# Patient Record
Sex: MALE | Race: WHITE | NOT HISPANIC OR LATINO | Employment: OTHER | URBAN - METROPOLITAN AREA
[De-identification: names, ages, dates, MRNs, and addresses within clinical notes are randomized per-mention and may not be internally consistent; named-entity substitution may affect disease eponyms.]

---

## 2017-08-16 RX ORDER — TIZANIDINE 4 MG/1
TABLET ORAL
Qty: 45 TABLET | Refills: 2 | Status: SHIPPED | OUTPATIENT
Start: 2017-08-16 | End: 2018-10-23

## 2017-11-08 RX ORDER — ALBUTEROL SULFATE 90 UG/1
AEROSOL, METERED RESPIRATORY (INHALATION)
Qty: 9 EACH | Refills: 11 | Status: SHIPPED | OUTPATIENT
Start: 2017-11-08 | End: 2018-09-03 | Stop reason: SDUPTHER

## 2017-11-15 ENCOUNTER — CLINICAL SUPPORT (OUTPATIENT)
Dept: PRIMARY CARE CLINIC | Facility: CLINIC | Age: 67
End: 2017-11-15
Payer: MEDICARE

## 2017-11-15 ENCOUNTER — OFFICE VISIT (OUTPATIENT)
Dept: PRIMARY CARE CLINIC | Facility: CLINIC | Age: 67
End: 2017-11-15
Payer: MEDICARE

## 2017-11-15 VITALS
OXYGEN SATURATION: 93 % | DIASTOLIC BLOOD PRESSURE: 64 MMHG | RESPIRATION RATE: 18 BRPM | BODY MASS INDEX: 26.22 KG/M2 | HEART RATE: 99 BPM | HEIGHT: 70 IN | WEIGHT: 183.13 LBS | SYSTOLIC BLOOD PRESSURE: 131 MMHG | TEMPERATURE: 98 F

## 2017-11-15 DIAGNOSIS — Z12.5 PROSTATE CANCER SCREENING: ICD-10-CM

## 2017-11-15 DIAGNOSIS — J40 BRONCHITIS: ICD-10-CM

## 2017-11-15 DIAGNOSIS — R53.83 FATIGUE, UNSPECIFIED TYPE: ICD-10-CM

## 2017-11-15 DIAGNOSIS — R06.02 SOB (SHORTNESS OF BREATH): ICD-10-CM

## 2017-11-15 DIAGNOSIS — M25.552 HIP PAIN, BILATERAL: ICD-10-CM

## 2017-11-15 DIAGNOSIS — J32.9 SINUSITIS, UNSPECIFIED CHRONICITY, UNSPECIFIED LOCATION: Primary | ICD-10-CM

## 2017-11-15 DIAGNOSIS — J44.9 CHRONIC OBSTRUCTIVE PULMONARY DISEASE, UNSPECIFIED COPD TYPE: ICD-10-CM

## 2017-11-15 DIAGNOSIS — Z23 NEED FOR IMMUNIZATION AGAINST INFLUENZA: ICD-10-CM

## 2017-11-15 DIAGNOSIS — M25.551 HIP PAIN, BILATERAL: ICD-10-CM

## 2017-11-15 DIAGNOSIS — N40.0 BENIGN PROSTATIC HYPERPLASIA, UNSPECIFIED WHETHER LOWER URINARY TRACT SYMPTOMS PRESENT: ICD-10-CM

## 2017-11-15 DIAGNOSIS — Z72.0 TOBACCO USE: ICD-10-CM

## 2017-11-15 DIAGNOSIS — J45.40 MODERATE PERSISTENT ASTHMA WITHOUT COMPLICATION: ICD-10-CM

## 2017-11-15 LAB
ALBUMIN SERPL BCP-MCNC: 3.6 G/DL
ALP SERPL-CCNC: 122 U/L
ALT SERPL W/O P-5'-P-CCNC: 15 U/L
ANION GAP SERPL CALC-SCNC: 12 MMOL/L
AST SERPL-CCNC: 27 U/L
BASOPHILS # BLD AUTO: 0.13 K/UL
BASOPHILS NFR BLD: 1.4 %
BILIRUB SERPL-MCNC: 0.4 MG/DL
BUN SERPL-MCNC: 15 MG/DL
CALCIUM SERPL-MCNC: 10.4 MG/DL
CHLORIDE SERPL-SCNC: 105 MMOL/L
CO2 SERPL-SCNC: 26 MMOL/L
COMPLEXED PSA SERPL-MCNC: 1.2 NG/ML
CREAT SERPL-MCNC: 1.2 MG/DL
DIFFERENTIAL METHOD: ABNORMAL
EOSINOPHIL # BLD AUTO: 0.2 K/UL
EOSINOPHIL NFR BLD: 1.7 %
ERYTHROCYTE [DISTWIDTH] IN BLOOD BY AUTOMATED COUNT: 13.6 %
EST. GFR  (AFRICAN AMERICAN): >60 ML/MIN/1.73 M^2
EST. GFR  (NON AFRICAN AMERICAN): >60 ML/MIN/1.73 M^2
GLUCOSE SERPL-MCNC: 93 MG/DL
HCT VFR BLD AUTO: 46.8 %
HGB BLD-MCNC: 15.4 G/DL
IMM GRANULOCYTES # BLD AUTO: 0.02 K/UL
IMM GRANULOCYTES NFR BLD AUTO: 0.2 %
LYMPHOCYTES # BLD AUTO: 2.7 K/UL
LYMPHOCYTES NFR BLD: 29 %
MCH RBC QN AUTO: 32.3 PG
MCHC RBC AUTO-ENTMCNC: 32.9 G/DL
MCV RBC AUTO: 98 FL
MONOCYTES # BLD AUTO: 1 K/UL
MONOCYTES NFR BLD: 10.8 %
NEUTROPHILS # BLD AUTO: 5.3 K/UL
NEUTROPHILS NFR BLD: 56.9 %
NRBC BLD-RTO: 0 /100 WBC
PLATELET # BLD AUTO: 379 K/UL
PMV BLD AUTO: 9.3 FL
POTASSIUM SERPL-SCNC: 4.4 MMOL/L
PROT SERPL-MCNC: 8.9 G/DL
RBC # BLD AUTO: 4.77 M/UL
SODIUM SERPL-SCNC: 143 MMOL/L
TSH SERPL DL<=0.005 MIU/L-ACNC: 1.26 UIU/ML
WBC # BLD AUTO: 9.36 K/UL

## 2017-11-15 PROCEDURE — 99215 OFFICE O/P EST HI 40 MIN: CPT | Mod: PBBFAC,25,27,PN | Performed by: INTERNAL MEDICINE

## 2017-11-15 PROCEDURE — 99213 OFFICE O/P EST LOW 20 MIN: CPT | Mod: S$PBB,,, | Performed by: INTERNAL MEDICINE

## 2017-11-15 PROCEDURE — 85025 COMPLETE CBC W/AUTO DIFF WBC: CPT

## 2017-11-15 PROCEDURE — 99212 OFFICE O/P EST SF 10 MIN: CPT | Mod: PBBFAC,PN

## 2017-11-15 PROCEDURE — G0008 ADMIN INFLUENZA VIRUS VAC: HCPCS | Mod: PBBFAC,PN

## 2017-11-15 PROCEDURE — 84443 ASSAY THYROID STIM HORMONE: CPT

## 2017-11-15 PROCEDURE — 99999 PR PBB SHADOW E&M-EST. PATIENT-LVL II: CPT | Mod: PBBFAC,,,

## 2017-11-15 PROCEDURE — 96372 THER/PROPH/DIAG INJ SC/IM: CPT | Mod: PBBFAC,PN

## 2017-11-15 PROCEDURE — 84153 ASSAY OF PSA TOTAL: CPT

## 2017-11-15 PROCEDURE — 99999 PR PBB SHADOW E&M-EST. PATIENT-LVL V: CPT | Mod: PBBFAC,,, | Performed by: INTERNAL MEDICINE

## 2017-11-15 PROCEDURE — 80053 COMPREHEN METABOLIC PANEL: CPT

## 2017-11-15 RX ORDER — CYANOCOBALAMIN 1000 UG/ML
1000 INJECTION, SOLUTION INTRAMUSCULAR; SUBCUTANEOUS
Status: COMPLETED | OUTPATIENT
Start: 2017-11-15 | End: 2017-11-15

## 2017-11-15 RX ORDER — BETAMETHASONE SODIUM PHOSPHATE AND BETAMETHASONE ACETATE 3; 3 MG/ML; MG/ML
12 INJECTION, SUSPENSION INTRA-ARTICULAR; INTRALESIONAL; INTRAMUSCULAR; SOFT TISSUE
Status: COMPLETED | OUTPATIENT
Start: 2017-11-15 | End: 2017-11-15

## 2017-11-15 RX ADMIN — CYANOCOBALAMIN 1000 MCG: 1000 INJECTION, SOLUTION INTRAMUSCULAR; SUBCUTANEOUS at 10:11

## 2017-11-15 RX ADMIN — BETAMETHASONE ACETATE AND BETAMETHASONE SODIUM PHOSPHATE 12 MG: 3; 3 INJECTION, SUSPENSION INTRA-ARTICULAR; INTRALESIONAL; INTRAMUSCULAR; SOFT TISSUE at 10:11

## 2017-11-16 ENCOUNTER — TELEPHONE (OUTPATIENT)
Dept: PRIMARY CARE CLINIC | Facility: CLINIC | Age: 67
End: 2017-11-16

## 2017-11-16 RX ORDER — AZITHROMYCIN 250 MG/1
TABLET, FILM COATED ORAL
Qty: 6 TABLET | Refills: 0 | Status: SHIPPED | OUTPATIENT
Start: 2017-11-16 | End: 2017-11-21

## 2017-11-16 RX ORDER — PREDNISONE 20 MG/1
20 TABLET ORAL 2 TIMES DAILY
Qty: 14 TABLET | Refills: 0 | Status: SHIPPED | OUTPATIENT
Start: 2017-11-16 | End: 2017-11-23

## 2017-11-16 RX ORDER — TIOTROPIUM BROMIDE 18 UG/1
18 CAPSULE ORAL; RESPIRATORY (INHALATION) DAILY
Qty: 30 CAPSULE | Refills: 5 | Status: SHIPPED | OUTPATIENT
Start: 2017-11-16 | End: 2018-10-23

## 2017-11-16 NOTE — TELEPHONE ENCOUNTER
----- Message from Gonzalez Gonzales sent at 11/16/2017  9:42 AM CST -----  Contact: Wife,Yamilex Kaminski want to know if you going to send new rx to pharmacy if so please send to [facility], any questions please call back at [phone] (home)     [address] [facility] [address]  [place] [address]  Phone: [phone] Fax: [phone]

## 2017-11-16 NOTE — PROGRESS NOTES
Subjective:       Patient ID: Uriah Mills is a 67 y.o. male.    Chief Complaint: Flu Vaccine and bilateral hip pain    HPI  Pt need Flu vaccine and c/o bilateral hip pain increase when ambulate  No trauma no sob cp also c/o coughing congestion had colonoscopy <10yrs and normal and problems with prostae see urologist  Review of Systems    Objective:      Physical Exam   Constitutional: He is oriented to person, place, and time. He appears well-developed and well-nourished. No distress.   HENT:   Head: Normocephalic and atraumatic.   Right Ear: External ear normal.   Left Ear: External ear normal.   Nose: Nose normal.   Mouth/Throat: Oropharynx is clear and moist. No oropharyngeal exudate.   Eyes: Conjunctivae and EOM are normal. Pupils are equal, round, and reactive to light. Right eye exhibits no discharge. Left eye exhibits no discharge.   Neck: Normal range of motion. Neck supple. No thyromegaly present.   Cardiovascular: Normal rate, regular rhythm, normal heart sounds and intact distal pulses.  Exam reveals no gallop and no friction rub.    No murmur heard.  Pulmonary/Chest: Effort normal and breath sounds normal. No respiratory distress. He has no wheezes. He has no rales. He exhibits no tenderness.   Abdominal: Soft. Bowel sounds are normal. He exhibits no distension. There is no tenderness. There is no rebound and no guarding.   Musculoskeletal: Normal range of motion. He exhibits no edema, tenderness (bilateral hip pain and loer back pain increase with ambulation) or deformity.   Lymphadenopathy:     He has no cervical adenopathy.   Neurological: He is alert and oriented to person, place, and time.   Skin: Skin is warm and dry. Capillary refill takes less than 2 seconds. No rash noted. No erythema.   Psychiatric: He has a normal mood and affect. Judgment and thought content normal.   Nursing note and vitals reviewed.      Assessment:       1. Sinusitis, unspecified chronicity, unspecified location    2.  Bronchitis    3. Tobacco use    4. Hip pain, bilateral    5. Chronic obstructive pulmonary disease, unspecified COPD type    6. Benign prostatic hyperplasia, unspecified whether lower urinary tract symptoms present    7. Prostate cancer screening    8. Need for immunization against influenza    9. Fatigue, unspecified type    10. SOB (shortness of breath)    11. Moderate persistent asthma without complication        Plan:       Sinusitis, unspecified chronicity, unspecified location  -     azithromycin (Z-KADE) 250 MG tablet; Take 2 tablets by mouth on day 1; Take 1 tablet by mouth on days 2-5  Dispense: 6 tablet; Refill: 0    Bronchitis  -     CBC auto differential; Future; Expected date: 11/15/2017  -     Comprehensive metabolic panel; Future; Expected date: 11/15/2017  -     X-Ray Chest PA And Lateral; Future; Expected date: 11/15/2017  -     betamethasone acetate-betamethasone sodium phosphate injection 12 mg; Inject 2 mLs (12 mg total) into the muscle one time.    Tobacco use    Hip pain, bilateral  -     X-Ray Hip 3 or 4 views Bilateral; Future; Expected date: 11/15/2017  -     X-Ray Lumbar Spine Ap And Lateral; Future; Expected date: 11/15/2017    Chronic obstructive pulmonary disease, unspecified COPD type  -     tiotropium (SPIRIVA) 18 mcg inhalation capsule; Inhale 1 capsule (18 mcg total) into the lungs once daily. Controller  Dispense: 30 capsule; Refill: 5  -     predniSONE (DELTASONE) 20 MG tablet; Take 1 tablet (20 mg total) by mouth 2 (two) times daily.  Dispense: 14 tablet; Refill: 0    Benign prostatic hyperplasia, unspecified whether lower urinary tract symptoms present  -     POCT URINE DIPSTICK WITHOUT MICROSCOPE  -     Ambulatory referral to Urology    Prostate cancer screening  -     PSA, Screening; Future; Expected date: 02/15/2018    Need for immunization against influenza  -     Influenza - High Dose (65+) (PF) (IM)    Fatigue, unspecified type  -     TSH; Future; Expected date:  11/15/2017  -     cyanocobalamin injection 1,000 mcg; Inject 1 mL (1,000 mcg total) into the muscle one time.    SOB (shortness of breath)  -     POCT EKG 12-LEAD (NOT FOR [facility] USE); Future; Expected date: 11/16/2017    Moderate persistent asthma without complication

## 2017-11-17 ENCOUNTER — PATIENT MESSAGE (OUTPATIENT)
Dept: PRIMARY CARE CLINIC | Facility: CLINIC | Age: 67
End: 2017-11-17

## 2017-11-17 DIAGNOSIS — J44.9 CHRONIC OBSTRUCTIVE PULMONARY DISEASE, UNSPECIFIED COPD TYPE: ICD-10-CM

## 2017-11-17 DIAGNOSIS — N40.0 BENIGN PROSTATIC HYPERPLASIA, UNSPECIFIED WHETHER LOWER URINARY TRACT SYMPTOMS PRESENT: Primary | ICD-10-CM

## 2017-11-17 RX ORDER — SILODOSIN 8 MG/1
8 CAPSULE ORAL DAILY
Qty: 30 CAPSULE | Refills: 11 | Status: SHIPPED | OUTPATIENT
Start: 2017-11-17 | End: 2018-10-23

## 2017-11-20 ENCOUNTER — TELEPHONE (OUTPATIENT)
Dept: PRIMARY CARE CLINIC | Facility: CLINIC | Age: 67
End: 2017-11-20

## 2017-11-20 NOTE — TELEPHONE ENCOUNTER
----- Message from Geo Montalvo MD sent at 11/19/2017  8:13 PM CST -----  Please call the patient regarding his labs are normal so far

## 2017-11-24 ENCOUNTER — TELEPHONE (OUTPATIENT)
Dept: PRIMARY CARE CLINIC | Facility: CLINIC | Age: 67
End: 2017-11-24

## 2017-11-24 NOTE — TELEPHONE ENCOUNTER
----- Message from Veronica Tavarez sent at 11/24/2017  9:50 AM CST -----  Yamilex Mills / [phone] returning call for Nahomy

## 2017-11-24 NOTE — TELEPHONE ENCOUNTER
Patient notified with results, also documented in result notes. Wife states that there were 2 medications that were not covered by the insurance and too pricey. Just wanted it documented in the record

## 2018-09-04 RX ORDER — ALBUTEROL SULFATE 90 UG/1
AEROSOL, METERED RESPIRATORY (INHALATION)
Qty: 9 EACH | Refills: 11 | Status: SHIPPED | OUTPATIENT
Start: 2018-09-04 | End: 2019-03-13

## 2018-10-23 ENCOUNTER — OFFICE VISIT (OUTPATIENT)
Dept: PRIMARY CARE CLINIC | Facility: CLINIC | Age: 68
End: 2018-10-23
Payer: MEDICARE

## 2018-10-23 VITALS
DIASTOLIC BLOOD PRESSURE: 66 MMHG | SYSTOLIC BLOOD PRESSURE: 134 MMHG | HEIGHT: 70 IN | TEMPERATURE: 98 F | HEART RATE: 82 BPM | RESPIRATION RATE: 18 BRPM | OXYGEN SATURATION: 97 % | WEIGHT: 188.5 LBS | BODY MASS INDEX: 26.99 KG/M2

## 2018-10-23 DIAGNOSIS — Z23 NEED FOR IMMUNIZATION AGAINST TETANUS ALONE: ICD-10-CM

## 2018-10-23 DIAGNOSIS — L03.032 INFECTION OF NAIL BED OF TOE OF LEFT FOOT: Primary | ICD-10-CM

## 2018-10-23 DIAGNOSIS — Z23 NEED FOR PROPHYLACTIC VACCINATION AND INOCULATION AGAINST INFLUENZA: ICD-10-CM

## 2018-10-23 DIAGNOSIS — J44.9 CHRONIC OBSTRUCTIVE PULMONARY DISEASE, UNSPECIFIED COPD TYPE: ICD-10-CM

## 2018-10-23 DIAGNOSIS — N40.0 BENIGN PROSTATIC HYPERPLASIA, UNSPECIFIED WHETHER LOWER URINARY TRACT SYMPTOMS PRESENT: ICD-10-CM

## 2018-10-23 PROCEDURE — 99999 PR PBB SHADOW E&M-EST. PATIENT-LVL III: CPT | Mod: PBBFAC,,, | Performed by: INTERNAL MEDICINE

## 2018-10-23 PROCEDURE — 99213 OFFICE O/P EST LOW 20 MIN: CPT | Mod: PBBFAC,PN,25 | Performed by: INTERNAL MEDICINE

## 2018-10-23 PROCEDURE — 90662 IIV NO PRSV INCREASED AG IM: CPT | Mod: PBBFAC,PN

## 2018-10-23 PROCEDURE — 99213 OFFICE O/P EST LOW 20 MIN: CPT | Mod: S$PBB,,, | Performed by: INTERNAL MEDICINE

## 2018-10-23 PROCEDURE — 90714 TD VACC NO PRESV 7 YRS+ IM: CPT | Mod: PBBFAC,PN

## 2018-10-23 PROCEDURE — 1101F PT FALLS ASSESS-DOCD LE1/YR: CPT | Mod: ,,, | Performed by: INTERNAL MEDICINE

## 2018-10-23 RX ORDER — SILODOSIN 8 MG/1
8 CAPSULE ORAL DAILY
Qty: 30 CAPSULE | Refills: 11 | Status: SHIPPED | OUTPATIENT
Start: 2018-10-23 | End: 2019-03-13

## 2018-10-23 RX ORDER — SULFAMETHOXAZOLE AND TRIMETHOPRIM 800; 160 MG/1; MG/1
1 TABLET ORAL 2 TIMES DAILY
Qty: 20 TABLET | Refills: 0 | Status: SHIPPED | OUTPATIENT
Start: 2018-10-23 | End: 2018-11-02

## 2018-10-23 RX ORDER — CLINDAMYCIN HYDROCHLORIDE 300 MG/1
300 CAPSULE ORAL 4 TIMES DAILY
Qty: 40 CAPSULE | Refills: 0 | Status: SHIPPED | OUTPATIENT
Start: 2018-10-23 | End: 2019-03-13

## 2018-10-23 RX ORDER — MUPIROCIN 20 MG/G
OINTMENT TOPICAL 2 TIMES DAILY
Qty: 22 G | Refills: 0 | Status: SHIPPED | OUTPATIENT
Start: 2018-10-23 | End: 2019-03-13

## 2018-10-23 NOTE — PROGRESS NOTES
Patient ID by name and . NKDA. Influenza High Dose vaccine given IM in right deltoid and Tetanus vaccine given IM in left deltoid using aseptic technique. Aspirated with no blood noted. Patient tolerated well. Given per physicians order. No adverse reactions noted.

## 2018-10-24 NOTE — PROGRESS NOTES
Subjective:       Patient ID: Uriah Mills is a 67 y.o. male.    Chief Complaint: Insect Bite    HPI patient complained of possible insect by in the left big toe now with swelling redness at the base of the nail bed with no clear yellow day and is unwitnessed and the toenail at the tip no fever no trauma no short of breath or chest pain has not any had tetanus vaccine since Silke more and 12 year  Review of Systems    Objective:      Physical Exam   Constitutional: He is oriented to person, place, and time. He appears well-developed and well-nourished. No distress.   HENT:   Head: Normocephalic and atraumatic.   Right Ear: External ear normal.   Left Ear: External ear normal.   Nose: Nose normal.   Mouth/Throat: Oropharynx is clear and moist. No oropharyngeal exudate.   Eyes: Conjunctivae and EOM are normal. Pupils are equal, round, and reactive to light. Right eye exhibits no discharge. Left eye exhibits no discharge.   Neck: Normal range of motion. Neck supple. No thyromegaly present.   Cardiovascular: Normal rate, regular rhythm, normal heart sounds and intact distal pulses. Exam reveals no gallop and no friction rub.   No murmur heard.  Pulmonary/Chest: Effort normal and breath sounds normal. No respiratory distress. He has no wheezes. He has no rales. He exhibits no tenderness.   Abdominal: Soft. Bowel sounds are normal. He exhibits no distension. There is no tenderness. There is no rebound and no guarding.   Musculoskeletal: Normal range of motion. He exhibits tenderness (Connor an erythematous at the base of the left big toenail with yellow discoloration of the neck to and some clear yellow urine drainage on the medial tip of the toenail). He exhibits no edema or deformity.   Lymphadenopathy:     He has no cervical adenopathy.   Neurological: He is alert and oriented to person, place, and time.   Skin: Skin is warm and dry. Capillary refill takes less than 2 seconds. No rash noted. No erythema.    Psychiatric: He has a normal mood and affect. Judgment and thought content normal.   Nursing note and vitals reviewed.      Assessment:       1. Infection of nail bed of toe of left foot    2. Chronic obstructive pulmonary disease, unspecified COPD type    3. Benign prostatic hyperplasia, unspecified whether lower urinary tract symptoms present    4. Need for immunization against tetanus alone    5. Need for prophylactic vaccination and inoculation against influenza        Plan:       Infection of nail bed of toe of left foot  Comments:  Local wound care with the antiseptic sap peroxide and apply Bactroban twice a day  Orders:  -     clindamycin (CLEOCIN) 300 MG capsule; Take 1 capsule (300 mg total) by mouth 4 (four) times daily.  Dispense: 40 capsule; Refill: 0  -     sulfamethoxazole-trimethoprim 800-160mg (BACTRIM DS) 800-160 mg Tab; Take 1 tablet by mouth 2 (two) times daily. for 10 days  Dispense: 20 tablet; Refill: 0  -     mupirocin (BACTROBAN) 2 % ointment; Apply topically 2 (two) times daily.  Dispense: 22 g; Refill: 0    Chronic obstructive pulmonary disease, unspecified COPD type  -     umeclidinium-vilanterol (ANORO ELLIPTA) 62.5-25 mcg/actuation DsDv; Inhale 1 puff into the lungs once daily. Controller  Dispense: 60 each; Refill: 5    Benign prostatic hyperplasia, unspecified whether lower urinary tract symptoms present  -     silodosin (RAPAFLO) 8 mg Cap capsule; Take 1 capsule (8 mg total) by mouth once daily.  Dispense: 30 capsule; Refill: 11    Need for immunization against tetanus alone  -     (In Office Administered) Td Vaccine - Preservative Free    Need for prophylactic vaccination and inoculation against influenza  -     Flu Vaccine - High Dose (PF) (65+)

## 2019-03-13 ENCOUNTER — OFFICE VISIT (OUTPATIENT)
Dept: PRIMARY CARE CLINIC | Facility: CLINIC | Age: 69
End: 2019-03-13
Payer: MEDICARE

## 2019-03-13 VITALS
TEMPERATURE: 98 F | SYSTOLIC BLOOD PRESSURE: 139 MMHG | HEIGHT: 70 IN | DIASTOLIC BLOOD PRESSURE: 68 MMHG | BODY MASS INDEX: 27.44 KG/M2 | HEART RATE: 90 BPM | OXYGEN SATURATION: 97 % | RESPIRATION RATE: 18 BRPM | WEIGHT: 191.69 LBS

## 2019-03-13 DIAGNOSIS — Z11.59 NEED FOR HEPATITIS C SCREENING TEST: ICD-10-CM

## 2019-03-13 DIAGNOSIS — G56.01 CARPAL TUNNEL SYNDROME OF RIGHT WRIST: Primary | ICD-10-CM

## 2019-03-13 DIAGNOSIS — Z13.6 ENCOUNTER FOR SCREENING FOR CARDIOVASCULAR DISORDERS: ICD-10-CM

## 2019-03-13 DIAGNOSIS — L98.9 SKIN LESIONS: ICD-10-CM

## 2019-03-13 DIAGNOSIS — Z00.00 ROUTINE MEDICAL EXAM: ICD-10-CM

## 2019-03-13 DIAGNOSIS — Z12.11 COLON CANCER SCREENING: ICD-10-CM

## 2019-03-13 DIAGNOSIS — J44.9 CHRONIC OBSTRUCTIVE PULMONARY DISEASE, UNSPECIFIED COPD TYPE: ICD-10-CM

## 2019-03-13 DIAGNOSIS — R06.02 SOB (SHORTNESS OF BREATH) ON EXERTION: ICD-10-CM

## 2019-03-13 DIAGNOSIS — L57.0 ACTINIC KERATOSES: ICD-10-CM

## 2019-03-13 DIAGNOSIS — Z12.5 PROSTATE CANCER SCREENING: ICD-10-CM

## 2019-03-13 DIAGNOSIS — N40.0 BENIGN PROSTATIC HYPERPLASIA, UNSPECIFIED WHETHER LOWER URINARY TRACT SYMPTOMS PRESENT: ICD-10-CM

## 2019-03-13 LAB
BILIRUB SERPL-MCNC: NORMAL MG/DL
BLOOD URINE, POC: NORMAL
COLOR, POC UA: YELLOW
GLUCOSE UR QL STRIP: NORMAL
KETONES UR QL STRIP: NORMAL
LEUKOCYTE ESTERASE URINE, POC: NORMAL
NITRITE, POC UA: NORMAL
PH, POC UA: 5
PROTEIN, POC: NORMAL
SPECIFIC GRAVITY, POC UA: 1.01
UROBILINOGEN, POC UA: NORMAL

## 2019-03-13 PROCEDURE — 93005 ELECTROCARDIOGRAM TRACING: CPT | Mod: S$GLB,,, | Performed by: INTERNAL MEDICINE

## 2019-03-13 PROCEDURE — 99499 RISK ADDL DX/OHS AUDIT: ICD-10-PCS | Mod: S$GLB,,, | Performed by: INTERNAL MEDICINE

## 2019-03-13 PROCEDURE — 93005 EKG 12-LEAD: ICD-10-PCS | Mod: S$GLB,,, | Performed by: INTERNAL MEDICINE

## 2019-03-13 PROCEDURE — 93010 EKG 12-LEAD: ICD-10-PCS | Mod: S$GLB,,, | Performed by: INTERNAL MEDICINE

## 2019-03-13 PROCEDURE — 99999 PR PBB SHADOW E&M-EST. PATIENT-LVL V: CPT | Mod: PBBFAC,,, | Performed by: INTERNAL MEDICINE

## 2019-03-13 PROCEDURE — 1101F PT FALLS ASSESS-DOCD LE1/YR: CPT | Mod: CPTII,S$GLB,, | Performed by: INTERNAL MEDICINE

## 2019-03-13 PROCEDURE — 81002 URINALYSIS NONAUTO W/O SCOPE: CPT | Mod: S$GLB,,, | Performed by: INTERNAL MEDICINE

## 2019-03-13 PROCEDURE — 93010 ELECTROCARDIOGRAM REPORT: CPT | Mod: S$GLB,,, | Performed by: INTERNAL MEDICINE

## 2019-03-13 PROCEDURE — 99214 PR OFFICE/OUTPT VISIT, EST, LEVL IV, 30-39 MIN: ICD-10-PCS | Mod: 25,S$GLB,, | Performed by: INTERNAL MEDICINE

## 2019-03-13 PROCEDURE — 81002 POCT URINE DIPSTICK WITHOUT MICROSCOPE: ICD-10-PCS | Mod: S$GLB,,, | Performed by: INTERNAL MEDICINE

## 2019-03-13 PROCEDURE — 99999 PR PBB SHADOW E&M-EST. PATIENT-LVL V: ICD-10-PCS | Mod: PBBFAC,,, | Performed by: INTERNAL MEDICINE

## 2019-03-13 PROCEDURE — 99499 UNLISTED E&M SERVICE: CPT | Mod: S$GLB,,, | Performed by: INTERNAL MEDICINE

## 2019-03-13 PROCEDURE — 1101F PR PT FALLS ASSESS DOC 0-1 FALLS W/OUT INJ PAST YR: ICD-10-PCS | Mod: CPTII,S$GLB,, | Performed by: INTERNAL MEDICINE

## 2019-03-13 PROCEDURE — 99214 OFFICE O/P EST MOD 30 MIN: CPT | Mod: 25,S$GLB,, | Performed by: INTERNAL MEDICINE

## 2019-03-13 RX ORDER — TERAZOSIN 5 MG/1
5 CAPSULE ORAL NIGHTLY
Qty: 30 CAPSULE | Refills: 11 | Status: SHIPPED | OUTPATIENT
Start: 2019-03-13 | End: 2020-03-15

## 2019-03-13 RX ORDER — TAMSULOSIN HYDROCHLORIDE 0.4 MG/1
0.4 CAPSULE ORAL DAILY
Qty: 30 CAPSULE | Refills: 11 | Status: SHIPPED | OUTPATIENT
Start: 2019-03-13 | End: 2020-03-15

## 2019-03-13 NOTE — PROGRESS NOTES
Subjective:       Patient ID: Uriah Mills is a 68 y.o. male.    Chief Complaint: Discuss Medications; Arthritis; and Prostate Problem    HPI patient is here for follow-up routine and problem with medication since new insurance had to change his the medicine for benign prostate hypertrophy to get a medication in the formulary and also inhaler for his COPD asthma also need to be changed patient complained of shortness of breath with exertion but still smoking no chest pain no headache dizziness or syncope he had colonoscopy more than 10 years ago and was normal has symptom a carpal tunnel syndrome right hand had treatment but not better patient work as a  for many years before retired patient still smoking but trying to quit patient see Dr. Dsouza and as his cardiologist also complain by his right hip has been hurting with ambulation but not severe  Review of Systems    Objective:      Physical Exam   Constitutional: He is oriented to person, place, and time. He appears well-developed and well-nourished. No distress.   HENT:   Head: Normocephalic and atraumatic.   Right Ear: External ear normal.   Left Ear: External ear normal.   Nose: Nose normal.   Mouth/Throat: Oropharynx is clear and moist. No oropharyngeal exudate.   Eyes: Conjunctivae and EOM are normal. Pupils are equal, round, and reactive to light. Right eye exhibits no discharge. Left eye exhibits no discharge.   Neck: Normal range of motion. Neck supple. No thyromegaly present.   Cardiovascular: Normal rate, regular rhythm, normal heart sounds and intact distal pulses. Exam reveals no gallop and no friction rub.   No murmur heard.  Pulmonary/Chest: Effort normal and breath sounds normal. No respiratory distress. He has no wheezes. He has no rales. He exhibits no tenderness.   Abdominal: Soft. Bowel sounds are normal. He exhibits no distension. There is no tenderness. There is no rebound and no guarding.   Musculoskeletal: Normal range of  motion. He exhibits no edema, tenderness or deformity.   Lymphadenopathy:     He has no cervical adenopathy.   Neurological: He is alert and oriented to person, place, and time.   Skin: Skin is warm and dry. Capillary refill takes less than 2 seconds. No rash noted. No erythema.   Psychiatric: He has a normal mood and affect. Judgment and thought content normal.   Nursing note and vitals reviewed.      Assessment:       1. Carpal tunnel syndrome of right wrist    2. Benign prostatic hyperplasia, unspecified whether lower urinary tract symptoms present    3. Chronic obstructive pulmonary disease, unspecified COPD type    4. SOB (shortness of breath) on exertion    5. Prostate cancer screening    6. Colon cancer screening    7. Need for hepatitis C screening test    8. Routine medical exam    9. Encounter for screening for cardiovascular disorders    10. Skin lesions    11. Actinic keratoses        Plan:       Carpal tunnel syndrome of right wrist  Comments:  Orthopedic consult for carpal tunnel release when patient ready    Benign prostatic hyperplasia, unspecified whether lower urinary tract symptoms present  -     tamsulosin (FLOMAX) 0.4 mg Cap; Take 1 capsule (0.4 mg total) by mouth once daily.  Dispense: 30 capsule; Refill: 11  -     terazosin (HYTRIN) 5 MG capsule; Take 1 capsule (5 mg total) by mouth every evening.  Dispense: 30 capsule; Refill: 11  -     Ambulatory consult to Urology    Chronic obstructive pulmonary disease, unspecified COPD type  -     ipratropium-albuterol (COMBIVENT)  mcg/actuation inhaler; Inhale 2 puffs into the lungs every 6 (six) hours as needed for Wheezing. Rescue  Dispense: 4 g; Refill: 11  -     Complete PFT w/ bronchodilator; Future    SOB (shortness of breath) on exertion  Comments:  Pulmonary from COPD from many years of smoking patient already have cardiologist Dr. Dsouza had cardiac workup with him    Prostate cancer screening  -     PSA, Screening; Future; Expected  date: 03/13/2019    Colon cancer screening  -     Ambulatory referral to Colorectal Surgery    Need for hepatitis C screening test  -     Hepatitis C antibody; Future; Expected date: 03/13/2019    Routine medical exam  -     CBC auto differential; Future; Expected date: 03/13/2019  -     Comprehensive metabolic panel; Future; Expected date: 03/13/2019  -     X-Ray Chest PA And Lateral; Future; Expected date: 03/13/2019  -     POCT URINE DIPSTICK WITHOUT MICROSCOPE  -     SCHEDULED EKG 12-LEAD (to [facility]); Future    Encounter for screening for cardiovascular disorders  -     Lipid panel; Future; Expected date: 03/13/2019    Skin lesions  -     Ambulatory referral to Dermatology    Actinic keratoses  -     Ambulatory referral to Dermatology

## 2019-03-14 NOTE — PATIENT INSTRUCTIONS
Patient is blood test chest x-ray EKG UA  Urology consult for BPH still symptomatic with medication  Colonoscopy with Dr. Sequeira  And dermatology consult for skin lesion

## 2019-03-20 ENCOUNTER — TELEPHONE (OUTPATIENT)
Dept: PRIMARY CARE CLINIC | Facility: CLINIC | Age: 69
End: 2019-03-20

## 2019-03-20 DIAGNOSIS — Z12.11 COLON CANCER SCREENING: Primary | ICD-10-CM

## 2019-03-20 NOTE — TELEPHONE ENCOUNTER
----- Message from Gisela Mills sent at 3/20/2019 10:43 AM CDT -----  Hello,    We have received an FOBT FIT KIT from your patient    Uriah Mills, MRN [id number].There are no orders in EPIC for the testing. Can you please submit an order for the test and please call me @ [phone] to let me know it is in, so that we can process this test. Please call the lab with any questions.    Thank you,   Gisela Mills MT([facility])  [facility]  [phone]

## 2019-03-21 ENCOUNTER — LAB VISIT (OUTPATIENT)
Dept: LAB | Facility: HOSPITAL | Age: 69
End: 2019-03-21
Attending: INTERNAL MEDICINE
Payer: MEDICARE

## 2019-03-21 ENCOUNTER — OFFICE VISIT (OUTPATIENT)
Dept: PRIMARY CARE CLINIC | Facility: CLINIC | Age: 69
End: 2019-03-21
Payer: MEDICARE

## 2019-03-21 VITALS
TEMPERATURE: 98 F | DIASTOLIC BLOOD PRESSURE: 55 MMHG | SYSTOLIC BLOOD PRESSURE: 125 MMHG | OXYGEN SATURATION: 95 % | HEART RATE: 95 BPM | HEIGHT: 70 IN | BODY MASS INDEX: 27.47 KG/M2 | WEIGHT: 191.88 LBS | RESPIRATION RATE: 18 BRPM

## 2019-03-21 DIAGNOSIS — F51.01 PRIMARY INSOMNIA: ICD-10-CM

## 2019-03-21 DIAGNOSIS — M51.36 HERNIATION OF INTERVERTEBRAL DISC OF LUMBAR SPINE DUE TO DEGENERATION: ICD-10-CM

## 2019-03-21 DIAGNOSIS — Z12.11 COLON CANCER SCREENING: ICD-10-CM

## 2019-03-21 DIAGNOSIS — L57.0 ACTINIC KERATOSES: ICD-10-CM

## 2019-03-21 DIAGNOSIS — G56.01 CARPAL TUNNEL SYNDROME OF RIGHT WRIST: ICD-10-CM

## 2019-03-21 DIAGNOSIS — F41.9 ANXIETY: Primary | ICD-10-CM

## 2019-03-21 DIAGNOSIS — M51.26 HERNIATION OF INTERVERTEBRAL DISC OF LUMBAR SPINE DUE TO DEGENERATION: ICD-10-CM

## 2019-03-21 PROCEDURE — 99999 PR PBB SHADOW E&M-EST. PATIENT-LVL IV: ICD-10-PCS | Mod: PBBFAC,,, | Performed by: INTERNAL MEDICINE

## 2019-03-21 PROCEDURE — 1101F PT FALLS ASSESS-DOCD LE1/YR: CPT | Mod: CPTII,S$GLB,, | Performed by: INTERNAL MEDICINE

## 2019-03-21 PROCEDURE — 1101F PR PT FALLS ASSESS DOC 0-1 FALLS W/OUT INJ PAST YR: ICD-10-PCS | Mod: CPTII,S$GLB,, | Performed by: INTERNAL MEDICINE

## 2019-03-21 PROCEDURE — 99214 PR OFFICE/OUTPT VISIT, EST, LEVL IV, 30-39 MIN: ICD-10-PCS | Mod: S$GLB,,, | Performed by: INTERNAL MEDICINE

## 2019-03-21 PROCEDURE — 99999 PR PBB SHADOW E&M-EST. PATIENT-LVL IV: CPT | Mod: PBBFAC,,, | Performed by: INTERNAL MEDICINE

## 2019-03-21 PROCEDURE — 82274 ASSAY TEST FOR BLOOD FECAL: CPT

## 2019-03-21 PROCEDURE — 99214 OFFICE O/P EST MOD 30 MIN: CPT | Mod: S$GLB,,, | Performed by: INTERNAL MEDICINE

## 2019-03-21 RX ORDER — LORAZEPAM 1 MG/1
1 TABLET ORAL NIGHTLY PRN
Qty: 30 TABLET | Refills: 0 | Status: SHIPPED | OUTPATIENT
Start: 2019-03-21 | End: 2021-03-04

## 2019-03-21 RX ORDER — TIZANIDINE 4 MG/1
4 TABLET ORAL 2 TIMES DAILY PRN
Qty: 30 TABLET | Refills: 1 | Status: SHIPPED | OUTPATIENT
Start: 2019-03-21 | End: 2019-03-31

## 2019-03-21 NOTE — PROGRESS NOTES
Subjective:       Patient ID: Uriah Mills is a 68 y.o. male.    Chief Complaint: Results    HPI  patient is here for follow-up and blood test results review labs with palpation CBC CMP UA lipid profile PSA hepatitis C antibody own within normal limits patient has complained of back pain radiated down to both legs this has been ongoing problem from many years sometimes gets worse if he over do things at home still try to stay active patient also complained of symptom right carpal tunnel syndrome wearing wrist brace which help a little but still hurt currently not ready for surgery yet he also complained of anxiety and insomnia past at night cannot sleep uses take medication in the past but not currently request medication to take when needed occasionally denies short of breath chest pain headache dyspnea with exertion  Review of Systems    Objective:      Physical Exam   Constitutional: He is oriented to person, place, and time. He appears well-developed and well-nourished. No distress.   HENT:   Head: Normocephalic and atraumatic.   Right Ear: External ear normal.   Left Ear: External ear normal.   Nose: Nose normal.   Mouth/Throat: Oropharynx is clear and moist. No oropharyngeal exudate.   Eyes: Conjunctivae and EOM are normal. Pupils are equal, round, and reactive to light. Right eye exhibits no discharge. Left eye exhibits no discharge.   Neck: Normal range of motion. Neck supple. No thyromegaly present.   Cardiovascular: Normal rate, regular rhythm, normal heart sounds and intact distal pulses. Exam reveals no gallop and no friction rub.   No murmur heard.  Pulmonary/Chest: Effort normal and breath sounds normal. No respiratory distress. He has no wheezes. He has no rales. He exhibits no tenderness.   Abdominal: Soft. Bowel sounds are normal. He exhibits no distension. There is no tenderness. There is no rebound and no guarding.   Musculoskeletal: Normal range of motion. He exhibits tenderness (Tenderness  across lower back with palpation). He exhibits no edema or deformity.   Lymphadenopathy:     He has no cervical adenopathy.   Neurological: He is alert and oriented to person, place, and time.   Numbness and burning in the hand and fingers from carpal tunnel syndrome   Skin: Skin is warm and dry. Capillary refill takes less than 2 seconds. No rash noted. No erythema.   Diffuse scaly grayish  patchy skin rash in both upper extremity   Psychiatric: He has a normal mood and affect. Judgment and thought content normal.   Nursing note and vitals reviewed.      Assessment:       1. Anxiety    2. Primary insomnia    3. 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 Carpal tunnel syndrome of right wrist    5. Actinic keratoses        Plan:       Anxiety  -     LORazepam (ATIVAN) 1 MG tablet; Take 1 tablet (1 mg total) by mouth nightly as needed for Anxiety (insomnia).  Dispense: 30 tablet; Refill: 0    Primary insomnia  -     LORazepam (ATIVAN) 1 MG tablet; Take 1 tablet (1 mg total) by mouth nightly as needed for Anxiety (insomnia).  Dispense: 30 tablet; Refill: 0    Herniation of intervertebral disc of lumbar spine due to degeneration  Comments:  Patient had multiple epidural injection in the past without relief is to be on pain medication in the past consider neurosurgery consult if not better  Orders:  -     tiZANidine (ZANAFLEX) 4 MG tablet; Take 1 tablet (4 mg total) by mouth 2 (two) times daily as needed (muscle spasm).  Dispense: 30 tablet; Refill: 1    Carpal tunnel syndrome of right wrist  Comments:  Referral to orthopedic when patient ready for surgical intervention    Actinic keratoses  -     Ambulatory Referral to Dermatology

## 2019-03-22 LAB — HEMOCCULT STL QL IA: NEGATIVE

## 2019-03-23 PROBLEM — G56.01 CARPAL TUNNEL SYNDROME OF RIGHT WRIST: Status: ACTIVE | Noted: 2019-03-23

## 2019-04-12 ENCOUNTER — TELEPHONE (OUTPATIENT)
Dept: ORTHOPEDICS | Facility: CLINIC | Age: 69
End: 2019-04-12

## 2019-04-12 DIAGNOSIS — M79.641 PAIN OF RIGHT HAND: Primary | ICD-10-CM

## 2019-04-15 ENCOUNTER — OFFICE VISIT (OUTPATIENT)
Dept: ORTHOPEDICS | Facility: CLINIC | Age: 69
End: 2019-04-15
Payer: MEDICARE

## 2019-04-15 VITALS
DIASTOLIC BLOOD PRESSURE: 66 MMHG | HEART RATE: 85 BPM | SYSTOLIC BLOOD PRESSURE: 143 MMHG | WEIGHT: 193.56 LBS | BODY MASS INDEX: 27.77 KG/M2

## 2019-04-15 DIAGNOSIS — J44.9 CHRONIC OBSTRUCTIVE PULMONARY DISEASE, UNSPECIFIED COPD TYPE: ICD-10-CM

## 2019-04-15 DIAGNOSIS — G56.01 CARPAL TUNNEL SYNDROME OF RIGHT WRIST: Primary | ICD-10-CM

## 2019-04-15 PROCEDURE — 99204 OFFICE O/P NEW MOD 45 MIN: CPT | Mod: S$GLB,,, | Performed by: ORTHOPAEDIC SURGERY

## 2019-04-15 PROCEDURE — 99204 PR OFFICE/OUTPT VISIT, NEW, LEVL IV, 45-59 MIN: ICD-10-PCS | Mod: S$GLB,,, | Performed by: ORTHOPAEDIC SURGERY

## 2019-04-15 PROCEDURE — 1101F PT FALLS ASSESS-DOCD LE1/YR: CPT | Mod: CPTII,S$GLB,, | Performed by: ORTHOPAEDIC SURGERY

## 2019-04-15 PROCEDURE — 1101F PR PT FALLS ASSESS DOC 0-1 FALLS W/OUT INJ PAST YR: ICD-10-PCS | Mod: CPTII,S$GLB,, | Performed by: ORTHOPAEDIC SURGERY

## 2019-04-15 PROCEDURE — 99999 PR PBB SHADOW E&M-EST. PATIENT-LVL III: CPT | Mod: PBBFAC,,, | Performed by: ORTHOPAEDIC SURGERY

## 2019-04-15 PROCEDURE — 99999 PR PBB SHADOW E&M-EST. PATIENT-LVL III: ICD-10-PCS | Mod: PBBFAC,,, | Performed by: ORTHOPAEDIC SURGERY

## 2019-04-15 RX ORDER — CELECOXIB 200 MG/1
200 CAPSULE ORAL EVERY OTHER DAY
Qty: 7 CAPSULE | Refills: 0 | Status: SHIPPED | OUTPATIENT
Start: 2019-04-15 | End: 2019-04-29

## 2019-04-15 RX ORDER — UMECLIDINIUM BROMIDE AND VILANTEROL TRIFENATATE 62.5; 25 UG/1; UG/1
POWDER RESPIRATORY (INHALATION)
Qty: 60 EACH | Refills: 5 | Status: SHIPPED | OUTPATIENT
Start: 2019-04-15 | End: 2019-10-22 | Stop reason: CLARIF

## 2019-04-15 NOTE — PATIENT INSTRUCTIONS
Mr. Mills has symptoms and signs of of carpal tunnel syndrome. He is advised to get X-rays and EMG evaluation and return parminder follow up.

## 2019-04-15 NOTE — PROGRESS NOTES
Subjective:      Patient ID: Uriah Mills is a 68 y.o. male.    Chief Complaint: Pain of the Right Wrist      HPI:Mr. Mills has been having pain , tingling and numbness of right hand for the past 4 months. His symptoms are mostly in his thumb, index and middle fingers. Pain is more at night and it disturbs his sleep. He wears a wrist splint at night and it relieves his symptoms.    Past Medical History:   Diagnosis Date    Arthritis     Gastrointestinal disease      Past Surgical History:   Procedure Laterality Date    APPENDECTOMY      GASTRIC FUNDOPLICATION       Social History     Socioeconomic History    Marital status:      Spouse name: Not on file    Number of children: Not on file    Years of education: Not on file    Highest education level: Not on file   Occupational History    Not on file   Social Needs    Financial resource strain: Not on file    Food insecurity:     Worry: Not on file     Inability: Not on file    Transportation needs:     Medical: Not on file     Non-medical: Not on file   Tobacco Use    Smoking status: Current Every Day Smoker     Types: Cigars    Smokeless tobacco: Never Used   Substance and Sexual Activity    Alcohol use: No    Drug use: No    Sexual activity: Not Currently   Lifestyle    Physical activity:     Days per week: Not on file     Minutes per session: Not on file    Stress: Not on file   Relationships    Social connections:     Talks on phone: Not on file     Gets together: Not on file     Attends [employer] service: Not on file     Active member of club or organization: Not on file     Attends meetings of clubs or organizations: Not on file     Relationship status: Not on file   Other Topics Concern    Not on file   Social History Narrative    Not on file         Current Outpatient Medications:     ipratropium-albuterol (COMBIVENT)  mcg/actuation inhaler, Inhale 2 puffs into the lungs every 6 (six) hours as needed for Wheezing.  Rescue, Disp: 4 g, Rfl: 11    tamsulosin (FLOMAX) 0.4 mg Cap, Take 1 capsule (0.4 mg total) by mouth once daily., Disp: 30 capsule, Rfl: 11    terazosin (HYTRIN) 5 MG capsule, Take 1 capsule (5 mg total) by mouth every evening., Disp: 30 capsule, Rfl: 11    umeclidinium-vilanterol (ANORO ELLIPTA) 62.5-25 mcg/actuation DsDv, Inhale 1 puff into the lungs once daily. Controller, Disp: 60 each, Rfl: 5    LORazepam (ATIVAN) 1 MG tablet, Take 1 tablet (1 mg total) by mouth nightly as needed for Anxiety (insomnia)., Disp: 30 tablet, Rfl: 0  Review of patient's allergies indicates:  No Known Allergies    BP (!) 143/66   Pulse 85   Wt 87.8 kg (193 lb 9 oz)   BMI 27.77 kg/m²     Review of Systems   Constitution: Negative for chills, decreased appetite, diaphoresis, fever, malaise/fatigue, night sweats, weight gain and weight loss.   HENT: Negative.    Eyes: Negative for blurred vision, discharge, double vision, pain, photophobia, redness, vision loss in left eye, vision loss in right eye, visual disturbance and visual halos.   Cardiovascular: Negative.    Respiratory: Negative for cough, hemoptysis, shortness of breath, sleep disturbances due to breathing, snoring, sputum production and wheezing.    Endocrine: Negative.    Skin: Negative for color change, dry skin, flushing, itching, nail changes, poor wound healing, skin cancer, suspicious lesions and unusual hair distribution.   Musculoskeletal: Positive for joint pain, muscle cramps, muscle weakness and myalgias. Negative for arthritis, back pain, falls, gout, joint swelling, neck pain and stiffness.   Gastrointestinal: Negative.    Genitourinary: Negative.    Neurological: Positive for numbness, paresthesias and sensory change. Negative for aphonia, brief paralysis, difficulty with concentration, disturbances in coordination, excessive daytime sleepiness, dizziness, focal weakness, headaches, light-headedness, loss of balance, seizures and tremors.          Objective:    Right Hand Exam     Tenderness   The patient is experiencing tenderness in the bettencourt area.    Range of Motion   Wrist   Extension: normal   Flexion: normal   Pronation: normal   Supination: normal     Muscle Strength   Wrist extension: 4/5   Wrist flexion: 4/5   : 4/5     Tests   Phalens sign: positive  Tinel's sign (median nerve): positive  Finkelstein's test: negative    Other   Erythema: absent  Scars: absent  Sensation: decreased  Pulse: present    Comments:  Mr. Mills has symptoms and signs of carpal tunnel syndrome on right. He ha wasting of thenar muscles in right hand with decreased senation in median nerve distribution.       Left Hand Exam   Left hand exam is normal.               Assessment:     Imagin. Carpal tunnel syndrome of right wrist          Plan:          No follow-ups on file.

## 2019-04-17 ENCOUNTER — PATIENT MESSAGE (OUTPATIENT)
Dept: ORTHOPEDICS | Facility: CLINIC | Age: 69
End: 2019-04-17

## 2019-04-17 ENCOUNTER — TELEPHONE (OUTPATIENT)
Dept: NEUROLOGY | Facility: CLINIC | Age: 69
End: 2019-04-17

## 2019-04-17 NOTE — TELEPHONE ENCOUNTER
Lm on patients mobile vm informing him that his EMG Procedure has been scheduled for the next available date, which is August 6th at 9am.  Mailing confirmation letter today and left my direct contact information is case of any questions or concerns.

## 2019-04-22 DIAGNOSIS — F51.01 PRIMARY INSOMNIA: ICD-10-CM

## 2019-04-22 DIAGNOSIS — F41.9 ANXIETY: ICD-10-CM

## 2019-04-23 RX ORDER — LORAZEPAM 1 MG/1
TABLET ORAL
Qty: 30 TABLET | Refills: 0 | OUTPATIENT
Start: 2019-04-23

## 2019-05-13 RX ORDER — ALBUTEROL SULFATE 90 UG/1
AEROSOL, METERED RESPIRATORY (INHALATION)
Qty: 18 EACH | Refills: 1 | Status: SHIPPED | OUTPATIENT
Start: 2019-05-13 | End: 2019-06-23 | Stop reason: SDUPTHER

## 2019-06-23 RX ORDER — ALBUTEROL SULFATE 90 UG/1
AEROSOL, METERED RESPIRATORY (INHALATION)
Qty: 18 EACH | Refills: 1 | Status: SHIPPED | OUTPATIENT
Start: 2019-06-23 | End: 2019-07-27 | Stop reason: SDUPTHER

## 2019-07-16 ENCOUNTER — PROCEDURE VISIT (OUTPATIENT)
Dept: NEUROLOGY | Facility: CLINIC | Age: 69
End: 2019-07-16
Payer: MEDICARE

## 2019-07-16 ENCOUNTER — TELEPHONE (OUTPATIENT)
Dept: ORTHOPEDICS | Facility: CLINIC | Age: 69
End: 2019-07-16

## 2019-07-16 DIAGNOSIS — G56.01 CARPAL TUNNEL SYNDROME OF RIGHT WRIST: ICD-10-CM

## 2019-07-16 PROCEDURE — 95913 PR NERVE CONDUCTION STUDY; 13 OR MORE STUDIES: ICD-10-PCS | Mod: S$GLB,,, | Performed by: PSYCHIATRY & NEUROLOGY

## 2019-07-16 PROCEDURE — 95886 PR EMG COMPLETE, W/ NERVE CONDUCTION STUDIES, 5+ MUSCLES: ICD-10-PCS | Mod: S$GLB,,, | Performed by: PSYCHIATRY & NEUROLOGY

## 2019-07-16 PROCEDURE — 95913 NRV CNDJ TEST 13/> STUDIES: CPT | Mod: S$GLB,,, | Performed by: PSYCHIATRY & NEUROLOGY

## 2019-07-16 PROCEDURE — 95886 MUSC TEST DONE W/N TEST COMP: CPT | Mod: S$GLB,,, | Performed by: PSYCHIATRY & NEUROLOGY

## 2019-07-27 ENCOUNTER — PATIENT OUTREACH (OUTPATIENT)
Dept: ADMINISTRATIVE | Facility: OTHER | Age: 69
End: 2019-07-27

## 2019-07-27 RX ORDER — ALBUTEROL SULFATE 90 UG/1
AEROSOL, METERED RESPIRATORY (INHALATION)
Qty: 18 EACH | Refills: 1 | Status: SHIPPED | OUTPATIENT
Start: 2019-07-27 | End: 2019-09-06 | Stop reason: SDUPTHER

## 2019-07-30 ENCOUNTER — OFFICE VISIT (OUTPATIENT)
Dept: ORTHOPEDICS | Facility: CLINIC | Age: 69
End: 2019-07-30
Payer: MEDICARE

## 2019-07-30 VITALS
SYSTOLIC BLOOD PRESSURE: 131 MMHG | HEIGHT: 70 IN | HEART RATE: 73 BPM | DIASTOLIC BLOOD PRESSURE: 69 MMHG | BODY MASS INDEX: 27.77 KG/M2

## 2019-07-30 DIAGNOSIS — G56.01 CARPAL TUNNEL SYNDROME OF RIGHT WRIST: Primary | ICD-10-CM

## 2019-07-30 PROCEDURE — 99999 PR PBB SHADOW E&M-EST. PATIENT-LVL III: CPT | Mod: PBBFAC,,, | Performed by: ORTHOPAEDIC SURGERY

## 2019-07-30 PROCEDURE — 1101F PR PT FALLS ASSESS DOC 0-1 FALLS W/OUT INJ PAST YR: ICD-10-PCS | Mod: CPTII,S$GLB,, | Performed by: ORTHOPAEDIC SURGERY

## 2019-07-30 PROCEDURE — 1101F PT FALLS ASSESS-DOCD LE1/YR: CPT | Mod: CPTII,S$GLB,, | Performed by: ORTHOPAEDIC SURGERY

## 2019-07-30 PROCEDURE — 99204 OFFICE O/P NEW MOD 45 MIN: CPT | Mod: S$GLB,,, | Performed by: ORTHOPAEDIC SURGERY

## 2019-07-30 PROCEDURE — 99999 PR PBB SHADOW E&M-EST. PATIENT-LVL III: ICD-10-PCS | Mod: PBBFAC,,, | Performed by: ORTHOPAEDIC SURGERY

## 2019-07-30 PROCEDURE — 99204 PR OFFICE/OUTPT VISIT, NEW, LEVL IV, 45-59 MIN: ICD-10-PCS | Mod: S$GLB,,, | Performed by: ORTHOPAEDIC SURGERY

## 2019-07-30 NOTE — PROGRESS NOTES
H&P  Orthopaedics    SUBJECTIVE:     CC: right wrist pain, bilateral hand numbness at night    History of Present Illness:  Uriah Mills is a 68 y.o. male who presents with over 1 year history of bilateral hand pain and numbness which occurs mainly at night.  He also states over this time he has had increased right wrist pain with movement and decreased range of motion at that wrist.  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  He was previously seeing Dr. Cortes in [facility] who obtained an EMG.  Results of the EMG returned positive for bilateral ulnar nerve neuropathies, severe bilateral carpal tunnel syndrome with chronic denervation in bilateral APB muscles, and chronic denervation of the left C5 and C6 myotomes suggestive for cervical radiculopathy.  He states that his right wrist pain is is most pressing issue at this time. He does state that he has some chronic neck pain that gives him minimal issues.  He has notice some muscle wasting in his right hand over the past year.  He believes his  strength in both hands have decreased significantly over the past year.      Review of patient's allergies indicates:  No Known Allergies    Past Medical History:   Diagnosis Date    Arthritis     Gastrointestinal disease      Past Surgical History:   Procedure Laterality Date    APPENDECTOMY      GASTRIC FUNDOPLICATION       No family history on file.  Social History     Tobacco Use    Smoking status: Current Every Day Smoker     Types: Cigars    Smokeless tobacco: Never Used   Substance Use Topics    Alcohol use: No    Drug use: No        Review of Systems:  Patient denies constitutional symptoms, cardiac symptoms, respiratory symptoms, GI symptoms.  The remainder of the musculoskeletal ROS is included in the HPI.      OBJECTIVE:     Vital Signs (Most Recent)  Pulse: 73 (07/30/19 1028)  BP: 131/69 (07/30/19 1028)    Physical Exam:  Gen:  No  acute distress  CV:  Peripherally well-perfused.  Pulses 2+ bilaterally.  Lungs:  Normal respiratory effort.  Abdomen:  Soft, non-tender, non-distended  Head/Neck:  Normocephalic.  Atraumatic. No TTP, AROM and PROM intact without pain  Neuro:  CN intact without deficit, SILT throughout B/L Upper & Lower Extremities    MSK:  RUE:  - +tinels at carpal tunnel, +median nerve compression test  - Decreased active ROM with flexion of the wrist due to pain  - a trophy the interosseous muscles  - AIN/PIN/Radial/Median/Ulnar Nerves assessed in isolation without deficit  - SILT throughout  - Radial & Ulnar arteries palpated 2+  - Capillary Refill <3s    LUE:  - +median nerve compression  - AROM and PROM of the wrist full without pain  - AIN/PIN/Radial/Median/Ulnar Nerves assessed in isolation without deficit  - SILT throughout  - Radial & Ulnar arteries palpated 2+  - Capillary Refill <3s      Diagnostic Results:  Xray of the right wrist  Impression:  No acute fractures, dislocations, or bony lesion.  Mild radiocarpal joint arthritis    EMG: bilateral ulnar nerve neuropathies, severe bilateral carpal tunnel syndrome with chronic denervation in bilateral APB muscles, and chronic denervation of the left C5 and C6 myotomes suggestive for cervical radiculopathy    ASSESSMENT/PLAN:     A/P: Uriah Mills is a 68 y.o. male  with bilateral carpal tunnel syndrome, bilateral ulnar nerve neuropathies  Both surgical and non-surgical options were discussed with the patient today including NSAIDs, therapy, injections, and surgical intervention. At this time, the patient wishes to proceed with bilateral carpal tunnel injections today as well as nocturnal bracing.  He wishes to hold off on any therapy at this time. Discussed with patient that he will likely need surgery for his carpal tunnel syndrome, but he would like to wait to see how the injections improved his symptoms before deciding whether or not to undergo surgery. Will schedule  follow up in 6 weeks for re-evaluation.         Ken Suarez M.D.   Orthopedic Surgery Resident

## 2019-07-30 NOTE — H&P (VIEW-ONLY)
H&P  Orthopaedics    SUBJECTIVE:     CC: right wrist pain, bilateral hand numbness at night    History of Present Illness:  Uriah Mills is a 68 y.o. male who presents with over 1 year history of bilateral hand pain and numbness which occurs mainly at night.  He also states over this time he has had increased right wrist pain with movement and decreased range of motion at that wrist.  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  He was previously seeing Dr. Cortes in [facility] who obtained an EMG.  Results of the EMG returned positive for bilateral ulnar nerve neuropathies, severe bilateral carpal tunnel syndrome with chronic denervation in bilateral APB muscles, and chronic denervation of the left C5 and C6 myotomes suggestive for cervical radiculopathy.  He states that his right wrist pain is is most pressing issue at this time. He does state that he has some chronic neck pain that gives him minimal issues.  He has notice some muscle wasting in his right hand over the past year.  He believes his  strength in both hands have decreased significantly over the past year.      Review of patient's allergies indicates:  No Known Allergies    Past Medical History:   Diagnosis Date    Arthritis     Gastrointestinal disease      Past Surgical History:   Procedure Laterality Date    APPENDECTOMY      GASTRIC FUNDOPLICATION       No family history on file.  Social History     Tobacco Use    Smoking status: Current Every Day Smoker     Types: Cigars    Smokeless tobacco: Never Used   Substance Use Topics    Alcohol use: No    Drug use: No        Review of Systems:  Patient denies constitutional symptoms, cardiac symptoms, respiratory symptoms, GI symptoms.  The remainder of the musculoskeletal ROS is included in the HPI.      OBJECTIVE:     Vital Signs (Most Recent)  Pulse: 73 (07/30/19 1028)  BP: 131/69 (07/30/19 1028)    Physical Exam:  Gen:  No  acute distress  CV:  Peripherally well-perfused.  Pulses 2+ bilaterally.  Lungs:  Normal respiratory effort.  Abdomen:  Soft, non-tender, non-distended  Head/Neck:  Normocephalic.  Atraumatic. No TTP, AROM and PROM intact without pain  Neuro:  CN intact without deficit, SILT throughout B/L Upper & Lower Extremities    MSK:  RUE:  - +tinels at carpal tunnel, +median nerve compression test  - Decreased active ROM with flexion of the wrist due to pain  - a trophy the interosseous muscles  - AIN/PIN/Radial/Median/Ulnar Nerves assessed in isolation without deficit  - SILT throughout  - Radial & Ulnar arteries palpated 2+  - Capillary Refill <3s    LUE:  - +median nerve compression  - AROM and PROM of the wrist full without pain  - AIN/PIN/Radial/Median/Ulnar Nerves assessed in isolation without deficit  - SILT throughout  - Radial & Ulnar arteries palpated 2+  - Capillary Refill <3s      Diagnostic Results:  Xray of the right wrist  Impression:  No acute fractures, dislocations, or bony lesion.  Mild radiocarpal joint arthritis    EMG: bilateral ulnar nerve neuropathies, severe bilateral carpal tunnel syndrome with chronic denervation in bilateral APB muscles, and chronic denervation of the left C5 and C6 myotomes suggestive for cervical radiculopathy    ASSESSMENT/PLAN:     A/P: Uriah Mills is a 68 y.o. male  with bilateral carpal tunnel syndrome, bilateral ulnar nerve neuropathies  Both surgical and non-surgical options were discussed with the patient today including NSAIDs, therapy, injections, and surgical intervention. At this time, the patient wishes to proceed with bilateral carpal tunnel injections today as well as nocturnal bracing.  He wishes to hold off on any therapy at this time. Discussed with patient that he will likely need surgery for his carpal tunnel syndrome, but he would like to wait to see how the injections improved his symptoms before deciding whether or not to undergo surgery. Will schedule  follow up in 6 weeks for re-evaluation.         Ken Suarez M.D.   Orthopedic Surgery Resident

## 2019-07-31 DIAGNOSIS — G56.01 CARPAL TUNNEL SYNDROME OF RIGHT WRIST: Primary | ICD-10-CM

## 2019-08-01 ENCOUNTER — PATIENT MESSAGE (OUTPATIENT)
Dept: SURGERY | Facility: OTHER | Age: 69
End: 2019-08-01

## 2019-08-08 RX ORDER — ACETAMINOPHEN AND CODEINE PHOSPHATE 300; 30 MG/1; MG/1
1 TABLET ORAL
Qty: 10 TABLET | Refills: 0 | Status: SHIPPED | OUTPATIENT
Start: 2019-08-08 | End: 2020-06-19

## 2019-08-09 ENCOUNTER — TELEPHONE (OUTPATIENT)
Dept: ORTHOPEDICS | Facility: CLINIC | Age: 69
End: 2019-08-09

## 2019-08-09 NOTE — TELEPHONE ENCOUNTER
Called patient regarding arrival time for upcoming surgery. Arrival time is 5:15am on August 12, 2019.

## 2019-08-12 ENCOUNTER — ANESTHESIA EVENT (OUTPATIENT)
Dept: SURGERY | Facility: OTHER | Age: 69
End: 2019-08-12
Payer: MEDICARE

## 2019-08-12 ENCOUNTER — ANESTHESIA (OUTPATIENT)
Dept: SURGERY | Facility: OTHER | Age: 69
End: 2019-08-12
Payer: MEDICARE

## 2019-08-12 ENCOUNTER — HOSPITAL ENCOUNTER (OUTPATIENT)
Facility: OTHER | Age: 69
Discharge: HOME | End: 2019-08-12
Attending: ORTHOPAEDIC SURGERY | Admitting: ORTHOPAEDIC SURGERY
Payer: MEDICARE

## 2019-08-12 VITALS
HEART RATE: 62 BPM | DIASTOLIC BLOOD PRESSURE: 70 MMHG | OXYGEN SATURATION: 97 % | WEIGHT: 190 LBS | TEMPERATURE: 98 F | SYSTOLIC BLOOD PRESSURE: 110 MMHG | HEIGHT: 71 IN | RESPIRATION RATE: 18 BRPM | BODY MASS INDEX: 26.6 KG/M2

## 2019-08-12 DIAGNOSIS — G56.00 CARPAL TUNNEL SYNDROME: Primary | ICD-10-CM

## 2019-08-12 PROCEDURE — 36000707: Performed by: ORTHOPAEDIC SURGERY

## 2019-08-12 PROCEDURE — 20526 THER INJECTION CARP TUNNEL: CPT | Mod: 59,51,LT, | Performed by: ORTHOPAEDIC SURGERY

## 2019-08-12 PROCEDURE — 37000008 HC ANESTHESIA 1ST 15 MINUTES: Performed by: ORTHOPAEDIC SURGERY

## 2019-08-12 PROCEDURE — 63600175 PHARM REV CODE 636 W HCPCS: Performed by: ORTHOPAEDIC SURGERY

## 2019-08-12 PROCEDURE — 63600175 PHARM REV CODE 636 W HCPCS: Performed by: NURSE ANESTHETIST, CERTIFIED REGISTERED

## 2019-08-12 PROCEDURE — 64721 CARPAL TUNNEL SURGERY: CPT | Mod: RT,,, | Performed by: ORTHOPAEDIC SURGERY

## 2019-08-12 PROCEDURE — 20526 PR INJECT CARPAL TUNNEL: ICD-10-PCS | Mod: 59,51,LT, | Performed by: ORTHOPAEDIC SURGERY

## 2019-08-12 PROCEDURE — 63600175 PHARM REV CODE 636 W HCPCS: Performed by: STUDENT IN AN ORGANIZED HEALTH CARE EDUCATION/TRAINING PROGRAM

## 2019-08-12 PROCEDURE — 37000009 HC ANESTHESIA EA ADD 15 MINS: Performed by: ORTHOPAEDIC SURGERY

## 2019-08-12 PROCEDURE — 25000003 PHARM REV CODE 250: Performed by: ORTHOPAEDIC SURGERY

## 2019-08-12 PROCEDURE — 63600175 PHARM REV CODE 636 W HCPCS: Performed by: ANESTHESIOLOGY

## 2019-08-12 PROCEDURE — 71000015 HC POSTOP RECOV 1ST HR: Performed by: ORTHOPAEDIC SURGERY

## 2019-08-12 PROCEDURE — 36000706: Performed by: ORTHOPAEDIC SURGERY

## 2019-08-12 PROCEDURE — 64721 PR REVISE MEDIAN N/CARPAL TUNNEL SURG: ICD-10-PCS | Mod: RT,,, | Performed by: ORTHOPAEDIC SURGERY

## 2019-08-12 RX ORDER — LIDOCAINE HCL/PF 100 MG/5ML
SYRINGE (ML) INTRAVENOUS
Status: DISCONTINUED | OUTPATIENT
Start: 2019-08-12 | End: 2019-08-12

## 2019-08-12 RX ORDER — SODIUM CHLORIDE 9 MG/ML
INJECTION, SOLUTION INTRAVENOUS CONTINUOUS
Status: DISCONTINUED | OUTPATIENT
Start: 2019-08-12 | End: 2019-08-12 | Stop reason: HOSPADM

## 2019-08-12 RX ORDER — DEXAMETHASONE SODIUM PHOSPHATE 4 MG/ML
INJECTION, SOLUTION INTRA-ARTICULAR; INTRALESIONAL; INTRAMUSCULAR; INTRAVENOUS; SOFT TISSUE
Status: DISCONTINUED | OUTPATIENT
Start: 2019-08-12 | End: 2019-08-12 | Stop reason: HOSPADM

## 2019-08-12 RX ORDER — LIDOCAINE HYDROCHLORIDE 10 MG/ML
INJECTION, SOLUTION EPIDURAL; INFILTRATION; INTRACAUDAL; PERINEURAL
Status: DISCONTINUED | OUTPATIENT
Start: 2019-08-12 | End: 2019-08-12 | Stop reason: HOSPADM

## 2019-08-12 RX ORDER — PROPOFOL 10 MG/ML
VIAL (ML) INTRAVENOUS CONTINUOUS PRN
Status: DISCONTINUED | OUTPATIENT
Start: 2019-08-12 | End: 2019-08-12

## 2019-08-12 RX ORDER — MIDAZOLAM HYDROCHLORIDE 1 MG/ML
INJECTION INTRAMUSCULAR; INTRAVENOUS
Status: DISCONTINUED | OUTPATIENT
Start: 2019-08-12 | End: 2019-08-12

## 2019-08-12 RX ORDER — CEFAZOLIN SODIUM 1 G/3ML
2 INJECTION, POWDER, FOR SOLUTION INTRAMUSCULAR; INTRAVENOUS
Status: COMPLETED | OUTPATIENT
Start: 2019-08-12 | End: 2019-08-12

## 2019-08-12 RX ORDER — FENTANYL CITRATE 50 UG/ML
INJECTION, SOLUTION INTRAMUSCULAR; INTRAVENOUS
Status: DISCONTINUED | OUTPATIENT
Start: 2019-08-12 | End: 2019-08-12

## 2019-08-12 RX ORDER — BUPIVACAINE HYDROCHLORIDE 2.5 MG/ML
INJECTION, SOLUTION EPIDURAL; INFILTRATION; INTRACAUDAL
Status: DISCONTINUED | OUTPATIENT
Start: 2019-08-12 | End: 2019-08-12 | Stop reason: HOSPADM

## 2019-08-12 RX ORDER — SODIUM CHLORIDE, SODIUM LACTATE, POTASSIUM CHLORIDE, CALCIUM CHLORIDE 600; 310; 30; 20 MG/100ML; MG/100ML; MG/100ML; MG/100ML
INJECTION, SOLUTION INTRAVENOUS CONTINUOUS PRN
Status: DISCONTINUED | OUTPATIENT
Start: 2019-08-12 | End: 2019-08-12

## 2019-08-12 RX ADMIN — FENTANYL CITRATE 100 MCG: 50 INJECTION, SOLUTION INTRAMUSCULAR; INTRAVENOUS at 08:08

## 2019-08-12 RX ADMIN — PROPOFOL 50 MCG/KG/MIN: 10 INJECTION, EMULSION INTRAVENOUS at 08:08

## 2019-08-12 RX ADMIN — LIDOCAINE HYDROCHLORIDE 50 MG: 20 INJECTION, SOLUTION INTRAVENOUS at 08:08

## 2019-08-12 RX ADMIN — MIDAZOLAM HYDROCHLORIDE 2 MG: 1 INJECTION, SOLUTION INTRAMUSCULAR; INTRAVENOUS at 07:08

## 2019-08-12 RX ADMIN — CEFAZOLIN 2 G: 330 INJECTION, POWDER, FOR SOLUTION INTRAMUSCULAR; INTRAVENOUS at 08:08

## 2019-08-12 RX ADMIN — SODIUM CHLORIDE, SODIUM LACTATE, POTASSIUM CHLORIDE, AND CALCIUM CHLORIDE: .6; .31; .03; .02 INJECTION, SOLUTION INTRAVENOUS at 07:08

## 2019-08-12 NOTE — BRIEF OP NOTE
[facility]  Brief Operative Note     SUMMARY     Surgery Date: 8/12/2019     Surgeon(s) and Role:     * Roopa Hanna MD - Primary     * Colt George MD - Resident - Assisting        Pre-op Diagnosis:  Carpal tunnel syndrome of right wrist [G56.01]    Post-op Diagnosis:  Post-Op Diagnosis Codes:     * Carpal tunnel syndrome of right wrist [G56.01]    Procedure(s) (LRB):  RELEASE, CARPAL TUNNEL right (Right)    Anesthesia: Local MAC    Description of the findings of the procedure: as above    Findings/Key Components: as above    Estimated Blood Loss: * No values recorded between 8/12/2019  8:16 AM and 8/12/2019  8:34 AM *         Specimens:   Specimen (12h ago, onward)    None          Discharge Note    SUMMARY     Admit Date: 8/12/2019    Discharge Date and Time:  08/12/2019 8:34 AM    Hospital Course (synopsis of major diagnoses, care, treatment, and services provided during the course of the hospital stay): Pt admitted for outpatient procedure, tolerated well.  Recovered in PACU and was discharged home on day of surgery.       Final Diagnosis: Post-Op Diagnosis Codes:     * Carpal tunnel syndrome of right wrist [G56.01]    Disposition: Home or Self Care    Follow Up/Patient Instructions:     Medications:  Reconciled Home Medications:      Medication List      CONTINUE taking these medications    acetaminophen-codeine 300-30mg 300-30 mg Tab  Commonly known as:  TYLENOL #3  Take 1 tablet by mouth every 4 to 6 hours as needed (moderate to severe pain).     albuterol 90 mcg/actuation inhaler  Commonly known as:  PROVENTIL/VENTOLIN HFA  INHALE 2 PUFFS BY MOUTH EVERY 4 HOURS AS NEEDED     ANORO ELLIPTA 62.5-25 mcg/actuation Dsdv  Generic drug:  umeclidinium-vilanterol  INHALE 1 PUFF BY MOUTH ONCE DAILY     LORazepam 1 MG tablet  Commonly known as:  ATIVAN  Take 1 tablet (1 mg total) by mouth nightly as needed for Anxiety (insomnia).     tamsulosin 0.4 mg Cap  Commonly known as:   FLOMAX  Take 1 capsule (0.4 mg total) by mouth once daily.     terazosin 5 MG capsule  Commonly known as:  HYTRIN  Take 1 capsule (5 mg total) by mouth every evening.          Discharge Procedure Orders   Call MD for:  temperature >100.4     Call MD for:  persistent nausea and vomiting or diarrhea     Call MD for:  severe uncontrolled pain     Call MD for:  redness, tenderness, or signs of infection (pain, swelling, redness, odor or green/yellow discharge around incision site)     Call MD for:  difficulty breathing or increased cough     Call MD for:  severe persistent headache     Call MD for:  worsening rash     Call MD for:  persistent dizziness, light-headedness, or visual disturbances     Call MD for:  increased confusion or weakness     Leave dressing on - Keep it clean, dry, and intact until clinic visit     Follow-up Information     Follow up In 2 weeks.

## 2019-08-12 NOTE — PLAN OF CARE
Uriah HECTOR Mills has met all discharge criteria from Phase II. Vital Signs are stable, ambulating  without difficulty. Discharge instructions given, patient verbalized understanding. Discharged from facility via wheelchair in stable condition.

## 2019-08-12 NOTE — DISCHARGE INSTRUCTIONS
Anesthesia: Monitored Anesthesia Care (MAC)    Anesthesia Safety  · Have an adult family member or friend drive you home after the procedure.  · For the first 24 hours after your surgery:  ¨ Do not drive or use heavy equipment.  ¨ Do not make important decisions or sign documents.  ¨ Avoid alcohol.  ¨ Have someone stay with you, if possible. They can watch for problems and help keep you safe.      Discharge Instructions for Carpal Tunnel Release  You had a carpal tunnel release procedure to help relieve the symptoms of carpal tunnel syndrome. In carpal tunnel syndrome, a nerve in the wrist is compressed and irritated. This causes numbness and pain in the fingers and hand. Carpal tunnel release relieves the compression of the nerve. Here are instructions that will help you care for your arm and wrist when you are at home.  Home care  · Avoid gripping objects tightly or lifting with your affected arm.  · Wear your bandage, splint, or cast as directed by your doctor.  · Always keep the dressing, splint, or cast dry and clean.  · When showering, cover your hand and  wrist with plastic and use tape or rubberbands to keep the dressing, splint, or cast dry. Shower as necessary.    · Use an ice pack or bag of frozen peas -- or something similar -- wrapped in a thin towel on your wrist to reduce swelling for the first 48 hours. Leave the ice pack on for 20 minutes; then take it off for 20 minutes. Repeat as needed.  · Keep your arm elevated above your heart for 24 to 48 hours after surgery.  · Do the exercises you learned in the hospital, or as instructed by your doctor.  · Take pain medicine as directed.  · Dont drive until your doctor says its OK. Never drive while you are taking opioid pain medicine.  · Ask your doctor when you can return to work. If your job requires heavy lifting, you may not be able to begin working again for several weeks.  Follow-up care  Make a follow-up appointment as directed by your  doctor.     When to seek medical care  Call 911 right away if you have any of the following:  · Chest pain  · Shortness of breath  Otherwise, call your doctor immediately if you have any of the following:  · A splint, cast, or dressing that has gotten wet  · Increased bleeding or drainage from the incision (cut)  · Opening of the incision  · Fever above 100.4°F (38.9°C) taken by mouth, or shaking chills  · Any new numbness in the fingers or thumb  · Blue hand or fingers  · Increased pain with or without activity  · Increased redness, tenderness, or swelling of the incision   Date Last Reviewed: 11/15/2015  © 4527-1983 [employer]. [address] [place] [address], [place] [address]. All rights reserved. This information is not intended as a substitute for professional medical care. Always follow your healthcare professional's instructions.      PLEASE FOLLOW ANY ADDITIONAL INSTRUCTIONS GIVEN TO YOU BY DR MARIAMA BEVERLY!

## 2019-08-12 NOTE — OP NOTE
[facility]  Surgery Department  Operative Note    SUMMARY     Date of Procedure: 8/12/2019     Procedure: Procedure(s) (LRB):  RELEASE, CARPAL TUNNEL right (Right)  INJECTION, STEROID (Left)     Surgeon(s) and Role:     * Roopa Hanna MD - Primary     * Colt George MD - Resident - Assisting        Pre-Operative Diagnosis: Carpal tunnel syndrome of right wrist [G56.01]    Post-Operative Diagnosis: Post-Op Diagnosis Codes:     * Carpal tunnel syndrome of right wrist [G56.01]    Anesthesia: Local MAC    Technical Procedures Used: surgery    Description of the Findings of the Procedure:   DATE OF PROCEDURE: 8/12/19  SERVICE: Orthopedics.   ATTENDING SURGEON: Roopa Hanna M.D.   ASSISTANT SURGEON: Ariel  PREOPERATIVE DIAGNOSIS: Bilateral carpal tunnel syndrome.   POSTOPERATIVE DIAGNOSIS: bilateral carpal tunnel syndrome.   PROCEDURE: right carpal tunnel release. Left Carpal tunnel injection  ANESTHESIA: Local placed by surgeon and MAC.   FLUIDS: Lactated Ringers.   BLOOD LOSS: None. No blood was given.   TOURNIQUET TIME: 12 minutes.   PACKS AND DRAINS: None.   IMPLANTS: None.   SPECIMENS: None.   COMPLICATIONS: None.   INDICATIONS FOR PROCEDURE: Mr. Mills is a 68-year-old male who had numbness   and tingling into her bilateral hand. He has failed conservative treatment, EMG   was positive for carpal tunnel syndrome. Therefore, operative intervention was   deemed necessary. Risks and benefits were explained to the patient in clinic   since performed in clinic.   PROCEDURE IN DETAIL: After correct site was marked with the patient's   participation in the holding area, the patient was brought to the Operating   Room, placed in supine position, underwent MAC anesthesia. A well-padded   nonsterile tourniquet was placed on the right arm. A time-out was called for   correct site, procedure and patient to be indicated. Under sterile conditions,   an injection of lidocaine 1% was  injected into the carpal tunnel space. The arm   was prepped and draped in normal sterile fashion. The incision was marked out   using Sloan cardinal lines. The arm was exsanguinated using Esmarch.   Tourniquet was insufflated to 250 mmHg and that is where it remained for a total   of 12 minutes. The incision was made down to the palmar fascia. The palmar   fascia was sharply incised. The transverse ligament was identified. It was   very thick in nature. It was sharply incised. Median nerve was identified. A   Mosquito hemostat was passed from the undersurface of the transverse ligament   proximally and distally to free it from the median nerve. Metzenbaum scissors   were utilized to cut the transverse ligament proximally and distally. Once that   was completely released, a Mosquito hemostat was passed again to confirm a   complete release. Once that was performed, the area was irrigated with copious   amounts of normal saline. Nylon closed the skin. Sterile dressing was applied.   Tourniquet was deflated. Brisk capillary refill ensued.  A timeout was called. Under sterile conditions an injection of lidocaine 1% no epinephrine (1cc) and dexamethasone 4mg (1cc) was injected into the left carpal tunnel space.  The patient tolerated the procedure well. A bandage was placed. Sterile soft dressing was applied.  The patient was   transported to the Recovery Room in stable condition.   POSTOPERATIVE PLAN FOR THIS PATIENT: She is to keep her dressing clean, dry and   intact. We will see her back in 2 weeks for stitch removal.    Significant Surgical Tasks Conducted by the Assistant(s), if Applicable: none    Complications: No    Estimated Blood Loss (EBL): * No values recorded between 8/12/2019  8:16 AM and 8/12/2019  8:32 AM *           Implants: * No implants in log *    Specimens:   Specimen (12h ago, onward)    None                  Condition: Good    Disposition: PACU - hemodynamically stable.    Attestation: I  performed the procedure.    Discharge Note    SUMMARY     Admit Date: 8/12/2019    Discharge Date and Time:  08/12/2019 2:33 PM    Hospital Course (synopsis of major diagnoses, care, treatment, and services provided during the course of the hospital stay):      Final Diagnosis: Post-Op Diagnosis Codes:     * Carpal tunnel syndrome of right wrist [G56.01]    Disposition: Home or Self Care    Follow Up/Patient Instructions:     Medications:  Reconciled Home Medications:      Medication List      CONTINUE taking these medications    acetaminophen-codeine 300-30mg 300-30 mg Tab  Commonly known as:  TYLENOL #3  Take 1 tablet by mouth every 4 to 6 hours as needed (moderate to severe pain).     albuterol 90 mcg/actuation inhaler  Commonly known as:  PROVENTIL/VENTOLIN HFA  INHALE 2 PUFFS BY MOUTH EVERY 4 HOURS AS NEEDED     ANORO ELLIPTA 62.5-25 mcg/actuation Dsdv  Generic drug:  umeclidinium-vilanterol  INHALE 1 PUFF BY MOUTH ONCE DAILY     LORazepam 1 MG tablet  Commonly known as:  ATIVAN  Take 1 tablet (1 mg total) by mouth nightly as needed for Anxiety (insomnia).     tamsulosin 0.4 mg Cap  Commonly known as:  FLOMAX  Take 1 capsule (0.4 mg total) by mouth once daily.     terazosin 5 MG capsule  Commonly known as:  HYTRIN  Take 1 capsule (5 mg total) by mouth every evening.          Discharge Procedure Orders   Call MD for:  temperature >100.4     Call MD for:  persistent nausea and vomiting or diarrhea     Call MD for:  severe uncontrolled pain     Call MD for:  redness, tenderness, or signs of infection (pain, swelling, redness, odor or green/yellow discharge around incision site)     Call MD for:  difficulty breathing or increased cough     Call MD for:  severe persistent headache     Call MD for:  worsening rash     Call MD for:  persistent dizziness, light-headedness, or visual disturbances     Call MD for:  increased confusion or weakness     Leave dressing on - Keep it clean, dry, and intact until clinic visit      Follow-up Information     Follow up In 2 weeks.

## 2019-08-12 NOTE — ANESTHESIA POSTPROCEDURE EVALUATION
Anesthesia Post Evaluation    Patient: Uriah Mills    Procedure(s) Performed: Procedure(s) (LRB):  RELEASE, CARPAL TUNNEL right (Right)    Final Anesthesia Type: general  Patient location during evaluation: OPS  Patient participation: Yes- Able to Participate  Level of consciousness: awake and alert and oriented  Post-procedure vital signs: reviewed and stable  Pain management: adequate  Airway patency: patent  PONV status at discharge: No PONV  Anesthetic complications: no      Cardiovascular status: stable  Respiratory status: unassisted, spontaneous ventilation and room air  Hydration status: euvolemic  Follow-up not needed.          Vitals Value Taken Time   /65 8/12/2019  6:52 AM   Temp 36.6 °C (97.9 °F) 8/12/2019  6:52 AM   Pulse 18 8/12/2019  6:52 AM   Resp 18 8/12/2019  6:52 AM   SpO2 95 % 8/12/2019  6:52 AM         No case tracking events are documented in the log.      Pain/Adeline Score: No data recorded

## 2019-08-12 NOTE — INTERVAL H&P NOTE
The patient has been examined and the H&P has been [id number]:        Anesthesia/Surgery risks, benefits and alternative options discussed and understood by patient/family.          Active Hospital Problems    Diagnosis  POA    Carpal tunnel syndrome [G56.00]  Yes      Resolved Hospital Problems   No resolved problems to display.

## 2019-08-12 NOTE — ANESTHESIA PREPROCEDURE EVALUATION
08/12/2019  Uriah Mills is a 68 y.o., male.    Anesthesia Evaluation    I have reviewed the Patient Summary Reports.    I have reviewed the Nursing Notes.   I have reviewed the Medications.     Review of Systems  Anesthesia Hx:  Denies Family Hx of Anesthesia complications.   Denies Personal Hx of Anesthesia complications.   Social:  Smoker    Hematology/Oncology:  Hematology Normal   Oncology Normal     Cardiovascular:  Cardiovascular Normal     Pulmonary:   COPD    Renal/:  Renal/ Normal     Hepatic/GI:   GERD    Musculoskeletal:   Arthritis     Endocrine:  Endocrine Normal        Physical Exam  General:  Well nourished    Airway/Jaw/Neck:  Airway Findings: Mouth Opening: Normal Tongue: Normal  Mallampati: II  TM Distance: Normal, at least 6 cm      Dental:  Dental Findings: Upper Dentures   Chest/Lungs:  Chest/Lungs Findings: Decreased Breath Sounds Bilateral         Mental Status:  Mental Status Findings:  Alert and Oriented, Cooperative         Anesthesia Plan  Type of Anesthesia, risks & benefits discussed:  Anesthesia Type:  MAC, general  Patient's Preference:   Intra-op Monitoring Plan: standard ASA monitors  Intra-op Monitoring Plan Comments:   Post Op Pain Control Plan: multimodal analgesia  Post Op Pain Control Plan Comments:   Induction:   IV  Beta Blocker:         Informed Consent: Patient understands risks and agrees with Anesthesia plan.  Questions answered. Anesthesia consent signed with patient.  ASA Score: 3     Day of Surgery Review of History & Physical:    H&P update referred to the surgeon.         Ready For Surgery From Anesthesia Perspective.

## 2019-08-23 ENCOUNTER — OFFICE VISIT (OUTPATIENT)
Dept: ORTHOPEDICS | Facility: CLINIC | Age: 69
End: 2019-08-23
Payer: MEDICARE

## 2019-08-23 VITALS
HEART RATE: 84 BPM | HEIGHT: 70 IN | SYSTOLIC BLOOD PRESSURE: 132 MMHG | BODY MASS INDEX: 27.2 KG/M2 | WEIGHT: 190 LBS | DIASTOLIC BLOOD PRESSURE: 69 MMHG

## 2019-08-23 DIAGNOSIS — Z98.890 S/P CARPAL TUNNEL RELEASE: Primary | ICD-10-CM

## 2019-08-23 PROCEDURE — 99999 PR PBB SHADOW E&M-EST. PATIENT-LVL III: CPT | Mod: PBBFAC,,, | Performed by: PHYSICIAN ASSISTANT

## 2019-08-23 PROCEDURE — 99024 POSTOP FOLLOW-UP VISIT: CPT | Mod: S$GLB,,, | Performed by: PHYSICIAN ASSISTANT

## 2019-08-23 PROCEDURE — 99999 PR PBB SHADOW E&M-EST. PATIENT-LVL III: ICD-10-PCS | Mod: PBBFAC,,, | Performed by: PHYSICIAN ASSISTANT

## 2019-08-23 PROCEDURE — 99024 PR POST-OP FOLLOW-UP VISIT: ICD-10-PCS | Mod: S$GLB,,, | Performed by: PHYSICIAN ASSISTANT

## 2019-08-23 NOTE — PROGRESS NOTES
"Mr. Mills is here today for a post-operative visit.  He is 11 days status post right carpal tunnel release and Left Carpal tunnel injection by Dr. Hanna on 8/12/19. He reports that he is doing well.  Pain is minimal.  He is not taking pain medication.  He denies fever, chills, and sweats since the time of the surgery.     Physical exam:    Vitals:    08/23/19 0912   BP: 132/69   Pulse: 84   Weight: 86.2 kg (190 lb)   Height: 5' 10" (1.778 m)   PainSc:   4     Vital signs are stable, patient is afebrile.  Patient is well dressed and well groomed, no acute distress.  Alert and oriented to person, place, and time.  Post op dressing taken down.  Incision is clean, dry and intact, incision not ready for suture removal, there is mild superficial wound dehiscence, minimal bleeding present following bandage removal. Incision cleaned and new dressing applied. No signs of infection. There is no erythema or exudate. He is NVI. Good finger motion.    Assessment:  status post right carpal tunnel release and Left Carpal tunnel injection by Dr. Hanna on 8/12/19    Plan:  Uriah was seen today for pain.    Diagnoses and all orders for this visit:    S/P carpal tunnel release    -incision not ready for suture removal today, will have him come back Tuesday for suture removal and gel brace. Otherwise, doing well. Post op instructions reviewed with pt today for after suture removal.      Nataliya Reis PA-C  [facility]   [facility] [place]      "

## 2019-08-27 ENCOUNTER — OFFICE VISIT (OUTPATIENT)
Dept: ORTHOPEDICS | Facility: CLINIC | Age: 69
End: 2019-08-27
Payer: MEDICARE

## 2019-08-27 VITALS
WEIGHT: 190 LBS | DIASTOLIC BLOOD PRESSURE: 70 MMHG | HEART RATE: 68 BPM | BODY MASS INDEX: 27.2 KG/M2 | SYSTOLIC BLOOD PRESSURE: 136 MMHG | HEIGHT: 70 IN

## 2019-08-27 DIAGNOSIS — M19.041 OSTEOARTHRITIS OF RIGHT HAND, UNSPECIFIED OSTEOARTHRITIS TYPE: Primary | ICD-10-CM

## 2019-08-27 PROCEDURE — 99024 POSTOP FOLLOW-UP VISIT: CPT | Mod: S$GLB,,, | Performed by: ORTHOPAEDIC SURGERY

## 2019-08-27 PROCEDURE — 99999 PR PBB SHADOW E&M-EST. PATIENT-LVL III: CPT | Mod: PBBFAC,,, | Performed by: ORTHOPAEDIC SURGERY

## 2019-08-27 PROCEDURE — 99999 PR PBB SHADOW E&M-EST. PATIENT-LVL III: ICD-10-PCS | Mod: PBBFAC,,, | Performed by: ORTHOPAEDIC SURGERY

## 2019-08-27 PROCEDURE — 99024 PR POST-OP FOLLOW-UP VISIT: ICD-10-PCS | Mod: S$GLB,,, | Performed by: ORTHOPAEDIC SURGERY

## 2019-08-27 NOTE — PROGRESS NOTES
Pt is a postoperative pt from 2 weeks . Pt is s/p CTR. Pt denies fever. Pt denies of numbness, tingling. Pthas not started therapy. 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   Exam: Incision healing well, no erythema, no drainage. Pt unable to make composite fist- from OA  Plan Dutures out, OT ordered

## 2019-09-08 RX ORDER — ALBUTEROL SULFATE 90 UG/1
AEROSOL, METERED RESPIRATORY (INHALATION)
Qty: 18 EACH | Refills: 1 | Status: SHIPPED | OUTPATIENT
Start: 2019-09-08 | End: 2019-10-21 | Stop reason: SDUPTHER

## 2019-10-08 ENCOUNTER — OFFICE VISIT (OUTPATIENT)
Dept: ORTHOPEDICS | Facility: CLINIC | Age: 69
End: 2019-10-08
Payer: MEDICARE

## 2019-10-08 VITALS
SYSTOLIC BLOOD PRESSURE: 134 MMHG | HEIGHT: 70 IN | BODY MASS INDEX: 27.21 KG/M2 | HEART RATE: 76 BPM | DIASTOLIC BLOOD PRESSURE: 67 MMHG | WEIGHT: 190.06 LBS

## 2019-10-08 DIAGNOSIS — G56.01 CARPAL TUNNEL SYNDROME OF RIGHT WRIST: Primary | ICD-10-CM

## 2019-10-08 PROCEDURE — 99999 PR PBB SHADOW E&M-EST. PATIENT-LVL III: CPT | Mod: PBBFAC,,, | Performed by: PHYSICIAN ASSISTANT

## 2019-10-08 PROCEDURE — 99024 POSTOP FOLLOW-UP VISIT: CPT | Mod: S$GLB,,, | Performed by: PHYSICIAN ASSISTANT

## 2019-10-08 PROCEDURE — 99024 PR POST-OP FOLLOW-UP VISIT: ICD-10-PCS | Mod: S$GLB,,, | Performed by: PHYSICIAN ASSISTANT

## 2019-10-08 PROCEDURE — 99999 PR PBB SHADOW E&M-EST. PATIENT-LVL III: ICD-10-PCS | Mod: PBBFAC,,, | Performed by: PHYSICIAN ASSISTANT

## 2019-10-08 NOTE — PROGRESS NOTES
"Mr. Mills is here today for a post-operative visit.  He is 57 days status post right carpal tunnel release and Left Carpal tunnel injection by Dr. Hanna on 8/12/19. He reports that he is doing well, no longer having finger numbness or tingling in either hand. No longer having nighttime waking with pain or numbness.  Pain is 5/10, generalized hand arthritis pain that has not improved with compound cream alone or paraffin alone.  He is not interested in OT.  He is not taking pain medication.  He denies fever, chills, and sweats since the time of the surgery.     Physical exam:    Vitals:    10/08/19 1049   BP: 134/67   Pulse: 76   Weight: 86.2 kg (190 lb 0.6 oz)   Height: 5' 10" (1.778 m)   PainSc:   5     Vital signs are stable, patient is afebrile.  Patient is well dressed and well groomed, no acute distress.  Alert and oriented to person, place, and time.  Incision is healing well - clean, dry and intact. No scar pain. No signs of infection. There is no erythema or exudate. He is NVI. Good finger motion.    Assessment: 57 days status post right carpal tunnel release and Left Carpal tunnel injection by Dr. Hanna on 8/12/19    Plan:  Uriah was seen today for post-op evaluation and post-op evaluation.    Diagnoses and all orders for this visit:    Carpal tunnel syndrome of right wrist          - Discussed scar massage  - Discussed generalized hand OA pain - use of compound cream after warm compress/paraffin treatment  - Follow up if pain does not improve  - Follow up if left CT becomes symptomatic  - Call with questions or concerns        "

## 2019-10-22 ENCOUNTER — OFFICE VISIT (OUTPATIENT)
Dept: PRIMARY CARE CLINIC | Facility: CLINIC | Age: 69
End: 2019-10-22
Payer: MEDICARE

## 2019-10-22 VITALS
OXYGEN SATURATION: 97 % | HEART RATE: 80 BPM | WEIGHT: 191.31 LBS | TEMPERATURE: 97 F | SYSTOLIC BLOOD PRESSURE: 120 MMHG | DIASTOLIC BLOOD PRESSURE: 56 MMHG | BODY MASS INDEX: 27.39 KG/M2 | HEIGHT: 70 IN | RESPIRATION RATE: 20 BRPM

## 2019-10-22 DIAGNOSIS — Z23 NEED FOR VACCINATION: ICD-10-CM

## 2019-10-22 DIAGNOSIS — J44.9 CHRONIC OBSTRUCTIVE PULMONARY DISEASE, UNSPECIFIED COPD TYPE: ICD-10-CM

## 2019-10-22 DIAGNOSIS — M19.042 PRIMARY OSTEOARTHRITIS OF BOTH HANDS: Primary | ICD-10-CM

## 2019-10-22 DIAGNOSIS — M16.0 PRIMARY OSTEOARTHRITIS OF BOTH HIPS: ICD-10-CM

## 2019-10-22 DIAGNOSIS — M19.041 PRIMARY OSTEOARTHRITIS OF BOTH HANDS: Primary | ICD-10-CM

## 2019-10-22 DIAGNOSIS — Z72.0 TOBACCO USE: ICD-10-CM

## 2019-10-22 DIAGNOSIS — Z13.6 SCREENING FOR AAA (ABDOMINAL AORTIC ANEURYSM): ICD-10-CM

## 2019-10-22 DIAGNOSIS — J40 BRONCHITIS: ICD-10-CM

## 2019-10-22 PROCEDURE — 90662 FLU VACCINE - HIGH DOSE (65+) PRESERVATIVE FREE IM: ICD-10-PCS | Mod: S$GLB,,, | Performed by: INTERNAL MEDICINE

## 2019-10-22 PROCEDURE — 90670 PNEUMOCOCCAL CONJUGATE VACCINE 13-VALENT LESS THAN 5YO & GREATER THAN: ICD-10-PCS | Mod: S$GLB,,, | Performed by: INTERNAL MEDICINE

## 2019-10-22 PROCEDURE — 99999 PR PBB SHADOW E&M-EST. PATIENT-LVL IV: ICD-10-PCS | Mod: PBBFAC,,, | Performed by: INTERNAL MEDICINE

## 2019-10-22 PROCEDURE — 99499 RISK ADDL DX/OHS AUDIT: ICD-10-PCS | Mod: S$GLB,,, | Performed by: INTERNAL MEDICINE

## 2019-10-22 PROCEDURE — G0009 ADMIN PNEUMOCOCCAL VACCINE: HCPCS | Mod: S$GLB,,, | Performed by: INTERNAL MEDICINE

## 2019-10-22 PROCEDURE — 1101F PR PT FALLS ASSESS DOC 0-1 FALLS W/OUT INJ PAST YR: ICD-10-PCS | Mod: CPTII,S$GLB,, | Performed by: INTERNAL MEDICINE

## 2019-10-22 PROCEDURE — 1101F PT FALLS ASSESS-DOCD LE1/YR: CPT | Mod: CPTII,S$GLB,, | Performed by: INTERNAL MEDICINE

## 2019-10-22 PROCEDURE — 90670 PCV13 VACCINE IM: CPT | Mod: S$GLB,,, | Performed by: INTERNAL MEDICINE

## 2019-10-22 PROCEDURE — 99213 OFFICE O/P EST LOW 20 MIN: CPT | Mod: 25,S$GLB,, | Performed by: INTERNAL MEDICINE

## 2019-10-22 PROCEDURE — G0008 ADMIN INFLUENZA VIRUS VAC: HCPCS | Mod: S$GLB,,, | Performed by: INTERNAL MEDICINE

## 2019-10-22 PROCEDURE — 99213 PR OFFICE/OUTPT VISIT, EST, LEVL III, 20-29 MIN: ICD-10-PCS | Mod: 25,S$GLB,, | Performed by: INTERNAL MEDICINE

## 2019-10-22 PROCEDURE — 99499 UNLISTED E&M SERVICE: CPT | Mod: S$GLB,,, | Performed by: INTERNAL MEDICINE

## 2019-10-22 PROCEDURE — 99999 PR PBB SHADOW E&M-EST. PATIENT-LVL IV: CPT | Mod: PBBFAC,,, | Performed by: INTERNAL MEDICINE

## 2019-10-22 PROCEDURE — 90662 IIV NO PRSV INCREASED AG IM: CPT | Mod: S$GLB,,, | Performed by: INTERNAL MEDICINE

## 2019-10-22 PROCEDURE — G0009 PNEUMOCOCCAL CONJUGATE VACCINE 13-VALENT LESS THAN 5YO & GREATER THAN: ICD-10-PCS | Mod: S$GLB,,, | Performed by: INTERNAL MEDICINE

## 2019-10-22 PROCEDURE — G0008 FLU VACCINE - HIGH DOSE (65+) PRESERVATIVE FREE IM: ICD-10-PCS | Mod: S$GLB,,, | Performed by: INTERNAL MEDICINE

## 2019-10-22 RX ORDER — METHYLPREDNISOLONE 4 MG/1
TABLET ORAL
Qty: 1 PACKAGE | Refills: 0 | Status: SHIPPED | OUTPATIENT
Start: 2019-10-22 | End: 2020-06-19

## 2019-10-22 RX ORDER — ALBUTEROL SULFATE 90 UG/1
AEROSOL, METERED RESPIRATORY (INHALATION)
Qty: 18 EACH | Refills: 1 | Status: SHIPPED | OUTPATIENT
Start: 2019-10-22 | End: 2019-11-30 | Stop reason: SDUPTHER

## 2019-10-22 RX ORDER — TIZANIDINE 4 MG/1
TABLET ORAL
COMMUNITY
Start: 2019-10-21 | End: 2020-06-19

## 2019-10-22 RX ORDER — ASPIRIN 81 MG/1
81 TABLET ORAL DAILY
COMMUNITY

## 2019-10-22 RX ORDER — TRAMADOL HYDROCHLORIDE 50 MG/1
50 TABLET ORAL EVERY 8 HOURS PRN
Qty: 20 TABLET | Refills: 0 | Status: SHIPPED | OUTPATIENT
Start: 2019-10-22 | End: 2020-06-19

## 2019-10-22 RX ORDER — AZITHROMYCIN 250 MG/1
TABLET, FILM COATED ORAL
Qty: 6 TABLET | Refills: 0 | Status: SHIPPED | OUTPATIENT
Start: 2019-10-22 | End: 2019-10-26

## 2019-10-22 NOTE — PROGRESS NOTES
Verified pt ID using name and . NKDA. Administered Prevnar 13 IM in R. Deltoid, and Flu High Dose Vaccine IM in L. Deltoid per physician order using aseptic technique. Aspirated and no blood return noted. Pt tolerated well with no adverse reactions noted.

## 2019-10-22 NOTE — PROGRESS NOTES
Subjective:       Patient ID: Uriah Mills is a 68 y.o. male.    Chief Complaint: Arthritis (hands and hips) and Immunizations (flu and pneumo)    HPI  patient is here with complaint of chronic pain in both his hands and both hips he has been seen by Orthopedics but not getting any medication for treatment recommend hot wax patient both his from [facility] and try at home but not helping patient also has coughing congestion COPD still smoking cigar was on Anoro but cannot afford it anymore since insurance not covered discussed the smoking cessation with patient he will try he deny chest pain high fever weight gain weight loss no change in bowel habit and urination he does have a dyspnea with exertion but no chest pain  Review of Systems    Objective:      Physical Exam   Constitutional: He is oriented to person, place, and time. He appears well-developed and well-nourished. No distress.   HENT:   Head: Normocephalic and atraumatic.   Right Ear: External ear normal.   Left Ear: External ear normal.   Mouth/Throat: Oropharynx is clear and moist. No oropharyngeal exudate.   Nasal congestion with wet cough   Eyes: Pupils are equal, round, and reactive to light. Conjunctivae and EOM are normal. Right eye exhibits no discharge. Left eye exhibits no discharge.   Neck: Normal range of motion. Neck supple. No thyromegaly present.   Cardiovascular: Normal rate, regular rhythm, normal heart sounds and intact distal pulses. Exam reveals no gallop and no friction rub.   No murmur heard.  Pulmonary/Chest: Effort normal. No respiratory distress. He has no wheezes. He has rales (Mild bilateral rhonchi). He exhibits no tenderness.   Abdominal: Soft. Bowel sounds are normal. He exhibits no distension. There is no tenderness. There is no rebound and no guarding.   Musculoskeletal: Normal range of motion. He exhibits tenderness (Tenderness of both hand increased when patient try to make a fist not able to close the hand and bilateral hip  pain). He exhibits no edema or deformity.   Lymphadenopathy:     He has no cervical adenopathy.   Neurological: He is alert and oriented to person, place, and time.   Skin: Skin is warm and dry. Capillary refill takes less than 2 seconds. No rash noted. No erythema.   Psychiatric: He has a normal mood and affect. Judgment and thought content normal.   Nursing note and vitals reviewed.      Assessment:       1. Primary osteoarthritis of both hands    2. Need for vaccination    3. Screening for AAA (abdominal aortic aneurysm)    4. Primary osteoarthritis of both hips    5. Chronic obstructive pulmonary disease, unspecified COPD type    6. Bronchitis    7. Tobacco use        Plan:       Primary osteoarthritis of both hands  -     methylPREDNISolone (MEDROL DOSEPACK) 4 mg tablet; use as directed  Dispense: 1 Package; Refill: 0  -     traMADol (ULTRAM) 50 mg tablet; Take 1 tablet (50 mg total) by mouth every 8 (eight) hours as needed.  Dispense: 20 tablet; Refill: 0    Need for vaccination  -     varicella-zoster [id number], PF, (SHINGRIX, PF,) 50 mcg/0.5 mL injection; Inject 0.5 mLs into the muscle once. for 1 dose  Dispense: 0.5 mL; Refill: 0  -     Pneumococcal Conjugate Vaccine (13 Valent) (IM)  -     Influenza - High Dose (65+) (PF) (IM)    Screening for AAA (abdominal aortic aneurysm)  -     US Abdominal Aorta; Future; Expected date: 10/22/2019    Primary osteoarthritis of both hips  -     methylPREDNISolone (MEDROL DOSEPACK) 4 mg tablet; use as directed  Dispense: 1 Package; Refill: 0  -     traMADol (ULTRAM) 50 mg tablet; Take 1 tablet (50 mg total) by mouth every 8 (eight) hours as needed.  Dispense: 20 tablet; Refill: 0    Chronic obstructive pulmonary disease, unspecified COPD type  Comments:  Will try Trelegy since it is appear to be cover by his insurance  Orders:  -     methylPREDNISolone (MEDROL DOSEPACK) 4 mg tablet; use as directed  Dispense: 1 Package; Refill: 0  -     fluticasone-umeclidin-vilanter  (MEERA MORAN) 100-62.5-25 mcg DsDv; Inhale 1 puff into the lungs once daily.  Dispense: 60 each; Refill: 2    Bronchitis  -     methylPREDNISolone (MEDROL DOSEPACK) 4 mg tablet; use as directed  Dispense: 1 Package; Refill: 0  -     azithromycin (Z-KADE) 250 MG tablet; 2 tabs by mouth day 1, then 1 tab by mouth daily x 4 days  Dispense: 6 tablet; Refill: 0    Tobacco use  Comments:  Discussed smoking cessation with patient he will try does not want any medications  Orders:  -     Ambulatory referral to Smoking Cessation Program

## 2019-10-31 ENCOUNTER — TELEPHONE (OUTPATIENT)
Dept: PRIMARY CARE CLINIC | Facility: CLINIC | Age: 69
End: 2019-10-31

## 2019-10-31 DIAGNOSIS — J44.9 CHRONIC OBSTRUCTIVE PULMONARY DISEASE, UNSPECIFIED COPD TYPE: Primary | ICD-10-CM

## 2019-10-31 RX ORDER — TIOTROPIUM BROMIDE 18 UG/1
18 CAPSULE ORAL; RESPIRATORY (INHALATION) DAILY
Qty: 30 CAPSULE | Refills: 1 | Status: CANCELLED | OUTPATIENT
Start: 2019-10-31 | End: 2020-10-30

## 2019-10-31 RX ORDER — TIOTROPIUM BROMIDE 18 UG/1
18 CAPSULE ORAL; RESPIRATORY (INHALATION) DAILY
Qty: 30 CAPSULE | Refills: 3 | Status: SHIPPED | OUTPATIENT
Start: 2019-10-31 | End: 2020-06-19

## 2019-10-31 RX ORDER — TIOTROPIUM BROMIDE 18 UG/1
18 CAPSULE ORAL; RESPIRATORY (INHALATION) DAILY
Qty: 30 CAPSULE | Refills: 11 | Status: SHIPPED | OUTPATIENT
Start: 2019-10-31 | End: 2020-06-19

## 2019-10-31 NOTE — TELEPHONE ENCOUNTER
----- Message from Geo Montalvo MD sent at 10/31/2019  5:30 AM CDT -----  Please call the patient regarding his abdominal ultrasound no aneurysm

## 2019-10-31 NOTE — TELEPHONE ENCOUNTER
Dr. Montalvo,  Pt wanted you to know he did not get the med Trelegy filled due to high cost. Can you prescribe something else?

## 2019-12-01 RX ORDER — ALBUTEROL SULFATE 90 UG/1
AEROSOL, METERED RESPIRATORY (INHALATION)
Qty: 18 EACH | Refills: 1 | Status: SHIPPED | OUTPATIENT
Start: 2019-12-01 | End: 2020-01-13

## 2020-01-11 ENCOUNTER — PATIENT MESSAGE (OUTPATIENT)
Dept: PRIMARY CARE CLINIC | Facility: CLINIC | Age: 70
End: 2020-01-11

## 2020-01-13 DIAGNOSIS — J40 BRONCHITIS: Primary | ICD-10-CM

## 2020-01-13 NOTE — TELEPHONE ENCOUNTER
----- Message from Stephania Serrano sent at 1/13/2020 10:23 AM CST -----  Contact: Spouse/[place] [phone]  Prescription Request:     Name of medication: Proair Inhaler    Reason for request: Refill    Pharmacy: [facility] [id number]  [place], [place] [facility]    Patient is almost out!    Thank You

## 2020-01-14 RX ORDER — ALBUTEROL SULFATE 90 UG/1
2 AEROSOL, METERED RESPIRATORY (INHALATION) EVERY 6 HOURS PRN
Qty: 1 INHALER | Refills: 1 | OUTPATIENT
Start: 2020-01-14

## 2020-01-14 RX ORDER — ALBUTEROL SULFATE 90 UG/1
2 AEROSOL, METERED RESPIRATORY (INHALATION) EVERY 6 HOURS PRN
Qty: 1 INHALER | Refills: 3 | Status: SHIPPED | OUTPATIENT
Start: 2020-01-14 | End: 2020-04-15

## 2020-03-14 DIAGNOSIS — N40.0 BENIGN PROSTATIC HYPERPLASIA, UNSPECIFIED WHETHER LOWER URINARY TRACT SYMPTOMS PRESENT: ICD-10-CM

## 2020-03-15 RX ORDER — TERAZOSIN 5 MG/1
CAPSULE ORAL
Qty: 90 CAPSULE | Refills: 0 | Status: SHIPPED | OUTPATIENT
Start: 2020-03-15 | End: 2020-06-14

## 2020-03-15 RX ORDER — TAMSULOSIN HYDROCHLORIDE 0.4 MG/1
CAPSULE ORAL
Qty: 90 CAPSULE | Refills: 0 | Status: SHIPPED | OUTPATIENT
Start: 2020-03-15 | End: 2020-06-14

## 2020-04-14 DIAGNOSIS — J40 BRONCHITIS: ICD-10-CM

## 2020-04-15 RX ORDER — ALBUTEROL SULFATE 90 UG/1
AEROSOL, METERED RESPIRATORY (INHALATION)
Qty: 9 G | Refills: 0 | Status: SHIPPED | OUTPATIENT
Start: 2020-04-15 | End: 2020-04-18

## 2020-04-17 DIAGNOSIS — J40 BRONCHITIS: ICD-10-CM

## 2020-04-18 RX ORDER — ALBUTEROL SULFATE 90 UG/1
AEROSOL, METERED RESPIRATORY (INHALATION)
Qty: 9 G | Refills: 0 | Status: SHIPPED | OUTPATIENT
Start: 2020-04-18 | End: 2020-05-26

## 2020-05-14 DIAGNOSIS — Z12.11 COLON CANCER SCREENING: ICD-10-CM

## 2020-05-25 DIAGNOSIS — J40 BRONCHITIS: ICD-10-CM

## 2020-05-26 RX ORDER — ALBUTEROL SULFATE 90 UG/1
AEROSOL, METERED RESPIRATORY (INHALATION)
Qty: 9 G | Refills: 0 | Status: SHIPPED | OUTPATIENT
Start: 2020-05-26 | End: 2020-05-29 | Stop reason: SDUPTHER

## 2020-05-29 DIAGNOSIS — J40 BRONCHITIS: ICD-10-CM

## 2020-05-29 RX ORDER — ALBUTEROL SULFATE 90 UG/1
2 AEROSOL, METERED RESPIRATORY (INHALATION) EVERY 6 HOURS PRN
Qty: 9 G | Refills: 0 | Status: SHIPPED | OUTPATIENT
Start: 2020-05-29 | End: 2020-08-04

## 2020-06-18 ENCOUNTER — TELEPHONE (OUTPATIENT)
Dept: PRIMARY CARE CLINIC | Facility: CLINIC | Age: 70
End: 2020-06-18

## 2020-06-18 NOTE — TELEPHONE ENCOUNTER
Spoke with pt. Wife notified her that the rule is a one person visitor policy and that she may attend her husbands apt. Tomorrow with him

## 2020-06-18 NOTE — TELEPHONE ENCOUNTER
----- Message from Lupe Salamanca sent at 6/18/2020  1:33 PM CDT -----  Contact: Wife Yamilex [phone]  Patient has an apt tomorrow and wife is calling to ask if she could come to the apt with him, states patient is hard of hearing and cannot understand. Please advise and call her back

## 2020-06-19 ENCOUNTER — OFFICE VISIT (OUTPATIENT)
Dept: PRIMARY CARE CLINIC | Facility: CLINIC | Age: 70
End: 2020-06-19
Payer: MEDICARE

## 2020-06-19 VITALS
HEART RATE: 81 BPM | SYSTOLIC BLOOD PRESSURE: 136 MMHG | BODY MASS INDEX: 28.17 KG/M2 | HEIGHT: 70 IN | WEIGHT: 196.75 LBS | DIASTOLIC BLOOD PRESSURE: 60 MMHG | OXYGEN SATURATION: 96 % | RESPIRATION RATE: 18 BRPM | TEMPERATURE: 99 F

## 2020-06-19 DIAGNOSIS — J01.90 ACUTE NON-RECURRENT SINUSITIS, UNSPECIFIED LOCATION: ICD-10-CM

## 2020-06-19 DIAGNOSIS — J40 BRONCHITIS: ICD-10-CM

## 2020-06-19 DIAGNOSIS — Z72.0 TOBACCO USE: ICD-10-CM

## 2020-06-19 DIAGNOSIS — J44.9 CHRONIC OBSTRUCTIVE PULMONARY DISEASE, UNSPECIFIED COPD TYPE: Primary | ICD-10-CM

## 2020-06-19 PROCEDURE — 1159F PR MEDICATION LIST DOCUMENTED IN MEDICAL RECORD: ICD-10-PCS | Mod: S$GLB,,, | Performed by: INTERNAL MEDICINE

## 2020-06-19 PROCEDURE — 99999 PR PBB SHADOW E&M-EST. PATIENT-LVL IV: CPT | Mod: PBBFAC,,, | Performed by: INTERNAL MEDICINE

## 2020-06-19 PROCEDURE — 3288F FALL RISK ASSESSMENT DOCD: CPT | Mod: CPTII,S$GLB,, | Performed by: INTERNAL MEDICINE

## 2020-06-19 PROCEDURE — 96372 THER/PROPH/DIAG INJ SC/IM: CPT | Mod: S$GLB,,, | Performed by: INTERNAL MEDICINE

## 2020-06-19 PROCEDURE — 99999 PR PBB SHADOW E&M-EST. PATIENT-LVL IV: ICD-10-PCS | Mod: PBBFAC,,, | Performed by: INTERNAL MEDICINE

## 2020-06-19 PROCEDURE — 99499 UNLISTED E&M SERVICE: CPT | Mod: S$GLB,,, | Performed by: INTERNAL MEDICINE

## 2020-06-19 PROCEDURE — 96372 PR INJECTION,THERAP/PROPH/DIAG2ST, IM OR SUBCUT: ICD-10-PCS | Mod: S$GLB,,, | Performed by: INTERNAL MEDICINE

## 2020-06-19 PROCEDURE — 99499 RISK ADDL DX/OHS AUDIT: ICD-10-PCS | Mod: S$GLB,,, | Performed by: INTERNAL MEDICINE

## 2020-06-19 PROCEDURE — 99213 OFFICE O/P EST LOW 20 MIN: CPT | Mod: 25,S$GLB,, | Performed by: INTERNAL MEDICINE

## 2020-06-19 PROCEDURE — 1125F AMNT PAIN NOTED PAIN PRSNT: CPT | Mod: S$GLB,,, | Performed by: INTERNAL MEDICINE

## 2020-06-19 PROCEDURE — 3288F PR FALLS RISK ASSESSMENT DOCUMENTED: ICD-10-PCS | Mod: CPTII,S$GLB,, | Performed by: INTERNAL MEDICINE

## 2020-06-19 PROCEDURE — 1159F MED LIST DOCD IN RCRD: CPT | Mod: S$GLB,,, | Performed by: INTERNAL MEDICINE

## 2020-06-19 PROCEDURE — 1100F PTFALLS ASSESS-DOCD GE2>/YR: CPT | Mod: CPTII,S$GLB,, | Performed by: INTERNAL MEDICINE

## 2020-06-19 PROCEDURE — 1100F PR PT FALLS ASSESS DOC 2+ FALLS/FALL W/INJURY/YR: ICD-10-PCS | Mod: CPTII,S$GLB,, | Performed by: INTERNAL MEDICINE

## 2020-06-19 PROCEDURE — 99213 PR OFFICE/OUTPT VISIT, EST, LEVL III, 20-29 MIN: ICD-10-PCS | Mod: 25,S$GLB,, | Performed by: INTERNAL MEDICINE

## 2020-06-19 PROCEDURE — 1125F PR PAIN SEVERITY QUANTIFIED, PAIN PRESENT: ICD-10-PCS | Mod: S$GLB,,, | Performed by: INTERNAL MEDICINE

## 2020-06-19 RX ORDER — ALBUTEROL SULFATE 1.25 MG/3ML
1.25 SOLUTION RESPIRATORY (INHALATION) EVERY 6 HOURS PRN
Qty: 1 BOX | Refills: 0 | Status: SHIPPED | OUTPATIENT
Start: 2020-06-19 | End: 2021-06-19

## 2020-06-19 RX ORDER — CODEINE PHOSPHATE AND GUAIFENESIN 10; 100 MG/5ML; MG/5ML
5 SOLUTION ORAL EVERY 6 HOURS PRN
Qty: 150 ML | Refills: 0 | Status: SHIPPED | OUTPATIENT
Start: 2020-06-19 | End: 2020-10-22

## 2020-06-19 RX ORDER — TRIAMCINOLONE ACETONIDE 40 MG/ML
40 INJECTION, SUSPENSION INTRA-ARTICULAR; INTRAMUSCULAR ONCE
Status: COMPLETED | OUTPATIENT
Start: 2020-06-19 | End: 2020-06-19

## 2020-06-19 RX ORDER — LEVOCETIRIZINE DIHYDROCHLORIDE 5 MG/1
5 TABLET, FILM COATED ORAL NIGHTLY
Qty: 30 TABLET | Refills: 3 | Status: SHIPPED | OUTPATIENT
Start: 2020-06-19 | End: 2020-10-19

## 2020-06-19 RX ORDER — AMOXICILLIN AND CLAVULANATE POTASSIUM 875; 125 MG/1; MG/1
1 TABLET, FILM COATED ORAL EVERY 12 HOURS
Qty: 20 TABLET | Refills: 0 | Status: SHIPPED | OUTPATIENT
Start: 2020-06-19 | End: 2020-10-22

## 2020-06-19 RX ORDER — PREDNISONE 20 MG/1
20 TABLET ORAL 2 TIMES DAILY
Qty: 10 TABLET | Refills: 0 | Status: SHIPPED | OUTPATIENT
Start: 2020-06-19 | End: 2020-06-24

## 2020-06-19 RX ADMIN — TRIAMCINOLONE ACETONIDE 40 MG: 40 INJECTION, SUSPENSION INTRA-ARTICULAR; INTRAMUSCULAR at 10:06

## 2020-06-19 NOTE — PROGRESS NOTES
Subjective:       Patient ID: Uriah Mills is a 69 y.o. male.    Chief Complaint: Cough    HPI  patient complained of increasing coughing in the last few weeks coughing up white mucus and postnasal drip her last week he had a bad episode a cough ring when he is front of his porch with presyncope he has Ventolin metered-dose inhaler not help he is still smoking cigar he deny symptom of acid reflux deny P chest pain  Review of Systems    Objective:      Physical Exam  Vitals signs and nursing note reviewed.   Constitutional:       General: He is not in acute distress.     Appearance: He is well-developed and normal weight.   HENT:      Head: Normocephalic and atraumatic.      Right Ear: External ear normal.      Left Ear: External ear normal.      Nose: Nose normal.      Mouth/Throat:      Pharynx: No oropharyngeal exudate.   Eyes:      General:         Right eye: No discharge.         Left eye: No discharge.      Conjunctiva/sclera: Conjunctivae normal.      Pupils: Pupils are equal, round, and reactive to light.   Neck:      Musculoskeletal: Normal range of motion and neck supple.      Thyroid: No thyromegaly.   Cardiovascular:      Rate and Rhythm: Normal rate and regular rhythm.      Heart sounds: Normal heart sounds. No murmur. No friction rub. No gallop.    Pulmonary:      Effort: Pulmonary effort is normal. No respiratory distress.      Breath sounds: Wheezing (A bilateral Inspira jeramie expiratory wheezing) present. No rales.   Chest:      Chest wall: No tenderness.   Abdominal:      General: Bowel sounds are normal. There is no distension.      Palpations: Abdomen is soft.      Tenderness: There is no abdominal tenderness.   Musculoskeletal: Normal range of motion.         General: No tenderness or deformity.   Lymphadenopathy:      Cervical: No cervical adenopathy.   Skin:     General: Skin is warm and dry.      Findings: No erythema or rash.   Neurological:      Mental Status: He is alert and oriented to  person, place, and time.   Psychiatric:         Thought Content: Thought content normal.         Judgment: Judgment normal.         Assessment:       1. Chronic obstructive pulmonary disease, unspecified COPD type    2. Bronchitis    3. Acute non-recurrent sinusitis, unspecified location    4. Tobacco use        Plan:       Chronic obstructive pulmonary disease, unspecified COPD type  Comments:  Ventolin metered-dose inhaler not effective patient have moderately tight a bilateral wheezing will try nebulizer  Orders:  -     fluticasone-umeclidin-vilanter (TRELEGY ELLIPTA) 100-62.5-25 mcg DsDv; Inhale 1 puff into the lungs once daily.  Dispense: 60 each; Refill: 5  -     X-Ray Chest PA And Lateral; Future; Expected date: 06/19/2020  -     predniSONE (DELTASONE) 20 MG tablet; Take 1 tablet (20 mg total) by mouth 2 (two) times daily. for 5 days  Dispense: 10 tablet; Refill: 0  -     NEBULIZER FOR HOME USE  -     albuterol (ACCUNEB) 1.25 mg/3 mL Nebu; Take 3 mLs (1.25 mg total) by nebulization every 6 (six) hours as needed. Rescue  Dispense: 1 Box; Refill: 0    Bronchitis  -     triamcinolone acetonide injection 40 mg  -     cefTRIAXone (ROCEPHIN) 1 g in lidocaine HCL 10 mg/ml (1%) IM only syringe  -     X-Ray Chest PA And Lateral; Future; Expected date: 06/19/2020  -     guaifenesin-codeine 100-10 mg/5 ml (TUSSI-ORGANIDIN NR)  mg/5 mL syrup; Take 5 mLs by mouth every 6 (six) hours as needed.  Dispense: 150 mL; Refill: 0  -     amoxicillin-clavulanate 875-125mg (AUGMENTIN) 875-125 mg per tablet; Take 1 tablet by mouth every 12 (twelve) hours.  Dispense: 20 tablet; Refill: 0    Acute non-recurrent sinusitis, unspecified location  -     cefTRIAXone (ROCEPHIN) 1 g in lidocaine HCL 10 mg/ml (1%) IM only syringe  -     amoxicillin-clavulanate 875-125mg (AUGMENTIN) 875-125 mg per tablet; Take 1 tablet by mouth every 12 (twelve) hours.  Dispense: 20 tablet; Refill: 0    Tobacco use  Comments:  Discussed with patient need  to quit smoking completely will refer to smoking cessation program  Orders:  -     Ambulatory referral/consult to Smoking Cessation Program; Future; Expected date: 06/26/2020    Other orders  -     levocetirizine (XYZAL) 5 MG tablet; Take 1 tablet (5 mg total) by mouth every evening.  Dispense: 30 tablet; Refill: 3  -     Cancel: Ambulatory referral/consult to Dermatology; Future; Expected date: 06/26/2020

## 2020-06-19 NOTE — PROGRESS NOTES
Verified pt ID using name and . NKDA. Administered Rocephin 1 gm IM in Right VG, and Kenalog 60 mg IM in Left VG per physician order using aseptic technique. Aspirated and no blood return noted. Pt tolerated well with no adverse reactions noted.

## 2020-06-22 ENCOUNTER — TELEPHONE (OUTPATIENT)
Dept: PRIMARY CARE CLINIC | Facility: CLINIC | Age: 70
End: 2020-06-22

## 2020-10-05 ENCOUNTER — TELEPHONE (OUTPATIENT)
Dept: PRIMARY CARE CLINIC | Facility: CLINIC | Age: 70
End: 2020-10-05

## 2020-10-05 ENCOUNTER — PATIENT MESSAGE (OUTPATIENT)
Dept: ADMINISTRATIVE | Facility: HOSPITAL | Age: 70
End: 2020-10-05

## 2020-10-05 NOTE — TELEPHONE ENCOUNTER
----- Message from Lupe Salamanca sent at 10/5/2020 11:37 AM CDT -----  Contact: Wife Yamilex [phone]  Patient's wife is asking if she can come to the apt with him, states patient has a hard time understanding.    Thank you

## 2020-10-22 ENCOUNTER — OFFICE VISIT (OUTPATIENT)
Dept: PRIMARY CARE CLINIC | Facility: CLINIC | Age: 70
End: 2020-10-22
Payer: MEDICARE

## 2020-10-22 VITALS
OXYGEN SATURATION: 98 % | RESPIRATION RATE: 18 BRPM | WEIGHT: 192.56 LBS | HEIGHT: 70 IN | BODY MASS INDEX: 27.57 KG/M2 | DIASTOLIC BLOOD PRESSURE: 64 MMHG | HEART RATE: 76 BPM | TEMPERATURE: 99 F | SYSTOLIC BLOOD PRESSURE: 128 MMHG

## 2020-10-22 DIAGNOSIS — M54.42 CHRONIC LEFT-SIDED LOW BACK PAIN WITH LEFT-SIDED SCIATICA: Primary | ICD-10-CM

## 2020-10-22 DIAGNOSIS — G89.29 CHRONIC LEFT-SIDED LOW BACK PAIN WITH LEFT-SIDED SCIATICA: Primary | ICD-10-CM

## 2020-10-22 DIAGNOSIS — B35.6 TINEA CRURIS: ICD-10-CM

## 2020-10-22 DIAGNOSIS — M54.9 DORSALGIA, UNSPECIFIED: ICD-10-CM

## 2020-10-22 DIAGNOSIS — Z23 NEED FOR VACCINATION: ICD-10-CM

## 2020-10-22 PROCEDURE — 1101F PR PT FALLS ASSESS DOC 0-1 FALLS W/OUT INJ PAST YR: ICD-10-PCS | Mod: CPTII,S$GLB,, | Performed by: INTERNAL MEDICINE

## 2020-10-22 PROCEDURE — 3008F PR BODY MASS INDEX (BMI) DOCUMENTED: ICD-10-PCS | Mod: CPTII,S$GLB,, | Performed by: INTERNAL MEDICINE

## 2020-10-22 PROCEDURE — G0009 ADMIN PNEUMOCOCCAL VACCINE: HCPCS | Mod: S$GLB,,, | Performed by: INTERNAL MEDICINE

## 2020-10-22 PROCEDURE — G0008 FLU VACCINE - QUADRIVALENT - ADJUVANTED: ICD-10-PCS | Mod: S$GLB,,, | Performed by: INTERNAL MEDICINE

## 2020-10-22 PROCEDURE — 3008F BODY MASS INDEX DOCD: CPT | Mod: CPTII,S$GLB,, | Performed by: INTERNAL MEDICINE

## 2020-10-22 PROCEDURE — 90732 PPSV23 VACC 2 YRS+ SUBQ/IM: CPT | Mod: S$GLB,,, | Performed by: INTERNAL MEDICINE

## 2020-10-22 PROCEDURE — 1125F AMNT PAIN NOTED PAIN PRSNT: CPT | Mod: S$GLB,,, | Performed by: INTERNAL MEDICINE

## 2020-10-22 PROCEDURE — 1125F PR PAIN SEVERITY QUANTIFIED, PAIN PRESENT: ICD-10-PCS | Mod: S$GLB,,, | Performed by: INTERNAL MEDICINE

## 2020-10-22 PROCEDURE — 99213 OFFICE O/P EST LOW 20 MIN: CPT | Mod: 25,S$GLB,, | Performed by: INTERNAL MEDICINE

## 2020-10-22 PROCEDURE — 99999 PR PBB SHADOW E&M-EST. PATIENT-LVL V: ICD-10-PCS | Mod: PBBFAC,,, | Performed by: INTERNAL MEDICINE

## 2020-10-22 PROCEDURE — 99213 PR OFFICE/OUTPT VISIT, EST, LEVL III, 20-29 MIN: ICD-10-PCS | Mod: 25,S$GLB,, | Performed by: INTERNAL MEDICINE

## 2020-10-22 PROCEDURE — 1159F PR MEDICATION LIST DOCUMENTED IN MEDICAL RECORD: ICD-10-PCS | Mod: S$GLB,,, | Performed by: INTERNAL MEDICINE

## 2020-10-22 PROCEDURE — 99999 PR PBB SHADOW E&M-EST. PATIENT-LVL V: CPT | Mod: PBBFAC,,, | Performed by: INTERNAL MEDICINE

## 2020-10-22 PROCEDURE — 90694 FLU VACCINE - QUADRIVALENT - ADJUVANTED: ICD-10-PCS | Mod: S$GLB,,, | Performed by: INTERNAL MEDICINE

## 2020-10-22 PROCEDURE — 1101F PT FALLS ASSESS-DOCD LE1/YR: CPT | Mod: CPTII,S$GLB,, | Performed by: INTERNAL MEDICINE

## 2020-10-22 PROCEDURE — G0009 PNEUMOCOCCAL POLYSACCHARIDE VACCINE 23-VALENT =>2YO SQ IM: ICD-10-PCS | Mod: S$GLB,,, | Performed by: INTERNAL MEDICINE

## 2020-10-22 PROCEDURE — 1159F MED LIST DOCD IN RCRD: CPT | Mod: S$GLB,,, | Performed by: INTERNAL MEDICINE

## 2020-10-22 PROCEDURE — G0008 ADMIN INFLUENZA VIRUS VAC: HCPCS | Mod: S$GLB,,, | Performed by: INTERNAL MEDICINE

## 2020-10-22 PROCEDURE — 90694 VACC AIIV4 NO PRSRV 0.5ML IM: CPT | Mod: S$GLB,,, | Performed by: INTERNAL MEDICINE

## 2020-10-22 PROCEDURE — 90732 PNEUMOCOCCAL POLYSACCHARIDE VACCINE 23-VALENT =>2YO SQ IM: ICD-10-PCS | Mod: S$GLB,,, | Performed by: INTERNAL MEDICINE

## 2020-10-22 RX ORDER — PREDNISONE 20 MG/1
20 TABLET ORAL 2 TIMES DAILY
Qty: 10 TABLET | Refills: 1 | Status: SHIPPED | OUTPATIENT
Start: 2020-10-22 | End: 2020-10-27

## 2020-10-22 RX ORDER — CLOTRIMAZOLE AND BETAMETHASONE DIPROPIONATE 10; .64 MG/G; MG/G
CREAM TOPICAL 2 TIMES DAILY
Qty: 45 G | Refills: 1 | Status: SHIPPED | OUTPATIENT
Start: 2020-10-22 | End: 2021-04-06

## 2020-10-22 RX ORDER — GABAPENTIN 300 MG/1
300 CAPSULE ORAL 3 TIMES DAILY
Qty: 90 CAPSULE | Refills: 11 | Status: SHIPPED | OUTPATIENT
Start: 2020-10-22 | End: 2021-02-19 | Stop reason: ALTCHOICE

## 2020-10-22 RX ORDER — TRAMADOL HYDROCHLORIDE 50 MG/1
50 TABLET ORAL EVERY 12 HOURS PRN
Qty: 30 TABLET | Refills: 0 | Status: SHIPPED | OUTPATIENT
Start: 2020-10-22 | End: 2020-12-23 | Stop reason: SDUPTHER

## 2020-10-22 NOTE — PROGRESS NOTES
Verified pt ID using name and . NKDA. Administered Pneumo 23 IM in Right Deltoid, and Flu High Dose Vaccine IM in left deltoid per physician order using aseptic technique. Aspirated and no blood return noted. Pt tolerated well with no adverse reactions noted.

## 2020-10-22 NOTE — PROGRESS NOTES
Subjective:       Patient ID: Uriah Mills is a 69 y.o. male.    Chief Complaint: Annual Exam and Immunizations    HPI patient here for annual exam and immunization he also need a note for jury duty to be excused from patient clinically doing well except for complained of pain in the left lower back that radiated down into the left buttock left thigh left leg and leg weakness sometime he denies short of breath chest pain dyspnea with exertion.  Patient is still smoking no EtOH no fever chill nausea vomiting diarrhea short of breath or chest pain  Review of Systems    Objective:      Physical Exam  Vitals signs and nursing note reviewed.   Constitutional:       General: He is not in acute distress.     Appearance: He is well-developed.   HENT:      Head: Normocephalic and atraumatic.      Right Ear: External ear normal.      Left Ear: External ear normal.      Nose: Nose normal.      Mouth/Throat:      Pharynx: No oropharyngeal exudate.   Eyes:      Extraocular Movements: Extraocular movements intact.      Conjunctiva/sclera: Conjunctivae normal.      Pupils: Pupils are equal, round, and reactive to light.   Neck:      Musculoskeletal: Normal range of motion and neck supple.      Thyroid: No thyromegaly.   Cardiovascular:      Rate and Rhythm: Normal rate and regular rhythm.      Heart sounds: Normal heart sounds. No murmur. No friction rub. No gallop.    Pulmonary:      Effort: Pulmonary effort is normal. No respiratory distress.      Breath sounds: Normal breath sounds. No wheezing or rales.   Abdominal:      General: Bowel sounds are normal. There is no distension.      Palpations: Abdomen is soft.      Tenderness: There is no abdominal tenderness. There is no guarding.   Musculoskeletal: Normal range of motion.         General: Tenderness (Tenderness in left lower back radiated into left buttock left thigh left leg no skin rash) present. No deformity.   Lymphadenopathy:      Cervical: No cervical adenopathy.    Skin:     General: Skin is warm and dry.      Capillary Refill: Capillary refill takes less than 2 seconds.      Findings: No erythema or rash.   Neurological:      General: No focal deficit present.      Mental Status: He is alert and oriented to person, place, and time.   Psychiatric:         Thought Content: Thought content normal.         Judgment: Judgment normal.         Assessment:       1. Chronic left-sided low back pain with left-sided sciatica    2. Need for vaccination    3. Dorsalgia, unspecified    4. Tinea cruris        Plan:       Chronic left-sided low back pain with left-sided sciatica  -     predniSONE (DELTASONE) 20 MG tablet; Take 1 tablet (20 mg total) by mouth 2 (two) times daily. for 5 days  Dispense: 10 tablet; Refill: 1  -     gabapentin (NEURONTIN) 300 MG capsule; Take 1 capsule (300 mg total) by mouth 3 (three) times daily.  Dispense: 90 capsule; Refill: 11    Need for vaccination  -     Influenza - Quadrivalent (Adjuvanted)  -     Pneumococcal Polysaccharide Vaccine (23 Valent) (SQ/IM)    Dorsalgia, unspecified  -     X-Ray Lumbar Spine Ap And Lateral; Future; Expected date: 10/22/2020  -     traMADoL (ULTRAM) 50 mg tablet; Take 1 tablet (50 mg total) by mouth every 12 (twelve) hours as needed for Pain.  Dispense: 30 tablet; Refill: 0    Tinea cruris  -     clotrimazole-betamethasone 1-0.05% (LOTRISONE) cream; Apply topically 2 (two) times daily.  Dispense: 45 g; Refill: 1        Medication List with Changes/Refills   New Medications    CLOTRIMAZOLE-BETAMETHASONE 1-0.05% (LOTRISONE) CREAM    Apply topically 2 (two) times daily.    GABAPENTIN (NEURONTIN) 300 MG CAPSULE    Take 1 capsule (300 mg total) by mouth 3 (three) times daily.    PREDNISONE (DELTASONE) 20 MG TABLET    Take 1 tablet (20 mg total) by mouth 2 (two) times daily. for 5 days    TRAMADOL (ULTRAM) 50 MG TABLET    Take 1 tablet (50 mg total) by mouth every 12 (twelve) hours as needed for Pain.   Current Medications     ALBUTEROL (ACCUNEB) 1.25 MG/3 ML NEBU    Take 3 mLs (1.25 mg total) by nebulization every 6 (six) hours as needed. Rescue    ASPIRIN (ECOTRIN) 81 MG EC TABLET    Take 81 mg by mouth once daily.    FLUTICASONE-UMECLIDIN-VILANTER (TRELEGY ELLIPTA) 100-62.5-25 MCG DSDV    Inhale 1 puff into the lungs once daily.    LEVOCETIRIZINE (XYZAL) 5 MG TABLET    TAKE 1 TABLET BY MOUTH ONCE DAILY IN THE EVENING    LORAZEPAM (ATIVAN) 1 MG TABLET    Take 1 tablet (1 mg total) by mouth nightly as needed for Anxiety (insomnia).    PROAIR HFA 90 MCG/ACTUATION INHALER    INHALE 2 PUFFS BY MOUTH EVERY 6 HOURS AS NEEDED    TAMSULOSIN (FLOMAX) 0.4 MG CAP    Take 1 capsule by mouth once daily    TERAZOSIN (HYTRIN) 5 MG CAPSULE    TAKE 1 CAPSULE BY MOUTH IN THE EVENING   Discontinued Medications    AMOXICILLIN-CLAVULANATE 875-125MG (AUGMENTIN) 875-125 MG PER TABLET    Take 1 tablet by mouth every 12 (twelve) hours.    GUAIFENESIN-CODEINE 100-10 MG/5 ML (TUSSI-ORGANIDIN NR)  MG/5 ML SYRUP    Take 5 mLs by mouth every 6 (six) hours as needed.

## 2020-10-23 DIAGNOSIS — I70.0 ATHEROSCLEROSIS OF ABDOMINAL AORTA: Primary | ICD-10-CM

## 2020-11-04 DIAGNOSIS — I70.0 ATHEROSCLEROSIS OF ABDOMINAL AORTA: Primary | ICD-10-CM

## 2020-11-24 ENCOUNTER — OFFICE VISIT (OUTPATIENT)
Dept: PRIMARY CARE CLINIC | Facility: CLINIC | Age: 70
End: 2020-11-24
Payer: MEDICARE

## 2020-11-24 VITALS
BODY MASS INDEX: 26.99 KG/M2 | SYSTOLIC BLOOD PRESSURE: 124 MMHG | RESPIRATION RATE: 16 BRPM | HEIGHT: 71 IN | TEMPERATURE: 98 F | DIASTOLIC BLOOD PRESSURE: 62 MMHG | HEART RATE: 87 BPM | WEIGHT: 192.81 LBS | OXYGEN SATURATION: 96 %

## 2020-11-24 DIAGNOSIS — N40.0 BENIGN PROSTATIC HYPERPLASIA, UNSPECIFIED WHETHER LOWER URINARY TRACT SYMPTOMS PRESENT: ICD-10-CM

## 2020-11-24 DIAGNOSIS — Z72.0 TOBACCO USE: ICD-10-CM

## 2020-11-24 DIAGNOSIS — J44.9 CHRONIC OBSTRUCTIVE PULMONARY DISEASE, UNSPECIFIED COPD TYPE: ICD-10-CM

## 2020-11-24 DIAGNOSIS — Z12.5 SCREENING FOR PROSTATE CANCER: ICD-10-CM

## 2020-11-24 DIAGNOSIS — Z13.220 ENCOUNTER FOR LIPID SCREENING FOR CARDIOVASCULAR DISEASE: ICD-10-CM

## 2020-11-24 DIAGNOSIS — G89.29 CHRONIC BILATERAL LOW BACK PAIN WITH BILATERAL SCIATICA: Primary | ICD-10-CM

## 2020-11-24 DIAGNOSIS — M54.41 CHRONIC BILATERAL LOW BACK PAIN WITH BILATERAL SCIATICA: Primary | ICD-10-CM

## 2020-11-24 DIAGNOSIS — J40 BRONCHITIS: ICD-10-CM

## 2020-11-24 DIAGNOSIS — M54.42 CHRONIC BILATERAL LOW BACK PAIN WITH BILATERAL SCIATICA: Primary | ICD-10-CM

## 2020-11-24 DIAGNOSIS — M54.9 DORSALGIA, UNSPECIFIED: ICD-10-CM

## 2020-11-24 DIAGNOSIS — Z13.6 ENCOUNTER FOR LIPID SCREENING FOR CARDIOVASCULAR DISEASE: ICD-10-CM

## 2020-11-24 PROCEDURE — 99213 OFFICE O/P EST LOW 20 MIN: CPT | Mod: 25,S$GLB,, | Performed by: INTERNAL MEDICINE

## 2020-11-24 PROCEDURE — 96372 PR INJECTION,THERAP/PROPH/DIAG2ST, IM OR SUBCUT: ICD-10-PCS | Mod: S$GLB,,, | Performed by: INTERNAL MEDICINE

## 2020-11-24 PROCEDURE — 1101F PT FALLS ASSESS-DOCD LE1/YR: CPT | Mod: CPTII,S$GLB,, | Performed by: INTERNAL MEDICINE

## 2020-11-24 PROCEDURE — 1125F PR PAIN SEVERITY QUANTIFIED, PAIN PRESENT: ICD-10-PCS | Mod: S$GLB,,, | Performed by: INTERNAL MEDICINE

## 2020-11-24 PROCEDURE — 1125F AMNT PAIN NOTED PAIN PRSNT: CPT | Mod: S$GLB,,, | Performed by: INTERNAL MEDICINE

## 2020-11-24 PROCEDURE — 99999 PR PBB SHADOW E&M-EST. PATIENT-LVL V: CPT | Mod: PBBFAC,,, | Performed by: INTERNAL MEDICINE

## 2020-11-24 PROCEDURE — 1101F PR PT FALLS ASSESS DOC 0-1 FALLS W/OUT INJ PAST YR: ICD-10-PCS | Mod: CPTII,S$GLB,, | Performed by: INTERNAL MEDICINE

## 2020-11-24 PROCEDURE — 3008F PR BODY MASS INDEX (BMI) DOCUMENTED: ICD-10-PCS | Mod: CPTII,S$GLB,, | Performed by: INTERNAL MEDICINE

## 2020-11-24 PROCEDURE — 3288F FALL RISK ASSESSMENT DOCD: CPT | Mod: CPTII,S$GLB,, | Performed by: INTERNAL MEDICINE

## 2020-11-24 PROCEDURE — 99999 PR PBB SHADOW E&M-EST. PATIENT-LVL V: ICD-10-PCS | Mod: PBBFAC,,, | Performed by: INTERNAL MEDICINE

## 2020-11-24 PROCEDURE — 3288F PR FALLS RISK ASSESSMENT DOCUMENTED: ICD-10-PCS | Mod: CPTII,S$GLB,, | Performed by: INTERNAL MEDICINE

## 2020-11-24 PROCEDURE — 99213 PR OFFICE/OUTPT VISIT, EST, LEVL III, 20-29 MIN: ICD-10-PCS | Mod: 25,S$GLB,, | Performed by: INTERNAL MEDICINE

## 2020-11-24 PROCEDURE — 1159F MED LIST DOCD IN RCRD: CPT | Mod: S$GLB,,, | Performed by: INTERNAL MEDICINE

## 2020-11-24 PROCEDURE — 3008F BODY MASS INDEX DOCD: CPT | Mod: CPTII,S$GLB,, | Performed by: INTERNAL MEDICINE

## 2020-11-24 PROCEDURE — 96372 THER/PROPH/DIAG INJ SC/IM: CPT | Mod: S$GLB,,, | Performed by: INTERNAL MEDICINE

## 2020-11-24 PROCEDURE — 1159F PR MEDICATION LIST DOCUMENTED IN MEDICAL RECORD: ICD-10-PCS | Mod: S$GLB,,, | Performed by: INTERNAL MEDICINE

## 2020-11-24 RX ORDER — TRIAMCINOLONE ACETONIDE 40 MG/ML
60 INJECTION, SUSPENSION INTRA-ARTICULAR; INTRAMUSCULAR ONCE
Status: COMPLETED | OUTPATIENT
Start: 2020-11-24 | End: 2020-11-24

## 2020-11-24 RX ORDER — AZITHROMYCIN 250 MG/1
TABLET, FILM COATED ORAL
Qty: 6 TABLET | Refills: 0 | Status: SHIPPED | OUTPATIENT
Start: 2020-11-24 | End: 2020-11-28

## 2020-11-24 RX ORDER — MELOXICAM 15 MG/1
15 TABLET ORAL DAILY
Qty: 30 TABLET | Refills: 0 | Status: SHIPPED | OUTPATIENT
Start: 2020-11-24 | End: 2021-02-04 | Stop reason: SDUPTHER

## 2020-11-24 RX ORDER — GUAIFENESIN/DEXTROMETHORPHAN 100-10MG/5
5 SYRUP ORAL EVERY 6 HOURS PRN
Qty: 180 ML | Refills: 1 | Status: SHIPPED | OUTPATIENT
Start: 2020-11-24 | End: 2021-02-05 | Stop reason: ALTCHOICE

## 2020-11-24 RX ADMIN — TRIAMCINOLONE ACETONIDE 60 MG: 40 INJECTION, SUSPENSION INTRA-ARTICULAR; INTRAMUSCULAR at 11:11

## 2020-11-24 NOTE — PROGRESS NOTES
Verified pt ID using name and . NKDA. Administered Kenalog 60 MG IM in Left VG per physician order using aseptic technique. Aspirated and no blood return noted. Pt tolerated well with no adverse reactions noted.

## 2020-11-24 NOTE — PROGRESS NOTES
Subjective:       Patient ID: Uriah Mills is a 70 y.o. male.    Chief Complaint: Results (AAA screening ) and Follow-up (Left hip pain )    HPI  Pt visit today for f/u abd aortic u/s no aneurysm pt also c/o chronic lower back ppain that radiate into both hips L>R and he has been coughing  With mucous sl yellow he is still smoking 1-2 cigar a day he denies fever chest pain does have sob with exertion . He denies any other symptoms. He never had an MRI of his LS to evaluate chronic lower back pain with radiculopathy and his symptome getting worse  Review of Systems    Objective:      Physical Exam  Vitals signs and nursing note reviewed.   Constitutional:       General: He is not in acute distress.     Appearance: He is well-developed.   HENT:      Head: Normocephalic and atraumatic.      Right Ear: External ear normal.      Left Ear: External ear normal.      Nose: Nose normal.      Mouth/Throat:      Pharynx: No oropharyngeal exudate.   Eyes:      Extraocular Movements: Extraocular movements intact.      Conjunctiva/sclera: Conjunctivae normal.      Pupils: Pupils are equal, round, and reactive to light.   Neck:      Musculoskeletal: Normal range of motion and neck supple.      Thyroid: No thyromegaly.   Cardiovascular:      Rate and Rhythm: Normal rate and regular rhythm.      Heart sounds: Normal heart sounds. No murmur. No friction rub. No gallop.    Pulmonary:      Effort: Pulmonary effort is normal. No respiratory distress.      Breath sounds: Normal breath sounds. No wheezing or rales.   Abdominal:      General: Bowel sounds are normal. There is no distension.      Palpations: Abdomen is soft.      Tenderness: There is no abdominal tenderness. There is no guarding.   Musculoskeletal: Normal range of motion.         General: Tenderness (tenderness across lower back with radiation of pain into bilateral buttock and hips lef worse than rt) present. No deformity.   Lymphadenopathy:      Cervical: No cervical  adenopathy.   Skin:     General: Skin is warm and dry.      Capillary Refill: Capillary refill takes less than 2 seconds.      Findings: No erythema or rash.   Neurological:      General: No focal deficit present.      Mental Status: He is alert and oriented to person, place, and time.   Psychiatric:         Mood and Affect: Mood normal.         Thought Content: Thought content normal.         Judgment: Judgment normal.         Assessment:       1. Chronic bilateral low back pain with bilateral sciatica    2. Benign prostatic hyperplasia, unspecified whether lower urinary tract symptoms present    3. Chronic obstructive pulmonary disease, unspecified COPD type    4. Tobacco use    5. Bronchitis    6. Encounter for lipid screening for cardiovascular disease    7. Dorsalgia, unspecified    8. Screening for prostate cancer        Plan:       Chronic bilateral low back pain with bilateral sciatica  -     triamcinolone acetonide injection 60 mg  -     Urinalysis; Future; Expected date: 11/24/2020    Benign prostatic hyperplasia, unspecified whether lower urinary tract symptoms present  Comments:  stable on medications    Chronic obstructive pulmonary disease, unspecified COPD type  -     meloxicam (MOBIC) 15 MG tablet; Take 1 tablet (15 mg total) by mouth once daily.  Dispense: 30 tablet; Refill: 0    Tobacco use  -     Ambulatory referral/consult to Smoking Cessation Program; Future; Expected date: 12/01/2020    Bronchitis  -     triamcinolone acetonide injection 60 mg  -     CBC Auto Differential; Future; Expected date: 11/24/2020  -     Comprehensive Metabolic Panel; Future; Expected date: 11/24/2020  -     azithromycin (Z-KADE) 250 MG tablet; 2 tabs by mouth day 1, then 1 tab by mouth daily x 4 days  Dispense: 6 tablet; Refill: 0  -     dextromethorphan-guaifenesin  mg/5 ml (ROBITUSSIN-DM)  mg/5 mL liquid; Take 5 mLs by mouth every 6 (six) hours as needed (for cough).  Dispense: 180 mL; Refill:  1    Encounter for lipid screening for cardiovascular disease  -     Lipid Panel; Future; Expected date: 11/24/2020    Dorsalgia, unspecified  -     MRI Lumbar Spine Without Contrast; Future; Expected date: 11/24/2020    Screening for prostate cancer  -     PSA, Screening; Future; Expected date: 11/24/2020        Medication List with Changes/Refills   New Medications    AZITHROMYCIN (Z-KADE) 250 MG TABLET    2 tabs by mouth day 1, then 1 tab by mouth daily x 4 days    DEXTROMETHORPHAN-GUAIFENESIN  MG/5 ML (ROBITUSSIN-DM)  MG/5 ML LIQUID    Take 5 mLs by mouth every 6 (six) hours as needed (for cough).    MELOXICAM (MOBIC) 15 MG TABLET    Take 1 tablet (15 mg total) by mouth once daily.   Current Medications    ALBUTEROL (ACCUNEB) 1.25 MG/3 ML NEBU    Take 3 mLs (1.25 mg total) by nebulization every 6 (six) hours as needed. Rescue    ASPIRIN (ECOTRIN) 81 MG EC TABLET    Take 81 mg by mouth once daily.    CLOTRIMAZOLE-BETAMETHASONE 1-0.05% (LOTRISONE) CREAM    Apply topically 2 (two) times daily.    FLUTICASONE-UMECLIDIN-VILANTER (TRELEGY ELLIPTA) 100-62.5-25 MCG DSDV    Inhale 1 puff into the lungs once daily.    GABAPENTIN (NEURONTIN) 300 MG CAPSULE    Take 1 capsule (300 mg total) by mouth 3 (three) times daily.    LEVOCETIRIZINE (XYZAL) 5 MG TABLET    TAKE 1 TABLET BY MOUTH ONCE DAILY IN THE EVENING    LORAZEPAM (ATIVAN) 1 MG TABLET    Take 1 tablet (1 mg total) by mouth nightly as needed for Anxiety (insomnia).    PROAIR HFA 90 MCG/ACTUATION INHALER    INHALE 2 PUFFS BY MOUTH EVERY 6 HOURS AS NEEDED    TAMSULOSIN (FLOMAX) 0.4 MG CAP    Take 1 capsule by mouth once daily    TERAZOSIN (HYTRIN) 5 MG CAPSULE    TAKE 1 CAPSULE BY MOUTH IN THE EVENING    TRAMADOL (ULTRAM) 50 MG TABLET    Take 1 tablet (50 mg total) by mouth every 12 (twelve) hours as needed for Pain.

## 2020-12-23 ENCOUNTER — OFFICE VISIT (OUTPATIENT)
Dept: PRIMARY CARE CLINIC | Facility: CLINIC | Age: 70
End: 2020-12-23
Payer: MEDICARE

## 2020-12-23 VITALS
BODY MASS INDEX: 27.27 KG/M2 | OXYGEN SATURATION: 97 % | TEMPERATURE: 97 F | HEIGHT: 70 IN | RESPIRATION RATE: 22 BRPM | DIASTOLIC BLOOD PRESSURE: 56 MMHG | WEIGHT: 190.5 LBS | SYSTOLIC BLOOD PRESSURE: 128 MMHG | HEART RATE: 80 BPM

## 2020-12-23 DIAGNOSIS — M54.9 DORSALGIA, UNSPECIFIED: ICD-10-CM

## 2020-12-23 DIAGNOSIS — J30.89 NON-SEASONAL ALLERGIC RHINITIS, UNSPECIFIED TRIGGER: ICD-10-CM

## 2020-12-23 DIAGNOSIS — R31.29 MICROSCOPIC HEMATURIA: ICD-10-CM

## 2020-12-23 DIAGNOSIS — M51.16 LUMBAR DISC DISEASE WITH RADICULOPATHY: Primary | ICD-10-CM

## 2020-12-23 DIAGNOSIS — J40 BRONCHITIS: ICD-10-CM

## 2020-12-23 LAB
BILIRUB SERPL-MCNC: ABNORMAL MG/DL
BLOOD URINE, POC: ABNORMAL
CLARITY, POC UA: CLEAR
COLOR, POC UA: YELLOW
GLUCOSE UR QL STRIP: NORMAL
KETONES UR QL STRIP: ABNORMAL
LEUKOCYTE ESTERASE URINE, POC: ABNORMAL
NITRITE, POC UA: ABNORMAL
PH, POC UA: 6
PROTEIN, POC: 30
SPECIFIC GRAVITY, POC UA: 1.01
UROBILINOGEN, POC UA: NORMAL

## 2020-12-23 PROCEDURE — 3008F PR BODY MASS INDEX (BMI) DOCUMENTED: ICD-10-PCS | Mod: CPTII,S$GLB,, | Performed by: INTERNAL MEDICINE

## 2020-12-23 PROCEDURE — 99213 OFFICE O/P EST LOW 20 MIN: CPT | Mod: 25,S$GLB,, | Performed by: INTERNAL MEDICINE

## 2020-12-23 PROCEDURE — 3288F PR FALLS RISK ASSESSMENT DOCUMENTED: ICD-10-PCS | Mod: CPTII,S$GLB,, | Performed by: INTERNAL MEDICINE

## 2020-12-23 PROCEDURE — 99999 PR PBB SHADOW E&M-EST. PATIENT-LVL V: CPT | Mod: PBBFAC,,, | Performed by: INTERNAL MEDICINE

## 2020-12-23 PROCEDURE — 1159F MED LIST DOCD IN RCRD: CPT | Mod: S$GLB,,, | Performed by: INTERNAL MEDICINE

## 2020-12-23 PROCEDURE — 81002 URINALYSIS NONAUTO W/O SCOPE: CPT | Mod: S$GLB,,, | Performed by: INTERNAL MEDICINE

## 2020-12-23 PROCEDURE — 1101F PR PT FALLS ASSESS DOC 0-1 FALLS W/OUT INJ PAST YR: ICD-10-PCS | Mod: CPTII,S$GLB,, | Performed by: INTERNAL MEDICINE

## 2020-12-23 PROCEDURE — 1125F PR PAIN SEVERITY QUANTIFIED, PAIN PRESENT: ICD-10-PCS | Mod: S$GLB,,, | Performed by: INTERNAL MEDICINE

## 2020-12-23 PROCEDURE — 3288F FALL RISK ASSESSMENT DOCD: CPT | Mod: CPTII,S$GLB,, | Performed by: INTERNAL MEDICINE

## 2020-12-23 PROCEDURE — 1101F PT FALLS ASSESS-DOCD LE1/YR: CPT | Mod: CPTII,S$GLB,, | Performed by: INTERNAL MEDICINE

## 2020-12-23 PROCEDURE — 3008F BODY MASS INDEX DOCD: CPT | Mod: CPTII,S$GLB,, | Performed by: INTERNAL MEDICINE

## 2020-12-23 PROCEDURE — 1159F PR MEDICATION LIST DOCUMENTED IN MEDICAL RECORD: ICD-10-PCS | Mod: S$GLB,,, | Performed by: INTERNAL MEDICINE

## 2020-12-23 PROCEDURE — 99999 PR PBB SHADOW E&M-EST. PATIENT-LVL V: ICD-10-PCS | Mod: PBBFAC,,, | Performed by: INTERNAL MEDICINE

## 2020-12-23 PROCEDURE — 81002 POCT URINE DIPSTICK WITHOUT MICROSCOPE: ICD-10-PCS | Mod: S$GLB,,, | Performed by: INTERNAL MEDICINE

## 2020-12-23 PROCEDURE — 99213 PR OFFICE/OUTPT VISIT, EST, LEVL III, 20-29 MIN: ICD-10-PCS | Mod: 25,S$GLB,, | Performed by: INTERNAL MEDICINE

## 2020-12-23 PROCEDURE — 1125F AMNT PAIN NOTED PAIN PRSNT: CPT | Mod: S$GLB,,, | Performed by: INTERNAL MEDICINE

## 2020-12-23 RX ORDER — FLUTICASONE PROPIONATE 50 MCG
2 SPRAY, SUSPENSION (ML) NASAL DAILY
Qty: 15.8 ML | Refills: 1 | Status: SHIPPED | OUTPATIENT
Start: 2020-12-23 | End: 2021-02-05 | Stop reason: ALTCHOICE

## 2020-12-23 RX ORDER — TRAMADOL HYDROCHLORIDE 50 MG/1
50 TABLET ORAL EVERY 12 HOURS PRN
Qty: 30 TABLET | Refills: 0 | Status: SHIPPED | OUTPATIENT
Start: 2020-12-23 | End: 2021-03-04

## 2020-12-23 RX ORDER — ALBUTEROL SULFATE 90 UG/1
2 AEROSOL, METERED RESPIRATORY (INHALATION) EVERY 6 HOURS
Qty: 9 G | Refills: 5 | Status: SHIPPED | OUTPATIENT
Start: 2020-12-23 | End: 2021-05-27

## 2020-12-23 NOTE — PROGRESS NOTES
Subjective:       Patient ID: Uriah Mills is a 70 y.o. male.    Chief Complaint: Shortness of Breath, Follow-up (lower back pain and left hip pain ), Results (MRI & Lab results ), and Medication Refill (proair inhaler )    HPI  Pt visit today for f/u he is still having lower back pain radiate left buttock left hip and numbness left leg . Also c/o sob no cp still smoking cigars  Pt had MRI LS ddd L4-5 and L5S1 but no significant spinal stenose or foramen stenoses . Review labs CBC CMP LIPIDS are normal  Review of Systems    Objective:      Physical Exam  Vitals signs and nursing note reviewed.   Constitutional:       General: He is not in acute distress.     Appearance: He is well-developed.   HENT:      Head: Normocephalic and atraumatic.      Right Ear: External ear normal.      Left Ear: External ear normal.      Nose: Nose normal.      Mouth/Throat:      Pharynx: No oropharyngeal exudate.   Eyes:      Extraocular Movements: Extraocular movements intact.      Conjunctiva/sclera: Conjunctivae normal.      Pupils: Pupils are equal, round, and reactive to light.   Neck:      Musculoskeletal: Normal range of motion and neck supple.      Thyroid: No thyromegaly.   Cardiovascular:      Rate and Rhythm: Normal rate and regular rhythm.      Heart sounds: Normal heart sounds. No murmur. No friction rub. No gallop.    Pulmonary:      Effort: Pulmonary effort is normal. No respiratory distress.      Breath sounds: Wheezing (blateral scattered wheezing) present. No rales.   Abdominal:      General: Bowel sounds are normal. There is no distension.      Palpations: Abdomen is soft.      Tenderness: There is no abdominal tenderness. There is no guarding.   Musculoskeletal: Normal range of motion.         General: No tenderness or deformity.   Lymphadenopathy:      Cervical: No cervical adenopathy.   Skin:     General: Skin is warm and dry.      Findings: No erythema or rash.   Neurological:      General: No focal deficit  present.      Mental Status: He is alert and oriented to person, place, and time.   Psychiatric:         Thought Content: Thought content normal.         Judgment: Judgment normal.         Assessment:       1. Lumbar disc disease with radiculopathy    2. Bronchitis    3. Dorsalgia, unspecified    4. Non-seasonal allergic rhinitis, unspecified trigger        Plan:       Lumbar disc disease with radiculopathy  -     Ambulatory referral/consult to Pain Clinic; Future; Expected date: 12/30/2020    Bronchitis  -     PROAIR HFA 90 mcg/actuation inhaler; Inhale 2 puffs into the lungs every 6 (six) hours. Rescue  Dispense: 9 g; Refill: 5    Dorsalgia, unspecified  -     traMADoL (ULTRAM) 50 mg tablet; Take 1 tablet (50 mg total) by mouth every 12 (twelve) hours as needed for Pain.  Dispense: 30 tablet; Refill: 0  -     Ambulatory referral/consult to Pain Clinic; Future; Expected date: 12/30/2020    Non-seasonal allergic rhinitis, unspecified trigger  -     fluticasone propionate (FLONASE) 50 mcg/actuation nasal spray; 2 sprays (100 mcg total) by Each Nostril route once daily.  Dispense: 15.8 mL; Refill: 1        Medication List with Changes/Refills   New Medications    FLUTICASONE PROPIONATE (FLONASE) 50 MCG/ACTUATION NASAL SPRAY    2 sprays (100 mcg total) by Each Nostril route once daily.   Current Medications    ALBUTEROL (ACCUNEB) 1.25 MG/3 ML NEBU    Take 3 mLs (1.25 mg total) by nebulization every 6 (six) hours as needed. Rescue    ASPIRIN (ECOTRIN) 81 MG EC TABLET    Take 81 mg by mouth once daily.    CLOTRIMAZOLE-BETAMETHASONE 1-0.05% (LOTRISONE) CREAM    Apply topically 2 (two) times daily.    DEXTROMETHORPHAN-GUAIFENESIN  MG/5 ML (ROBITUSSIN-DM)  MG/5 ML LIQUID    Take 5 mLs by mouth every 6 (six) hours as needed (for cough).    FLUTICASONE-UMECLIDIN-VILANTER (TRELEGY ELLIPTA) 100-62.5-25 MCG DSDV    Inhale 1 puff into the lungs once daily.    GABAPENTIN (NEURONTIN) 300 MG CAPSULE    Take 1 capsule  (300 mg total) by mouth 3 (three) times daily.    LEVOCETIRIZINE (XYZAL) 5 MG TABLET    TAKE 1 TABLET BY MOUTH ONCE DAILY IN THE EVENING    LORAZEPAM (ATIVAN) 1 MG TABLET    Take 1 tablet (1 mg total) by mouth nightly as needed for Anxiety (insomnia).    MELOXICAM (MOBIC) 15 MG TABLET    Take 1 tablet (15 mg total) by mouth once daily.    TAMSULOSIN (FLOMAX) 0.4 MG CAP    Take 1 capsule by mouth once daily    TERAZOSIN (HYTRIN) 5 MG CAPSULE    TAKE 1 CAPSULE BY MOUTH IN THE EVENING   Changed and/or Refilled Medications    Modified Medication Previous Medication    PROAIR HFA 90 MCG/ACTUATION INHALER PROAIR HFA 90 mcg/actuation inhaler       Inhale 2 puffs into the lungs every 6 (six) hours. Rescue    INHALE 2 PUFFS BY MOUTH EVERY 6 HOURS AS NEEDED    TRAMADOL (ULTRAM) 50 MG TABLET traMADoL (ULTRAM) 50 mg tablet       Take 1 tablet (50 mg total) by mouth every 12 (twelve) hours as needed for Pain.    Take 1 tablet (50 mg total) by mouth every 12 (twelve) hours as needed for Pain.

## 2020-12-27 DIAGNOSIS — R31.29 MICROSCOPIC HEMATURIA: Primary | ICD-10-CM

## 2020-12-29 ENCOUNTER — PATIENT OUTREACH (OUTPATIENT)
Dept: ADMINISTRATIVE | Facility: OTHER | Age: 70
End: 2020-12-29

## 2020-12-29 NOTE — PROGRESS NOTES
LINKS immunization registry not responding  Care Everywhere updated  Health Maintenance updated  Chart reviewed for overdue Proactive [facility] Encounters (PILAR) health maintenance testing (CRS, Breast Ca, Diabetic Eye Exam)   Orders entered:N/A

## 2021-01-05 ENCOUNTER — OFFICE VISIT (OUTPATIENT)
Dept: PAIN MEDICINE | Facility: CLINIC | Age: 71
End: 2021-01-05
Payer: MEDICARE

## 2021-01-05 VITALS
HEIGHT: 70 IN | TEMPERATURE: 98 F | RESPIRATION RATE: 19 BRPM | BODY MASS INDEX: 27.67 KG/M2 | SYSTOLIC BLOOD PRESSURE: 153 MMHG | WEIGHT: 193.25 LBS | HEART RATE: 84 BPM | DIASTOLIC BLOOD PRESSURE: 75 MMHG

## 2021-01-05 DIAGNOSIS — M51.16 LUMBAR DISC DISEASE WITH RADICULOPATHY: ICD-10-CM

## 2021-01-05 DIAGNOSIS — M47.816 LUMBAR SPONDYLOSIS: ICD-10-CM

## 2021-01-05 DIAGNOSIS — M54.16 LUMBAR RADICULOPATHY: ICD-10-CM

## 2021-01-05 DIAGNOSIS — M51.36 DDD (DEGENERATIVE DISC DISEASE), LUMBAR: Primary | ICD-10-CM

## 2021-01-05 DIAGNOSIS — M54.9 DORSALGIA, UNSPECIFIED: ICD-10-CM

## 2021-01-05 PROBLEM — M51.369 DDD (DEGENERATIVE DISC DISEASE), LUMBAR: Status: ACTIVE | Noted: 2021-01-05

## 2021-01-05 PROCEDURE — 99999 PR PBB SHADOW E&M-EST. PATIENT-LVL V: ICD-10-PCS | Mod: PBBFAC,,, | Performed by: PAIN MEDICINE

## 2021-01-05 PROCEDURE — 99204 OFFICE O/P NEW MOD 45 MIN: CPT | Mod: S$GLB,,, | Performed by: PAIN MEDICINE

## 2021-01-05 PROCEDURE — 3288F PR FALLS RISK ASSESSMENT DOCUMENTED: ICD-10-PCS | Mod: CPTII,S$GLB,, | Performed by: PAIN MEDICINE

## 2021-01-05 PROCEDURE — 1159F MED LIST DOCD IN RCRD: CPT | Mod: S$GLB,,, | Performed by: PAIN MEDICINE

## 2021-01-05 PROCEDURE — 99999 PR PBB SHADOW E&M-EST. PATIENT-LVL V: CPT | Mod: PBBFAC,,, | Performed by: PAIN MEDICINE

## 2021-01-05 PROCEDURE — 1125F PR PAIN SEVERITY QUANTIFIED, PAIN PRESENT: ICD-10-PCS | Mod: S$GLB,,, | Performed by: PAIN MEDICINE

## 2021-01-05 PROCEDURE — 99204 PR OFFICE/OUTPT VISIT, NEW, LEVL IV, 45-59 MIN: ICD-10-PCS | Mod: S$GLB,,, | Performed by: PAIN MEDICINE

## 2021-01-05 PROCEDURE — 1125F AMNT PAIN NOTED PAIN PRSNT: CPT | Mod: S$GLB,,, | Performed by: PAIN MEDICINE

## 2021-01-05 PROCEDURE — 3288F FALL RISK ASSESSMENT DOCD: CPT | Mod: CPTII,S$GLB,, | Performed by: PAIN MEDICINE

## 2021-01-05 PROCEDURE — 1159F PR MEDICATION LIST DOCUMENTED IN MEDICAL RECORD: ICD-10-PCS | Mod: S$GLB,,, | Performed by: PAIN MEDICINE

## 2021-01-05 PROCEDURE — 3008F BODY MASS INDEX DOCD: CPT | Mod: CPTII,S$GLB,, | Performed by: PAIN MEDICINE

## 2021-01-05 PROCEDURE — 3008F PR BODY MASS INDEX (BMI) DOCUMENTED: ICD-10-PCS | Mod: CPTII,S$GLB,, | Performed by: PAIN MEDICINE

## 2021-01-05 PROCEDURE — 1101F PT FALLS ASSESS-DOCD LE1/YR: CPT | Mod: CPTII,S$GLB,, | Performed by: PAIN MEDICINE

## 2021-01-05 PROCEDURE — 1101F PR PT FALLS ASSESS DOC 0-1 FALLS W/OUT INJ PAST YR: ICD-10-PCS | Mod: CPTII,S$GLB,, | Performed by: PAIN MEDICINE

## 2021-01-19 ENCOUNTER — OFFICE VISIT (OUTPATIENT)
Dept: PAIN MEDICINE | Facility: CLINIC | Age: 71
End: 2021-01-19
Payer: MEDICARE

## 2021-01-19 VITALS
HEART RATE: 86 BPM | WEIGHT: 191.25 LBS | SYSTOLIC BLOOD PRESSURE: 125 MMHG | TEMPERATURE: 99 F | BODY MASS INDEX: 27.38 KG/M2 | DIASTOLIC BLOOD PRESSURE: 77 MMHG | HEIGHT: 70 IN

## 2021-01-19 DIAGNOSIS — M54.16 LUMBAR RADICULOPATHY: Primary | ICD-10-CM

## 2021-01-19 DIAGNOSIS — M51.36 DDD (DEGENERATIVE DISC DISEASE), LUMBAR: ICD-10-CM

## 2021-01-19 DIAGNOSIS — M47.816 LUMBAR SPONDYLOSIS: ICD-10-CM

## 2021-01-19 PROCEDURE — 1125F PR PAIN SEVERITY QUANTIFIED, PAIN PRESENT: ICD-10-PCS | Mod: S$GLB,,, | Performed by: PAIN MEDICINE

## 2021-01-19 PROCEDURE — 3008F BODY MASS INDEX DOCD: CPT | Mod: CPTII,S$GLB,, | Performed by: PAIN MEDICINE

## 2021-01-19 PROCEDURE — 99213 PR OFFICE/OUTPT VISIT, EST, LEVL III, 20-29 MIN: ICD-10-PCS | Mod: S$GLB,,, | Performed by: PAIN MEDICINE

## 2021-01-19 PROCEDURE — 99213 OFFICE O/P EST LOW 20 MIN: CPT | Mod: S$GLB,,, | Performed by: PAIN MEDICINE

## 2021-01-19 PROCEDURE — 1125F AMNT PAIN NOTED PAIN PRSNT: CPT | Mod: S$GLB,,, | Performed by: PAIN MEDICINE

## 2021-01-19 PROCEDURE — 99999 PR PBB SHADOW E&M-EST. PATIENT-LVL IV: ICD-10-PCS | Mod: PBBFAC,,, | Performed by: PAIN MEDICINE

## 2021-01-19 PROCEDURE — 99999 PR PBB SHADOW E&M-EST. PATIENT-LVL IV: CPT | Mod: PBBFAC,,, | Performed by: PAIN MEDICINE

## 2021-01-19 PROCEDURE — 1159F PR MEDICATION LIST DOCUMENTED IN MEDICAL RECORD: ICD-10-PCS | Mod: S$GLB,,, | Performed by: PAIN MEDICINE

## 2021-01-19 PROCEDURE — 3008F PR BODY MASS INDEX (BMI) DOCUMENTED: ICD-10-PCS | Mod: CPTII,S$GLB,, | Performed by: PAIN MEDICINE

## 2021-01-19 PROCEDURE — 1159F MED LIST DOCD IN RCRD: CPT | Mod: S$GLB,,, | Performed by: PAIN MEDICINE

## 2021-01-20 ENCOUNTER — PATIENT OUTREACH (OUTPATIENT)
Dept: ADMINISTRATIVE | Facility: OTHER | Age: 71
End: 2021-01-20

## 2021-02-04 ENCOUNTER — OFFICE VISIT (OUTPATIENT)
Dept: PRIMARY CARE CLINIC | Facility: CLINIC | Age: 71
End: 2021-02-04
Payer: MEDICARE

## 2021-02-04 VITALS
BODY MASS INDEX: 26.61 KG/M2 | OXYGEN SATURATION: 94 % | SYSTOLIC BLOOD PRESSURE: 130 MMHG | WEIGHT: 190.06 LBS | DIASTOLIC BLOOD PRESSURE: 58 MMHG | TEMPERATURE: 98 F | HEIGHT: 71 IN | HEART RATE: 88 BPM | RESPIRATION RATE: 22 BRPM

## 2021-02-04 DIAGNOSIS — S50.312A ABRASION OF LEFT ELBOW, INITIAL ENCOUNTER: ICD-10-CM
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
## 2021-02-04 DIAGNOSIS — J44.9 CHRONIC OBSTRUCTIVE PULMONARY DISEASE, UNSPECIFIED COPD TYPE: ICD-10-CM

## 2021-02-04 DIAGNOSIS — L08.9 FINGER, SUPERFICIAL FOREIGN BODY (SPLINTER), INFECTED, INITIAL ENCOUNTER: ICD-10-CM

## 2021-02-04 DIAGNOSIS — J32.9 CHRONIC SINUSITIS, UNSPECIFIED LOCATION: Primary | ICD-10-CM

## 2021-02-04 DIAGNOSIS — S60.459A FINGER, SUPERFICIAL FOREIGN BODY (SPLINTER), INFECTED, INITIAL ENCOUNTER: ICD-10-CM

## 2021-02-04 PROCEDURE — 1100F PTFALLS ASSESS-DOCD GE2>/YR: CPT | Mod: CPTII,S$GLB,, | Performed by: INTERNAL MEDICINE

## 2021-02-04 PROCEDURE — 1125F AMNT PAIN NOTED PAIN PRSNT: CPT | Mod: S$GLB,,, | Performed by: INTERNAL MEDICINE

## 2021-02-04 PROCEDURE — 99214 OFFICE O/P EST MOD 30 MIN: CPT | Mod: S$GLB,,, | Performed by: INTERNAL MEDICINE

## 2021-02-04 PROCEDURE — 99999 PR PBB SHADOW E&M-EST. PATIENT-LVL V: CPT | Mod: PBBFAC,,, | Performed by: INTERNAL MEDICINE

## 2021-02-04 PROCEDURE — 1125F PR PAIN SEVERITY QUANTIFIED, PAIN PRESENT: ICD-10-PCS | Mod: S$GLB,,, | Performed by: INTERNAL MEDICINE

## 2021-02-04 PROCEDURE — 99214 PR OFFICE/OUTPT VISIT, EST, LEVL IV, 30-39 MIN: ICD-10-PCS | Mod: S$GLB,,, | Performed by: INTERNAL MEDICINE

## 2021-02-04 PROCEDURE — 1159F MED LIST DOCD IN RCRD: CPT | Mod: S$GLB,,, | Performed by: INTERNAL MEDICINE

## 2021-02-04 PROCEDURE — 99499 RISK ADDL DX/OHS AUDIT: ICD-10-PCS | Mod: S$GLB,,, | Performed by: INTERNAL MEDICINE

## 2021-02-04 PROCEDURE — 3008F BODY MASS INDEX DOCD: CPT | Mod: CPTII,S$GLB,, | Performed by: INTERNAL MEDICINE

## 2021-02-04 PROCEDURE — 99999 PR PBB SHADOW E&M-EST. PATIENT-LVL V: ICD-10-PCS | Mod: PBBFAC,,, | Performed by: INTERNAL MEDICINE

## 2021-02-04 PROCEDURE — 1159F PR MEDICATION LIST DOCUMENTED IN MEDICAL RECORD: ICD-10-PCS | Mod: S$GLB,,, | Performed by: INTERNAL MEDICINE

## 2021-02-04 PROCEDURE — 99499 UNLISTED E&M SERVICE: CPT | Mod: S$GLB,,, | Performed by: INTERNAL MEDICINE

## 2021-02-04 PROCEDURE — 1100F PR PT FALLS ASSESS DOC 2+ FALLS/FALL W/INJURY/YR: ICD-10-PCS | Mod: CPTII,S$GLB,, | Performed by: INTERNAL MEDICINE

## 2021-02-04 PROCEDURE — 3288F PR FALLS RISK ASSESSMENT DOCUMENTED: ICD-10-PCS | Mod: CPTII,S$GLB,, | Performed by: INTERNAL MEDICINE

## 2021-02-04 PROCEDURE — 3288F FALL RISK ASSESSMENT DOCD: CPT | Mod: CPTII,S$GLB,, | Performed by: INTERNAL MEDICINE

## 2021-02-04 PROCEDURE — 3008F PR BODY MASS INDEX (BMI) DOCUMENTED: ICD-10-PCS | Mod: CPTII,S$GLB,, | Performed by: INTERNAL MEDICINE

## 2021-02-04 RX ORDER — TIOTROPIUM BROMIDE 18 UG/1
18 CAPSULE ORAL; RESPIRATORY (INHALATION) DAILY
Qty: 30 CAPSULE | Refills: 5 | Status: SHIPPED | OUTPATIENT
Start: 2021-02-04 | End: 2021-04-23

## 2021-02-04 RX ORDER — MELOXICAM 15 MG/1
15 TABLET ORAL DAILY
Qty: 30 TABLET | Refills: 1 | Status: SHIPPED | OUTPATIENT
Start: 2021-02-04 | End: 2021-03-28

## 2021-02-04 RX ORDER — PREDNISONE 20 MG/1
20 TABLET ORAL 2 TIMES DAILY
Qty: 10 TABLET | Refills: 0 | Status: SHIPPED | OUTPATIENT
Start: 2021-02-04 | End: 2021-02-09

## 2021-02-04 RX ORDER — AMOXICILLIN AND CLAVULANATE POTASSIUM 875; 125 MG/1; MG/1
1 TABLET, FILM COATED ORAL EVERY 12 HOURS
Qty: 20 TABLET | Refills: 0 | Status: SHIPPED | OUTPATIENT
Start: 2021-02-04 | End: 2021-03-04

## 2021-02-04 RX ORDER — CODEINE PHOSPHATE AND GUAIFENESIN 10; 100 MG/5ML; MG/5ML
5 SOLUTION ORAL EVERY 6 HOURS PRN
Qty: 120 ML | Refills: 0 | Status: SHIPPED | OUTPATIENT
Start: 2021-02-04 | End: 2021-02-19

## 2021-03-04 ENCOUNTER — TELEPHONE (OUTPATIENT)
Dept: OTOLARYNGOLOGY | Facility: CLINIC | Age: 71
End: 2021-03-04

## 2021-03-04 ENCOUNTER — OFFICE VISIT (OUTPATIENT)
Dept: PRIMARY CARE CLINIC | Facility: CLINIC | Age: 71
End: 2021-03-04
Payer: MEDICARE

## 2021-03-04 VITALS
DIASTOLIC BLOOD PRESSURE: 78 MMHG | OXYGEN SATURATION: 96 % | RESPIRATION RATE: 20 BRPM | HEART RATE: 79 BPM | HEIGHT: 71 IN | WEIGHT: 190.56 LBS | SYSTOLIC BLOOD PRESSURE: 134 MMHG | BODY MASS INDEX: 26.68 KG/M2

## 2021-03-04 DIAGNOSIS — J44.9 CHRONIC OBSTRUCTIVE PULMONARY DISEASE, UNSPECIFIED COPD TYPE: ICD-10-CM

## 2021-03-04 DIAGNOSIS — Z13.6 ENCOUNTER FOR LIPID SCREENING FOR CARDIOVASCULAR DISEASE: Primary | ICD-10-CM

## 2021-03-04 DIAGNOSIS — J32.9 CHRONIC SINUSITIS, UNSPECIFIED LOCATION: ICD-10-CM

## 2021-03-04 DIAGNOSIS — Z13.220 ENCOUNTER FOR LIPID SCREENING FOR CARDIOVASCULAR DISEASE: Primary | ICD-10-CM

## 2021-03-04 PROCEDURE — 3288F PR FALLS RISK ASSESSMENT DOCUMENTED: ICD-10-PCS | Mod: CPTII,S$GLB,, | Performed by: INTERNAL MEDICINE

## 2021-03-04 PROCEDURE — 1101F PT FALLS ASSESS-DOCD LE1/YR: CPT | Mod: CPTII,S$GLB,, | Performed by: INTERNAL MEDICINE

## 2021-03-04 PROCEDURE — 99999 PR PBB SHADOW E&M-EST. PATIENT-LVL IV: CPT | Mod: PBBFAC,,, | Performed by: INTERNAL MEDICINE

## 2021-03-04 PROCEDURE — 1159F MED LIST DOCD IN RCRD: CPT | Mod: S$GLB,,, | Performed by: INTERNAL MEDICINE

## 2021-03-04 PROCEDURE — 1125F PR PAIN SEVERITY QUANTIFIED, PAIN PRESENT: ICD-10-PCS | Mod: S$GLB,,, | Performed by: INTERNAL MEDICINE

## 2021-03-04 PROCEDURE — 1159F PR MEDICATION LIST DOCUMENTED IN MEDICAL RECORD: ICD-10-PCS | Mod: S$GLB,,, | Performed by: INTERNAL MEDICINE

## 2021-03-04 PROCEDURE — 3008F PR BODY MASS INDEX (BMI) DOCUMENTED: ICD-10-PCS | Mod: CPTII,S$GLB,, | Performed by: INTERNAL MEDICINE

## 2021-03-04 PROCEDURE — 1101F PR PT FALLS ASSESS DOC 0-1 FALLS W/OUT INJ PAST YR: ICD-10-PCS | Mod: CPTII,S$GLB,, | Performed by: INTERNAL MEDICINE

## 2021-03-04 PROCEDURE — 3008F BODY MASS INDEX DOCD: CPT | Mod: CPTII,S$GLB,, | Performed by: INTERNAL MEDICINE

## 2021-03-04 PROCEDURE — 3288F FALL RISK ASSESSMENT DOCD: CPT | Mod: CPTII,S$GLB,, | Performed by: INTERNAL MEDICINE

## 2021-03-04 PROCEDURE — 99213 PR OFFICE/OUTPT VISIT, EST, LEVL III, 20-29 MIN: ICD-10-PCS | Mod: S$GLB,,, | Performed by: INTERNAL MEDICINE

## 2021-03-04 PROCEDURE — 99999 PR PBB SHADOW E&M-EST. PATIENT-LVL IV: ICD-10-PCS | Mod: PBBFAC,,, | Performed by: INTERNAL MEDICINE

## 2021-03-04 PROCEDURE — 1125F AMNT PAIN NOTED PAIN PRSNT: CPT | Mod: S$GLB,,, | Performed by: INTERNAL MEDICINE

## 2021-03-04 PROCEDURE — 99213 OFFICE O/P EST LOW 20 MIN: CPT | Mod: S$GLB,,, | Performed by: INTERNAL MEDICINE

## 2021-03-04 RX ORDER — FLUTICASONE PROPIONATE 50 MCG
2 SPRAY, SUSPENSION (ML) NASAL DAILY PRN
Qty: 15.8 ML | Refills: 5 | Status: SHIPPED | OUTPATIENT
Start: 2021-03-04 | End: 2021-04-06

## 2021-03-04 RX ORDER — CODEINE PHOSPHATE AND GUAIFENESIN 10; 100 MG/5ML; MG/5ML
5 SOLUTION ORAL EVERY 6 HOURS PRN
Qty: 150 ML | Refills: 0 | Status: SHIPPED | OUTPATIENT
Start: 2021-03-04 | End: 2021-04-06

## 2021-03-04 RX ORDER — LEVOCETIRIZINE DIHYDROCHLORIDE 5 MG/1
5 TABLET, FILM COATED ORAL NIGHTLY
Qty: 30 TABLET | Refills: 11 | Status: SHIPPED | OUTPATIENT
Start: 2021-03-04 | End: 2022-03-13

## 2021-03-04 RX ORDER — CEFDINIR 300 MG/1
300 CAPSULE ORAL 2 TIMES DAILY
Qty: 20 CAPSULE | Refills: 0 | Status: SHIPPED | OUTPATIENT
Start: 2021-03-04 | End: 2021-03-14

## 2021-03-04 RX ORDER — GABAPENTIN 300 MG/1
300 CAPSULE ORAL 3 TIMES DAILY
COMMUNITY
Start: 2021-02-28 | End: 2021-03-04 | Stop reason: ALTCHOICE

## 2021-03-22 ENCOUNTER — OFFICE VISIT (OUTPATIENT)
Dept: ALLERGY | Facility: CLINIC | Age: 71
End: 2021-03-22
Payer: MEDICARE

## 2021-03-22 ENCOUNTER — LAB VISIT (OUTPATIENT)
Dept: LAB | Facility: HOSPITAL | Age: 71
End: 2021-03-22
Attending: ALLERGY & IMMUNOLOGY
Payer: MEDICARE

## 2021-03-22 ENCOUNTER — PATIENT OUTREACH (OUTPATIENT)
Dept: ADMINISTRATIVE | Facility: OTHER | Age: 71
End: 2021-03-22

## 2021-03-22 VITALS — HEIGHT: 71 IN | WEIGHT: 191.56 LBS | BODY MASS INDEX: 26.82 KG/M2

## 2021-03-22 DIAGNOSIS — M19.041 PRIMARY OSTEOARTHRITIS OF BOTH HANDS: ICD-10-CM

## 2021-03-22 DIAGNOSIS — M16.0 PRIMARY OSTEOARTHRITIS OF BOTH HIPS: ICD-10-CM

## 2021-03-22 DIAGNOSIS — J31.0 CHRONIC RHINITIS: ICD-10-CM

## 2021-03-22 DIAGNOSIS — R05.9 COUGH: ICD-10-CM

## 2021-03-22 DIAGNOSIS — M19.042 PRIMARY OSTEOARTHRITIS OF BOTH HANDS: ICD-10-CM

## 2021-03-22 DIAGNOSIS — J44.9 CHRONIC OBSTRUCTIVE PULMONARY DISEASE, UNSPECIFIED COPD TYPE: Primary | ICD-10-CM

## 2021-03-22 PROCEDURE — 1125F AMNT PAIN NOTED PAIN PRSNT: CPT | Mod: S$GLB,,, | Performed by: ALLERGY & IMMUNOLOGY

## 2021-03-22 PROCEDURE — 3008F BODY MASS INDEX DOCD: CPT | Mod: CPTII,S$GLB,, | Performed by: ALLERGY & IMMUNOLOGY

## 2021-03-22 PROCEDURE — 82785 ASSAY OF IGE: CPT | Performed by: ALLERGY & IMMUNOLOGY

## 2021-03-22 PROCEDURE — 99999 PR PBB SHADOW E&M-EST. PATIENT-LVL III: ICD-10-PCS | Mod: PBBFAC,,, | Performed by: ALLERGY & IMMUNOLOGY

## 2021-03-22 PROCEDURE — 36415 COLL VENOUS BLD VENIPUNCTURE: CPT | Performed by: ALLERGY & IMMUNOLOGY

## 2021-03-22 PROCEDURE — 3008F PR BODY MASS INDEX (BMI) DOCUMENTED: ICD-10-PCS | Mod: CPTII,S$GLB,, | Performed by: ALLERGY & IMMUNOLOGY

## 2021-03-22 PROCEDURE — 86003 ALLG SPEC IGE CRUDE XTRC EA: CPT | Mod: 59 | Performed by: ALLERGY & IMMUNOLOGY

## 2021-03-22 PROCEDURE — 1159F PR MEDICATION LIST DOCUMENTED IN MEDICAL RECORD: ICD-10-PCS | Mod: S$GLB,,, | Performed by: ALLERGY & IMMUNOLOGY

## 2021-03-22 PROCEDURE — 99204 OFFICE O/P NEW MOD 45 MIN: CPT | Mod: S$GLB,,, | Performed by: ALLERGY & IMMUNOLOGY

## 2021-03-22 PROCEDURE — 86003 ALLG SPEC IGE CRUDE XTRC EA: CPT | Performed by: ALLERGY & IMMUNOLOGY

## 2021-03-22 PROCEDURE — 1125F PR PAIN SEVERITY QUANTIFIED, PAIN PRESENT: ICD-10-PCS | Mod: S$GLB,,, | Performed by: ALLERGY & IMMUNOLOGY

## 2021-03-22 PROCEDURE — 99999 PR PBB SHADOW E&M-EST. PATIENT-LVL III: CPT | Mod: PBBFAC,,, | Performed by: ALLERGY & IMMUNOLOGY

## 2021-03-22 PROCEDURE — 99204 PR OFFICE/OUTPT VISIT, NEW, LEVL IV, 45-59 MIN: ICD-10-PCS | Mod: S$GLB,,, | Performed by: ALLERGY & IMMUNOLOGY

## 2021-03-22 PROCEDURE — 1159F MED LIST DOCD IN RCRD: CPT | Mod: S$GLB,,, | Performed by: ALLERGY & IMMUNOLOGY

## 2021-03-22 RX ORDER — IPRATROPIUM BROMIDE 42 UG/1
1-2 SPRAY, METERED NASAL 2 TIMES DAILY
Qty: 15 ML | Refills: 5 | Status: SHIPPED | OUTPATIENT
Start: 2021-03-22 | End: 2021-10-20

## 2021-03-23 LAB — IGE SERPL-ACNC: 530 IU/ML (ref 0–100)

## 2021-03-25 LAB
A ALTERNATA IGE QN: <0.1 KU/L
A FUMIGATUS IGE QN: <0.1 KU/L
BERMUDA GRASS IGE QN: <0.1 KU/L
CAT DANDER IGE QN: <0.1 KU/L
CEDAR IGE QN: <0.1 KU/L
D FARINAE IGE QN: <0.1 KU/L
D PTERONYSS IGE QN: <0.1 KU/L
DEPRECATED A ALTERNATA IGE RAST QL: NORMAL
DEPRECATED A FUMIGATUS IGE RAST QL: NORMAL
DEPRECATED BERMUDA GRASS IGE RAST QL: NORMAL
DEPRECATED CAT DANDER IGE RAST QL: NORMAL
DEPRECATED CEDAR IGE RAST QL: NORMAL
DEPRECATED D FARINAE IGE RAST QL: NORMAL
DEPRECATED D PTERONYSS IGE RAST QL: NORMAL
DEPRECATED DOG DANDER IGE RAST QL: NORMAL
DEPRECATED ELDER IGE RAST QL: NORMAL
DEPRECATED ENGL PLANTAIN IGE RAST QL: NORMAL
DEPRECATED PECAN/HICK TREE IGE RAST QL: NORMAL
DEPRECATED ROACH IGE RAST QL: NORMAL
DEPRECATED TIMOTHY IGE RAST QL: NORMAL
DEPRECATED WEST RAGWEED IGE RAST QL: NORMAL
DEPRECATED WHITE OAK IGE RAST QL: NORMAL
DOG DANDER IGE QN: <0.1 KU/L
ELDER IGE QN: <0.1 KU/L
ENGL PLANTAIN IGE QN: <0.1 KU/L
PECAN/HICK TREE IGE QN: <0.1 KU/L
ROACH IGE QN: <0.1 KU/L
TIMOTHY IGE QN: <0.1 KU/L
WEST RAGWEED IGE QN: <0.1 KU/L
WHITE OAK IGE QN: <0.1 KU/L

## 2021-03-30 ENCOUNTER — OFFICE VISIT (OUTPATIENT)
Dept: ALLERGY | Facility: CLINIC | Age: 71
End: 2021-03-30
Payer: MEDICARE

## 2021-03-30 VITALS — HEIGHT: 71 IN | WEIGHT: 192 LBS | BODY MASS INDEX: 26.88 KG/M2

## 2021-03-30 DIAGNOSIS — R05.9 COUGH: Primary | ICD-10-CM

## 2021-03-30 DIAGNOSIS — K21.9 LARYNGOPHARYNGEAL REFLUX: ICD-10-CM

## 2021-03-30 DIAGNOSIS — J31.0 CHRONIC RHINITIS: ICD-10-CM

## 2021-03-30 DIAGNOSIS — J44.9 CHRONIC OBSTRUCTIVE PULMONARY DISEASE, UNSPECIFIED COPD TYPE: ICD-10-CM

## 2021-03-30 PROCEDURE — 99999 PR PBB SHADOW E&M-EST. PATIENT-LVL IV: CPT | Mod: PBBFAC,,, | Performed by: ALLERGY & IMMUNOLOGY

## 2021-03-30 PROCEDURE — 99214 OFFICE O/P EST MOD 30 MIN: CPT | Mod: S$GLB,,, | Performed by: ALLERGY & IMMUNOLOGY

## 2021-03-30 PROCEDURE — 3008F PR BODY MASS INDEX (BMI) DOCUMENTED: ICD-10-PCS | Mod: CPTII,S$GLB,, | Performed by: ALLERGY & IMMUNOLOGY

## 2021-03-30 PROCEDURE — 99999 PR PBB SHADOW E&M-EST. PATIENT-LVL IV: ICD-10-PCS | Mod: PBBFAC,,, | Performed by: ALLERGY & IMMUNOLOGY

## 2021-03-30 PROCEDURE — 3288F FALL RISK ASSESSMENT DOCD: CPT | Mod: CPTII,S$GLB,, | Performed by: ALLERGY & IMMUNOLOGY

## 2021-03-30 PROCEDURE — 99214 PR OFFICE/OUTPT VISIT, EST, LEVL IV, 30-39 MIN: ICD-10-PCS | Mod: S$GLB,,, | Performed by: ALLERGY & IMMUNOLOGY

## 2021-03-30 PROCEDURE — 1125F AMNT PAIN NOTED PAIN PRSNT: CPT | Mod: S$GLB,,, | Performed by: ALLERGY & IMMUNOLOGY

## 2021-03-30 PROCEDURE — 3288F PR FALLS RISK ASSESSMENT DOCUMENTED: ICD-10-PCS | Mod: CPTII,S$GLB,, | Performed by: ALLERGY & IMMUNOLOGY

## 2021-03-30 PROCEDURE — 1125F PR PAIN SEVERITY QUANTIFIED, PAIN PRESENT: ICD-10-PCS | Mod: S$GLB,,, | Performed by: ALLERGY & IMMUNOLOGY

## 2021-03-30 PROCEDURE — 1101F PR PT FALLS ASSESS DOC 0-1 FALLS W/OUT INJ PAST YR: ICD-10-PCS | Mod: CPTII,S$GLB,, | Performed by: ALLERGY & IMMUNOLOGY

## 2021-03-30 PROCEDURE — 1159F PR MEDICATION LIST DOCUMENTED IN MEDICAL RECORD: ICD-10-PCS | Mod: S$GLB,,, | Performed by: ALLERGY & IMMUNOLOGY

## 2021-03-30 PROCEDURE — 3008F BODY MASS INDEX DOCD: CPT | Mod: CPTII,S$GLB,, | Performed by: ALLERGY & IMMUNOLOGY

## 2021-03-30 PROCEDURE — 1101F PT FALLS ASSESS-DOCD LE1/YR: CPT | Mod: CPTII,S$GLB,, | Performed by: ALLERGY & IMMUNOLOGY

## 2021-03-30 PROCEDURE — 1159F MED LIST DOCD IN RCRD: CPT | Mod: S$GLB,,, | Performed by: ALLERGY & IMMUNOLOGY

## 2021-04-05 ENCOUNTER — PATIENT MESSAGE (OUTPATIENT)
Dept: ADMINISTRATIVE | Facility: HOSPITAL | Age: 71
End: 2021-04-05

## 2021-04-06 ENCOUNTER — OFFICE VISIT (OUTPATIENT)
Dept: OTOLARYNGOLOGY | Facility: CLINIC | Age: 71
End: 2021-04-06
Payer: MEDICARE

## 2021-04-06 VITALS
BODY MASS INDEX: 27.63 KG/M2 | SYSTOLIC BLOOD PRESSURE: 148 MMHG | WEIGHT: 193 LBS | DIASTOLIC BLOOD PRESSURE: 76 MMHG | HEART RATE: 80 BPM | HEIGHT: 70 IN

## 2021-04-06 DIAGNOSIS — F17.200 SMOKER: ICD-10-CM

## 2021-04-06 DIAGNOSIS — K21.9 LARYNGOPHARYNGEAL REFLUX: ICD-10-CM

## 2021-04-06 DIAGNOSIS — J30.0 VASOMOTOR RHINITIS: Primary | ICD-10-CM

## 2021-04-06 PROCEDURE — 1101F PR PT FALLS ASSESS DOC 0-1 FALLS W/OUT INJ PAST YR: ICD-10-PCS | Mod: CPTII,S$GLB,, | Performed by: OTOLARYNGOLOGY

## 2021-04-06 PROCEDURE — 1125F PR PAIN SEVERITY QUANTIFIED, PAIN PRESENT: ICD-10-PCS | Mod: S$GLB,,, | Performed by: OTOLARYNGOLOGY

## 2021-04-06 PROCEDURE — 99204 OFFICE O/P NEW MOD 45 MIN: CPT | Mod: 25,S$GLB,, | Performed by: OTOLARYNGOLOGY

## 2021-04-06 PROCEDURE — 1159F MED LIST DOCD IN RCRD: CPT | Mod: S$GLB,,, | Performed by: OTOLARYNGOLOGY

## 2021-04-06 PROCEDURE — 99499 RISK ADDL DX/OHS AUDIT: ICD-10-PCS | Mod: S$GLB,,, | Performed by: OTOLARYNGOLOGY

## 2021-04-06 PROCEDURE — 3288F FALL RISK ASSESSMENT DOCD: CPT | Mod: CPTII,S$GLB,, | Performed by: OTOLARYNGOLOGY

## 2021-04-06 PROCEDURE — 1101F PT FALLS ASSESS-DOCD LE1/YR: CPT | Mod: CPTII,S$GLB,, | Performed by: OTOLARYNGOLOGY

## 2021-04-06 PROCEDURE — 31575 DIAGNOSTIC LARYNGOSCOPY: CPT | Mod: S$GLB,,, | Performed by: OTOLARYNGOLOGY

## 2021-04-06 PROCEDURE — 99499 UNLISTED E&M SERVICE: CPT | Mod: S$GLB,,, | Performed by: OTOLARYNGOLOGY

## 2021-04-06 PROCEDURE — 3008F BODY MASS INDEX DOCD: CPT | Mod: CPTII,S$GLB,, | Performed by: OTOLARYNGOLOGY

## 2021-04-06 PROCEDURE — 31575 PR LARYNGOSCOPY, FLEXIBLE; DIAGNOSTIC: ICD-10-PCS | Mod: S$GLB,,, | Performed by: OTOLARYNGOLOGY

## 2021-04-06 PROCEDURE — 99204 PR OFFICE/OUTPT VISIT, NEW, LEVL IV, 45-59 MIN: ICD-10-PCS | Mod: 25,S$GLB,, | Performed by: OTOLARYNGOLOGY

## 2021-04-06 PROCEDURE — 3288F PR FALLS RISK ASSESSMENT DOCUMENTED: ICD-10-PCS | Mod: CPTII,S$GLB,, | Performed by: OTOLARYNGOLOGY

## 2021-04-06 PROCEDURE — 3008F PR BODY MASS INDEX (BMI) DOCUMENTED: ICD-10-PCS | Mod: CPTII,S$GLB,, | Performed by: OTOLARYNGOLOGY

## 2021-04-06 PROCEDURE — 1159F PR MEDICATION LIST DOCUMENTED IN MEDICAL RECORD: ICD-10-PCS | Mod: S$GLB,,, | Performed by: OTOLARYNGOLOGY

## 2021-04-06 PROCEDURE — 1125F AMNT PAIN NOTED PAIN PRSNT: CPT | Mod: S$GLB,,, | Performed by: OTOLARYNGOLOGY

## 2021-04-06 RX ORDER — GABAPENTIN 300 MG/1
300 CAPSULE ORAL 3 TIMES DAILY
COMMUNITY
Start: 2021-03-28 | End: 2021-05-27

## 2021-04-06 RX ORDER — PANTOPRAZOLE SODIUM 40 MG/1
40 TABLET, DELAYED RELEASE ORAL DAILY
Qty: 42 TABLET | Refills: 2 | Status: SHIPPED | OUTPATIENT
Start: 2021-04-06 | End: 2021-08-03

## 2021-04-14 ENCOUNTER — PATIENT OUTREACH (OUTPATIENT)
Dept: ADMINISTRATIVE | Facility: HOSPITAL | Age: 71
End: 2021-04-14

## 2021-04-22 ENCOUNTER — TELEPHONE (OUTPATIENT)
Dept: PRIMARY CARE CLINIC | Facility: CLINIC | Age: 71
End: 2021-04-22

## 2021-04-22 ENCOUNTER — HOSPITAL ENCOUNTER (OUTPATIENT)
Dept: PULMONOLOGY | Facility: CLINIC | Age: 71
Discharge: HOME | End: 2021-04-22
Payer: MEDICARE

## 2021-04-22 DIAGNOSIS — J44.9 CHRONIC OBSTRUCTIVE PULMONARY DISEASE, UNSPECIFIED COPD TYPE: ICD-10-CM

## 2021-04-22 PROCEDURE — 94060 EVALUATION OF WHEEZING: CPT | Mod: S$GLB,,, | Performed by: INTERNAL MEDICINE

## 2021-04-22 PROCEDURE — 94727 GAS DIL/WSHOT DETER LNG VOL: CPT | Mod: S$GLB,,, | Performed by: INTERNAL MEDICINE

## 2021-04-22 PROCEDURE — 94727 PR PULM FUNCTION TEST BY GAS: ICD-10-PCS | Mod: S$GLB,,, | Performed by: INTERNAL MEDICINE

## 2021-04-22 PROCEDURE — 94060 PR EVAL OF BRONCHOSPASM: ICD-10-PCS | Mod: S$GLB,,, | Performed by: INTERNAL MEDICINE

## 2021-04-22 PROCEDURE — 94729 DIFFUSING CAPACITY: CPT | Mod: S$GLB,,, | Performed by: INTERNAL MEDICINE

## 2021-04-22 PROCEDURE — 94729 PR C02/MEMBANE DIFFUSE CAPACITY: ICD-10-PCS | Mod: S$GLB,,, | Performed by: INTERNAL MEDICINE

## 2021-04-23 DIAGNOSIS — J44.9 CHRONIC OBSTRUCTIVE PULMONARY DISEASE, UNSPECIFIED COPD TYPE: Primary | ICD-10-CM

## 2021-04-23 RX ORDER — FLUTICASONE FUROATE, UMECLIDINIUM BROMIDE AND VILANTEROL TRIFENATATE 200; 62.5; 25 UG/1; UG/1; UG/1
1 POWDER RESPIRATORY (INHALATION) DAILY
Qty: 60 EACH | Refills: 5 | Status: SHIPPED | OUTPATIENT
Start: 2021-04-23 | End: 2021-10-19

## 2021-05-06 ENCOUNTER — TELEPHONE (OUTPATIENT)
Dept: PRIMARY CARE CLINIC | Facility: CLINIC | Age: 71
End: 2021-05-06

## 2021-05-18 ENCOUNTER — OFFICE VISIT (OUTPATIENT)
Dept: OTOLARYNGOLOGY | Facility: CLINIC | Age: 71
End: 2021-05-18
Payer: MEDICARE

## 2021-05-18 VITALS
TEMPERATURE: 99 F | BODY MASS INDEX: 28.09 KG/M2 | OXYGEN SATURATION: 95 % | WEIGHT: 196.19 LBS | SYSTOLIC BLOOD PRESSURE: 152 MMHG | HEIGHT: 70 IN | HEART RATE: 72 BPM | DIASTOLIC BLOOD PRESSURE: 68 MMHG

## 2021-05-18 DIAGNOSIS — J31.0 CHRONIC RHINITIS: ICD-10-CM

## 2021-05-18 DIAGNOSIS — J30.0 VASOMOTOR RHINITIS: Primary | ICD-10-CM

## 2021-05-18 PROCEDURE — 1159F PR MEDICATION LIST DOCUMENTED IN MEDICAL RECORD: ICD-10-PCS | Mod: S$GLB,,, | Performed by: OTOLARYNGOLOGY

## 2021-05-18 PROCEDURE — 3008F BODY MASS INDEX DOCD: CPT | Mod: CPTII,S$GLB,, | Performed by: OTOLARYNGOLOGY

## 2021-05-18 PROCEDURE — 1126F PR PAIN SEVERITY QUANTIFIED, NO PAIN PRESENT: ICD-10-PCS | Mod: S$GLB,,, | Performed by: OTOLARYNGOLOGY

## 2021-05-18 PROCEDURE — 3288F PR FALLS RISK ASSESSMENT DOCUMENTED: ICD-10-PCS | Mod: CPTII,S$GLB,, | Performed by: OTOLARYNGOLOGY

## 2021-05-18 PROCEDURE — 3008F PR BODY MASS INDEX (BMI) DOCUMENTED: ICD-10-PCS | Mod: CPTII,S$GLB,, | Performed by: OTOLARYNGOLOGY

## 2021-05-18 PROCEDURE — 1101F PT FALLS ASSESS-DOCD LE1/YR: CPT | Mod: CPTII,S$GLB,, | Performed by: OTOLARYNGOLOGY

## 2021-05-18 PROCEDURE — 1159F MED LIST DOCD IN RCRD: CPT | Mod: S$GLB,,, | Performed by: OTOLARYNGOLOGY

## 2021-05-18 PROCEDURE — 1101F PR PT FALLS ASSESS DOC 0-1 FALLS W/OUT INJ PAST YR: ICD-10-PCS | Mod: CPTII,S$GLB,, | Performed by: OTOLARYNGOLOGY

## 2021-05-18 PROCEDURE — 1126F AMNT PAIN NOTED NONE PRSNT: CPT | Mod: S$GLB,,, | Performed by: OTOLARYNGOLOGY

## 2021-05-18 PROCEDURE — 3288F FALL RISK ASSESSMENT DOCD: CPT | Mod: CPTII,S$GLB,, | Performed by: OTOLARYNGOLOGY

## 2021-05-18 PROCEDURE — 99213 OFFICE O/P EST LOW 20 MIN: CPT | Mod: S$GLB,,, | Performed by: OTOLARYNGOLOGY

## 2021-05-18 PROCEDURE — 99213 PR OFFICE/OUTPT VISIT, EST, LEVL III, 20-29 MIN: ICD-10-PCS | Mod: S$GLB,,, | Performed by: OTOLARYNGOLOGY

## 2021-05-18 RX ORDER — FLUTICASONE PROPIONATE 50 MCG
1 SPRAY, SUSPENSION (ML) NASAL 2 TIMES DAILY
Qty: 16 G | Refills: 11 | Status: ON HOLD | OUTPATIENT
Start: 2021-05-18 | End: 2021-09-23

## 2021-05-18 RX ORDER — AZELASTINE 1 MG/ML
2 SPRAY, METERED NASAL 2 TIMES DAILY
Qty: 30 ML | Refills: 11 | Status: ON HOLD | OUTPATIENT
Start: 2021-05-18 | End: 2021-09-23

## 2021-05-27 ENCOUNTER — OFFICE VISIT (OUTPATIENT)
Dept: PRIMARY CARE CLINIC | Facility: CLINIC | Age: 71
End: 2021-05-27
Payer: MEDICARE

## 2021-05-27 VITALS
TEMPERATURE: 98 F | DIASTOLIC BLOOD PRESSURE: 58 MMHG | WEIGHT: 191.81 LBS | RESPIRATION RATE: 16 BRPM | HEIGHT: 70 IN | OXYGEN SATURATION: 95 % | BODY MASS INDEX: 27.46 KG/M2 | HEART RATE: 72 BPM | SYSTOLIC BLOOD PRESSURE: 130 MMHG

## 2021-05-27 DIAGNOSIS — Z72.0 TOBACCO USE: ICD-10-CM

## 2021-05-27 DIAGNOSIS — M54.41 CHRONIC BILATERAL LOW BACK PAIN WITH BILATERAL SCIATICA: Primary | ICD-10-CM

## 2021-05-27 DIAGNOSIS — G89.29 CHRONIC BILATERAL LOW BACK PAIN WITH BILATERAL SCIATICA: Primary | ICD-10-CM

## 2021-05-27 DIAGNOSIS — M54.42 CHRONIC BILATERAL LOW BACK PAIN WITH BILATERAL SCIATICA: Primary | ICD-10-CM

## 2021-05-27 PROCEDURE — 1101F PT FALLS ASSESS-DOCD LE1/YR: CPT | Mod: CPTII,S$GLB,, | Performed by: INTERNAL MEDICINE

## 2021-05-27 PROCEDURE — 3008F PR BODY MASS INDEX (BMI) DOCUMENTED: ICD-10-PCS | Mod: CPTII,S$GLB,, | Performed by: INTERNAL MEDICINE

## 2021-05-27 PROCEDURE — 99999 PR PBB SHADOW E&M-EST. PATIENT-LVL V: CPT | Mod: PBBFAC,,, | Performed by: INTERNAL MEDICINE

## 2021-05-27 PROCEDURE — 1125F PR PAIN SEVERITY QUANTIFIED, PAIN PRESENT: ICD-10-PCS | Mod: S$GLB,,, | Performed by: INTERNAL MEDICINE

## 2021-05-27 PROCEDURE — 1125F AMNT PAIN NOTED PAIN PRSNT: CPT | Mod: S$GLB,,, | Performed by: INTERNAL MEDICINE

## 2021-05-27 PROCEDURE — 3288F PR FALLS RISK ASSESSMENT DOCUMENTED: ICD-10-PCS | Mod: CPTII,S$GLB,, | Performed by: INTERNAL MEDICINE

## 2021-05-27 PROCEDURE — 1159F PR MEDICATION LIST DOCUMENTED IN MEDICAL RECORD: ICD-10-PCS | Mod: S$GLB,,, | Performed by: INTERNAL MEDICINE

## 2021-05-27 PROCEDURE — 99999 PR PBB SHADOW E&M-EST. PATIENT-LVL V: ICD-10-PCS | Mod: PBBFAC,,, | Performed by: INTERNAL MEDICINE

## 2021-05-27 PROCEDURE — 1159F MED LIST DOCD IN RCRD: CPT | Mod: S$GLB,,, | Performed by: INTERNAL MEDICINE

## 2021-05-27 PROCEDURE — 1101F PR PT FALLS ASSESS DOC 0-1 FALLS W/OUT INJ PAST YR: ICD-10-PCS | Mod: CPTII,S$GLB,, | Performed by: INTERNAL MEDICINE

## 2021-05-27 PROCEDURE — 99213 PR OFFICE/OUTPT VISIT, EST, LEVL III, 20-29 MIN: ICD-10-PCS | Mod: S$GLB,,, | Performed by: INTERNAL MEDICINE

## 2021-05-27 PROCEDURE — 99213 OFFICE O/P EST LOW 20 MIN: CPT | Mod: S$GLB,,, | Performed by: INTERNAL MEDICINE

## 2021-05-27 PROCEDURE — 3288F FALL RISK ASSESSMENT DOCD: CPT | Mod: CPTII,S$GLB,, | Performed by: INTERNAL MEDICINE

## 2021-05-27 PROCEDURE — 3008F BODY MASS INDEX DOCD: CPT | Mod: CPTII,S$GLB,, | Performed by: INTERNAL MEDICINE

## 2021-05-27 RX ORDER — TIZANIDINE 4 MG/1
4 TABLET ORAL 2 TIMES DAILY PRN
Qty: 30 TABLET | Refills: 0 | Status: SHIPPED | OUTPATIENT
Start: 2021-05-27 | End: 2021-06-20

## 2021-05-27 RX ORDER — TRAMADOL HYDROCHLORIDE 50 MG/1
50 TABLET ORAL EVERY 12 HOURS PRN
Qty: 30 TABLET | Refills: 0 | Status: SHIPPED | OUTPATIENT
Start: 2021-05-27 | End: 2021-06-10 | Stop reason: SDUPTHER

## 2021-05-27 RX ORDER — GABAPENTIN 600 MG/1
600 TABLET ORAL 2 TIMES DAILY
Qty: 60 TABLET | Refills: 5 | Status: SHIPPED | OUTPATIENT
Start: 2021-05-27 | End: 2021-11-15

## 2021-05-27 RX ORDER — PREDNISONE 20 MG/1
20 TABLET ORAL 2 TIMES DAILY
Qty: 10 TABLET | Refills: 0 | Status: SHIPPED | OUTPATIENT
Start: 2021-05-27 | End: 2021-06-01

## 2021-05-28 ENCOUNTER — TELEPHONE (OUTPATIENT)
Dept: PAIN MEDICINE | Facility: CLINIC | Age: 71
End: 2021-05-28

## 2021-05-28 DIAGNOSIS — M54.16 LUMBAR RADICULOPATHY: Primary | ICD-10-CM

## 2021-05-28 DIAGNOSIS — M51.36 DDD (DEGENERATIVE DISC DISEASE), LUMBAR: ICD-10-CM

## 2021-06-07 ENCOUNTER — PATIENT MESSAGE (OUTPATIENT)
Dept: PRIMARY CARE CLINIC | Facility: CLINIC | Age: 71
End: 2021-06-07

## 2021-06-07 DIAGNOSIS — J44.9 CHRONIC OBSTRUCTIVE PULMONARY DISEASE, UNSPECIFIED COPD TYPE: ICD-10-CM

## 2021-06-07 DIAGNOSIS — G89.29 CHRONIC BILATERAL LOW BACK PAIN WITH BILATERAL SCIATICA: ICD-10-CM

## 2021-06-07 DIAGNOSIS — M54.41 CHRONIC BILATERAL LOW BACK PAIN WITH BILATERAL SCIATICA: ICD-10-CM

## 2021-06-07 DIAGNOSIS — M54.42 CHRONIC BILATERAL LOW BACK PAIN WITH BILATERAL SCIATICA: ICD-10-CM

## 2021-06-07 DIAGNOSIS — N40.0 BENIGN PROSTATIC HYPERPLASIA, UNSPECIFIED WHETHER LOWER URINARY TRACT SYMPTOMS PRESENT: ICD-10-CM

## 2021-06-08 RX ORDER — MELOXICAM 15 MG/1
TABLET ORAL
Qty: 30 TABLET | Refills: 0 | OUTPATIENT
Start: 2021-06-08

## 2021-06-08 RX ORDER — TERAZOSIN 5 MG/1
CAPSULE ORAL
Qty: 90 CAPSULE | Refills: 0 | Status: SHIPPED | OUTPATIENT
Start: 2021-06-08 | End: 2021-06-10

## 2021-06-08 RX ORDER — TAMSULOSIN HYDROCHLORIDE 0.4 MG/1
CAPSULE ORAL
Qty: 90 CAPSULE | Refills: 0 | Status: SHIPPED | OUTPATIENT
Start: 2021-06-08 | End: 2021-08-25

## 2021-06-10 RX ORDER — TRAMADOL HYDROCHLORIDE 50 MG/1
50 TABLET ORAL EVERY 12 HOURS PRN
Qty: 30 TABLET | Refills: 0 | Status: SHIPPED | OUTPATIENT
Start: 2021-06-10 | End: 2021-07-20 | Stop reason: SDUPTHER

## 2021-06-21 ENCOUNTER — TELEPHONE (OUTPATIENT)
Dept: PAIN MEDICINE | Facility: CLINIC | Age: 71
End: 2021-06-21

## 2021-07-01 ENCOUNTER — OFFICE VISIT (OUTPATIENT)
Dept: PAIN MEDICINE | Facility: CLINIC | Age: 71
End: 2021-07-01
Payer: MEDICARE

## 2021-07-01 VITALS
DIASTOLIC BLOOD PRESSURE: 75 MMHG | HEART RATE: 88 BPM | SYSTOLIC BLOOD PRESSURE: 138 MMHG | BODY MASS INDEX: 28.01 KG/M2 | WEIGHT: 195.69 LBS | HEIGHT: 70 IN | OXYGEN SATURATION: 95 %

## 2021-07-01 DIAGNOSIS — M51.36 DDD (DEGENERATIVE DISC DISEASE), LUMBAR: Primary | ICD-10-CM

## 2021-07-01 DIAGNOSIS — M54.16 LUMBAR RADICULOPATHY: ICD-10-CM

## 2021-07-01 DIAGNOSIS — M47.816 LUMBAR SPONDYLOSIS: ICD-10-CM

## 2021-07-01 PROCEDURE — 1125F PR PAIN SEVERITY QUANTIFIED, PAIN PRESENT: ICD-10-PCS | Mod: S$GLB,,, | Performed by: PAIN MEDICINE

## 2021-07-01 PROCEDURE — 1159F PR MEDICATION LIST DOCUMENTED IN MEDICAL RECORD: ICD-10-PCS | Mod: S$GLB,,, | Performed by: PAIN MEDICINE

## 2021-07-01 PROCEDURE — 1159F MED LIST DOCD IN RCRD: CPT | Mod: S$GLB,,, | Performed by: PAIN MEDICINE

## 2021-07-01 PROCEDURE — 99213 PR OFFICE/OUTPT VISIT, EST, LEVL III, 20-29 MIN: ICD-10-PCS | Mod: S$GLB,,, | Performed by: PAIN MEDICINE

## 2021-07-01 PROCEDURE — 3008F BODY MASS INDEX DOCD: CPT | Mod: CPTII,S$GLB,, | Performed by: PAIN MEDICINE

## 2021-07-01 PROCEDURE — 3008F PR BODY MASS INDEX (BMI) DOCUMENTED: ICD-10-PCS | Mod: CPTII,S$GLB,, | Performed by: PAIN MEDICINE

## 2021-07-01 PROCEDURE — 99999 PR PBB SHADOW E&M-EST. PATIENT-LVL IV: ICD-10-PCS | Mod: PBBFAC,,, | Performed by: PAIN MEDICINE

## 2021-07-01 PROCEDURE — 99213 OFFICE O/P EST LOW 20 MIN: CPT | Mod: S$GLB,,, | Performed by: PAIN MEDICINE

## 2021-07-01 PROCEDURE — 1125F AMNT PAIN NOTED PAIN PRSNT: CPT | Mod: S$GLB,,, | Performed by: PAIN MEDICINE

## 2021-07-01 PROCEDURE — 99999 PR PBB SHADOW E&M-EST. PATIENT-LVL IV: CPT | Mod: PBBFAC,,, | Performed by: PAIN MEDICINE

## 2021-07-02 ENCOUNTER — TELEPHONE (OUTPATIENT)
Dept: PAIN MEDICINE | Facility: CLINIC | Age: 71
End: 2021-07-02

## 2021-07-02 DIAGNOSIS — M47.816 LUMBAR SPONDYLOSIS: Primary | ICD-10-CM

## 2021-07-20 ENCOUNTER — TELEPHONE (OUTPATIENT)
Dept: PAIN MEDICINE | Facility: CLINIC | Age: 71
End: 2021-07-20

## 2021-07-20 ENCOUNTER — OFFICE VISIT (OUTPATIENT)
Dept: PRIMARY CARE CLINIC | Facility: CLINIC | Age: 71
End: 2021-07-20
Payer: MEDICARE

## 2021-07-20 VITALS
HEART RATE: 82 BPM | DIASTOLIC BLOOD PRESSURE: 62 MMHG | OXYGEN SATURATION: 95 % | HEIGHT: 70 IN | SYSTOLIC BLOOD PRESSURE: 128 MMHG | RESPIRATION RATE: 16 BRPM | BODY MASS INDEX: 28.09 KG/M2 | WEIGHT: 196.19 LBS

## 2021-07-20 DIAGNOSIS — M16.0 PRIMARY OSTEOARTHRITIS OF BOTH HIPS: ICD-10-CM

## 2021-07-20 DIAGNOSIS — M54.41 CHRONIC BILATERAL LOW BACK PAIN WITH BILATERAL SCIATICA: Primary | ICD-10-CM

## 2021-07-20 DIAGNOSIS — M54.42 CHRONIC BILATERAL LOW BACK PAIN WITH BILATERAL SCIATICA: Primary | ICD-10-CM

## 2021-07-20 DIAGNOSIS — G89.29 CHRONIC BILATERAL LOW BACK PAIN WITH BILATERAL SCIATICA: Primary | ICD-10-CM

## 2021-07-20 PROCEDURE — 99213 OFFICE O/P EST LOW 20 MIN: CPT | Mod: S$GLB,,, | Performed by: INTERNAL MEDICINE

## 2021-07-20 PROCEDURE — 99999 PR PBB SHADOW E&M-EST. PATIENT-LVL IV: CPT | Mod: PBBFAC,,, | Performed by: INTERNAL MEDICINE

## 2021-07-20 PROCEDURE — 1101F PT FALLS ASSESS-DOCD LE1/YR: CPT | Mod: CPTII,S$GLB,, | Performed by: INTERNAL MEDICINE

## 2021-07-20 PROCEDURE — 1159F PR MEDICATION LIST DOCUMENTED IN MEDICAL RECORD: ICD-10-PCS | Mod: CPTII,S$GLB,, | Performed by: INTERNAL MEDICINE

## 2021-07-20 PROCEDURE — 3288F PR FALLS RISK ASSESSMENT DOCUMENTED: ICD-10-PCS | Mod: CPTII,S$GLB,, | Performed by: INTERNAL MEDICINE

## 2021-07-20 PROCEDURE — 3008F PR BODY MASS INDEX (BMI) DOCUMENTED: ICD-10-PCS | Mod: CPTII,S$GLB,, | Performed by: INTERNAL MEDICINE

## 2021-07-20 PROCEDURE — 3288F FALL RISK ASSESSMENT DOCD: CPT | Mod: CPTII,S$GLB,, | Performed by: INTERNAL MEDICINE

## 2021-07-20 PROCEDURE — 99213 PR OFFICE/OUTPT VISIT, EST, LEVL III, 20-29 MIN: ICD-10-PCS | Mod: S$GLB,,, | Performed by: INTERNAL MEDICINE

## 2021-07-20 PROCEDURE — 1101F PR PT FALLS ASSESS DOC 0-1 FALLS W/OUT INJ PAST YR: ICD-10-PCS | Mod: CPTII,S$GLB,, | Performed by: INTERNAL MEDICINE

## 2021-07-20 PROCEDURE — 99999 PR PBB SHADOW E&M-EST. PATIENT-LVL IV: ICD-10-PCS | Mod: PBBFAC,,, | Performed by: INTERNAL MEDICINE

## 2021-07-20 PROCEDURE — 1126F AMNT PAIN NOTED NONE PRSNT: CPT | Mod: CPTII,S$GLB,, | Performed by: INTERNAL MEDICINE

## 2021-07-20 PROCEDURE — 1159F MED LIST DOCD IN RCRD: CPT | Mod: CPTII,S$GLB,, | Performed by: INTERNAL MEDICINE

## 2021-07-20 PROCEDURE — 1126F PR PAIN SEVERITY QUANTIFIED, NO PAIN PRESENT: ICD-10-PCS | Mod: CPTII,S$GLB,, | Performed by: INTERNAL MEDICINE

## 2021-07-20 PROCEDURE — 3008F BODY MASS INDEX DOCD: CPT | Mod: CPTII,S$GLB,, | Performed by: INTERNAL MEDICINE

## 2021-07-20 RX ORDER — TRAMADOL HYDROCHLORIDE 50 MG/1
50 TABLET ORAL EVERY 12 HOURS PRN
Qty: 45 TABLET | Refills: 1 | Status: SHIPPED | OUTPATIENT
Start: 2021-07-20 | End: 2021-09-10

## 2021-07-20 RX ORDER — DICLOFENAC SODIUM 50 MG/1
50 TABLET, DELAYED RELEASE ORAL 2 TIMES DAILY WITH MEALS
Qty: 40 TABLET | Refills: 1 | Status: SHIPPED | OUTPATIENT
Start: 2021-07-20 | End: 2021-08-25

## 2021-08-05 ENCOUNTER — TELEPHONE (OUTPATIENT)
Dept: PAIN MEDICINE | Facility: CLINIC | Age: 71
End: 2021-08-05

## 2021-09-10 ENCOUNTER — TELEPHONE (OUTPATIENT)
Dept: PAIN MEDICINE | Facility: CLINIC | Age: 71
End: 2021-09-10

## 2021-09-29 ENCOUNTER — TELEPHONE (OUTPATIENT)
Dept: PAIN MEDICINE | Facility: CLINIC | Age: 71
End: 2021-09-29

## 2021-10-01 ENCOUNTER — TELEPHONE (OUTPATIENT)
Dept: PAIN MEDICINE | Facility: CLINIC | Age: 71
End: 2021-10-01

## 2021-10-05 ENCOUNTER — TELEPHONE (OUTPATIENT)
Dept: PAIN MEDICINE | Facility: CLINIC | Age: 71
End: 2021-10-05

## 2021-10-06 ENCOUNTER — TELEPHONE (OUTPATIENT)
Dept: PAIN MEDICINE | Facility: CLINIC | Age: 71
End: 2021-10-06

## 2021-10-20 ENCOUNTER — OFFICE VISIT (OUTPATIENT)
Dept: PRIMARY CARE CLINIC | Facility: CLINIC | Age: 71
End: 2021-10-20
Payer: MEDICARE

## 2021-10-20 VITALS
HEIGHT: 70 IN | BODY MASS INDEX: 26.82 KG/M2 | RESPIRATION RATE: 16 BRPM | SYSTOLIC BLOOD PRESSURE: 128 MMHG | OXYGEN SATURATION: 95 % | DIASTOLIC BLOOD PRESSURE: 60 MMHG | HEART RATE: 88 BPM | WEIGHT: 187.38 LBS

## 2021-10-20 DIAGNOSIS — Z23 NEED FOR INFLUENZA VACCINATION: Primary | ICD-10-CM

## 2021-10-20 DIAGNOSIS — M16.0 PRIMARY OSTEOARTHRITIS OF BOTH HIPS: ICD-10-CM

## 2021-10-20 DIAGNOSIS — M54.42 CHRONIC BILATERAL LOW BACK PAIN WITH BILATERAL SCIATICA: ICD-10-CM

## 2021-10-20 DIAGNOSIS — G89.29 CHRONIC BILATERAL LOW BACK PAIN WITH BILATERAL SCIATICA: ICD-10-CM

## 2021-10-20 DIAGNOSIS — Z72.0 TOBACCO USE: ICD-10-CM

## 2021-10-20 DIAGNOSIS — M54.41 CHRONIC BILATERAL LOW BACK PAIN WITH BILATERAL SCIATICA: ICD-10-CM

## 2021-10-20 PROCEDURE — 1160F PR REVIEW ALL MEDS BY PRESCRIBER/CLIN PHARMACIST DOCUMENTED: ICD-10-PCS | Mod: CPTII,S$GLB,, | Performed by: INTERNAL MEDICINE

## 2021-10-20 PROCEDURE — 3074F SYST BP LT 130 MM HG: CPT | Mod: CPTII,S$GLB,, | Performed by: INTERNAL MEDICINE

## 2021-10-20 PROCEDURE — G0008 FLU VACCINE - QUADRIVALENT - ADJUVANTED: ICD-10-PCS | Mod: S$GLB,,, | Performed by: INTERNAL MEDICINE

## 2021-10-20 PROCEDURE — 99213 PR OFFICE/OUTPT VISIT, EST, LEVL III, 20-29 MIN: ICD-10-PCS | Mod: 25,S$GLB,, | Performed by: INTERNAL MEDICINE

## 2021-10-20 PROCEDURE — G0008 ADMIN INFLUENZA VIRUS VAC: HCPCS | Mod: S$GLB,,, | Performed by: INTERNAL MEDICINE

## 2021-10-20 PROCEDURE — 3008F PR BODY MASS INDEX (BMI) DOCUMENTED: ICD-10-PCS | Mod: CPTII,S$GLB,, | Performed by: INTERNAL MEDICINE

## 2021-10-20 PROCEDURE — 1160F RVW MEDS BY RX/DR IN RCRD: CPT | Mod: CPTII,S$GLB,, | Performed by: INTERNAL MEDICINE

## 2021-10-20 PROCEDURE — 3008F BODY MASS INDEX DOCD: CPT | Mod: CPTII,S$GLB,, | Performed by: INTERNAL MEDICINE

## 2021-10-20 PROCEDURE — 3078F PR MOST RECENT DIASTOLIC BLOOD PRESSURE < 80 MM HG: ICD-10-PCS | Mod: CPTII,S$GLB,, | Performed by: INTERNAL MEDICINE

## 2021-10-20 PROCEDURE — 90694 VACC AIIV4 NO PRSRV 0.5ML IM: CPT | Mod: S$GLB,,, | Performed by: INTERNAL MEDICINE

## 2021-10-20 PROCEDURE — 1159F PR MEDICATION LIST DOCUMENTED IN MEDICAL RECORD: ICD-10-PCS | Mod: CPTII,S$GLB,, | Performed by: INTERNAL MEDICINE

## 2021-10-20 PROCEDURE — 3074F PR MOST RECENT SYSTOLIC BLOOD PRESSURE < 130 MM HG: ICD-10-PCS | Mod: CPTII,S$GLB,, | Performed by: INTERNAL MEDICINE

## 2021-10-20 PROCEDURE — 3078F DIAST BP <80 MM HG: CPT | Mod: CPTII,S$GLB,, | Performed by: INTERNAL MEDICINE

## 2021-10-20 PROCEDURE — 1125F AMNT PAIN NOTED PAIN PRSNT: CPT | Mod: CPTII,S$GLB,, | Performed by: INTERNAL MEDICINE

## 2021-10-20 PROCEDURE — 1159F MED LIST DOCD IN RCRD: CPT | Mod: CPTII,S$GLB,, | Performed by: INTERNAL MEDICINE

## 2021-10-20 PROCEDURE — 99999 PR PBB SHADOW E&M-EST. PATIENT-LVL IV: ICD-10-PCS | Mod: PBBFAC,,, | Performed by: INTERNAL MEDICINE

## 2021-10-20 PROCEDURE — 1125F PR PAIN SEVERITY QUANTIFIED, PAIN PRESENT: ICD-10-PCS | Mod: CPTII,S$GLB,, | Performed by: INTERNAL MEDICINE

## 2021-10-20 PROCEDURE — 90694 FLU VACCINE - QUADRIVALENT - ADJUVANTED: ICD-10-PCS | Mod: S$GLB,,, | Performed by: INTERNAL MEDICINE

## 2021-10-20 PROCEDURE — 99213 OFFICE O/P EST LOW 20 MIN: CPT | Mod: 25,S$GLB,, | Performed by: INTERNAL MEDICINE

## 2021-10-20 PROCEDURE — 99999 PR PBB SHADOW E&M-EST. PATIENT-LVL IV: CPT | Mod: PBBFAC,,, | Performed by: INTERNAL MEDICINE

## 2021-10-20 RX ORDER — TRAMADOL HYDROCHLORIDE 50 MG/1
50 TABLET ORAL
Qty: 30 TABLET | Refills: 0 | Status: SHIPPED | OUTPATIENT
Start: 2021-10-20 | End: 2022-01-20 | Stop reason: SDUPTHER

## 2021-10-20 RX ORDER — DICLOFENAC SODIUM 50 MG/1
TABLET, DELAYED RELEASE ORAL
Qty: 60 TABLET | Refills: 0 | Status: SHIPPED | OUTPATIENT
Start: 2021-10-20 | End: 2021-12-09

## 2021-10-20 RX ORDER — TRAMADOL HYDROCHLORIDE 50 MG/1
50 TABLET ORAL
Qty: 30 TABLET | Refills: 0 | Status: CANCELLED | OUTPATIENT
Start: 2021-10-20

## 2021-10-20 RX ORDER — DICLOFENAC SODIUM 50 MG/1
TABLET, DELAYED RELEASE ORAL
Qty: 40 TABLET | Refills: 0 | Status: CANCELLED | OUTPATIENT
Start: 2021-10-20

## 2021-11-11 ENCOUNTER — OFFICE VISIT (OUTPATIENT)
Dept: PAIN MEDICINE | Facility: CLINIC | Age: 71
End: 2021-11-11
Payer: MEDICARE

## 2021-11-11 VITALS
DIASTOLIC BLOOD PRESSURE: 68 MMHG | BODY MASS INDEX: 27.21 KG/M2 | SYSTOLIC BLOOD PRESSURE: 126 MMHG | RESPIRATION RATE: 18 BRPM | WEIGHT: 190.06 LBS | HEIGHT: 70 IN | HEART RATE: 74 BPM

## 2021-11-11 DIAGNOSIS — M47.816 LUMBAR SPONDYLOSIS: ICD-10-CM

## 2021-11-11 DIAGNOSIS — M53.3 SACROILIAC JOINT PAIN: ICD-10-CM

## 2021-11-11 DIAGNOSIS — M47.897 OTHER OSTEOARTHRITIS OF SPINE, LUMBOSACRAL REGION: ICD-10-CM

## 2021-11-11 DIAGNOSIS — M51.36 DDD (DEGENERATIVE DISC DISEASE), LUMBAR: Primary | ICD-10-CM

## 2021-11-11 PROCEDURE — 1101F PT FALLS ASSESS-DOCD LE1/YR: CPT | Mod: CPTII,S$GLB,, | Performed by: PAIN MEDICINE

## 2021-11-11 PROCEDURE — 1125F AMNT PAIN NOTED PAIN PRSNT: CPT | Mod: CPTII,S$GLB,, | Performed by: PAIN MEDICINE

## 2021-11-11 PROCEDURE — 1101F PR PT FALLS ASSESS DOC 0-1 FALLS W/OUT INJ PAST YR: ICD-10-PCS | Mod: CPTII,S$GLB,, | Performed by: PAIN MEDICINE

## 2021-11-11 PROCEDURE — 1159F PR MEDICATION LIST DOCUMENTED IN MEDICAL RECORD: ICD-10-PCS | Mod: CPTII,S$GLB,, | Performed by: PAIN MEDICINE

## 2021-11-11 PROCEDURE — 99214 PR OFFICE/OUTPT VISIT, EST, LEVL IV, 30-39 MIN: ICD-10-PCS | Mod: S$GLB,,, | Performed by: PAIN MEDICINE

## 2021-11-11 PROCEDURE — 1159F MED LIST DOCD IN RCRD: CPT | Mod: CPTII,S$GLB,, | Performed by: PAIN MEDICINE

## 2021-11-11 PROCEDURE — 3078F PR MOST RECENT DIASTOLIC BLOOD PRESSURE < 80 MM HG: ICD-10-PCS | Mod: CPTII,S$GLB,, | Performed by: PAIN MEDICINE

## 2021-11-11 PROCEDURE — 1160F PR REVIEW ALL MEDS BY PRESCRIBER/CLIN PHARMACIST DOCUMENTED: ICD-10-PCS | Mod: CPTII,S$GLB,, | Performed by: PAIN MEDICINE

## 2021-11-11 PROCEDURE — 99999 PR PBB SHADOW E&M-EST. PATIENT-LVL III: CPT | Mod: PBBFAC,,, | Performed by: PAIN MEDICINE

## 2021-11-11 PROCEDURE — 3074F PR MOST RECENT SYSTOLIC BLOOD PRESSURE < 130 MM HG: ICD-10-PCS | Mod: CPTII,S$GLB,, | Performed by: PAIN MEDICINE

## 2021-11-11 PROCEDURE — 3074F SYST BP LT 130 MM HG: CPT | Mod: CPTII,S$GLB,, | Performed by: PAIN MEDICINE

## 2021-11-11 PROCEDURE — 99214 OFFICE O/P EST MOD 30 MIN: CPT | Mod: S$GLB,,, | Performed by: PAIN MEDICINE

## 2021-11-11 PROCEDURE — 3008F PR BODY MASS INDEX (BMI) DOCUMENTED: ICD-10-PCS | Mod: CPTII,S$GLB,, | Performed by: PAIN MEDICINE

## 2021-11-11 PROCEDURE — 3288F FALL RISK ASSESSMENT DOCD: CPT | Mod: CPTII,S$GLB,, | Performed by: PAIN MEDICINE

## 2021-11-11 PROCEDURE — 3288F PR FALLS RISK ASSESSMENT DOCUMENTED: ICD-10-PCS | Mod: CPTII,S$GLB,, | Performed by: PAIN MEDICINE

## 2021-11-11 PROCEDURE — 1125F PR PAIN SEVERITY QUANTIFIED, PAIN PRESENT: ICD-10-PCS | Mod: CPTII,S$GLB,, | Performed by: PAIN MEDICINE

## 2021-11-11 PROCEDURE — 1160F RVW MEDS BY RX/DR IN RCRD: CPT | Mod: CPTII,S$GLB,, | Performed by: PAIN MEDICINE

## 2021-11-11 PROCEDURE — 99999 PR PBB SHADOW E&M-EST. PATIENT-LVL III: ICD-10-PCS | Mod: PBBFAC,,, | Performed by: PAIN MEDICINE

## 2021-11-11 PROCEDURE — 3008F BODY MASS INDEX DOCD: CPT | Mod: CPTII,S$GLB,, | Performed by: PAIN MEDICINE

## 2021-11-11 PROCEDURE — 3078F DIAST BP <80 MM HG: CPT | Mod: CPTII,S$GLB,, | Performed by: PAIN MEDICINE

## 2021-12-03 ENCOUNTER — PATIENT MESSAGE (OUTPATIENT)
Dept: PRIMARY CARE CLINIC | Facility: CLINIC | Age: 71
End: 2021-12-03
Payer: MEDICARE

## 2021-12-05 DIAGNOSIS — M54.41 CHRONIC BILATERAL LOW BACK PAIN WITH BILATERAL SCIATICA: ICD-10-CM

## 2021-12-05 DIAGNOSIS — J44.9 CHRONIC OBSTRUCTIVE PULMONARY DISEASE, UNSPECIFIED COPD TYPE: ICD-10-CM

## 2021-12-05 DIAGNOSIS — N40.0 BENIGN PROSTATIC HYPERPLASIA, UNSPECIFIED WHETHER LOWER URINARY TRACT SYMPTOMS PRESENT: ICD-10-CM

## 2021-12-05 DIAGNOSIS — G89.29 CHRONIC BILATERAL LOW BACK PAIN WITH BILATERAL SCIATICA: ICD-10-CM

## 2021-12-05 DIAGNOSIS — J40 BRONCHITIS: ICD-10-CM

## 2021-12-05 DIAGNOSIS — M54.42 CHRONIC BILATERAL LOW BACK PAIN WITH BILATERAL SCIATICA: ICD-10-CM

## 2021-12-05 DIAGNOSIS — M16.0 PRIMARY OSTEOARTHRITIS OF BOTH HIPS: ICD-10-CM

## 2021-12-08 RX ORDER — FLUTICASONE FUROATE, UMECLIDINIUM BROMIDE AND VILANTEROL TRIFENATATE 200; 62.5; 25 UG/1; UG/1; UG/1
POWDER RESPIRATORY (INHALATION)
Qty: 180 EACH | Refills: 3 | Status: SHIPPED | OUTPATIENT
Start: 2021-12-08 | End: 2023-08-29

## 2021-12-08 RX ORDER — ALBUTEROL SULFATE 90 UG/1
AEROSOL, METERED RESPIRATORY (INHALATION)
Qty: 25.5 G | Refills: 3 | Status: SHIPPED | OUTPATIENT
Start: 2021-12-08 | End: 2022-12-01

## 2021-12-09 RX ORDER — TERAZOSIN 5 MG/1
CAPSULE ORAL
Qty: 90 CAPSULE | Refills: 3 | Status: SHIPPED | OUTPATIENT
Start: 2021-12-09 | End: 2022-12-04

## 2021-12-09 RX ORDER — TAMSULOSIN HYDROCHLORIDE 0.4 MG/1
CAPSULE ORAL
Qty: 90 CAPSULE | Refills: 3 | Status: SHIPPED | OUTPATIENT
Start: 2021-12-09 | End: 2022-12-04

## 2021-12-09 RX ORDER — DICLOFENAC SODIUM 50 MG/1
TABLET, DELAYED RELEASE ORAL
Qty: 40 TABLET | Refills: 0 | Status: SHIPPED | OUTPATIENT
Start: 2021-12-09 | End: 2022-01-08

## 2021-12-10 ENCOUNTER — IMMUNIZATION (OUTPATIENT)
Dept: PRIMARY CARE CLINIC | Facility: CLINIC | Age: 71
End: 2021-12-10
Payer: MEDICARE

## 2021-12-10 DIAGNOSIS — Z23 NEED FOR VACCINATION: Primary | ICD-10-CM

## 2021-12-10 PROCEDURE — 0004A COVID-19, MRNA, LNP-S, PF, 30 MCG/0.3 ML DOSE VACCINE: CPT | Mod: PBBFAC | Performed by: FAMILY MEDICINE

## 2021-12-14 ENCOUNTER — OFFICE VISIT (OUTPATIENT)
Dept: PAIN MEDICINE | Facility: CLINIC | Age: 71
End: 2021-12-14
Payer: MEDICARE

## 2021-12-14 VITALS
BODY MASS INDEX: 26.74 KG/M2 | SYSTOLIC BLOOD PRESSURE: 132 MMHG | HEIGHT: 70 IN | HEART RATE: 77 BPM | OXYGEN SATURATION: 97 % | DIASTOLIC BLOOD PRESSURE: 70 MMHG | RESPIRATION RATE: 19 BRPM | WEIGHT: 186.75 LBS

## 2021-12-14 DIAGNOSIS — M54.16 LUMBAR RADICULOPATHY: ICD-10-CM

## 2021-12-14 DIAGNOSIS — M47.816 LUMBAR SPONDYLOSIS: ICD-10-CM

## 2021-12-14 DIAGNOSIS — M51.36 DDD (DEGENERATIVE DISC DISEASE), LUMBAR: Primary | ICD-10-CM

## 2021-12-14 DIAGNOSIS — M47.897 OTHER OSTEOARTHRITIS OF SPINE, LUMBOSACRAL REGION: ICD-10-CM

## 2021-12-14 DIAGNOSIS — M53.3 SACROILIAC JOINT PAIN: ICD-10-CM

## 2021-12-14 PROCEDURE — 99213 PR OFFICE/OUTPT VISIT, EST, LEVL III, 20-29 MIN: ICD-10-PCS | Mod: S$GLB,,, | Performed by: PAIN MEDICINE

## 2021-12-14 PROCEDURE — 99999 PR PBB SHADOW E&M-EST. PATIENT-LVL IV: CPT | Mod: PBBFAC,,, | Performed by: PAIN MEDICINE

## 2021-12-14 PROCEDURE — 99213 OFFICE O/P EST LOW 20 MIN: CPT | Mod: S$GLB,,, | Performed by: PAIN MEDICINE

## 2021-12-14 PROCEDURE — 99999 PR PBB SHADOW E&M-EST. PATIENT-LVL IV: ICD-10-PCS | Mod: PBBFAC,,, | Performed by: PAIN MEDICINE

## 2022-01-07 DIAGNOSIS — M54.41 CHRONIC BILATERAL LOW BACK PAIN WITH BILATERAL SCIATICA: ICD-10-CM

## 2022-01-07 DIAGNOSIS — M16.0 PRIMARY OSTEOARTHRITIS OF BOTH HIPS: ICD-10-CM

## 2022-01-07 DIAGNOSIS — G89.29 CHRONIC BILATERAL LOW BACK PAIN WITH BILATERAL SCIATICA: ICD-10-CM

## 2022-01-07 DIAGNOSIS — M54.42 CHRONIC BILATERAL LOW BACK PAIN WITH BILATERAL SCIATICA: ICD-10-CM

## 2022-01-08 RX ORDER — DICLOFENAC SODIUM 50 MG/1
TABLET, DELAYED RELEASE ORAL
Qty: 40 TABLET | Refills: 0 | Status: SHIPPED | OUTPATIENT
Start: 2022-01-08 | End: 2022-01-24 | Stop reason: SDUPTHER

## 2022-01-20 ENCOUNTER — OFFICE VISIT (OUTPATIENT)
Dept: PRIMARY CARE CLINIC | Facility: CLINIC | Age: 72
End: 2022-01-20
Payer: MEDICARE

## 2022-01-20 VITALS
DIASTOLIC BLOOD PRESSURE: 60 MMHG | OXYGEN SATURATION: 97 % | BODY MASS INDEX: 27.15 KG/M2 | WEIGHT: 189.63 LBS | RESPIRATION RATE: 16 BRPM | HEIGHT: 70 IN | SYSTOLIC BLOOD PRESSURE: 118 MMHG | HEART RATE: 60 BPM

## 2022-01-20 DIAGNOSIS — M54.42 CHRONIC BILATERAL LOW BACK PAIN WITH BILATERAL SCIATICA: ICD-10-CM

## 2022-01-20 DIAGNOSIS — M54.41 CHRONIC BILATERAL LOW BACK PAIN WITH BILATERAL SCIATICA: ICD-10-CM

## 2022-01-20 DIAGNOSIS — M54.41 CHRONIC BILATERAL LOW BACK PAIN WITH BILATERAL SCIATICA: Primary | ICD-10-CM

## 2022-01-20 DIAGNOSIS — G89.29 CHRONIC BILATERAL LOW BACK PAIN WITH BILATERAL SCIATICA: ICD-10-CM

## 2022-01-20 DIAGNOSIS — G89.29 CHRONIC BILATERAL LOW BACK PAIN WITH BILATERAL SCIATICA: Primary | ICD-10-CM

## 2022-01-20 DIAGNOSIS — M54.42 CHRONIC BILATERAL LOW BACK PAIN WITH BILATERAL SCIATICA: Primary | ICD-10-CM

## 2022-01-20 DIAGNOSIS — Z12.5 SCREENING FOR PROSTATE CANCER: ICD-10-CM

## 2022-01-20 DIAGNOSIS — M54.16 LUMBAR RADICULOPATHY: ICD-10-CM

## 2022-01-20 DIAGNOSIS — M16.0 PRIMARY OSTEOARTHRITIS OF BOTH HIPS: ICD-10-CM

## 2022-01-20 PROCEDURE — 3074F PR MOST RECENT SYSTOLIC BLOOD PRESSURE < 130 MM HG: ICD-10-PCS | Mod: CPTII,S$GLB,, | Performed by: INTERNAL MEDICINE

## 2022-01-20 PROCEDURE — 1159F PR MEDICATION LIST DOCUMENTED IN MEDICAL RECORD: ICD-10-PCS | Mod: CPTII,S$GLB,, | Performed by: INTERNAL MEDICINE

## 2022-01-20 PROCEDURE — 1126F PR PAIN SEVERITY QUANTIFIED, NO PAIN PRESENT: ICD-10-PCS | Mod: CPTII,S$GLB,, | Performed by: INTERNAL MEDICINE

## 2022-01-20 PROCEDURE — 3008F BODY MASS INDEX DOCD: CPT | Mod: CPTII,S$GLB,, | Performed by: INTERNAL MEDICINE

## 2022-01-20 PROCEDURE — 99999 PR PBB SHADOW E&M-EST. PATIENT-LVL IV: CPT | Mod: PBBFAC,,, | Performed by: INTERNAL MEDICINE

## 2022-01-20 PROCEDURE — 1160F RVW MEDS BY RX/DR IN RCRD: CPT | Mod: CPTII,S$GLB,, | Performed by: INTERNAL MEDICINE

## 2022-01-20 PROCEDURE — 1126F AMNT PAIN NOTED NONE PRSNT: CPT | Mod: CPTII,S$GLB,, | Performed by: INTERNAL MEDICINE

## 2022-01-20 PROCEDURE — 3008F PR BODY MASS INDEX (BMI) DOCUMENTED: ICD-10-PCS | Mod: CPTII,S$GLB,, | Performed by: INTERNAL MEDICINE

## 2022-01-20 PROCEDURE — 1160F PR REVIEW ALL MEDS BY PRESCRIBER/CLIN PHARMACIST DOCUMENTED: ICD-10-PCS | Mod: CPTII,S$GLB,, | Performed by: INTERNAL MEDICINE

## 2022-01-20 PROCEDURE — 1101F PR PT FALLS ASSESS DOC 0-1 FALLS W/OUT INJ PAST YR: ICD-10-PCS | Mod: CPTII,S$GLB,, | Performed by: INTERNAL MEDICINE

## 2022-01-20 PROCEDURE — 99213 PR OFFICE/OUTPT VISIT, EST, LEVL III, 20-29 MIN: ICD-10-PCS | Mod: S$GLB,,, | Performed by: INTERNAL MEDICINE

## 2022-01-20 PROCEDURE — 99999 PR PBB SHADOW E&M-EST. PATIENT-LVL IV: ICD-10-PCS | Mod: PBBFAC,,, | Performed by: INTERNAL MEDICINE

## 2022-01-20 PROCEDURE — 1101F PT FALLS ASSESS-DOCD LE1/YR: CPT | Mod: CPTII,S$GLB,, | Performed by: INTERNAL MEDICINE

## 2022-01-20 PROCEDURE — 3074F SYST BP LT 130 MM HG: CPT | Mod: CPTII,S$GLB,, | Performed by: INTERNAL MEDICINE

## 2022-01-20 PROCEDURE — 3288F FALL RISK ASSESSMENT DOCD: CPT | Mod: CPTII,S$GLB,, | Performed by: INTERNAL MEDICINE

## 2022-01-20 PROCEDURE — 1159F MED LIST DOCD IN RCRD: CPT | Mod: CPTII,S$GLB,, | Performed by: INTERNAL MEDICINE

## 2022-01-20 PROCEDURE — 3078F DIAST BP <80 MM HG: CPT | Mod: CPTII,S$GLB,, | Performed by: INTERNAL MEDICINE

## 2022-01-20 PROCEDURE — 3078F PR MOST RECENT DIASTOLIC BLOOD PRESSURE < 80 MM HG: ICD-10-PCS | Mod: CPTII,S$GLB,, | Performed by: INTERNAL MEDICINE

## 2022-01-20 PROCEDURE — 99213 OFFICE O/P EST LOW 20 MIN: CPT | Mod: S$GLB,,, | Performed by: INTERNAL MEDICINE

## 2022-01-20 PROCEDURE — 3288F PR FALLS RISK ASSESSMENT DOCUMENTED: ICD-10-PCS | Mod: CPTII,S$GLB,, | Performed by: INTERNAL MEDICINE

## 2022-01-20 RX ORDER — TRAMADOL HYDROCHLORIDE 50 MG/1
50 TABLET ORAL EVERY 12 HOURS PRN
Qty: 60 TABLET | Refills: 0 | Status: SHIPPED | OUTPATIENT
Start: 2022-01-20 | End: 2022-09-21

## 2022-01-20 NOTE — TELEPHONE ENCOUNTER
No new care gaps identified.  Powered by [employer]. Reference number: [id number].   1/20/2022 2:59:28 PM CST

## 2022-01-20 NOTE — PROGRESS NOTES
Subjective:       Patient ID: Uriah Mills is a 71 y.o. male.    Chief Complaint: Follow-up and Medication Refill    HPI  Pt visit today for f/u with lower back pain and radculopathy he was seen by pain management and had ESIs a few time result only last very short time pt was told nothing else can be done and no appt for f/u he still I a lot of pain discuss with pt can try get opion from [facility] pt agrees he denies any other physical symptoms  Review of Systems    Objective:      Physical Exam  Vitals and nursing note reviewed.   Constitutional:       General: He is not in acute distress.     Appearance: He is well-developed and well-nourished.   HENT:      Head: Normocephalic and atraumatic.      Right Ear: External ear normal.      Left Ear: External ear normal.      Nose: Nose normal.      Mouth/Throat:      Mouth: Oropharynx is clear and moist.      Pharynx: No oropharyngeal exudate.   Eyes:      Extraocular Movements: Extraocular movements intact and EOM normal.      Conjunctiva/sclera: Conjunctivae normal.      Pupils: Pupils are equal, round, and reactive to light.   Neck:      Thyroid: No thyromegaly.   Cardiovascular:      Rate and Rhythm: Normal rate and regular rhythm.      Pulses: Intact distal pulses.      Heart sounds: Normal heart sounds. No murmur heard.  No friction rub. No gallop.    Pulmonary:      Effort: Pulmonary effort is normal. No respiratory distress.      Breath sounds: Normal breath sounds. No wheezing or rales.   Abdominal:      General: Bowel sounds are normal. There is no distension.      Palpations: Abdomen is soft.      Tenderness: There is no abdominal tenderness. There is no guarding.   Musculoskeletal:         General: Tenderness (subjective tenderness across lower back radiate into lower ext bilaterally) present. No deformity or edema. Normal range of motion.      Cervical back: Normal range of motion and neck supple.   Lymphadenopathy:      Cervical: No cervical adenopathy.    Skin:     General: Skin is warm and dry.      Findings: No erythema or rash.   Neurological:      Mental Status: He is alert and oriented to person, place, and time.   Psychiatric:         Mood and Affect: Mood and affect and mood normal.         Thought Content: Thought content normal.         Judgment: Judgment normal.         Assessment:       1. Chronic bilateral low back pain with bilateral sciatica    2. Screening for prostate cancer    3. Primary osteoarthritis of both hips    4. Lumbar radiculopathy        Plan:       Chronic bilateral low back pain with bilateral sciatica  -     traMADoL (ULTRAM) 50 mg tablet; Take 1 tablet (50 mg total) by mouth every 12 (twelve) hours as needed for Pain.  Dispense: 60 tablet; Refill: 0  -     Ambulatory referral/consult to Neurosurgery; Future; Expected date: 01/21/2022    Screening for prostate cancer  -     PSA, Screening; Future; Expected date: 01/20/2022    Primary osteoarthritis of both hips  -     traMADoL (ULTRAM) 50 mg tablet; Take 1 tablet (50 mg total) by mouth every 12 (twelve) hours as needed for Pain.  Dispense: 60 tablet; Refill: 0    Lumbar radiculopathy  -     Ambulatory referral/consult to Neurosurgery; Future; Expected date: 01/21/2022        Medication List with Changes/Refills   Current Medications    ASPIRIN (ECOTRIN) 81 MG EC TABLET    Take 81 mg by mouth once daily.    DICLOFENAC (VOLTAREN) 50 MG EC TABLET    TAKE 1 TABLET BY MOUTH TWICE DAILY WITH MEALS FOR ARTHRITIS    LEVOCETIRIZINE (XYZAL) 5 MG TABLET    Take 1 tablet (5 mg total) by mouth every evening.    PROAIR HFA 90 MCG/ACTUATION INHALER    INHALE 2 PUFFS BY MOUTH EVERY 6 HOURS AS NEEDED    TAMSULOSIN (FLOMAX) 0.4 MG CAP    Take 1 capsule by mouth once daily    TERAZOSIN (HYTRIN) 5 MG CAPSULE    TAKE 1 CAPSULE BY MOUTH IN THE EVENING    TIZANIDINE (ZANAFLEX) 4 MG TABLET    Take 1 tablet (4 mg total) by mouth daily as needed (prn muscle spasm).    TRELEGY ELLIPTA 200-62.5-25 MCG INHALER     INHALE 1 PUFF ONCE DAILY   Changed and/or Refilled Medications    Modified Medication Previous Medication    GABAPENTIN (NEURONTIN) 600 MG TABLET gabapentin (NEURONTIN) 600 MG tablet       Take 1 tablet by mouth twice daily    Take 1 tablet by mouth twice daily    TRAMADOL (ULTRAM) 50 MG TABLET traMADoL (ULTRAM) 50 mg tablet       Take 1 tablet (50 mg total) by mouth every 12 (twelve) hours as needed for Pain.    Take 1 tablet (50 mg total) by mouth every 24 hours as needed for Pain.

## 2022-01-21 RX ORDER — GABAPENTIN 600 MG/1
TABLET ORAL
Qty: 60 TABLET | Refills: 0 | Status: SHIPPED | OUTPATIENT
Start: 2022-01-21 | End: 2022-02-19

## 2022-01-23 ENCOUNTER — PATIENT MESSAGE (OUTPATIENT)
Dept: PRIMARY CARE CLINIC | Facility: CLINIC | Age: 72
End: 2022-01-23
Payer: MEDICARE

## 2022-01-23 DIAGNOSIS — M16.0 PRIMARY OSTEOARTHRITIS OF BOTH HIPS: ICD-10-CM

## 2022-01-23 DIAGNOSIS — G89.29 CHRONIC BILATERAL LOW BACK PAIN WITH BILATERAL SCIATICA: ICD-10-CM

## 2022-01-23 DIAGNOSIS — M54.41 CHRONIC BILATERAL LOW BACK PAIN WITH BILATERAL SCIATICA: ICD-10-CM

## 2022-01-23 DIAGNOSIS — M54.42 CHRONIC BILATERAL LOW BACK PAIN WITH BILATERAL SCIATICA: ICD-10-CM

## 2022-01-25 ENCOUNTER — TELEPHONE (OUTPATIENT)
Dept: PRIMARY CARE CLINIC | Facility: CLINIC | Age: 72
End: 2022-01-25
Payer: MEDICARE

## 2022-01-25 DIAGNOSIS — Z12.5 SCREENING FOR PROSTATE CANCER: ICD-10-CM

## 2022-01-25 DIAGNOSIS — Z00.00 ANNUAL PHYSICAL EXAM: ICD-10-CM

## 2022-01-25 DIAGNOSIS — Z13.220 ENCOUNTER FOR LIPID SCREENING FOR CARDIOVASCULAR DISEASE: Primary | ICD-10-CM

## 2022-01-25 DIAGNOSIS — Z13.6 ENCOUNTER FOR LIPID SCREENING FOR CARDIOVASCULAR DISEASE: Primary | ICD-10-CM

## 2022-01-25 DIAGNOSIS — M54.42 CHRONIC BILATERAL LOW BACK PAIN WITH BILATERAL SCIATICA: ICD-10-CM

## 2022-01-25 DIAGNOSIS — G89.29 CHRONIC BILATERAL LOW BACK PAIN WITH BILATERAL SCIATICA: ICD-10-CM

## 2022-01-25 DIAGNOSIS — M54.41 CHRONIC BILATERAL LOW BACK PAIN WITH BILATERAL SCIATICA: ICD-10-CM

## 2022-01-25 RX ORDER — DICLOFENAC SODIUM 50 MG/1
50 TABLET, DELAYED RELEASE ORAL 2 TIMES DAILY
Qty: 60 TABLET | Refills: 0 | Status: SHIPPED | OUTPATIENT
Start: 2022-01-25 | End: 2022-03-02

## 2022-02-18 DIAGNOSIS — M54.42 CHRONIC BILATERAL LOW BACK PAIN WITH BILATERAL SCIATICA: ICD-10-CM

## 2022-02-18 DIAGNOSIS — G89.29 CHRONIC BILATERAL LOW BACK PAIN WITH BILATERAL SCIATICA: ICD-10-CM

## 2022-02-18 DIAGNOSIS — M54.41 CHRONIC BILATERAL LOW BACK PAIN WITH BILATERAL SCIATICA: ICD-10-CM

## 2022-02-18 NOTE — TELEPHONE ENCOUNTER
No new care gaps identified.  Powered by [employer]. Reference number: [id number].   2/18/2022 10:32:36 AM CST

## 2022-02-19 RX ORDER — GABAPENTIN 600 MG/1
TABLET ORAL
Qty: 60 TABLET | Refills: 0 | Status: SHIPPED | OUTPATIENT
Start: 2022-02-19 | End: 2022-03-23

## 2022-02-28 DIAGNOSIS — G89.29 CHRONIC BILATERAL LOW BACK PAIN WITH BILATERAL SCIATICA: ICD-10-CM

## 2022-02-28 DIAGNOSIS — M16.0 PRIMARY OSTEOARTHRITIS OF BOTH HIPS: ICD-10-CM

## 2022-02-28 DIAGNOSIS — M54.41 CHRONIC BILATERAL LOW BACK PAIN WITH BILATERAL SCIATICA: ICD-10-CM

## 2022-02-28 DIAGNOSIS — M54.42 CHRONIC BILATERAL LOW BACK PAIN WITH BILATERAL SCIATICA: ICD-10-CM

## 2022-03-02 RX ORDER — DICLOFENAC SODIUM 50 MG/1
TABLET, DELAYED RELEASE ORAL
Qty: 60 TABLET | Refills: 0 | Status: SHIPPED | OUTPATIENT
Start: 2022-03-02 | End: 2022-03-24 | Stop reason: SDUPTHER

## 2022-03-03 NOTE — TELEPHONE ENCOUNTER
Pt. Wife notified that he needs labs or we will not continue to renew his meds both him & the wife are going early next week

## 2022-03-08 DIAGNOSIS — J32.9 CHRONIC SINUSITIS, UNSPECIFIED LOCATION: ICD-10-CM

## 2022-03-08 NOTE — TELEPHONE ENCOUNTER
No new care gaps identified.  Powered by [email or web address]. Reference number: [id number].   3/08/2022 11:39:49 AM CST

## 2022-03-13 RX ORDER — LEVOCETIRIZINE DIHYDROCHLORIDE 5 MG/1
TABLET, FILM COATED ORAL
Qty: 90 TABLET | Refills: 3 | Status: SHIPPED | OUTPATIENT
Start: 2022-03-13 | End: 2023-02-28

## 2022-03-24 ENCOUNTER — OFFICE VISIT (OUTPATIENT)
Dept: PRIMARY CARE CLINIC | Facility: CLINIC | Age: 72
End: 2022-03-24
Payer: MEDICARE

## 2022-03-24 VITALS
DIASTOLIC BLOOD PRESSURE: 60 MMHG | BODY MASS INDEX: 27.27 KG/M2 | HEIGHT: 70 IN | RESPIRATION RATE: 16 BRPM | SYSTOLIC BLOOD PRESSURE: 132 MMHG | OXYGEN SATURATION: 95 % | HEART RATE: 86 BPM | WEIGHT: 190.5 LBS

## 2022-03-24 DIAGNOSIS — G89.29 CHRONIC BILATERAL LOW BACK PAIN WITH BILATERAL SCIATICA: ICD-10-CM

## 2022-03-24 DIAGNOSIS — M54.41 CHRONIC BILATERAL LOW BACK PAIN WITH BILATERAL SCIATICA: ICD-10-CM

## 2022-03-24 DIAGNOSIS — M16.0 PRIMARY OSTEOARTHRITIS OF BOTH HIPS: ICD-10-CM

## 2022-03-24 DIAGNOSIS — M54.42 CHRONIC BILATERAL LOW BACK PAIN WITH BILATERAL SCIATICA: ICD-10-CM

## 2022-03-24 PROCEDURE — 99999 PR PBB SHADOW E&M-EST. PATIENT-LVL IV: ICD-10-PCS | Mod: PBBFAC,,, | Performed by: INTERNAL MEDICINE

## 2022-03-24 PROCEDURE — 1126F AMNT PAIN NOTED NONE PRSNT: CPT | Mod: CPTII,S$GLB,, | Performed by: INTERNAL MEDICINE

## 2022-03-24 PROCEDURE — 1126F PR PAIN SEVERITY QUANTIFIED, NO PAIN PRESENT: ICD-10-PCS | Mod: CPTII,S$GLB,, | Performed by: INTERNAL MEDICINE

## 2022-03-24 PROCEDURE — 1160F PR REVIEW ALL MEDS BY PRESCRIBER/CLIN PHARMACIST DOCUMENTED: ICD-10-PCS | Mod: CPTII,S$GLB,, | Performed by: INTERNAL MEDICINE

## 2022-03-24 PROCEDURE — 99213 OFFICE O/P EST LOW 20 MIN: CPT | Mod: S$GLB,,, | Performed by: INTERNAL MEDICINE

## 2022-03-24 PROCEDURE — 3075F SYST BP GE 130 - 139MM HG: CPT | Mod: CPTII,S$GLB,, | Performed by: INTERNAL MEDICINE

## 2022-03-24 PROCEDURE — 3288F FALL RISK ASSESSMENT DOCD: CPT | Mod: CPTII,S$GLB,, | Performed by: INTERNAL MEDICINE

## 2022-03-24 PROCEDURE — 1160F RVW MEDS BY RX/DR IN RCRD: CPT | Mod: CPTII,S$GLB,, | Performed by: INTERNAL MEDICINE

## 2022-03-24 PROCEDURE — 3078F DIAST BP <80 MM HG: CPT | Mod: CPTII,S$GLB,, | Performed by: INTERNAL MEDICINE

## 2022-03-24 PROCEDURE — 3008F PR BODY MASS INDEX (BMI) DOCUMENTED: ICD-10-PCS | Mod: CPTII,S$GLB,, | Performed by: INTERNAL MEDICINE

## 2022-03-24 PROCEDURE — 1159F PR MEDICATION LIST DOCUMENTED IN MEDICAL RECORD: ICD-10-PCS | Mod: CPTII,S$GLB,, | Performed by: INTERNAL MEDICINE

## 2022-03-24 PROCEDURE — 3008F BODY MASS INDEX DOCD: CPT | Mod: CPTII,S$GLB,, | Performed by: INTERNAL MEDICINE

## 2022-03-24 PROCEDURE — 1101F PT FALLS ASSESS-DOCD LE1/YR: CPT | Mod: CPTII,S$GLB,, | Performed by: INTERNAL MEDICINE

## 2022-03-24 PROCEDURE — 3288F PR FALLS RISK ASSESSMENT DOCUMENTED: ICD-10-PCS | Mod: CPTII,S$GLB,, | Performed by: INTERNAL MEDICINE

## 2022-03-24 PROCEDURE — 3078F PR MOST RECENT DIASTOLIC BLOOD PRESSURE < 80 MM HG: ICD-10-PCS | Mod: CPTII,S$GLB,, | Performed by: INTERNAL MEDICINE

## 2022-03-24 PROCEDURE — 1159F MED LIST DOCD IN RCRD: CPT | Mod: CPTII,S$GLB,, | Performed by: INTERNAL MEDICINE

## 2022-03-24 PROCEDURE — 1101F PR PT FALLS ASSESS DOC 0-1 FALLS W/OUT INJ PAST YR: ICD-10-PCS | Mod: CPTII,S$GLB,, | Performed by: INTERNAL MEDICINE

## 2022-03-24 PROCEDURE — 99213 PR OFFICE/OUTPT VISIT, EST, LEVL III, 20-29 MIN: ICD-10-PCS | Mod: S$GLB,,, | Performed by: INTERNAL MEDICINE

## 2022-03-24 PROCEDURE — 99999 PR PBB SHADOW E&M-EST. PATIENT-LVL IV: CPT | Mod: PBBFAC,,, | Performed by: INTERNAL MEDICINE

## 2022-03-24 PROCEDURE — 3075F PR MOST RECENT SYSTOLIC BLOOD PRESS GE 130-139MM HG: ICD-10-PCS | Mod: CPTII,S$GLB,, | Performed by: INTERNAL MEDICINE

## 2022-03-24 RX ORDER — GABAPENTIN 600 MG/1
600 TABLET ORAL 2 TIMES DAILY
Qty: 60 TABLET | Refills: 3 | Status: SHIPPED | OUTPATIENT
Start: 2022-03-24 | End: 2022-06-21 | Stop reason: SDUPTHER

## 2022-03-24 RX ORDER — DICLOFENAC SODIUM 50 MG/1
50 TABLET, DELAYED RELEASE ORAL 2 TIMES DAILY
Qty: 60 TABLET | Refills: 0 | Status: SHIPPED | OUTPATIENT
Start: 2022-03-24 | End: 2022-04-18

## 2022-03-26 NOTE — PROGRESS NOTES
Subjective:       Patient ID: Uriah Mills is a 71 y.o. male.    Chief Complaint: Follow-up (2 mth) and Medication Refill    HPI  Pt visit today c/o lower back pain and sciatica hurt more when sitting  Try get up and standing he brie kat seen by NS DR Lenz had ALVIN couple times but not lasting long he laso c/o left hip pain with ambulation no trauma no fever chill no n/v/d no sob cp XAVIER  Review of Systems    Objective:      Physical Exam  Vitals and nursing note reviewed.   Constitutional:       General: He is not in acute distress.     Appearance: He is well-developed.   HENT:      Head: Normocephalic and atraumatic.      Right Ear: External ear normal.      Left Ear: External ear normal.      Nose: Nose normal.      Mouth/Throat:      Pharynx: No oropharyngeal exudate.   Eyes:      Extraocular Movements: Extraocular movements intact.      Conjunctiva/sclera: Conjunctivae normal.      Pupils: Pupils are equal, round, and reactive to light.   Neck:      Thyroid: No thyromegaly.   Cardiovascular:      Rate and Rhythm: Normal rate and regular rhythm.      Heart sounds: Normal heart sounds. No murmur heard.    No friction rub. No gallop.   Pulmonary:      Effort: Pulmonary effort is normal. No respiratory distress.      Breath sounds: Normal breath sounds. No wheezing or rales.   Abdominal:      General: Bowel sounds are normal. There is no distension.      Palpations: Abdomen is soft.      Tenderness: There is no abdominal tenderness. There is no guarding.   Musculoskeletal:         General: Tenderness (subjective tenderness across lower back radiate down both legs  and left hip pain ) present. No deformity. Normal range of motion.      Cervical back: Normal range of motion and neck supple.   Lymphadenopathy:      Cervical: No cervical adenopathy.   Skin:     General: Skin is warm and dry.      Findings: No erythema or rash.   Neurological:      Mental Status: He is alert and oriented to person, place, and time.    Psychiatric:         Thought Content: Thought content normal.         Judgment: Judgment normal.         Assessment:       1. Chronic bilateral low back pain with bilateral sciatica    2. Primary osteoarthritis of both hips        Plan:       Chronic bilateral low back pain with bilateral sciatica  -     gabapentin (NEURONTIN) 600 MG tablet; Take 1 tablet (600 mg total) by mouth 2 (two) times daily.  Dispense: 60 tablet; Refill: 3  -     diclofenac (VOLTAREN) 50 MG EC tablet; Take 1 tablet (50 mg total) by mouth 2 (two) times daily.  Dispense: 60 tablet; Refill: 0  -     Back/Cervical Brace For Home Use    Primary osteoarthritis of both hips  -     gabapentin (NEURONTIN) 600 MG tablet; Take 1 tablet (600 mg total) by mouth 2 (two) times daily.  Dispense: 60 tablet; Refill: 3  -     diclofenac (VOLTAREN) 50 MG EC tablet; Take 1 tablet (50 mg total) by mouth 2 (two) times daily.  Dispense: 60 tablet; Refill: 0  -     Back/Cervical Brace For Home Use        Medication List with Changes/Refills   Current Medications    ASPIRIN (ECOTRIN) 81 MG EC TABLET    Take 81 mg by mouth once daily.    LEVOCETIRIZINE (XYZAL) 5 MG TABLET    TAKE 1 TABLET BY MOUTH ONCE DAILY IN THE EVENING    PROAIR HFA 90 MCG/ACTUATION INHALER    INHALE 2 PUFFS BY MOUTH EVERY 6 HOURS AS NEEDED    TAMSULOSIN (FLOMAX) 0.4 MG CAP    Take 1 capsule by mouth once daily    TERAZOSIN (HYTRIN) 5 MG CAPSULE    TAKE 1 CAPSULE BY MOUTH IN THE EVENING    TIZANIDINE (ZANAFLEX) 4 MG TABLET    Take 1 tablet (4 mg total) by mouth daily as needed (prn muscle spasm).    TRAMADOL (ULTRAM) 50 MG TABLET    Take 1 tablet (50 mg total) by mouth every 12 (twelve) hours as needed for Pain.    TRELEGY ELLIPTA 200-62.5-25 MCG INHALER    INHALE 1 PUFF ONCE DAILY   Changed and/or Refilled Medications    Modified Medication Previous Medication    DICLOFENAC (VOLTAREN) 50 MG EC TABLET diclofenac (VOLTAREN) 50 MG EC tablet       Take 1 tablet (50 mg total) by mouth 2 (two) times  daily.    Take 1 tablet by mouth twice daily    GABAPENTIN (NEURONTIN) 600 MG TABLET gabapentin (NEURONTIN) 600 MG tablet       Take 1 tablet (600 mg total) by mouth 2 (two) times daily.    Take 1 tablet by mouth twice daily

## 2022-04-06 ENCOUNTER — PATIENT MESSAGE (OUTPATIENT)
Dept: ADMINISTRATIVE | Facility: HOSPITAL | Age: 72
End: 2022-04-06
Payer: MEDICARE

## 2022-04-06 DIAGNOSIS — Z12.11 SCREENING FOR COLON CANCER: ICD-10-CM

## 2022-04-16 DIAGNOSIS — M16.0 PRIMARY OSTEOARTHRITIS OF BOTH HIPS: ICD-10-CM

## 2022-04-16 DIAGNOSIS — M54.41 CHRONIC BILATERAL LOW BACK PAIN WITH BILATERAL SCIATICA: ICD-10-CM

## 2022-04-16 DIAGNOSIS — M54.42 CHRONIC BILATERAL LOW BACK PAIN WITH BILATERAL SCIATICA: ICD-10-CM

## 2022-04-16 DIAGNOSIS — G89.29 CHRONIC BILATERAL LOW BACK PAIN WITH BILATERAL SCIATICA: ICD-10-CM

## 2022-04-18 RX ORDER — DICLOFENAC SODIUM 50 MG/1
TABLET, DELAYED RELEASE ORAL
Qty: 60 TABLET | Refills: 0 | Status: SHIPPED | OUTPATIENT
Start: 2022-04-18 | End: 2022-05-30 | Stop reason: SDUPTHER

## 2022-05-05 ENCOUNTER — PATIENT MESSAGE (OUTPATIENT)
Dept: SMOKING CESSATION | Facility: CLINIC | Age: 72
End: 2022-05-05
Payer: MEDICARE

## 2022-06-13 DIAGNOSIS — G89.29 CHRONIC BILATERAL LOW BACK PAIN WITH BILATERAL SCIATICA: ICD-10-CM

## 2022-06-13 DIAGNOSIS — M54.42 CHRONIC BILATERAL LOW BACK PAIN WITH BILATERAL SCIATICA: ICD-10-CM

## 2022-06-13 DIAGNOSIS — M54.41 CHRONIC BILATERAL LOW BACK PAIN WITH BILATERAL SCIATICA: ICD-10-CM

## 2022-06-13 DIAGNOSIS — M16.0 PRIMARY OSTEOARTHRITIS OF BOTH HIPS: ICD-10-CM

## 2022-06-14 RX ORDER — DICLOFENAC SODIUM 50 MG/1
TABLET, DELAYED RELEASE ORAL
Qty: 60 TABLET | Refills: 0 | Status: SHIPPED | OUTPATIENT
Start: 2022-06-14 | End: 2022-06-21

## 2022-06-21 ENCOUNTER — OFFICE VISIT (OUTPATIENT)
Dept: PRIMARY CARE CLINIC | Facility: CLINIC | Age: 72
End: 2022-06-21
Payer: MEDICARE

## 2022-06-21 VITALS
BODY MASS INDEX: 25.93 KG/M2 | HEART RATE: 88 BPM | SYSTOLIC BLOOD PRESSURE: 118 MMHG | WEIGHT: 181.13 LBS | HEIGHT: 70 IN | RESPIRATION RATE: 16 BRPM | DIASTOLIC BLOOD PRESSURE: 58 MMHG | OXYGEN SATURATION: 95 %

## 2022-06-21 DIAGNOSIS — J44.89 OBSTRUCTIVE CHRONIC BRONCHITIS WITHOUT EXACERBATION: ICD-10-CM

## 2022-06-21 DIAGNOSIS — M16.0 PRIMARY OSTEOARTHRITIS OF BOTH HIPS: ICD-10-CM

## 2022-06-21 DIAGNOSIS — G89.29 CHRONIC BILATERAL LOW BACK PAIN WITH BILATERAL SCIATICA: Primary | ICD-10-CM

## 2022-06-21 DIAGNOSIS — Z72.0 TOBACCO USE: ICD-10-CM

## 2022-06-21 DIAGNOSIS — M54.42 CHRONIC BILATERAL LOW BACK PAIN WITH BILATERAL SCIATICA: Primary | ICD-10-CM

## 2022-06-21 DIAGNOSIS — M54.41 CHRONIC BILATERAL LOW BACK PAIN WITH BILATERAL SCIATICA: Primary | ICD-10-CM

## 2022-06-21 PROCEDURE — 3074F SYST BP LT 130 MM HG: CPT | Mod: CPTII,S$GLB,, | Performed by: INTERNAL MEDICINE

## 2022-06-21 PROCEDURE — 1159F PR MEDICATION LIST DOCUMENTED IN MEDICAL RECORD: ICD-10-PCS | Mod: CPTII,S$GLB,, | Performed by: INTERNAL MEDICINE

## 2022-06-21 PROCEDURE — 3008F PR BODY MASS INDEX (BMI) DOCUMENTED: ICD-10-PCS | Mod: CPTII,S$GLB,, | Performed by: INTERNAL MEDICINE

## 2022-06-21 PROCEDURE — 1159F MED LIST DOCD IN RCRD: CPT | Mod: CPTII,S$GLB,, | Performed by: INTERNAL MEDICINE

## 2022-06-21 PROCEDURE — 1101F PT FALLS ASSESS-DOCD LE1/YR: CPT | Mod: CPTII,S$GLB,, | Performed by: INTERNAL MEDICINE

## 2022-06-21 PROCEDURE — 3078F DIAST BP <80 MM HG: CPT | Mod: CPTII,S$GLB,, | Performed by: INTERNAL MEDICINE

## 2022-06-21 PROCEDURE — 3078F PR MOST RECENT DIASTOLIC BLOOD PRESSURE < 80 MM HG: ICD-10-PCS | Mod: CPTII,S$GLB,, | Performed by: INTERNAL MEDICINE

## 2022-06-21 PROCEDURE — 99999 PR PBB SHADOW E&M-EST. PATIENT-LVL IV: CPT | Mod: PBBFAC,,, | Performed by: INTERNAL MEDICINE

## 2022-06-21 PROCEDURE — 1126F AMNT PAIN NOTED NONE PRSNT: CPT | Mod: CPTII,S$GLB,, | Performed by: INTERNAL MEDICINE

## 2022-06-21 PROCEDURE — 99213 OFFICE O/P EST LOW 20 MIN: CPT | Mod: S$GLB,,, | Performed by: INTERNAL MEDICINE

## 2022-06-21 PROCEDURE — 1126F PR PAIN SEVERITY QUANTIFIED, NO PAIN PRESENT: ICD-10-PCS | Mod: CPTII,S$GLB,, | Performed by: INTERNAL MEDICINE

## 2022-06-21 PROCEDURE — 99499 RISK ADDL DX/OHS AUDIT: ICD-10-PCS | Mod: S$GLB,,, | Performed by: INTERNAL MEDICINE

## 2022-06-21 PROCEDURE — 1160F RVW MEDS BY RX/DR IN RCRD: CPT | Mod: CPTII,S$GLB,, | Performed by: INTERNAL MEDICINE

## 2022-06-21 PROCEDURE — 3288F PR FALLS RISK ASSESSMENT DOCUMENTED: ICD-10-PCS | Mod: CPTII,S$GLB,, | Performed by: INTERNAL MEDICINE

## 2022-06-21 PROCEDURE — 3008F BODY MASS INDEX DOCD: CPT | Mod: CPTII,S$GLB,, | Performed by: INTERNAL MEDICINE

## 2022-06-21 PROCEDURE — 99999 PR PBB SHADOW E&M-EST. PATIENT-LVL IV: ICD-10-PCS | Mod: PBBFAC,,, | Performed by: INTERNAL MEDICINE

## 2022-06-21 PROCEDURE — 3074F PR MOST RECENT SYSTOLIC BLOOD PRESSURE < 130 MM HG: ICD-10-PCS | Mod: CPTII,S$GLB,, | Performed by: INTERNAL MEDICINE

## 2022-06-21 PROCEDURE — 99213 PR OFFICE/OUTPT VISIT, EST, LEVL III, 20-29 MIN: ICD-10-PCS | Mod: S$GLB,,, | Performed by: INTERNAL MEDICINE

## 2022-06-21 PROCEDURE — 99499 UNLISTED E&M SERVICE: CPT | Mod: S$GLB,,, | Performed by: INTERNAL MEDICINE

## 2022-06-21 PROCEDURE — 3288F FALL RISK ASSESSMENT DOCD: CPT | Mod: CPTII,S$GLB,, | Performed by: INTERNAL MEDICINE

## 2022-06-21 PROCEDURE — 1101F PR PT FALLS ASSESS DOC 0-1 FALLS W/OUT INJ PAST YR: ICD-10-PCS | Mod: CPTII,S$GLB,, | Performed by: INTERNAL MEDICINE

## 2022-06-21 PROCEDURE — 1160F PR REVIEW ALL MEDS BY PRESCRIBER/CLIN PHARMACIST DOCUMENTED: ICD-10-PCS | Mod: CPTII,S$GLB,, | Performed by: INTERNAL MEDICINE

## 2022-06-21 RX ORDER — CELECOXIB 200 MG/1
200 CAPSULE ORAL 2 TIMES DAILY
Qty: 60 CAPSULE | Refills: 3 | Status: SHIPPED | OUTPATIENT
Start: 2022-06-21 | End: 2022-08-16

## 2022-06-21 RX ORDER — BACLOFEN 20 MG/1
20 TABLET ORAL 2 TIMES DAILY
Qty: 60 TABLET | Refills: 5 | Status: SHIPPED | OUTPATIENT
Start: 2022-06-21 | End: 2022-08-16

## 2022-06-21 RX ORDER — GABAPENTIN 600 MG/1
600 TABLET ORAL 2 TIMES DAILY
Qty: 60 TABLET | Refills: 3 | Status: SHIPPED | OUTPATIENT
Start: 2022-06-21 | End: 2022-08-26 | Stop reason: SDUPTHER

## 2022-06-24 NOTE — PROGRESS NOTES
Subjective:       Patient ID: Uriah Mills is a 71 y.o. male.    Chief Complaint: Follow-up (3 mth)    HPI  Pt with h/o LS disc ds with radiculopathy and bilateral knee pain from OA  He request refill mdications no sob cp no n/v/d no fever chill no XAVIER . Pt is still smoking but a lot less  Review of Systems    Objective:      Physical Exam  Vitals and nursing note reviewed.   Constitutional:       General: He is not in acute distress.     Appearance: He is well-developed.   HENT:      Head: Normocephalic and atraumatic.      Right Ear: External ear normal.      Left Ear: External ear normal.      Nose: Nose normal.      Mouth/Throat:      Pharynx: No oropharyngeal exudate.   Eyes:      Extraocular Movements: Extraocular movements intact.      Conjunctiva/sclera: Conjunctivae normal.      Pupils: Pupils are equal, round, and reactive to light.   Neck:      Thyroid: No thyromegaly.   Cardiovascular:      Rate and Rhythm: Normal rate and regular rhythm.      Heart sounds: Normal heart sounds. No murmur heard.    No friction rub. No gallop.   Pulmonary:      Effort: Pulmonary effort is normal. No respiratory distress.      Breath sounds: Normal breath sounds. No wheezing.   Abdominal:      General: Bowel sounds are normal. There is no distension.      Palpations: Abdomen is soft.      Tenderness: There is no abdominal tenderness.   Musculoskeletal:         General: No tenderness or deformity. Normal range of motion.      Cervical back: Normal range of motion and neck supple.   Lymphadenopathy:      Cervical: No cervical adenopathy.   Skin:     General: Skin is warm and dry.      Findings: No erythema or rash.   Neurological:      Mental Status: He is alert and oriented to person, place, and time.   Psychiatric:         Mood and Affect: Mood normal.         Thought Content: Thought content normal.         Judgment: Judgment normal.         Assessment:       1. Chronic bilateral low back pain with bilateral sciatica     2. Obstructive chronic bronchitis without exacerbation    3. Primary osteoarthritis of both hips    4. Tobacco use        Plan:       Chronic bilateral low back pain with bilateral sciatica  -     gabapentin (NEURONTIN) 600 MG tablet; Take 1 tablet (600 mg total) by mouth 2 (two) times daily.  Dispense: 60 tablet; Refill: 3  -     baclofen (LIORESAL) 20 MG tablet; Take 1 tablet (20 mg total) by mouth 2 (two) times daily. Muscle spasm  Dispense: 60 tablet; Refill: 5  -     celecoxib (CELEBREX) 200 MG capsule; Take 1 capsule (200 mg total) by mouth 2 (two) times daily.  Dispense: 60 capsule; Refill: 3  -     Back/Cervical Brace For Home Use    Obstructive chronic bronchitis without exacerbation  Comments:  pt needs quit smoking continue with MDI    Primary osteoarthritis of both hips  -     gabapentin (NEURONTIN) 600 MG tablet; Take 1 tablet (600 mg total) by mouth 2 (two) times daily.  Dispense: 60 tablet; Refill: 3    Tobacco use        Medication List with Changes/Refills   New Medications    BACLOFEN (LIORESAL) 20 MG TABLET    Take 1 tablet (20 mg total) by mouth 2 (two) times daily. Muscle spasm    CELECOXIB (CELEBREX) 200 MG CAPSULE    Take 1 capsule (200 mg total) by mouth 2 (two) times daily.   Current Medications    ASPIRIN (ECOTRIN) 81 MG EC TABLET    Take 81 mg by mouth once daily.    LEVOCETIRIZINE (XYZAL) 5 MG TABLET    TAKE 1 TABLET BY MOUTH ONCE DAILY IN THE EVENING    PROAIR HFA 90 MCG/ACTUATION INHALER    INHALE 2 PUFFS BY MOUTH EVERY 6 HOURS AS NEEDED    TAMSULOSIN (FLOMAX) 0.4 MG CAP    Take 1 capsule by mouth once daily    TERAZOSIN (HYTRIN) 5 MG CAPSULE    TAKE 1 CAPSULE BY MOUTH IN THE EVENING    TRAMADOL (ULTRAM) 50 MG TABLET    Take 1 tablet (50 mg total) by mouth every 12 (twelve) hours as needed for Pain.    TRELEGY ELLIPTA 200-62.5-25 MCG INHALER    INHALE 1 PUFF ONCE DAILY   Changed and/or Refilled Medications    Modified Medication Previous Medication    GABAPENTIN (NEURONTIN) 600 MG  TABLET gabapentin (NEURONTIN) 600 MG tablet       Take 1 tablet (600 mg total) by mouth 2 (two) times daily.    Take 1 tablet (600 mg total) by mouth 2 (two) times daily.   Discontinued Medications    DICLOFENAC (VOLTAREN) 50 MG EC TABLET    Take 1 tablet by mouth twice daily    TIZANIDINE (ZANAFLEX) 4 MG TABLET    Take 1 tablet (4 mg total) by mouth daily as needed (prn muscle spasm).

## 2022-07-02 LAB — HEMOCCULT STL QL IA: NEGATIVE

## 2022-07-05 ENCOUNTER — PATIENT MESSAGE (OUTPATIENT)
Dept: PRIMARY CARE CLINIC | Facility: CLINIC | Age: 72
End: 2022-07-05
Payer: MEDICARE

## 2022-08-16 ENCOUNTER — PATIENT MESSAGE (OUTPATIENT)
Dept: PRIMARY CARE CLINIC | Facility: CLINIC | Age: 72
End: 2022-08-16
Payer: MEDICARE

## 2022-08-16 DIAGNOSIS — G89.29 CHRONIC BILATERAL LOW BACK PAIN WITH BILATERAL SCIATICA: ICD-10-CM

## 2022-08-16 DIAGNOSIS — M54.41 CHRONIC BILATERAL LOW BACK PAIN WITH BILATERAL SCIATICA: ICD-10-CM

## 2022-08-16 DIAGNOSIS — M54.42 CHRONIC BILATERAL LOW BACK PAIN WITH BILATERAL SCIATICA: ICD-10-CM

## 2022-08-16 DIAGNOSIS — M16.0 PRIMARY OSTEOARTHRITIS OF BOTH HIPS: ICD-10-CM

## 2022-08-16 DIAGNOSIS — M16.0 PRIMARY OSTEOARTHRITIS OF BOTH HIPS: Primary | ICD-10-CM

## 2022-08-16 NOTE — TELEPHONE ENCOUNTER
Patient is stopping the Celebrex and Baclofen. He said they aren't doing anything for his pain. He's asking to take Voltaren again. It's pended below.

## 2022-08-17 ENCOUNTER — PATIENT MESSAGE (OUTPATIENT)
Dept: PRIMARY CARE CLINIC | Facility: CLINIC | Age: 72
End: 2022-08-17
Payer: MEDICARE

## 2022-08-17 RX ORDER — DICLOFENAC SODIUM 50 MG/1
TABLET, DELAYED RELEASE ORAL
Qty: 60 TABLET | Refills: 5 | OUTPATIENT
Start: 2022-08-17

## 2022-08-17 RX ORDER — DICLOFENAC SODIUM 50 MG/1
50 TABLET, DELAYED RELEASE ORAL 2 TIMES DAILY PRN
Qty: 60 TABLET | Refills: 2 | Status: SHIPPED | OUTPATIENT
Start: 2022-08-17 | End: 2022-11-21

## 2022-08-25 DIAGNOSIS — M16.0 PRIMARY OSTEOARTHRITIS OF BOTH HIPS: ICD-10-CM

## 2022-08-25 DIAGNOSIS — G89.29 CHRONIC BILATERAL LOW BACK PAIN WITH BILATERAL SCIATICA: ICD-10-CM

## 2022-08-25 DIAGNOSIS — M54.42 CHRONIC BILATERAL LOW BACK PAIN WITH BILATERAL SCIATICA: ICD-10-CM

## 2022-08-25 DIAGNOSIS — M54.41 CHRONIC BILATERAL LOW BACK PAIN WITH BILATERAL SCIATICA: ICD-10-CM

## 2022-08-25 RX ORDER — GABAPENTIN 600 MG/1
TABLET ORAL
Qty: 60 TABLET | Refills: 0 | OUTPATIENT
Start: 2022-08-25

## 2022-08-25 NOTE — TELEPHONE ENCOUNTER
No new care gaps identified.  [facility] Embedded Care Gaps. Reference number: [id number]. 8/25/2022   1:20:58 PM CDT

## 2022-08-31 ENCOUNTER — PES CALL (OUTPATIENT)
Dept: ADMINISTRATIVE | Facility: CLINIC | Age: 72
End: 2022-08-31
Payer: MEDICARE

## 2022-09-19 ENCOUNTER — PATIENT MESSAGE (OUTPATIENT)
Dept: PRIMARY CARE CLINIC | Facility: CLINIC | Age: 72
End: 2022-09-19
Payer: MEDICARE

## 2022-09-21 ENCOUNTER — OFFICE VISIT (OUTPATIENT)
Dept: PRIMARY CARE CLINIC | Facility: CLINIC | Age: 72
End: 2022-09-21
Payer: MEDICARE

## 2022-09-21 VITALS
SYSTOLIC BLOOD PRESSURE: 126 MMHG | WEIGHT: 180.25 LBS | RESPIRATION RATE: 20 BRPM | BODY MASS INDEX: 25.8 KG/M2 | HEART RATE: 66 BPM | DIASTOLIC BLOOD PRESSURE: 78 MMHG | HEIGHT: 70 IN | OXYGEN SATURATION: 93 %

## 2022-09-21 DIAGNOSIS — Z23 ENCOUNTER FOR VACCINATION: ICD-10-CM

## 2022-09-21 DIAGNOSIS — F41.9 ANXIETY: ICD-10-CM

## 2022-09-21 DIAGNOSIS — N18.31 CHRONIC KIDNEY DISEASE, STAGE 3A: ICD-10-CM

## 2022-09-21 DIAGNOSIS — Z23 NEED FOR VACCINATION: ICD-10-CM

## 2022-09-21 DIAGNOSIS — I70.0 ATHEROSCLEROSIS OF ABDOMINAL AORTA: ICD-10-CM

## 2022-09-21 DIAGNOSIS — G47.00 INSOMNIA, UNSPECIFIED TYPE: Primary | ICD-10-CM

## 2022-09-21 PROCEDURE — 99499 RISK ADDL DX/OHS AUDIT: ICD-10-PCS | Mod: S$GLB,,, | Performed by: INTERNAL MEDICINE

## 2022-09-21 PROCEDURE — G0008 ADMIN INFLUENZA VIRUS VAC: HCPCS | Mod: 59,S$GLB,, | Performed by: INTERNAL MEDICINE

## 2022-09-21 PROCEDURE — 1125F AMNT PAIN NOTED PAIN PRSNT: CPT | Mod: CPTII,S$GLB,, | Performed by: INTERNAL MEDICINE

## 2022-09-21 PROCEDURE — 3074F PR MOST RECENT SYSTOLIC BLOOD PRESSURE < 130 MM HG: ICD-10-PCS | Mod: CPTII,S$GLB,, | Performed by: INTERNAL MEDICINE

## 2022-09-21 PROCEDURE — 3078F DIAST BP <80 MM HG: CPT | Mod: CPTII,S$GLB,, | Performed by: INTERNAL MEDICINE

## 2022-09-21 PROCEDURE — 3008F PR BODY MASS INDEX (BMI) DOCUMENTED: ICD-10-PCS | Mod: CPTII,S$GLB,, | Performed by: INTERNAL MEDICINE

## 2022-09-21 PROCEDURE — 1159F MED LIST DOCD IN RCRD: CPT | Mod: CPTII,S$GLB,, | Performed by: INTERNAL MEDICINE

## 2022-09-21 PROCEDURE — 3074F SYST BP LT 130 MM HG: CPT | Mod: CPTII,S$GLB,, | Performed by: INTERNAL MEDICINE

## 2022-09-21 PROCEDURE — 99499 UNLISTED E&M SERVICE: CPT | Mod: S$GLB,,, | Performed by: INTERNAL MEDICINE

## 2022-09-21 PROCEDURE — 99999 PR PBB SHADOW E&M-EST. PATIENT-LVL IV: ICD-10-PCS | Mod: PBBFAC,,, | Performed by: INTERNAL MEDICINE

## 2022-09-21 PROCEDURE — 3078F PR MOST RECENT DIASTOLIC BLOOD PRESSURE < 80 MM HG: ICD-10-PCS | Mod: CPTII,S$GLB,, | Performed by: INTERNAL MEDICINE

## 2022-09-21 PROCEDURE — 99999 PR PBB SHADOW E&M-EST. PATIENT-LVL IV: CPT | Mod: PBBFAC,,, | Performed by: INTERNAL MEDICINE

## 2022-09-21 PROCEDURE — G0008 FLU VACCINE - QUADRIVALENT - ADJUVANTED: ICD-10-PCS | Mod: 59,S$GLB,, | Performed by: INTERNAL MEDICINE

## 2022-09-21 PROCEDURE — 3008F BODY MASS INDEX DOCD: CPT | Mod: CPTII,S$GLB,, | Performed by: INTERNAL MEDICINE

## 2022-09-21 PROCEDURE — 96372 THER/PROPH/DIAG INJ SC/IM: CPT | Mod: S$GLB,,, | Performed by: INTERNAL MEDICINE

## 2022-09-21 PROCEDURE — 90694 FLU VACCINE - QUADRIVALENT - ADJUVANTED: ICD-10-PCS | Mod: S$GLB,,, | Performed by: INTERNAL MEDICINE

## 2022-09-21 PROCEDURE — 99213 PR OFFICE/OUTPT VISIT, EST, LEVL III, 20-29 MIN: ICD-10-PCS | Mod: 25,S$GLB,, | Performed by: INTERNAL MEDICINE

## 2022-09-21 PROCEDURE — 90694 VACC AIIV4 NO PRSRV 0.5ML IM: CPT | Mod: S$GLB,,, | Performed by: INTERNAL MEDICINE

## 2022-09-21 PROCEDURE — 1160F RVW MEDS BY RX/DR IN RCRD: CPT | Mod: CPTII,S$GLB,, | Performed by: INTERNAL MEDICINE

## 2022-09-21 PROCEDURE — 1159F PR MEDICATION LIST DOCUMENTED IN MEDICAL RECORD: ICD-10-PCS | Mod: CPTII,S$GLB,, | Performed by: INTERNAL MEDICINE

## 2022-09-21 PROCEDURE — 1125F PR PAIN SEVERITY QUANTIFIED, PAIN PRESENT: ICD-10-PCS | Mod: CPTII,S$GLB,, | Performed by: INTERNAL MEDICINE

## 2022-09-21 PROCEDURE — 96372 PR INJECTION,THERAP/PROPH/DIAG2ST, IM OR SUBCUT: ICD-10-PCS | Mod: S$GLB,,, | Performed by: INTERNAL MEDICINE

## 2022-09-21 PROCEDURE — 1160F PR REVIEW ALL MEDS BY PRESCRIBER/CLIN PHARMACIST DOCUMENTED: ICD-10-PCS | Mod: CPTII,S$GLB,, | Performed by: INTERNAL MEDICINE

## 2022-09-21 PROCEDURE — 99213 OFFICE O/P EST LOW 20 MIN: CPT | Mod: 25,S$GLB,, | Performed by: INTERNAL MEDICINE

## 2022-09-21 RX ORDER — CYANOCOBALAMIN 1000 UG/ML
2000 INJECTION, SOLUTION INTRAMUSCULAR; SUBCUTANEOUS
Status: COMPLETED | OUTPATIENT
Start: 2022-09-21 | End: 2022-09-21

## 2022-09-21 RX ORDER — ALPRAZOLAM 1 MG/1
1 TABLET ORAL NIGHTLY PRN
Qty: 30 TABLET | Refills: 0 | Status: SHIPPED | OUTPATIENT
Start: 2022-09-21 | End: 2024-02-12 | Stop reason: SDUPTHER

## 2022-09-21 RX ADMIN — CYANOCOBALAMIN 2000 MCG: 1000 INJECTION, SOLUTION INTRAMUSCULAR; SUBCUTANEOUS at 12:09

## 2022-09-21 NOTE — PROGRESS NOTES
Patient identified by name and . NKA Administered 2cc of B12 and High Dose Influenza vaccine IM using aseptic technique

## 2022-09-21 NOTE — PROGRESS NOTES
Subjective:       Patient ID: Uriah Mills is a 71 y.o. male.    Chief Complaint: Follow-up    HPI  pt visit today for routine f/u his main c/o today is insomnia and anxiety with panic attack sometime no sob cp XAVIER he is  still having chronic lower back pain with radiculopathy pt request medication karoline try to take only when neccessary  Review of Systems    Objective:      Physical Exam  Vitals and nursing note reviewed.   Constitutional:       General: He is not in acute distress.     Appearance: He is well-developed.   HENT:      Head: Normocephalic and atraumatic.      Right Ear: External ear normal.      Left Ear: External ear normal.   Eyes:      Conjunctiva/sclera: Conjunctivae normal.      Pupils: Pupils are equal, round, and reactive to light.   Neck:      Thyroid: No thyromegaly.   Cardiovascular:      Rate and Rhythm: Normal rate and regular rhythm.      Heart sounds: Normal heart sounds. No murmur heard.    No friction rub. No gallop.   Pulmonary:      Effort: Pulmonary effort is normal. No respiratory distress.      Breath sounds: Normal breath sounds. No wheezing.   Abdominal:      General: Bowel sounds are normal. There is no distension.      Palpations: Abdomen is soft.      Tenderness: There is no abdominal tenderness.   Musculoskeletal:         General: No tenderness or deformity. Normal range of motion.      Cervical back: Normal range of motion and neck supple.   Lymphadenopathy:      Cervical: No cervical adenopathy.   Skin:     General: Skin is warm and dry.      Findings: No erythema or rash.   Neurological:      Mental Status: He is alert and oriented to person, place, and time.   Psychiatric:         Thought Content: Thought content normal.         Judgment: Judgment normal.      Comments: Anxious        Assessment:       1. Insomnia, unspecified type    2. Need for vaccination    3. Anxiety    4. Encounter for vaccination    5. Chronic kidney disease, stage 3a    6. Atherosclerosis of  abdominal aorta          Plan:       Insomnia, unspecified type  -     ALPRAZolam (XANAX) 1 MG tablet; Take 1 tablet (1 mg total) by mouth nightly as needed for Anxiety.  Dispense: 30 tablet; Refill: 0  -     cyanocobalamin injection 2,000 mcg    Need for vaccination  -     Influenza (FLUAD) - Quadrivalent (Adjuvanted) *Preferred* (65+) (PF)    Anxiety  -     ALPRAZolam (XANAX) 1 MG tablet; Take 1 tablet (1 mg total) by mouth nightly as needed for Anxiety.  Dispense: 30 tablet; Refill: 0    Encounter for vaccination    Chronic kidney disease, stage 3a  Comments:  stable avoid NSAIDS     Atherosclerosis of abdominal aorta  Comments:  continue with CV risk modification      Medication List with Changes/Refills   New Medications    ALPRAZOLAM (XANAX) 1 MG TABLET    Take 1 tablet (1 mg total) by mouth nightly as needed for Anxiety.   Current Medications    ASPIRIN (ECOTRIN) 81 MG EC TABLET    Take 81 mg by mouth once daily.    DICLOFENAC (VOLTAREN) 50 MG EC TABLET    Take 1 tablet (50 mg total) by mouth 2 (two) times daily as needed (arthritis).    GABAPENTIN (NEURONTIN) 600 MG TABLET    Take 1 tablet (600 mg total) by mouth 2 (two) times daily.    LEVOCETIRIZINE (XYZAL) 5 MG TABLET    TAKE 1 TABLET BY MOUTH ONCE DAILY IN THE EVENING    PROAIR HFA 90 MCG/ACTUATION INHALER    INHALE 2 PUFFS BY MOUTH EVERY 6 HOURS AS NEEDED    TAMSULOSIN (FLOMAX) 0.4 MG CAP    Take 1 capsule by mouth once daily    TERAZOSIN (HYTRIN) 5 MG CAPSULE    TAKE 1 CAPSULE BY MOUTH IN THE EVENING    TRELEGY ELLIPTA 200-62.5-25 MCG INHALER    INHALE 1 PUFF ONCE DAILY   Discontinued Medications    TRAMADOL (ULTRAM) 50 MG TABLET    Take 1 tablet (50 mg total) by mouth every 12 (twelve) hours as needed for Pain.

## 2022-09-22 ENCOUNTER — PATIENT OUTREACH (OUTPATIENT)
Dept: ADMINISTRATIVE | Facility: HOSPITAL | Age: 72
End: 2022-09-22
Payer: MEDICARE

## 2022-09-25 PROBLEM — N18.31 CHRONIC KIDNEY DISEASE, STAGE 3A: Status: ACTIVE | Noted: 2022-09-25

## 2022-09-27 ENCOUNTER — PATIENT MESSAGE (OUTPATIENT)
Dept: PRIMARY CARE CLINIC | Facility: CLINIC | Age: 72
End: 2022-09-27
Payer: MEDICARE

## 2022-12-03 DIAGNOSIS — N40.0 BENIGN PROSTATIC HYPERPLASIA, UNSPECIFIED WHETHER LOWER URINARY TRACT SYMPTOMS PRESENT: ICD-10-CM

## 2022-12-04 RX ORDER — TERAZOSIN 5 MG/1
CAPSULE ORAL
Qty: 90 CAPSULE | Refills: 3 | Status: SHIPPED | OUTPATIENT
Start: 2022-12-04 | End: 2023-10-25

## 2022-12-04 RX ORDER — TAMSULOSIN HYDROCHLORIDE 0.4 MG/1
CAPSULE ORAL
Qty: 90 CAPSULE | Refills: 3 | Status: SHIPPED | OUTPATIENT
Start: 2022-12-04 | End: 2023-10-25

## 2022-12-04 NOTE — TELEPHONE ENCOUNTER
No new care gaps identified.  [facility] Embedded Care Gaps. Reference number: [id number]. 12/03/2022   7:19:20 PM CST

## 2022-12-05 NOTE — TELEPHONE ENCOUNTER
Refill Decision Note   Uriah Mills  is requesting a refill authorization.  Brief Assessment and Rationale for Refill:  Approve     Medication Therapy Plan:       Medication Reconciliation Completed: No   Comments:     No Care Gaps recommended.     Note composed:11:46 PM 12/04/2022

## 2023-02-01 ENCOUNTER — OFFICE VISIT (OUTPATIENT)
Dept: PRIMARY CARE CLINIC | Facility: CLINIC | Age: 73
End: 2023-02-01
Payer: MEDICARE

## 2023-02-01 VITALS
OXYGEN SATURATION: 98 % | SYSTOLIC BLOOD PRESSURE: 122 MMHG | WEIGHT: 173.31 LBS | DIASTOLIC BLOOD PRESSURE: 50 MMHG | TEMPERATURE: 98 F | BODY MASS INDEX: 24.81 KG/M2 | HEART RATE: 71 BPM | HEIGHT: 70 IN | RESPIRATION RATE: 17 BRPM

## 2023-02-01 DIAGNOSIS — I70.0 ATHEROSCLEROSIS OF ABDOMINAL AORTA: ICD-10-CM

## 2023-02-01 DIAGNOSIS — G89.29 CHRONIC PAIN OF BOTH SHOULDERS: Primary | ICD-10-CM

## 2023-02-01 DIAGNOSIS — N18.31 CHRONIC KIDNEY DISEASE, STAGE 3A: ICD-10-CM

## 2023-02-01 DIAGNOSIS — G62.9 PERIPHERAL POLYNEUROPATHY: ICD-10-CM

## 2023-02-01 DIAGNOSIS — M25.511 CHRONIC PAIN OF BOTH SHOULDERS: Primary | ICD-10-CM

## 2023-02-01 DIAGNOSIS — J44.89 OBSTRUCTIVE CHRONIC BRONCHITIS WITHOUT EXACERBATION: ICD-10-CM

## 2023-02-01 DIAGNOSIS — R07.81 PLEURITIC CHEST PAIN: ICD-10-CM

## 2023-02-01 DIAGNOSIS — Z72.0 TOBACCO USE: ICD-10-CM

## 2023-02-01 DIAGNOSIS — M25.512 CHRONIC PAIN OF BOTH SHOULDERS: Primary | ICD-10-CM

## 2023-02-01 PROCEDURE — 1160F RVW MEDS BY RX/DR IN RCRD: CPT | Mod: CPTII,S$GLB,, | Performed by: INTERNAL MEDICINE

## 2023-02-01 PROCEDURE — 3008F BODY MASS INDEX DOCD: CPT | Mod: CPTII,S$GLB,, | Performed by: INTERNAL MEDICINE

## 2023-02-01 PROCEDURE — 1160F PR REVIEW ALL MEDS BY PRESCRIBER/CLIN PHARMACIST DOCUMENTED: ICD-10-PCS | Mod: CPTII,S$GLB,, | Performed by: INTERNAL MEDICINE

## 2023-02-01 PROCEDURE — 1101F PR PT FALLS ASSESS DOC 0-1 FALLS W/OUT INJ PAST YR: ICD-10-PCS | Mod: CPTII,S$GLB,, | Performed by: INTERNAL MEDICINE

## 2023-02-01 PROCEDURE — 3074F PR MOST RECENT SYSTOLIC BLOOD PRESSURE < 130 MM HG: ICD-10-PCS | Mod: CPTII,S$GLB,, | Performed by: INTERNAL MEDICINE

## 2023-02-01 PROCEDURE — 3078F PR MOST RECENT DIASTOLIC BLOOD PRESSURE < 80 MM HG: ICD-10-PCS | Mod: CPTII,S$GLB,, | Performed by: INTERNAL MEDICINE

## 2023-02-01 PROCEDURE — 1159F MED LIST DOCD IN RCRD: CPT | Mod: CPTII,S$GLB,, | Performed by: INTERNAL MEDICINE

## 2023-02-01 PROCEDURE — 3008F PR BODY MASS INDEX (BMI) DOCUMENTED: ICD-10-PCS | Mod: CPTII,S$GLB,, | Performed by: INTERNAL MEDICINE

## 2023-02-01 PROCEDURE — 1125F PR PAIN SEVERITY QUANTIFIED, PAIN PRESENT: ICD-10-PCS | Mod: CPTII,S$GLB,, | Performed by: INTERNAL MEDICINE

## 2023-02-01 PROCEDURE — 3074F SYST BP LT 130 MM HG: CPT | Mod: CPTII,S$GLB,, | Performed by: INTERNAL MEDICINE

## 2023-02-01 PROCEDURE — 1101F PT FALLS ASSESS-DOCD LE1/YR: CPT | Mod: CPTII,S$GLB,, | Performed by: INTERNAL MEDICINE

## 2023-02-01 PROCEDURE — 3288F PR FALLS RISK ASSESSMENT DOCUMENTED: ICD-10-PCS | Mod: CPTII,S$GLB,, | Performed by: INTERNAL MEDICINE

## 2023-02-01 PROCEDURE — 99999 PR PBB SHADOW E&M-EST. PATIENT-LVL V: CPT | Mod: PBBFAC,,, | Performed by: INTERNAL MEDICINE

## 2023-02-01 PROCEDURE — 99214 OFFICE O/P EST MOD 30 MIN: CPT | Mod: S$GLB,,, | Performed by: INTERNAL MEDICINE

## 2023-02-01 PROCEDURE — 3288F FALL RISK ASSESSMENT DOCD: CPT | Mod: CPTII,S$GLB,, | Performed by: INTERNAL MEDICINE

## 2023-02-01 PROCEDURE — 1125F AMNT PAIN NOTED PAIN PRSNT: CPT | Mod: CPTII,S$GLB,, | Performed by: INTERNAL MEDICINE

## 2023-02-01 PROCEDURE — 99999 PR PBB SHADOW E&M-EST. PATIENT-LVL V: ICD-10-PCS | Mod: PBBFAC,,, | Performed by: INTERNAL MEDICINE

## 2023-02-01 PROCEDURE — 99214 PR OFFICE/OUTPT VISIT, EST, LEVL IV, 30-39 MIN: ICD-10-PCS | Mod: S$GLB,,, | Performed by: INTERNAL MEDICINE

## 2023-02-01 PROCEDURE — 3078F DIAST BP <80 MM HG: CPT | Mod: CPTII,S$GLB,, | Performed by: INTERNAL MEDICINE

## 2023-02-01 PROCEDURE — 1159F PR MEDICATION LIST DOCUMENTED IN MEDICAL RECORD: ICD-10-PCS | Mod: CPTII,S$GLB,, | Performed by: INTERNAL MEDICINE

## 2023-02-01 RX ORDER — TRAMADOL HYDROCHLORIDE 50 MG/1
50 TABLET ORAL EVERY 12 HOURS PRN
Qty: 30 TABLET | Refills: 1 | Status: SHIPPED | OUTPATIENT
Start: 2023-02-01 | End: 2023-03-21 | Stop reason: SDUPTHER

## 2023-02-01 RX ORDER — PREDNISONE 20 MG/1
20 TABLET ORAL 2 TIMES DAILY
Qty: 10 TABLET | Refills: 1 | Status: SHIPPED | OUTPATIENT
Start: 2023-02-01 | End: 2023-02-06

## 2023-02-01 NOTE — PROGRESS NOTES
Subjective:       Patient ID: Uriah Mills is a 72 y.o. male.    Chief Complaint: Insomnia (Follow up) and Back Pain    HPI  patient visit today follow-up he now complained of bilateral shoulder pain right shoulder pain has been on and off for a long time he had no rotator cuff tear 20 years ago required surgery now the pain has been flare-up since the after [place] patient brought his wife to [facility] for visit wait for 5 hours next day right shoulder started hurting since then hurt more when he abduct more than 80 degree can not extend he has shoulder injection in the past and also epidural injection in the cervical spine he left shoulder begin to hurt about 2 weeks ago when he brought his jacket and his left arm stuck and he struck his shoulder to try to get in the jacket and heard a pop hurting since then he also complained of pain in the anterior left lower rib when he cough he fill better when he wears a back brace he refused physical therapy and he is still smoking he denies short of breath chest pain fever chill any other joint pain  Review of Systems  .  He also complained of numbness tingling burning in the feet toes  Objective:      Physical Exam  Vitals and nursing note reviewed.   Constitutional:       General: He is not in acute distress.     Appearance: He is well-developed.   HENT:      Head: Normocephalic and atraumatic.      Right Ear: External ear normal.      Left Ear: External ear normal.      Nose: Nose normal.   Eyes:      General:         Right eye: No discharge.         Left eye: No discharge.      Conjunctiva/sclera: Conjunctivae normal.      Pupils: Pupils are equal, round, and reactive to light.   Neck:      Thyroid: No thyromegaly.   Cardiovascular:      Rate and Rhythm: Normal rate and regular rhythm.      Heart sounds: Normal heart sounds. No murmur heard.    No friction rub. No gallop.   Pulmonary:      Effort: Pulmonary effort is normal. No respiratory distress.      Breath  sounds: Normal breath sounds. No wheezing or rales.   Chest:      Chest wall: No tenderness.   Abdominal:      General: Bowel sounds are normal. There is no distension.      Palpations: Abdomen is soft.      Tenderness: There is no abdominal tenderness.   Musculoskeletal:         General: No tenderness (Tenderness right shoulder moderate restriction of range of motion due to pain left shoulder with similar exam) or deformity. Normal range of motion.      Cervical back: Normal range of motion and neck supple.   Lymphadenopathy:      Cervical: No cervical adenopathy.   Skin:     General: Skin is warm and dry.      Findings: No erythema or rash.   Neurological:      Mental Status: He is alert and oriented to person, place, and time.   Psychiatric:         Mood and Affect: Mood normal.         Thought Content: Thought content normal.         Judgment: Judgment normal.       Assessment:       1. Chronic pain of both shoulders    2. Pleuritic chest pain    3. Peripheral polyneuropathy    4. Tobacco use    5. Obstructive chronic bronchitis without exacerbation    6. Atherosclerosis of abdominal aorta    7. Chronic kidney disease, stage 3a        Plan:       Chronic pain of both shoulders  -     X-Ray Shoulder 2 or More views Bilateral; Future; Expected date: 02/01/2023  -     Ambulatory referral/consult to Orthopedics; Future; Expected date: 02/08/2023  -     traMADoL (ULTRAM) 50 mg tablet; Take 1 tablet (50 mg total) by mouth every 12 (twelve) hours as needed for Pain.  Dispense: 30 tablet; Refill: 1  -     predniSONE (DELTASONE) 20 MG tablet; Take 1 tablet (20 mg total) by mouth 2 (two) times daily. for 5 days  Dispense: 10 tablet; Refill: 1    Pleuritic chest pain  Comments:  Consider CT scan of the chest if pain persists  Orders:  -     X-Ray Chest PA And Lateral; Future; Expected date: 02/01/2023  -     traMADoL (ULTRAM) 50 mg tablet; Take 1 tablet (50 mg total) by mouth every 12 (twelve) hours as needed for Pain.   Dispense: 30 tablet; Refill: 1  -     predniSONE (DELTASONE) 20 MG tablet; Take 1 tablet (20 mg total) by mouth 2 (two) times daily. for 5 days  Dispense: 10 tablet; Refill: 1    Peripheral polyneuropathy    Tobacco use  Comments:  Discussed with patient need to quit smoking patient try and but admits very hard  Orders:  -     Ambulatory referral/consult to Smoking Cessation Program; Future; Expected date: 02/09/2023    Obstructive chronic bronchitis without exacerbation  Comments:  continue with Trelegy and albuterol metered-dose inhaler p.r.n.    Atherosclerosis of abdominal aorta  Comments:  Continue to monitor and modify risk factor    Chronic kidney disease, stage 3a  Comments:  Avoid NSAIDs        Medication List with Changes/Refills   New Medications    PREDNISONE (DELTASONE) 20 MG TABLET    Take 1 tablet (20 mg total) by mouth 2 (two) times daily. for 5 days    TRAMADOL (ULTRAM) 50 MG TABLET    Take 1 tablet (50 mg total) by mouth every 12 (twelve) hours as needed for Pain.   Current Medications    ALPRAZOLAM (XANAX) 1 MG TABLET    Take 1 tablet (1 mg total) by mouth nightly as needed for Anxiety.    ASPIRIN (ECOTRIN) 81 MG EC TABLET    Take 81 mg by mouth once daily.    DICLOFENAC (VOLTAREN) 50 MG EC TABLET    TAKE 1 TABLET BY MOUTH TWICE DAILY AS NEEDED FOR ARTHRITIS    GABAPENTIN (NEURONTIN) 600 MG TABLET    Take 1 tablet (600 mg total) by mouth 2 (two) times daily.    LEVOCETIRIZINE (XYZAL) 5 MG TABLET    TAKE 1 TABLET BY MOUTH ONCE DAILY IN THE EVENING    PROAIR HFA 90 MCG/ACTUATION INHALER    Inhale 2 puffs into the lungs every 6 (six) hours as needed.    TAMSULOSIN (FLOMAX) 0.4 MG CAP    Take 1 capsule by mouth once daily    TERAZOSIN (HYTRIN) 5 MG CAPSULE    TAKE 1 CAPSULE BY MOUTH IN THE EVENING    TRELEGY ELLIPTA 200-62.5-25 MCG INHALER    INHALE 1 PUFF ONCE DAILY

## 2023-02-16 ENCOUNTER — OFFICE VISIT (OUTPATIENT)
Dept: ORTHOPEDICS | Facility: CLINIC | Age: 73
End: 2023-02-16
Payer: MEDICARE

## 2023-02-16 VITALS
DIASTOLIC BLOOD PRESSURE: 66 MMHG | WEIGHT: 175.63 LBS | HEIGHT: 70 IN | SYSTOLIC BLOOD PRESSURE: 138 MMHG | BODY MASS INDEX: 25.14 KG/M2 | HEART RATE: 78 BPM

## 2023-02-16 DIAGNOSIS — M25.511 CHRONIC PAIN OF BOTH SHOULDERS: ICD-10-CM

## 2023-02-16 DIAGNOSIS — M25.512 CHRONIC PAIN OF BOTH SHOULDERS: ICD-10-CM

## 2023-02-16 DIAGNOSIS — G89.29 CHRONIC PAIN OF BOTH SHOULDERS: ICD-10-CM

## 2023-02-16 DIAGNOSIS — M75.101 ROTATOR CUFF SYNDROME OF RIGHT SHOULDER: ICD-10-CM

## 2023-02-16 DIAGNOSIS — M75.102 ROTATOR CUFF SYNDROME OF LEFT SHOULDER: Primary | ICD-10-CM

## 2023-02-16 PROCEDURE — 3008F PR BODY MASS INDEX (BMI) DOCUMENTED: ICD-10-PCS | Mod: CPTII,S$GLB,,

## 2023-02-16 PROCEDURE — 3075F PR MOST RECENT SYSTOLIC BLOOD PRESS GE 130-139MM HG: ICD-10-PCS | Mod: CPTII,S$GLB,,

## 2023-02-16 PROCEDURE — 1125F AMNT PAIN NOTED PAIN PRSNT: CPT | Mod: CPTII,S$GLB,,

## 2023-02-16 PROCEDURE — 3288F PR FALLS RISK ASSESSMENT DOCUMENTED: ICD-10-PCS | Mod: CPTII,S$GLB,,

## 2023-02-16 PROCEDURE — 99204 PR OFFICE/OUTPT VISIT, NEW, LEVL IV, 45-59 MIN: ICD-10-PCS | Mod: 25,S$GLB,,

## 2023-02-16 PROCEDURE — 1159F PR MEDICATION LIST DOCUMENTED IN MEDICAL RECORD: ICD-10-PCS | Mod: CPTII,S$GLB,,

## 2023-02-16 PROCEDURE — 99999 PR PBB SHADOW E&M-EST. PATIENT-LVL III: CPT | Mod: PBBFAC,,,

## 2023-02-16 PROCEDURE — 3288F FALL RISK ASSESSMENT DOCD: CPT | Mod: CPTII,S$GLB,,

## 2023-02-16 PROCEDURE — 1125F PR PAIN SEVERITY QUANTIFIED, PAIN PRESENT: ICD-10-PCS | Mod: CPTII,S$GLB,,

## 2023-02-16 PROCEDURE — 1101F PR PT FALLS ASSESS DOC 0-1 FALLS W/OUT INJ PAST YR: ICD-10-PCS | Mod: CPTII,S$GLB,,

## 2023-02-16 PROCEDURE — 3078F DIAST BP <80 MM HG: CPT | Mod: CPTII,S$GLB,,

## 2023-02-16 PROCEDURE — 20610 DRAIN/INJ JOINT/BURSA W/O US: CPT | Mod: 50,S$GLB,,

## 2023-02-16 PROCEDURE — 1101F PT FALLS ASSESS-DOCD LE1/YR: CPT | Mod: CPTII,S$GLB,,

## 2023-02-16 PROCEDURE — 3075F SYST BP GE 130 - 139MM HG: CPT | Mod: CPTII,S$GLB,,

## 2023-02-16 PROCEDURE — 20610 LARGE JOINT ASPIRATION/INJECTION: L SUBACROMIAL BURSA: ICD-10-PCS | Mod: 50,S$GLB,,

## 2023-02-16 PROCEDURE — 3078F PR MOST RECENT DIASTOLIC BLOOD PRESSURE < 80 MM HG: ICD-10-PCS | Mod: CPTII,S$GLB,,

## 2023-02-16 PROCEDURE — 99999 PR PBB SHADOW E&M-EST. PATIENT-LVL III: ICD-10-PCS | Mod: PBBFAC,,,

## 2023-02-16 PROCEDURE — 3008F BODY MASS INDEX DOCD: CPT | Mod: CPTII,S$GLB,,

## 2023-02-16 PROCEDURE — 99204 OFFICE O/P NEW MOD 45 MIN: CPT | Mod: 25,S$GLB,,

## 2023-02-16 PROCEDURE — 1159F MED LIST DOCD IN RCRD: CPT | Mod: CPTII,S$GLB,,

## 2023-02-16 RX ORDER — TRIAMCINOLONE ACETONIDE 40 MG/ML
40 INJECTION, SUSPENSION INTRA-ARTICULAR; INTRAMUSCULAR
Status: COMPLETED | OUTPATIENT
Start: 2023-02-16 | End: 2023-02-16

## 2023-02-16 RX ORDER — MELOXICAM 7.5 MG/1
7.5 TABLET ORAL DAILY
Qty: 60 TABLET | Refills: 1 | Status: SHIPPED | OUTPATIENT
Start: 2023-02-16 | End: 2023-03-21

## 2023-02-16 RX ADMIN — TRIAMCINOLONE ACETONIDE 40 MG: 40 INJECTION, SUSPENSION INTRA-ARTICULAR; INTRAMUSCULAR at 10:02

## 2023-02-16 NOTE — PROCEDURES
Large Joint Aspiration/Injection: L subacromial bursa    Date/Time: 2/16/2023 10:00 AM  Performed by: Selin Lagunas PA-C  Authorized by: Selin Lagunas PA-C     Consent Done?:  Yes (Verbal)  Indications:  Pain and arthritis  Site marked: the procedure site was marked    Timeout: prior to procedure the correct patient, procedure, and site was verified    Prep: patient was prepped and draped in usual sterile fashion    Local anesthetic:  Topical anesthetic and bupivacaine 0.25% without epinephrine  Anesthetic total (ml):  4      Details:  Needle Size:  22 G  Ultrasonic Guidance for needle placement?: No    Approach:  Posterior  Location:  Shoulder  Site:  L subacromial bursa  Medications:  40 mg triamcinolone acetonide (KENALOG-40) injection 40 mg/mL  Patient tolerance:  Patient tolerated the procedure well with no immediate complications

## 2023-02-16 NOTE — PROCEDURES
Large Joint Aspiration/Injection: R subacromial bursa    Date/Time: 2/16/2023 10:00 AM  Performed by: Selin Lagunas PA-C  Authorized by: Selin Lagunas PA-C     Consent Done?:  Yes (Verbal)  Indications:  Pain and arthritis  Site marked: the procedure site was marked    Timeout: prior to procedure the correct patient, procedure, and site was verified    Prep: patient was prepped and draped in usual sterile fashion    Local anesthetic:  Topical anesthetic and bupivacaine 0.25% without epinephrine  Anesthetic total (ml):  4      Details:  Needle Size:  22 G  Ultrasonic Guidance for needle placement?: No    Approach:  Posterior  Location:  Shoulder  Site:  R subacromial bursa  Medications:  40 mg TRIAMCINOLONE ACETONIDE 40 MG/ML INJ SUSP  Patient tolerance:  Patient tolerated the procedure well with no immediate complications

## 2023-02-16 NOTE — PROGRESS NOTES
"Patient ID: Uriah Mills is a 72 y.o. male    Pain of the Right Shoulder and Pain of the Left Shoulder      History of Present Illness:    Uriah Mills presents to clinic for bilateral shoulder pain, R > L. He states his left shoulder pain began around thanksgiving when he went to put on his jacket and felt a "pull". His right shoulder pain began around [place] when his wife had rotator cuff surgery and he was sitting in the chair and his pain worsened.  Feels his right shoulder pain radiates to his neck. The pain started 2 months ago and is becoming progressively worse.  Pain is located over (points to) anterior and posterior bilateral shoulder. He reports that the pain is a 9 /10 sore and aching pain toda. The pain is affecting ADLs and limiting desired level of activity. Denies numbness, tingling, radiation and inability to bear weight.     Trial of tramadol with no improvement, requesting something stronger today.  S/P R shoulder rotator cuff repair.     Occupation: retired, previous      Ambulating: unassisted  Diabetic: no  Smoking: no  Hx of DVT/PE: no denies nighttime symptoms.      PAST MEDICAL HISTORY:   Past Medical History:   Diagnosis Date    Allergy     Arthritis     BPH (benign prostatic hyperplasia)     Carpal tunnel syndrome     Chronic hip pain, bilateral     Chronic low back pain     COPD (chronic obstructive pulmonary disease)     DDD (degenerative disc disease), lumbar     Dorsalgia     Gastrointestinal disease     Lumbar radiculopathy     Lumbar spondylosis     Osteoarthritis of both hips     Osteoarthritis of spine     Skin cancer      PAST SURGICAL HISTORY:   Past Surgical History:   Procedure Laterality Date    APPENDECTOMY      CARPAL TUNNEL RELEASE Right 08/12/2019    Procedure: RELEASE, CARPAL TUNNEL right;  Surgeon: Roopa Hanna MD;  Location: [facility];  Service: Orthopedics;  Laterality: Right;    COLONOSCOPY      EPIDURAL STEROID INJECTION INTO " LUMBAR SPINE Bilateral 06/17/2021    Bilateral ALVIN per  06/17/2021    GASTRIC FUNDOPLICATION      HERNIA REPAIR  1990s    INJECTION OF ANESTHETIC AGENT AROUND MEDIAL BRANCH NERVES INNERVATING LUMBAR FACET JOINT Bilateral 07/15/2021    Procedure: Block-nerve-medial branch-lumbar---BILATERAL L3, L4, L5;  Surgeon: Colt Lenz Jr., MD;  Location: [facility] [place] [facility];  Service: Pain Management;  Laterality: Bilateral;    INJECTION OF ANESTHETIC AGENT AROUND MEDIAL BRANCH NERVES INNERVATING LUMBAR FACET JOINT Bilateral 09/23/2021    Procedure: Block-nerve-medial branch-lumbar---BILATERAL L3, L4, L5;  Surgeon: Colt Lenz Jr., MD;  Location: [facility] [place] [facility];  Service: Pain Management;  Laterality: Bilateral;    INJECTION OF JOINT Bilateral 12/02/2021    Procedure: Injection, Joint---BILATERAL SACROILIAC INJECTION;  Surgeon: Colt Lenz Jr., MD;  Location: [facility] [place] [facility];  Service: Pain Management;  Laterality: Bilateral;    INJECTION OF STEROID Left 08/12/2019    Procedure: INJECTION, STEROID;  Surgeon: Roopa Hanna MD;  Location: [facility] OR;  Service: Orthopedics;  Laterality: Left;    RADIOFREQUENCY ABLATION Left 10/14/2021    Procedure: Radiofrequency Ablation---LEFT L3, L4, L5;  Surgeon: Colt Lenz Jr., MD;  Location: [facility] [place] [facility];  Service: Pain Management;  Laterality: Left;    RADIOFREQUENCY ABLATION Right 10/28/2021    Procedure: Radiofrequency Ablation---RIGHT L3, L4, L5;  Surgeon: Colt Lenz Jr., MD;  Location: [facility] [place] [facility];  Service: Pain Management;  Laterality: Right;    ROTATOR CUFF REPAIR Right     SACROILIAC JOINT INJECTION Bilateral 12/02/2021    SKIN CANCER EXCISION      TRANSFORAMINAL EPIDURAL INJECTION OF STEROID Bilateral 06/17/2021    Procedure: Injection,steroid,epidural,transforaminal approach---BILATERAL S1;  Surgeon: Colt Lenz Jr., MD;  Location: [facility] [place] [facility];  Service: Pain Management;  Laterality: Bilateral;     FAMILY HISTORY:    Family History   Problem Relation Age of Onset    Diabetes Mother     No Known Problems Father      SOCIAL HISTORY:   Social History     Occupational History    Not on file   Tobacco Use    Smoking status: Every Day     Types: Cigars    Smokeless tobacco: Never    Tobacco comments:     8 small cigars per day down from 10.   Substance and Sexual Activity    Alcohol use: No    Drug use: No    Sexual activity: Not Currently     Partners: Female     Birth control/protection: None        MEDICATIONS:   Current Outpatient Medications:     aspirin (ECOTRIN) 81 MG EC tablet, Take 81 mg by mouth once daily., Disp: , Rfl:     diclofenac (VOLTAREN) 50 MG EC tablet, TAKE 1 TABLET BY MOUTH TWICE DAILY AS NEEDED FOR ARTHRITIS, Disp: 60 tablet, Rfl: 0    gabapentin (NEURONTIN) 600 MG tablet, Take 1 tablet (600 mg total) by mouth 2 (two) times daily., Disp: 60 tablet, Rfl: 1    levocetirizine (XYZAL) 5 MG tablet, TAKE 1 TABLET BY MOUTH ONCE DAILY IN THE EVENING, Disp: 90 tablet, Rfl: 3    PROAIR HFA 90 mcg/actuation inhaler, Inhale 2 puffs into the lungs every 6 (six) hours as needed., Disp: 25.5 g, Rfl: 3    tamsulosin (FLOMAX) 0.4 mg Cap, Take 1 capsule by mouth once daily, Disp: 90 capsule, Rfl: 3    terazosin (HYTRIN) 5 MG capsule, TAKE 1 CAPSULE BY MOUTH IN THE EVENING, Disp: 90 capsule, Rfl: 3    traMADoL (ULTRAM) 50 mg tablet, Take 1 tablet (50 mg total) by mouth every 12 (twelve) hours as needed for Pain., Disp: 30 tablet, Rfl: 1    ALPRAZolam (XANAX) 1 MG tablet, Take 1 tablet (1 mg total) by mouth nightly as needed for Anxiety., Disp: 30 tablet, Rfl: 0    meloxicam (MOBIC) 7.5 MG tablet, Take 1 tablet (7.5 mg total) by mouth once daily., Disp: 60 tablet, Rfl: 1    TRELEGY ELLIPTA 200-62.5-25 mcg inhaler, INHALE 1 PUFF ONCE DAILY (Patient not taking: Reported on 2/1/2023), Disp: 180 each, Rfl: 3  No current facility-administered medications for this visit.    Facility-Administered Medications Ordered in Other Visits:      ALPRAZolam dissolvable tablet 1 mg, 1 mg, Oral, Once PRN, Colt Lenz Jr., MD    BUPivacaine (PF) 0.25% (2.5 mg/ml) injection 25 mg, 10 mL, Intramuscular, Once, Colt Lenz Jr., MD    dexAMETHasone sodium phos (PF) injection 10 mg, 10 mg, Epidural, Once, Colt Lenz Jr., MD    LIDOcaine (PF) 10 mg/ml (1%) injection 10 mg, 1 mL, Other, Once, Colt Lenz Jr., MD    LIDOcaine HCL 20 mg/ml (2%) injection 10 mL, 10 mL, Other, Once, Colt Lenz Jr., MD    LIDOcaine HCL 20 mg/ml (2%) injection 10 mL, 10 mL, Other, Once, Colt Lenz Jr., MD    triamcinolone acetonide injection 40 mg, 40 mg, Intra-articular, Once, Colt Lenz Jr., MD  ALLERGIES: Review of patient's allergies indicates:  No Known Allergies      Physical Exam     Vitals:    02/16/23 0943   BP: 138/66   Pulse: 78     Alert and oriented to person, place and time. No acute distress. Well-groomed, not ill appearing. Pupils round and reactive, normal respiratory effort, no audible wheezing.     Shoulder / Upper Extremity Exam    OBSERVATION:     Swelling  none  Deformity  none   Discoloration  none   Scapular winging none   Scars   none  Atrophy  none    TENDERNESS                Clavicle   Negative         AC Jt.    pos        SC Jt.    Negative          Acromion:  pos        Scapular Spine Negative   Supraspinatus  Negative       Infraspinatus  Negative   LH Biceps   Negative   Greater Tub.  pos   Trapezius  Negative   Cervical spine  Negative        ROM: (* = with pain)              FE    110 R, 90 L      ER at 0°    60° R, 30 L       ER at 90° ABD  90° R, 50 L       IR at 90°  ABD   NA         IR (spine level)   T10 R, unable to assess L        STRENGTH: (* = with pain)    SCAPTION   3/5        IR    3/5       ER    3/5       BICEPS   4/5       Deltoid    4/5         SIGNS:  Painful side       NEER   pos   OARIELLES  neg    SOARES   pos   SPEEDS  neg     DROP ARM   neg   BELLY PRESS neg   Superior  escape none    LIFT-OFF  neg   X-Body ADD    pos   MOVING VALGUS neg        STABILITY TESTING        Translation       Anterior  Normal      Posterior  Normal     Sulcus   < 10mm     Signs    Apprehension   neg   Relocation   no change        Jerk test  neg         Imaging:     Bilateral shoulder X-rays ordered/reviewed by me showing no evidence of fracture or dislocation. There is no obvious malalignment. No evidence of masses, lesions or foreign bodies.  Mild-to-moderate bilateral degeneration, rotator cuff repair right shoulder intact with no evidence of complications.      Assessment & Plan    Rotator cuff syndrome of left shoulder  -     Large Joint Aspiration/Injection: L subacromial bursa    Chronic pain of both shoulders  -     Ambulatory referral/consult to Orthopedics  -     triamcinolone acetonide injection 40 mg    Rotator cuff syndrome of right shoulder  -     triamcinolone acetonide injection 40 mg  -     Large Joint Aspiration/Injection: R subacromial bursa    Other orders  -     meloxicam (MOBIC) 7.5 MG tablet; Take 1 tablet (7.5 mg total) by mouth once daily.  Dispense: 60 tablet; Refill: 1         I made the decision to obtain old records of the patient including previous notes and imaging. New imaging was ordered today of the extremity or extremities evaluated. I independently reviewed and interpreted the radiographs and/or MRIs/CT scan today as well as prior imaging.    We discussed at length different treatment options including conservative vs surgical management. These include anti-inflammatories, acetaminophen, rest, ice, heat, formal physical therapy including strengthening and stretching exercises, home exercise programs, injections, dry needling, and finally surgical intervention.      Instructed patient unfortunately I can not prescribe narcotics unless post-operatively.  We will proceed with bilateral shoulder CSI today and Mobic.    Bilateral shoulder CSI given.  Post-injection  instructions reviewed  Mobic 7.5 mg-- May take 1-2 daily. Patient instructed to not take other NSAIDs with this. Patient instructed to take with food and OTC PPI, such as omeprazole to decrease GI side effects. Labs reviewed.  Take daily for 1 month, then as needed  Continue tramadol per PCP  Ice compress to the affected area 2-3x a day for 15-20 minutes as needed for pain management  AAOS HEP given, activity as tolerated  Consider MRI left shoulder if pain is refractory to conservative treatment.  Discussed possibility of right reverse total shoulder if pain persist.    Follow up: as needed  X-rays next visit: none    All questions were answered and patient is agreeable to the above plan.

## 2023-03-21 ENCOUNTER — OFFICE VISIT (OUTPATIENT)
Dept: PRIMARY CARE CLINIC | Facility: CLINIC | Age: 73
End: 2023-03-21
Payer: MEDICARE

## 2023-03-21 VITALS
BODY MASS INDEX: 24.54 KG/M2 | SYSTOLIC BLOOD PRESSURE: 118 MMHG | WEIGHT: 171.44 LBS | HEART RATE: 87 BPM | TEMPERATURE: 98 F | RESPIRATION RATE: 17 BRPM | OXYGEN SATURATION: 95 % | DIASTOLIC BLOOD PRESSURE: 62 MMHG | HEIGHT: 70 IN

## 2023-03-21 DIAGNOSIS — R07.81 PLEURITIC CHEST PAIN: ICD-10-CM

## 2023-03-21 DIAGNOSIS — J40 BRONCHITIS: ICD-10-CM

## 2023-03-21 DIAGNOSIS — M25.511 CHRONIC RIGHT SHOULDER PAIN: ICD-10-CM

## 2023-03-21 DIAGNOSIS — G89.29 CHRONIC RIGHT SHOULDER PAIN: ICD-10-CM

## 2023-03-21 DIAGNOSIS — M25.511 CHRONIC PAIN OF BOTH SHOULDERS: ICD-10-CM

## 2023-03-21 DIAGNOSIS — G89.29 CHRONIC PAIN OF BOTH SHOULDERS: ICD-10-CM

## 2023-03-21 DIAGNOSIS — M25.512 CHRONIC PAIN OF BOTH SHOULDERS: ICD-10-CM

## 2023-03-21 DIAGNOSIS — Z12.5 PROSTATE CANCER SCREENING: Primary | ICD-10-CM

## 2023-03-21 PROCEDURE — 1125F AMNT PAIN NOTED PAIN PRSNT: CPT | Mod: CPTII,S$GLB,, | Performed by: INTERNAL MEDICINE

## 2023-03-21 PROCEDURE — 3078F DIAST BP <80 MM HG: CPT | Mod: CPTII,S$GLB,, | Performed by: INTERNAL MEDICINE

## 2023-03-21 PROCEDURE — 99213 PR OFFICE/OUTPT VISIT, EST, LEVL III, 20-29 MIN: ICD-10-PCS | Mod: S$GLB,,, | Performed by: INTERNAL MEDICINE

## 2023-03-21 PROCEDURE — 3078F PR MOST RECENT DIASTOLIC BLOOD PRESSURE < 80 MM HG: ICD-10-PCS | Mod: CPTII,S$GLB,, | Performed by: INTERNAL MEDICINE

## 2023-03-21 PROCEDURE — 1159F MED LIST DOCD IN RCRD: CPT | Mod: CPTII,S$GLB,, | Performed by: INTERNAL MEDICINE

## 2023-03-21 PROCEDURE — 99213 OFFICE O/P EST LOW 20 MIN: CPT | Mod: S$GLB,,, | Performed by: INTERNAL MEDICINE

## 2023-03-21 PROCEDURE — 3008F PR BODY MASS INDEX (BMI) DOCUMENTED: ICD-10-PCS | Mod: CPTII,S$GLB,, | Performed by: INTERNAL MEDICINE

## 2023-03-21 PROCEDURE — 1160F PR REVIEW ALL MEDS BY PRESCRIBER/CLIN PHARMACIST DOCUMENTED: ICD-10-PCS | Mod: CPTII,S$GLB,, | Performed by: INTERNAL MEDICINE

## 2023-03-21 PROCEDURE — 3288F PR FALLS RISK ASSESSMENT DOCUMENTED: ICD-10-PCS | Mod: CPTII,S$GLB,, | Performed by: INTERNAL MEDICINE

## 2023-03-21 PROCEDURE — 1101F PR PT FALLS ASSESS DOC 0-1 FALLS W/OUT INJ PAST YR: ICD-10-PCS | Mod: CPTII,S$GLB,, | Performed by: INTERNAL MEDICINE

## 2023-03-21 PROCEDURE — 99999 PR PBB SHADOW E&M-EST. PATIENT-LVL IV: ICD-10-PCS | Mod: PBBFAC,,, | Performed by: INTERNAL MEDICINE

## 2023-03-21 PROCEDURE — 99999 PR PBB SHADOW E&M-EST. PATIENT-LVL IV: CPT | Mod: PBBFAC,,, | Performed by: INTERNAL MEDICINE

## 2023-03-21 PROCEDURE — 3008F BODY MASS INDEX DOCD: CPT | Mod: CPTII,S$GLB,, | Performed by: INTERNAL MEDICINE

## 2023-03-21 PROCEDURE — 3288F FALL RISK ASSESSMENT DOCD: CPT | Mod: CPTII,S$GLB,, | Performed by: INTERNAL MEDICINE

## 2023-03-21 PROCEDURE — 3074F SYST BP LT 130 MM HG: CPT | Mod: CPTII,S$GLB,, | Performed by: INTERNAL MEDICINE

## 2023-03-21 PROCEDURE — 1160F RVW MEDS BY RX/DR IN RCRD: CPT | Mod: CPTII,S$GLB,, | Performed by: INTERNAL MEDICINE

## 2023-03-21 PROCEDURE — 3074F PR MOST RECENT SYSTOLIC BLOOD PRESSURE < 130 MM HG: ICD-10-PCS | Mod: CPTII,S$GLB,, | Performed by: INTERNAL MEDICINE

## 2023-03-21 PROCEDURE — 1101F PT FALLS ASSESS-DOCD LE1/YR: CPT | Mod: CPTII,S$GLB,, | Performed by: INTERNAL MEDICINE

## 2023-03-21 PROCEDURE — 1125F PR PAIN SEVERITY QUANTIFIED, PAIN PRESENT: ICD-10-PCS | Mod: CPTII,S$GLB,, | Performed by: INTERNAL MEDICINE

## 2023-03-21 PROCEDURE — 1159F PR MEDICATION LIST DOCUMENTED IN MEDICAL RECORD: ICD-10-PCS | Mod: CPTII,S$GLB,, | Performed by: INTERNAL MEDICINE

## 2023-03-21 RX ORDER — CELECOXIB 200 MG/1
200 CAPSULE ORAL 2 TIMES DAILY PRN
Qty: 60 CAPSULE | Refills: 1 | Status: SHIPPED | OUTPATIENT
Start: 2023-03-21 | End: 2023-08-29

## 2023-03-21 RX ORDER — TRAMADOL HYDROCHLORIDE 50 MG/1
50 TABLET ORAL EVERY 12 HOURS PRN
Qty: 60 TABLET | Refills: 1 | Status: SHIPPED | OUTPATIENT
Start: 2023-03-21 | End: 2023-08-28

## 2023-03-21 NOTE — PROGRESS NOTES
Subjective:       Patient ID: Uriah Mills is a 72 y.o. male.    Chief Complaint: Follow-up    HPI  patient visit today for routine follow-up he has the chronic lower back pain and bilateral shoulder pain the right side a lot worse than the left he has been seen by orthopedic he has shoulder injection x-ray but not helping at all she he also have a epidural injection for back pain which also no resolved patient is seen pain cannot sleep not able to get any help from any body for pain he denies short of breath chest pain dyspnea with exertion no fever chills night sweats tramadol that he take for pain not help much  Review of Systems    Objective:      Physical Exam  Vitals and nursing note reviewed.   Constitutional:       General: He is not in acute distress.     Appearance: He is well-developed.   HENT:      Head: Normocephalic and atraumatic.      Right Ear: External ear normal.      Left Ear: External ear normal.      Nose: Nose normal.      Mouth/Throat:      Pharynx: No oropharyngeal exudate.   Eyes:      Extraocular Movements: Extraocular movements intact.      Conjunctiva/sclera: Conjunctivae normal.      Pupils: Pupils are equal, round, and reactive to light.   Neck:      Thyroid: No thyromegaly.   Cardiovascular:      Rate and Rhythm: Normal rate and regular rhythm.      Heart sounds: Normal heart sounds. No murmur heard.    No friction rub. No gallop.   Pulmonary:      Effort: Pulmonary effort is normal. No respiratory distress.      Breath sounds: Normal breath sounds. No wheezing.   Abdominal:      General: Bowel sounds are normal. There is no distension.      Palpations: Abdomen is soft.      Tenderness: There is no abdominal tenderness.   Musculoskeletal:         General: No tenderness or deformity. Normal range of motion.      Cervical back: Normal range of motion and neck supple.   Lymphadenopathy:      Cervical: No cervical adenopathy.   Skin:     General: Skin is warm and dry.      Findings: No  erythema or rash.   Neurological:      Mental Status: He is alert and oriented to person, place, and time.   Psychiatric:         Mood and Affect: Mood normal.         Thought Content: Thought content normal.         Judgment: Judgment normal.       Assessment:       1. Prostate cancer screening    2. Chronic right shoulder pain    3. Bronchitis    4. Chronic pain of both shoulders    5. Pleuritic chest pain          Plan:       Prostate cancer screening  -     PSA, Screening; Future; Expected date: 03/21/2023    Chronic right shoulder pain  Comments:  Since the right shoulder hurt the most and conservative treatment injection not helping will get MRI to see if anything can be done surgically  Orders:  -     celecoxib (CELEBREX) 200 MG capsule; Take 1 capsule (200 mg total) by mouth 2 (two) times daily as needed for Pain (arthritis).  Dispense: 60 capsule; Refill: 1  -     traMADoL (ULTRAM) 50 mg tablet; Take 1 tablet (50 mg total) by mouth every 12 (twelve) hours as needed for Pain.  Dispense: 60 tablet; Refill: 1  -     MRI Shoulder Without Contrast Right; Future; Expected date: 03/21/2023  -     Basic Metabolic Panel; Future; Expected date: 03/21/2023    Bronchitis    Chronic pain of both shoulders  -     celecoxib (CELEBREX) 200 MG capsule; Take 1 capsule (200 mg total) by mouth 2 (two) times daily as needed for Pain (arthritis).  Dispense: 60 capsule; Refill: 1  -     traMADoL (ULTRAM) 50 mg tablet; Take 1 tablet (50 mg total) by mouth every 12 (twelve) hours as needed for Pain.  Dispense: 60 tablet; Refill: 1    Pleuritic chest pain  Comments:  Consider CT scan of the chest if pain persists  Orders:  -     traMADoL (ULTRAM) 50 mg tablet; Take 1 tablet (50 mg total) by mouth every 12 (twelve) hours as needed for Pain.  Dispense: 60 tablet; Refill: 1        Medication List with Changes/Refills   New Medications    CELECOXIB (CELEBREX) 200 MG CAPSULE    Take 1 capsule (200 mg total) by mouth 2 (two) times daily  as needed for Pain (arthritis).   Current Medications    ALPRAZOLAM (XANAX) 1 MG TABLET    Take 1 tablet (1 mg total) by mouth nightly as needed for Anxiety.    ASPIRIN (ECOTRIN) 81 MG EC TABLET    Take 81 mg by mouth once daily.    GABAPENTIN (NEURONTIN) 600 MG TABLET    Take 1 tablet by mouth twice daily    LEVOCETIRIZINE (XYZAL) 5 MG TABLET    TAKE 1 TABLET BY MOUTH ONCE DAILY IN THE EVENING    PROAIR HFA 90 MCG/ACTUATION INHALER    Inhale 2 puffs into the lungs every 6 (six) hours as needed.    TAMSULOSIN (FLOMAX) 0.4 MG CAP    Take 1 capsule by mouth once daily    TERAZOSIN (HYTRIN) 5 MG CAPSULE    TAKE 1 CAPSULE BY MOUTH IN THE EVENING    TRELEGY ELLIPTA 200-62.5-25 MCG INHALER    INHALE 1 PUFF ONCE DAILY   Changed and/or Refilled Medications    Modified Medication Previous Medication    TRAMADOL (ULTRAM) 50 MG TABLET traMADoL (ULTRAM) 50 mg tablet       Take 1 tablet (50 mg total) by mouth every 12 (twelve) hours as needed for Pain.    Take 1 tablet (50 mg total) by mouth every 12 (twelve) hours as needed for Pain.   Discontinued Medications    DICLOFENAC (VOLTAREN) 50 MG EC TABLET    TAKE 1 TABLET BY MOUTH TWICE DAILY AS NEEDED FOR ARTHRITIS    MELOXICAM (MOBIC) 7.5 MG TABLET    Take 1 tablet (7.5 mg total) by mouth once daily.

## 2023-04-27 DIAGNOSIS — M54.42 CHRONIC BILATERAL LOW BACK PAIN WITH BILATERAL SCIATICA: ICD-10-CM

## 2023-04-27 DIAGNOSIS — M16.0 PRIMARY OSTEOARTHRITIS OF BOTH HIPS: ICD-10-CM

## 2023-04-27 DIAGNOSIS — M54.41 CHRONIC BILATERAL LOW BACK PAIN WITH BILATERAL SCIATICA: ICD-10-CM

## 2023-04-27 DIAGNOSIS — G89.29 CHRONIC BILATERAL LOW BACK PAIN WITH BILATERAL SCIATICA: ICD-10-CM

## 2023-04-27 NOTE — TELEPHONE ENCOUNTER
No care due was identified.  [facility] Embedded Care Due Messages. Reference number: [id number].   4/27/2023 12:50:56 PM CDT

## 2023-04-28 RX ORDER — GABAPENTIN 600 MG/1
TABLET ORAL
Qty: 60 TABLET | Refills: 0 | Status: SHIPPED | OUTPATIENT
Start: 2023-04-28 | End: 2023-05-30

## 2023-05-30 ENCOUNTER — PATIENT MESSAGE (OUTPATIENT)
Dept: ADMINISTRATIVE | Facility: HOSPITAL | Age: 73
End: 2023-05-30
Payer: MEDICARE

## 2023-05-30 DIAGNOSIS — M16.0 PRIMARY OSTEOARTHRITIS OF BOTH HIPS: ICD-10-CM

## 2023-05-30 DIAGNOSIS — G89.29 CHRONIC BILATERAL LOW BACK PAIN WITH BILATERAL SCIATICA: ICD-10-CM

## 2023-05-30 DIAGNOSIS — M54.42 CHRONIC BILATERAL LOW BACK PAIN WITH BILATERAL SCIATICA: ICD-10-CM

## 2023-05-30 DIAGNOSIS — M54.41 CHRONIC BILATERAL LOW BACK PAIN WITH BILATERAL SCIATICA: ICD-10-CM

## 2023-05-30 RX ORDER — GABAPENTIN 600 MG/1
TABLET ORAL
Qty: 60 TABLET | Refills: 0 | Status: SHIPPED | OUTPATIENT
Start: 2023-05-30 | End: 2023-06-26

## 2023-05-30 NOTE — TELEPHONE ENCOUNTER
No care due was identified.  [facility] Embedded Care Due Messages. Reference number: [id number].   5/30/2023 8:34:23 AM CDT

## 2023-06-26 DIAGNOSIS — M16.0 PRIMARY OSTEOARTHRITIS OF BOTH HIPS: ICD-10-CM

## 2023-06-26 DIAGNOSIS — M54.42 CHRONIC BILATERAL LOW BACK PAIN WITH BILATERAL SCIATICA: ICD-10-CM

## 2023-06-26 DIAGNOSIS — G89.29 CHRONIC BILATERAL LOW BACK PAIN WITH BILATERAL SCIATICA: ICD-10-CM

## 2023-06-26 DIAGNOSIS — M54.41 CHRONIC BILATERAL LOW BACK PAIN WITH BILATERAL SCIATICA: ICD-10-CM

## 2023-06-26 RX ORDER — GABAPENTIN 600 MG/1
TABLET ORAL
Qty: 60 TABLET | Refills: 0 | Status: SHIPPED | OUTPATIENT
Start: 2023-06-26 | End: 2023-07-20

## 2023-06-26 NOTE — TELEPHONE ENCOUNTER
No care due was identified.  [facility] Embedded Care Due Messages. Reference number: [id number].   6/26/2023 9:27:10 AM CDT

## 2023-08-14 ENCOUNTER — PATIENT OUTREACH (OUTPATIENT)
Dept: ADMINISTRATIVE | Facility: HOSPITAL | Age: 73
End: 2023-08-14
Payer: MEDICARE

## 2023-08-14 NOTE — PROGRESS NOTES
Health Maintenance Due   Topic Date Due    High Dose Statin  Never done    COVID-19 Vaccine (5 - Moderna series) 02/14/2023    PROSTATE-SPECIFIC ANTIGEN  03/08/2023    Colorectal Cancer Screening  06/23/2023        Chart review done.    updated.   Immunizations reviewed & updated.   Care Everywhere updated.

## 2023-08-28 ENCOUNTER — OFFICE VISIT (OUTPATIENT)
Dept: PRIMARY CARE CLINIC | Facility: CLINIC | Age: 73
End: 2023-08-28
Payer: MEDICARE

## 2023-08-28 VITALS
HEART RATE: 68 BPM | OXYGEN SATURATION: 95 % | DIASTOLIC BLOOD PRESSURE: 58 MMHG | RESPIRATION RATE: 16 BRPM | HEIGHT: 70 IN | SYSTOLIC BLOOD PRESSURE: 116 MMHG | BODY MASS INDEX: 24.44 KG/M2 | WEIGHT: 170.75 LBS

## 2023-08-28 DIAGNOSIS — F41.9 ANXIETY: ICD-10-CM

## 2023-08-28 DIAGNOSIS — Z12.11 ENCOUNTER FOR SCREENING COLONOSCOPY: ICD-10-CM

## 2023-08-28 DIAGNOSIS — Z13.220 ENCOUNTER FOR LIPID SCREENING FOR CARDIOVASCULAR DISEASE: ICD-10-CM

## 2023-08-28 DIAGNOSIS — Z12.5 SCREENING FOR PROSTATE CANCER: ICD-10-CM

## 2023-08-28 DIAGNOSIS — Z13.6 ENCOUNTER FOR LIPID SCREENING FOR CARDIOVASCULAR DISEASE: ICD-10-CM

## 2023-08-28 DIAGNOSIS — M54.42 CHRONIC BILATERAL LOW BACK PAIN WITH BILATERAL SCIATICA: ICD-10-CM

## 2023-08-28 DIAGNOSIS — M54.41 CHRONIC BILATERAL LOW BACK PAIN WITH BILATERAL SCIATICA: ICD-10-CM

## 2023-08-28 DIAGNOSIS — G89.29 CHRONIC BILATERAL LOW BACK PAIN WITH BILATERAL SCIATICA: ICD-10-CM

## 2023-08-28 DIAGNOSIS — M16.0 PRIMARY OSTEOARTHRITIS OF BOTH HIPS: ICD-10-CM

## 2023-08-28 DIAGNOSIS — M54.16 LUMBAR RADICULOPATHY: Primary | ICD-10-CM

## 2023-08-28 PROCEDURE — 3078F PR MOST RECENT DIASTOLIC BLOOD PRESSURE < 80 MM HG: ICD-10-PCS | Mod: CPTII,S$GLB,, | Performed by: INTERNAL MEDICINE

## 2023-08-28 PROCEDURE — 3074F SYST BP LT 130 MM HG: CPT | Mod: CPTII,S$GLB,, | Performed by: INTERNAL MEDICINE

## 2023-08-28 PROCEDURE — 1160F PR REVIEW ALL MEDS BY PRESCRIBER/CLIN PHARMACIST DOCUMENTED: ICD-10-PCS | Mod: CPTII,S$GLB,, | Performed by: INTERNAL MEDICINE

## 2023-08-28 PROCEDURE — 1101F PR PT FALLS ASSESS DOC 0-1 FALLS W/OUT INJ PAST YR: ICD-10-PCS | Mod: CPTII,S$GLB,, | Performed by: INTERNAL MEDICINE

## 2023-08-28 PROCEDURE — 99214 PR OFFICE/OUTPT VISIT, EST, LEVL IV, 30-39 MIN: ICD-10-PCS | Mod: S$GLB,,, | Performed by: INTERNAL MEDICINE

## 2023-08-28 PROCEDURE — 3078F DIAST BP <80 MM HG: CPT | Mod: CPTII,S$GLB,, | Performed by: INTERNAL MEDICINE

## 2023-08-28 PROCEDURE — 1125F PR PAIN SEVERITY QUANTIFIED, PAIN PRESENT: ICD-10-PCS | Mod: CPTII,S$GLB,, | Performed by: INTERNAL MEDICINE

## 2023-08-28 PROCEDURE — 1159F MED LIST DOCD IN RCRD: CPT | Mod: CPTII,S$GLB,, | Performed by: INTERNAL MEDICINE

## 2023-08-28 PROCEDURE — 3008F PR BODY MASS INDEX (BMI) DOCUMENTED: ICD-10-PCS | Mod: CPTII,S$GLB,, | Performed by: INTERNAL MEDICINE

## 2023-08-28 PROCEDURE — 1159F PR MEDICATION LIST DOCUMENTED IN MEDICAL RECORD: ICD-10-PCS | Mod: CPTII,S$GLB,, | Performed by: INTERNAL MEDICINE

## 2023-08-28 PROCEDURE — 99214 OFFICE O/P EST MOD 30 MIN: CPT | Mod: S$GLB,,, | Performed by: INTERNAL MEDICINE

## 2023-08-28 PROCEDURE — 3288F FALL RISK ASSESSMENT DOCD: CPT | Mod: CPTII,S$GLB,, | Performed by: INTERNAL MEDICINE

## 2023-08-28 PROCEDURE — 3288F PR FALLS RISK ASSESSMENT DOCUMENTED: ICD-10-PCS | Mod: CPTII,S$GLB,, | Performed by: INTERNAL MEDICINE

## 2023-08-28 PROCEDURE — 99999 PR PBB SHADOW E&M-EST. PATIENT-LVL IV: ICD-10-PCS | Mod: PBBFAC,,, | Performed by: INTERNAL MEDICINE

## 2023-08-28 PROCEDURE — 1125F AMNT PAIN NOTED PAIN PRSNT: CPT | Mod: CPTII,S$GLB,, | Performed by: INTERNAL MEDICINE

## 2023-08-28 PROCEDURE — 3008F BODY MASS INDEX DOCD: CPT | Mod: CPTII,S$GLB,, | Performed by: INTERNAL MEDICINE

## 2023-08-28 PROCEDURE — 1160F RVW MEDS BY RX/DR IN RCRD: CPT | Mod: CPTII,S$GLB,, | Performed by: INTERNAL MEDICINE

## 2023-08-28 PROCEDURE — 3074F PR MOST RECENT SYSTOLIC BLOOD PRESSURE < 130 MM HG: ICD-10-PCS | Mod: CPTII,S$GLB,, | Performed by: INTERNAL MEDICINE

## 2023-08-28 PROCEDURE — 1101F PT FALLS ASSESS-DOCD LE1/YR: CPT | Mod: CPTII,S$GLB,, | Performed by: INTERNAL MEDICINE

## 2023-08-28 PROCEDURE — 99999 PR PBB SHADOW E&M-EST. PATIENT-LVL IV: CPT | Mod: PBBFAC,,, | Performed by: INTERNAL MEDICINE

## 2023-08-28 RX ORDER — HYDROCODONE BITARTRATE AND ACETAMINOPHEN 5; 325 MG/1; MG/1
1 TABLET ORAL EVERY 12 HOURS PRN
Qty: 30 TABLET | Refills: 0 | Status: SHIPPED | OUTPATIENT
Start: 2023-08-28 | End: 2023-10-25 | Stop reason: SDUPTHER

## 2023-08-28 RX ORDER — GABAPENTIN 600 MG/1
600 TABLET ORAL 2 TIMES DAILY
Qty: 60 TABLET | Refills: 0 | Status: SHIPPED | OUTPATIENT
Start: 2023-08-28 | End: 2023-09-25

## 2023-08-28 NOTE — PROGRESS NOTES
Subjective:       Patient ID: Uriah Mills is a 72 y.o. male.    Chief Complaint: Follow-up (3 month follow up )    HPI Pt visit today with c/o rt shoulder pain and lower back apin with scitica he ha sbeen seen by ortho had PT injection rt shoulder without any relief he sttaes ortho suggest reverse rt shoulder replacment pt does not want surgery worry about complication from his age and he few ALVIN in lower back no results and said nothing else they can do for him except surgery his wife report pt sometime in a lot of pain he also interest in accupuncture  the tramadol that he is taking not helping his pain any more. He denies sob cp XAVIER or any other symptoms  Review of Systems    Objective:      Physical Exam  Vitals and nursing note reviewed.   Constitutional:       General: He is not in acute distress.     Appearance: He is well-developed.   HENT:      Head: Normocephalic and atraumatic.      Right Ear: External ear normal.      Left Ear: External ear normal.      Nose: Nose normal.      Mouth/Throat:      Pharynx: No oropharyngeal exudate.   Eyes:      Conjunctiva/sclera: Conjunctivae normal.      Pupils: Pupils are equal, round, and reactive to light.   Neck:      Thyroid: No thyromegaly.   Cardiovascular:      Rate and Rhythm: Normal rate and regular rhythm.      Heart sounds: Normal heart sounds. No murmur heard.     No friction rub. No gallop.   Pulmonary:      Effort: Pulmonary effort is normal. No respiratory distress.      Breath sounds: Normal breath sounds. No wheezing.   Abdominal:      General: Bowel sounds are normal. There is no distension.      Palpations: Abdomen is soft.      Tenderness: There is no abdominal tenderness. There is no rebound.   Musculoskeletal:         General: No tenderness or deformity.      Cervical back: Normal range of motion and neck supple.      Comments: Rt shoulder decrease ROM in all directions due to pain and subjective tenderness across lower back radiate down lower  ext   Lymphadenopathy:      Cervical: No cervical adenopathy.   Skin:     General: Skin is warm and dry.      Findings: No erythema or rash.   Neurological:      Mental Status: He is alert and oriented to person, place, and time.   Psychiatric:         Thought Content: Thought content normal.         Judgment: Judgment normal.      Comments: Sad mood         Assessment:       1. Lumbar radiculopathy    2. Chronic bilateral low back pain with bilateral sciatica    3. Primary osteoarthritis of both hips    4. Encounter for screening colonoscopy    5. Screening for prostate cancer    6. Encounter for lipid screening for cardiovascular disease    7. Anxiety        Plan:       Lumbar radiculopathy  -     gabapentin (NEURONTIN) 600 MG tablet; Take 1 tablet (600 mg total) by mouth 2 (two) times daily.  Dispense: 60 tablet; Refill: 0  -     Acupuncture; Future; Expected date: 08/29/2023  -     HYDROcodone-acetaminophen (NORCO) 5-325 mg per tablet; Take 1 tablet by mouth every 12 (twelve) hours as needed for Pain.  Dispense: 30 tablet; Refill: 0    Chronic bilateral low back pain with bilateral sciatica  -     gabapentin (NEURONTIN) 600 MG tablet; Take 1 tablet (600 mg total) by mouth 2 (two) times daily.  Dispense: 60 tablet; Refill: 0  -     Acupuncture; Future; Expected date: 08/29/2023  -     Comprehensive Metabolic Panel; Future; Expected date: 08/28/2023  -     HYDROcodone-acetaminophen (NORCO) 5-325 mg per tablet; Take 1 tablet by mouth every 12 (twelve) hours as needed for Pain.  Dispense: 30 tablet; Refill: 0    Primary osteoarthritis of both hips  -     Acupuncture; Future; Expected date: 08/29/2023  -     Comprehensive Metabolic Panel; Future; Expected date: 08/28/2023  -     HYDROcodone-acetaminophen (NORCO) 5-325 mg per tablet; Take 1 tablet by mouth every 12 (twelve) hours as needed for Pain.  Dispense: 30 tablet; Refill: 0    Encounter for screening colonoscopy  -     Fecal Immunochemical Test (iFOBT); Future;  Expected date: 08/28/2023    Screening for prostate cancer  -     PSA, Screening; Future; Expected date: 08/28/2023    Encounter for lipid screening for cardiovascular disease  -     Lipid Panel; Future; Expected date: 08/28/2023    Anxiety  Comments:  consider SSRI         Medication List with Changes/Refills   New Medications    HYDROCODONE-ACETAMINOPHEN (NORCO) 5-325 MG PER TABLET    Take 1 tablet by mouth every 12 (twelve) hours as needed for Pain.   Current Medications    ALPRAZOLAM (XANAX) 1 MG TABLET    Take 1 tablet (1 mg total) by mouth nightly as needed for Anxiety.    ASPIRIN (ECOTRIN) 81 MG EC TABLET    Take 81 mg by mouth once daily.    CELECOXIB (CELEBREX) 200 MG CAPSULE    Take 1 capsule (200 mg total) by mouth 2 (two) times daily as needed for Pain (arthritis).    LEVOCETIRIZINE (XYZAL) 5 MG TABLET    TAKE 1 TABLET BY MOUTH ONCE DAILY IN THE EVENING    PROAIR HFA 90 MCG/ACTUATION INHALER    Inhale 2 puffs into the lungs every 6 (six) hours as needed.    TAMSULOSIN (FLOMAX) 0.4 MG CAP    Take 1 capsule by mouth once daily    TERAZOSIN (HYTRIN) 5 MG CAPSULE    TAKE 1 CAPSULE BY MOUTH IN THE EVENING    TRELEGY ELLIPTA 200-62.5-25 MCG INHALER    INHALE 1 PUFF ONCE DAILY   Changed and/or Refilled Medications    Modified Medication Previous Medication    GABAPENTIN (NEURONTIN) 600 MG TABLET gabapentin (NEURONTIN) 600 MG tablet       Take 1 tablet (600 mg total) by mouth 2 (two) times daily.    Take 1 tablet by mouth twice daily   Discontinued Medications    TRAMADOL (ULTRAM) 50 MG TABLET    Take 1 tablet (50 mg total) by mouth every 12 (twelve) hours as needed for Pain.

## 2023-09-07 ENCOUNTER — CLINICAL SUPPORT (OUTPATIENT)
Dept: REHABILITATION | Facility: OTHER | Age: 73
End: 2023-09-07
Attending: PHYSICAL MEDICINE & REHABILITATION
Payer: MEDICARE

## 2023-09-07 DIAGNOSIS — M16.0 PRIMARY OSTEOARTHRITIS OF BOTH HIPS: ICD-10-CM

## 2023-09-07 DIAGNOSIS — M54.42 CHRONIC BILATERAL LOW BACK PAIN WITH BILATERAL SCIATICA: ICD-10-CM

## 2023-09-07 DIAGNOSIS — G89.29 CHRONIC BILATERAL LOW BACK PAIN WITH BILATERAL SCIATICA: ICD-10-CM

## 2023-09-07 DIAGNOSIS — M54.16 LUMBAR RADICULOPATHY: ICD-10-CM

## 2023-09-07 DIAGNOSIS — M54.41 CHRONIC BILATERAL LOW BACK PAIN WITH BILATERAL SCIATICA: ICD-10-CM

## 2023-09-07 PROCEDURE — 97810 ACUP 1/> WO ESTIM 1ST 15 MIN: CPT | Performed by: PHYSICAL MEDICINE & REHABILITATION

## 2023-09-07 PROCEDURE — 97811 ACUP 1/> W/O ESTIM EA ADD 15: CPT | Performed by: PHYSICAL MEDICINE & REHABILITATION

## 2023-09-07 NOTE — PROGRESS NOTES
Acupuncture Evaluation Note     Name: Uriah Mills  [facility] Number: [id number]    Traditional Chinese Medicine (TCM) Diagnosis: Qi Stagnation  Medical Diagnosis:   Encounter Diagnoses   Name Primary?    Chronic bilateral low back pain with bilateral sciatica     Primary osteoarthritis of both hips     Lumbar radiculopathy         Evaluation Date: 9/7/2023    Visit #/Visits authorized: 1/12    Precautions: Standard    Subjective     Chief Concern: the worst is the low back and the hip pain.  He has shoulder pain and neck pain    The low back pain has been since the early 80s.  The shoulder pain has been since thanksgiving.  The back pain is worse with walking and bending.  The pain is best lying in bed.  The pain is constant.  He spends 15 hours lying down. He will get up and smoke and go to the bathroom.  The pain is a tingling and stabbing pain.  The pain is 7/10 now, worst 10/10 twisting and bending, best 5/10 lying down.  He has done PT but felt like it caused too much pain. The injections did not help, he has had several rounds from different doctors.  He has been to chiropractor with some relief but not recently    MRI lumbar 2020  Alignment: Normal.     Vertebrae: 5 lumbar-type vertebral bodies. No aggressive marrow replacement process or fracture.     Discs: Satisfactory height.  Mild diffuse desiccation of disc material predominantly L4-L5 and L5-S1     Cord: Normal. Conus terminates at T12-L1.     Degenerative findings:     T12-L1: There is no focal disc herniation.No significant central canal narrowing . No significant neural foraminal narrowing.     L1-L2: There is no focal disc herniation.No significant central canal narrowing . No significant neural foraminal narrowing.     L2-L3: There is no focal disc herniation.No significant central canal narrowing . No significant neural foraminal narrowing.     L3-L4: There is no focal disc herniation.No significant central canal narrowing . No significant neural  "foraminal narrowing.     L4-L5: Mild broad-based bulging of disc material with small superimposed central protrusion.  No significant central canal narrowing . No significant neural foraminal narrowing.     L5-S1: Mild broad-based bulging of disc material with small superimposed central protrusion.  No significant central canal narrowing . No significant neural foraminal narrowing.     Paraspinal muscles & soft tissues: Unremarkable.     Impression:     Degenerative changes disc space level 4-L5 and L5-S1. no significant central or foraminal stenosis.    Medical necessity is demonstrated by the following IMPAIRMENTS: Medical Necessity: Decreased mobility limits day to day activities, social, and emergent situations and Decreased quality of life                  Previous Treatments Tried:  Injection(s), Medication, Imaging, and Therapy  and chiropractor    HEENT:  sinus    Chest:  none    Digestion:     Diet: in general, a "healthy" diet     Fluids: coffee 2 /day, is not drinking much, none  Taste/Appetite: fair.  He smokes cigars 6 a day   Symptoms: none    Sleep: sometimes    Energy Levels:  poor in bed all the time    Psychological Symptoms:  good, denies depression    Other Symptoms: shoulder pain        Objective     Observation: kyphosis    Tongue:      Body:  normal   Color:  pink   Coating:  thin,       New Findings:  right low back pain, flexion 80 ext 20 pain with ;lat bending to the right  Strength 5/5 dtr 2+    Treatment     Treatment Principles:  qi stagnation    Acupuncture points used:   bilateral 4 VIVEROS, Gb34, Ki3, Li11, Sp6, Sp9, St 42, Sp3, Bl 60 midline Gv 20, GV 24.5  Bilateral points:  Unilateral points:  Auricular Treatment:  none  Needles In: 22  Needles Out: 22  Needles W/O STIM placed: 120  Needles W/O STIM removed: 150      Other Traditional Chinese Medicine Modalities - none    Assessment     After treatment, patient felt relaxed     Patient prognosis is Fair.     Patient will continue to " benefit from acupuncture treatment to address the deficits listed in the problem list box on initial evaluation, provide patient family education and to maximize pt's level of independence in the home and community environment.     Patient's spiritual, cultural and educational needs considered and pt agreeable to plan of care and goals.     Anticipated barriers to treatment: none I encourage more physical activity and stay out of bed    Plan     Recommend 1 /week for 12 sessions then re-assess.      Education:  Patient is aware of cumulative benefit of acupuncture

## 2023-09-18 ENCOUNTER — PATIENT MESSAGE (OUTPATIENT)
Dept: PRIMARY CARE CLINIC | Facility: CLINIC | Age: 73
End: 2023-09-18
Payer: MEDICARE

## 2023-09-21 ENCOUNTER — CLINICAL SUPPORT (OUTPATIENT)
Dept: REHABILITATION | Facility: OTHER | Age: 73
End: 2023-09-21
Attending: PHYSICAL MEDICINE & REHABILITATION
Payer: MEDICARE

## 2023-09-21 DIAGNOSIS — M54.16 LUMBAR RADICULOPATHY: ICD-10-CM

## 2023-09-21 DIAGNOSIS — M54.42 CHRONIC BILATERAL LOW BACK PAIN WITH BILATERAL SCIATICA: Primary | ICD-10-CM

## 2023-09-21 DIAGNOSIS — G89.29 CHRONIC BILATERAL LOW BACK PAIN WITH BILATERAL SCIATICA: Primary | ICD-10-CM

## 2023-09-21 DIAGNOSIS — M54.41 CHRONIC BILATERAL LOW BACK PAIN WITH BILATERAL SCIATICA: Primary | ICD-10-CM

## 2023-09-21 DIAGNOSIS — M16.0 PRIMARY OSTEOARTHRITIS OF BOTH HIPS: ICD-10-CM

## 2023-09-21 PROCEDURE — 97811 ACUP 1/> W/O ESTIM EA ADD 15: CPT | Performed by: PHYSICAL MEDICINE & REHABILITATION

## 2023-09-21 PROCEDURE — 97810 ACUP 1/> WO ESTIM 1ST 15 MIN: CPT | Performed by: PHYSICAL MEDICINE & REHABILITATION

## 2023-09-21 NOTE — PROGRESS NOTES
Acupuncture Treatment Note     Name: Uriah Mills  [facility] Number: [id number]    Traditional Chinese Medicine Diagnosis: No diagnosis found.  Physician: No ref. provider found    Date of Service: 9/21/2023     Medical Diagnosis: chronic low back pain  Evaluation Date: 9/7/2023  Plan of Care Certification Period:   Visit #/Visits authorized: 2/ 12     Precautions: Standard    Subjective     Chief Complaint:  low back pain and hip pain    Response to Previous Treatment:  He felt some immediate relief after the treatment.  He felt better for a couple of hours.      Quality of Symptoms (Better/Worse):  same    Other Condition/Symptoms:  no additional symptoms    Objective      New Findings:  continued back pain    Treatment Principles:  qi stagnation    Acupuncture Points:  bilateral 4 VIVEROS, Gb34, Ki3, Li11, Sp6, Sp9, St 42, Sp3, Bl 60 midline Gv 20, GV 24.5    NEEDLES W/O STIM  AT: 155    NEEDLES W/O STIM REMOVED AT: 225    #NEEDLES IN: 22    #NEEDLES OUT: 22    Other Traditional Chinese Medicine Modalities:      Recommendations:  continued treatment    Education:  Patient is aware of cumulative effect of acupuncture      Assessment      Analysis of Treatment:  He did well next visit might try some surface release    Pt prognosis is Good.     Patient will continue to benefit from acupuncture treatment to address the deficits listed in the problem list box on initial evaluation, provide patient family education and to maximize pt's level of independence in the home and community environment.     Patient's spiritual, cultural and educational needs considered and pt agreeable to plan of care and goals.     Anticipated barriers to treatment: none    Plan     Recommend       1 /week for 12  treatments and re-assess.

## 2023-09-22 DIAGNOSIS — G89.29 CHRONIC BILATERAL LOW BACK PAIN WITH BILATERAL SCIATICA: ICD-10-CM

## 2023-09-22 DIAGNOSIS — M54.41 CHRONIC BILATERAL LOW BACK PAIN WITH BILATERAL SCIATICA: ICD-10-CM

## 2023-09-22 DIAGNOSIS — M54.42 CHRONIC BILATERAL LOW BACK PAIN WITH BILATERAL SCIATICA: ICD-10-CM

## 2023-09-22 DIAGNOSIS — M54.16 LUMBAR RADICULOPATHY: ICD-10-CM

## 2023-09-22 NOTE — TELEPHONE ENCOUNTER
No care due was identified.  [facility] Embedded Care Due Messages. Reference number: [id number].   9/22/2023 6:59:29 PM CDT

## 2023-09-25 RX ORDER — GABAPENTIN 600 MG/1
600 TABLET ORAL 2 TIMES DAILY
Qty: 60 TABLET | Refills: 0 | Status: SHIPPED | OUTPATIENT
Start: 2023-09-25 | End: 2023-10-19

## 2023-09-28 ENCOUNTER — CLINICAL SUPPORT (OUTPATIENT)
Dept: REHABILITATION | Facility: OTHER | Age: 73
End: 2023-09-28
Attending: PHYSICAL MEDICINE & REHABILITATION
Payer: MEDICARE

## 2023-09-28 DIAGNOSIS — M54.41 CHRONIC BILATERAL LOW BACK PAIN WITH BILATERAL SCIATICA: Primary | ICD-10-CM

## 2023-09-28 DIAGNOSIS — G89.29 CHRONIC BILATERAL LOW BACK PAIN WITH BILATERAL SCIATICA: Primary | ICD-10-CM

## 2023-09-28 DIAGNOSIS — M16.0 PRIMARY OSTEOARTHRITIS OF BOTH HIPS: ICD-10-CM

## 2023-09-28 DIAGNOSIS — M54.42 CHRONIC BILATERAL LOW BACK PAIN WITH BILATERAL SCIATICA: Primary | ICD-10-CM

## 2023-09-28 PROCEDURE — 97811 ACUP 1/> W/O ESTIM EA ADD 15: CPT | Performed by: PHYSICAL MEDICINE & REHABILITATION

## 2023-09-28 PROCEDURE — 97810 ACUP 1/> WO ESTIM 1ST 15 MIN: CPT | Performed by: PHYSICAL MEDICINE & REHABILITATION

## 2023-09-28 NOTE — PROGRESS NOTES
Acupuncture Treatment Note     Name: Uriah Mills  [facility] Number: [id number]    Traditional Chinese Medicine Diagnosis:   Encounter Diagnoses   Name Primary?    Chronic bilateral low back pain with bilateral sciatica Yes    Primary osteoarthritis of both hips      Physician: No ref. provider found    Date of Service: 9/28/2023     Medical Diagnosis: chronic low back pain  Evaluation Date: 9/7/2023  Plan of Care Certification Period:   Visit #/Visits authorized: 3/ 12     Precautions: Standard    Subjective     Chief Complaint:  low back pain and hip pain    Response to Previous Treatment:  He felt some immediate relief after the treatment.  He felt better for a couple of days.  They pain returned Sunday night.  He was walking better    Quality of Symptoms (Better/Worse):  same    Other Condition/Symptoms: some right shoulder pain  Objective      New Findings:  continued back pain    Treatment Principles:  qi stagnation    Acupuncture Points:  bilateral 4 VIVEROS, Gb34, Ki3, Li11, Sp6, Sp9, St 42, Sp3, Bl 60 midline Gv 20, GV 24.5    NEEDLES W/O STIM  AT: 152    NEEDLES W/O STIM REMOVED AT: 222    #NEEDLES IN: 22    #NEEDLES OUT: 22    Other Traditional Chinese Medicine Modalities:      Recommendations:  continued treatment    Education:  Patient is aware of cumulative effect of acupuncture      Assessment      Analysis of Treatment:  He did well next visit might try some surface release    Pt prognosis is Good.     Patient will continue to benefit from acupuncture treatment to address the deficits listed in the problem list box on initial evaluation, provide patient family education and to maximize pt's level of independence in the home and community environment.     Patient's spiritual, cultural and educational needs considered and pt agreeable to plan of care and goals.     Anticipated barriers to treatment: none    Plan     Recommend       1 /week for 12  treatments and re-assess.

## 2023-10-05 ENCOUNTER — CLINICAL SUPPORT (OUTPATIENT)
Dept: REHABILITATION | Facility: OTHER | Age: 73
End: 2023-10-05
Attending: PHYSICAL MEDICINE & REHABILITATION
Payer: MEDICARE

## 2023-10-05 DIAGNOSIS — M54.42 CHRONIC BILATERAL LOW BACK PAIN WITH BILATERAL SCIATICA: Primary | ICD-10-CM

## 2023-10-05 DIAGNOSIS — M16.0 PRIMARY OSTEOARTHRITIS OF BOTH HIPS: ICD-10-CM

## 2023-10-05 DIAGNOSIS — M54.41 CHRONIC BILATERAL LOW BACK PAIN WITH BILATERAL SCIATICA: Primary | ICD-10-CM

## 2023-10-05 DIAGNOSIS — G89.29 CHRONIC BILATERAL LOW BACK PAIN WITH BILATERAL SCIATICA: Primary | ICD-10-CM

## 2023-10-05 PROCEDURE — 97810 ACUP 1/> WO ESTIM 1ST 15 MIN: CPT | Performed by: PHYSICAL MEDICINE & REHABILITATION

## 2023-10-05 PROCEDURE — 97811 ACUP 1/> W/O ESTIM EA ADD 15: CPT | Performed by: PHYSICAL MEDICINE & REHABILITATION

## 2023-10-05 NOTE — PROGRESS NOTES
Acupuncture Treatment Note     Name: Uriah Mills  [facility] Number: [id number]    Traditional Chinese Medicine Diagnosis:   Encounter Diagnoses   Name Primary?    Chronic bilateral low back pain with bilateral sciatica Yes    Primary osteoarthritis of both hips        Physician: Geo Montalvo MD    Date of Service: 10/5/2023     Medical Diagnosis: chronic low back pain  Evaluation Date: 9/7/2023  Plan of Care Certification Period:   Visit #/Visits authorized: 4/ 12     Precautions: Standard    Subjective     Chief Complaint:  low back pain and hip pain    Response to Previous Treatment:  He felt some immediate relief after the treatment.  He felt better for a couple of days.  They pain is still a little better.  He feels like he went from constant 7 to a constant 5 for the entire week    Quality of Symptoms (Better/Worse):  same    Other Condition/Symptoms: some right shoulder pain  Objective      New Findings:  continued back pain    Treatment Principles:  qi stagnation    Acupuncture Points:  bilateral 4 VIVEROS, Gb34, Ki3, Li11, Sp6, Sp9, St 42, Sp3, Bl 60 midline Gv 20, GV 24.5    NEEDLES W/O STIM  AT: 153    NEEDLES W/O STIM REMOVED AT: 223    #NEEDLES IN: 22    #NEEDLES OUT: 22    Other Traditional Chinese Medicine Modalities:      Recommendations:  continued treatment    Education:  Patient is aware of cumulative effect of acupuncture      Assessment      Analysis of Treatment:  He did well next visit might try some surface release    Pt prognosis is Good.     Patient will continue to benefit from acupuncture treatment to address the deficits listed in the problem list box on initial evaluation, provide patient family education and to maximize pt's level of independence in the home and community environment.     Patient's spiritual, cultural and educational needs considered and pt agreeable to plan of care and goals.     Anticipated barriers to treatment: none    Plan     Recommend       1 /week for 12   treatments and re-assess.

## 2023-10-09 ENCOUNTER — TELEPHONE (OUTPATIENT)
Dept: PRIMARY CARE CLINIC | Facility: CLINIC | Age: 73
End: 2023-10-09
Payer: MEDICARE

## 2023-10-09 NOTE — TELEPHONE ENCOUNTER
Called patient in regards to him needing a 3 month follow up. Patient was scheduled and verbalized understanding of appointment date and time.

## 2023-10-09 NOTE — TELEPHONE ENCOUNTER
----- Message from Sherrie Maldonado sent at 10/9/2023  8:33 AM CDT -----  Contact: [phone]  1MEDICALADVICE     Patient is calling for Medical Advice regarding:needs a follow up appt     How long has patient had these symptoms: 3 month follow up     Pharmacy name and phone#:  [id number]  [place], [place] - [address]  [address]  [place] [address]  Phone: [phone] Fax: [phone]       Would like response via [employer]:  no    Comments:pts wife is calling she states she just noticed the pt does not have his 3 month follow up scheduled he was last seen in August please give return call

## 2023-10-11 ENCOUNTER — PATIENT OUTREACH (OUTPATIENT)
Dept: ADMINISTRATIVE | Facility: HOSPITAL | Age: 73
End: 2023-10-11
Payer: MEDICARE

## 2023-10-11 NOTE — PROGRESS NOTES
Health Maintenance Due   Topic Date Due    High Dose Statin  Never done    Colorectal Cancer Screening  06/23/2023    Influenza Vaccine (1) 09/01/2023    COVID-19 Vaccine (5 - 2023-24 season) 09/01/2023        Chart review done.   HM updated.   Immunizations reviewed & updated.   Care Everywhere updated.

## 2023-10-18 ENCOUNTER — PATIENT MESSAGE (OUTPATIENT)
Dept: CARDIOLOGY | Facility: CLINIC | Age: 73
End: 2023-10-18
Payer: MEDICARE

## 2023-10-19 DIAGNOSIS — M54.41 CHRONIC BILATERAL LOW BACK PAIN WITH BILATERAL SCIATICA: ICD-10-CM

## 2023-10-19 DIAGNOSIS — M54.16 LUMBAR RADICULOPATHY: ICD-10-CM

## 2023-10-19 DIAGNOSIS — M54.42 CHRONIC BILATERAL LOW BACK PAIN WITH BILATERAL SCIATICA: ICD-10-CM

## 2023-10-19 DIAGNOSIS — G89.29 CHRONIC BILATERAL LOW BACK PAIN WITH BILATERAL SCIATICA: ICD-10-CM

## 2023-10-19 RX ORDER — GABAPENTIN 600 MG/1
600 TABLET ORAL 2 TIMES DAILY
Qty: 60 TABLET | Refills: 0 | Status: SHIPPED | OUTPATIENT
Start: 2023-10-19 | End: 2023-11-20

## 2023-10-19 NOTE — TELEPHONE ENCOUNTER
No care due was identified.  Health [facility] Embedded Care Due Messages. Reference number: [id number].   10/19/2023 12:00:14 PM CDT

## 2023-10-25 ENCOUNTER — OFFICE VISIT (OUTPATIENT)
Dept: PRIMARY CARE CLINIC | Facility: CLINIC | Age: 73
End: 2023-10-25
Payer: MEDICARE

## 2023-10-25 VITALS
OXYGEN SATURATION: 95 % | HEIGHT: 70 IN | WEIGHT: 168.13 LBS | RESPIRATION RATE: 16 BRPM | SYSTOLIC BLOOD PRESSURE: 128 MMHG | TEMPERATURE: 99 F | BODY MASS INDEX: 24.07 KG/M2 | HEART RATE: 73 BPM | DIASTOLIC BLOOD PRESSURE: 82 MMHG

## 2023-10-25 DIAGNOSIS — M16.0 PRIMARY OSTEOARTHRITIS OF BOTH HIPS: ICD-10-CM

## 2023-10-25 DIAGNOSIS — M54.41 CHRONIC BILATERAL LOW BACK PAIN WITH BILATERAL SCIATICA: ICD-10-CM

## 2023-10-25 DIAGNOSIS — M54.42 CHRONIC BILATERAL LOW BACK PAIN WITH BILATERAL SCIATICA: ICD-10-CM

## 2023-10-25 DIAGNOSIS — M54.16 LUMBAR RADICULOPATHY: ICD-10-CM

## 2023-10-25 DIAGNOSIS — Z23 FLU VACCINE NEED: Primary | ICD-10-CM

## 2023-10-25 DIAGNOSIS — R35.0 BENIGN PROSTATIC HYPERPLASIA WITH URINARY FREQUENCY: ICD-10-CM

## 2023-10-25 DIAGNOSIS — N40.1 BENIGN PROSTATIC HYPERPLASIA WITH URINARY FREQUENCY: ICD-10-CM

## 2023-10-25 DIAGNOSIS — G89.29 CHRONIC BILATERAL LOW BACK PAIN WITH BILATERAL SCIATICA: ICD-10-CM

## 2023-10-25 PROCEDURE — 1159F PR MEDICATION LIST DOCUMENTED IN MEDICAL RECORD: ICD-10-PCS | Mod: CPTII,S$GLB,, | Performed by: INTERNAL MEDICINE

## 2023-10-25 PROCEDURE — 3074F PR MOST RECENT SYSTOLIC BLOOD PRESSURE < 130 MM HG: ICD-10-PCS | Mod: CPTII,S$GLB,, | Performed by: INTERNAL MEDICINE

## 2023-10-25 PROCEDURE — 99214 PR OFFICE/OUTPT VISIT, EST, LEVL IV, 30-39 MIN: ICD-10-PCS | Mod: S$GLB,,, | Performed by: INTERNAL MEDICINE

## 2023-10-25 PROCEDURE — 3079F PR MOST RECENT DIASTOLIC BLOOD PRESSURE 80-89 MM HG: ICD-10-PCS | Mod: CPTII,S$GLB,, | Performed by: INTERNAL MEDICINE

## 2023-10-25 PROCEDURE — 1101F PT FALLS ASSESS-DOCD LE1/YR: CPT | Mod: CPTII,S$GLB,, | Performed by: INTERNAL MEDICINE

## 2023-10-25 PROCEDURE — 99999 PR PBB SHADOW E&M-EST. PATIENT-LVL V: CPT | Mod: PBBFAC,,, | Performed by: INTERNAL MEDICINE

## 2023-10-25 PROCEDURE — 1125F AMNT PAIN NOTED PAIN PRSNT: CPT | Mod: CPTII,S$GLB,, | Performed by: INTERNAL MEDICINE

## 2023-10-25 PROCEDURE — 90694 VACC AIIV4 NO PRSRV 0.5ML IM: CPT | Mod: S$GLB,,, | Performed by: INTERNAL MEDICINE

## 2023-10-25 PROCEDURE — 3008F PR BODY MASS INDEX (BMI) DOCUMENTED: ICD-10-PCS | Mod: CPTII,S$GLB,, | Performed by: INTERNAL MEDICINE

## 2023-10-25 PROCEDURE — 3074F SYST BP LT 130 MM HG: CPT | Mod: CPTII,S$GLB,, | Performed by: INTERNAL MEDICINE

## 2023-10-25 PROCEDURE — 3288F PR FALLS RISK ASSESSMENT DOCUMENTED: ICD-10-PCS | Mod: CPTII,S$GLB,, | Performed by: INTERNAL MEDICINE

## 2023-10-25 PROCEDURE — 3008F BODY MASS INDEX DOCD: CPT | Mod: CPTII,S$GLB,, | Performed by: INTERNAL MEDICINE

## 2023-10-25 PROCEDURE — 1159F MED LIST DOCD IN RCRD: CPT | Mod: CPTII,S$GLB,, | Performed by: INTERNAL MEDICINE

## 2023-10-25 PROCEDURE — 99214 OFFICE O/P EST MOD 30 MIN: CPT | Mod: S$GLB,,, | Performed by: INTERNAL MEDICINE

## 2023-10-25 PROCEDURE — 1160F PR REVIEW ALL MEDS BY PRESCRIBER/CLIN PHARMACIST DOCUMENTED: ICD-10-PCS | Mod: CPTII,S$GLB,, | Performed by: INTERNAL MEDICINE

## 2023-10-25 PROCEDURE — 3079F DIAST BP 80-89 MM HG: CPT | Mod: CPTII,S$GLB,, | Performed by: INTERNAL MEDICINE

## 2023-10-25 PROCEDURE — 90694 FLU VACCINE - QUADRIVALENT - ADJUVANTED: ICD-10-PCS | Mod: S$GLB,,, | Performed by: INTERNAL MEDICINE

## 2023-10-25 PROCEDURE — 1160F RVW MEDS BY RX/DR IN RCRD: CPT | Mod: CPTII,S$GLB,, | Performed by: INTERNAL MEDICINE

## 2023-10-25 PROCEDURE — 1101F PR PT FALLS ASSESS DOC 0-1 FALLS W/OUT INJ PAST YR: ICD-10-PCS | Mod: CPTII,S$GLB,, | Performed by: INTERNAL MEDICINE

## 2023-10-25 PROCEDURE — 3288F FALL RISK ASSESSMENT DOCD: CPT | Mod: CPTII,S$GLB,, | Performed by: INTERNAL MEDICINE

## 2023-10-25 PROCEDURE — 99999 PR PBB SHADOW E&M-EST. PATIENT-LVL V: ICD-10-PCS | Mod: PBBFAC,,, | Performed by: INTERNAL MEDICINE

## 2023-10-25 PROCEDURE — G0008 ADMIN INFLUENZA VIRUS VAC: HCPCS | Mod: S$GLB,,, | Performed by: INTERNAL MEDICINE

## 2023-10-25 PROCEDURE — G0008 FLU VACCINE - QUADRIVALENT - ADJUVANTED: ICD-10-PCS | Mod: S$GLB,,, | Performed by: INTERNAL MEDICINE

## 2023-10-25 PROCEDURE — 1125F PR PAIN SEVERITY QUANTIFIED, PAIN PRESENT: ICD-10-PCS | Mod: CPTII,S$GLB,, | Performed by: INTERNAL MEDICINE

## 2023-10-25 RX ORDER — HYDROCODONE BITARTRATE AND ACETAMINOPHEN 5; 325 MG/1; MG/1
1 TABLET ORAL EVERY 12 HOURS PRN
Qty: 60 TABLET | Refills: 0 | Status: SHIPPED | OUTPATIENT
Start: 2023-10-25 | End: 2024-02-12

## 2023-10-25 RX ORDER — SILODOSIN 8 MG/1
8 CAPSULE ORAL DAILY
Qty: 30 CAPSULE | Refills: 11 | Status: SHIPPED | OUTPATIENT
Start: 2023-10-25

## 2023-10-25 NOTE — PROGRESS NOTES
Subjective:       Patient ID: Uriah Mills is a 73 y.o. male.    Chief Complaint: Follow-up (Follow up )    HPI   Pt visit today for f/u he is here with his wife pt c/o his back still hurt very bad withscaitica he was seen by [facility] pain management had epidural injections and accupuncture still hurt he rafa told nothing else can be done for him pt still having symptoms of BPH on flomax and hytrin not better with urinary frequency and weak stream  worsen by pain med pt request try another medicatio before  consult no hematuria no dysuria. Pt's wife states he is in pain all the time   Review of Systems   Constitutional:  Negative for unexpected weight change.   Respiratory:  Negative for shortness of breath.    Cardiovascular:  Negative for chest pain.   Gastrointestinal:  Negative for abdominal pain.   Musculoskeletal:  Positive for back pain.   Psychiatric/Behavioral:  Positive for dysphoric mood.        Objective:      Physical Exam  Vitals and nursing note reviewed.   Constitutional:       General: He is not in acute distress.     Appearance: He is well-developed.   HENT:      Head: Normocephalic and atraumatic.      Right Ear: External ear normal.      Left Ear: External ear normal.      Nose: Nose normal.      Mouth/Throat:      Pharynx: No oropharyngeal exudate.   Eyes:      Extraocular Movements: Extraocular movements intact.      Conjunctiva/sclera: Conjunctivae normal.      Pupils: Pupils are equal, round, and reactive to light.   Neck:      Thyroid: No thyromegaly.   Cardiovascular:      Rate and Rhythm: Normal rate and regular rhythm.      Heart sounds: Normal heart sounds. No murmur heard.     No friction rub. No gallop.   Pulmonary:      Effort: Pulmonary effort is normal. No respiratory distress.      Breath sounds: Normal breath sounds. No wheezing.   Abdominal:      General: Bowel sounds are normal. There is no distension.      Palpations: Abdomen is soft.      Tenderness: There is no abdominal  tenderness.   Musculoskeletal:         General: No tenderness or deformity. Normal range of motion.      Cervical back: Normal range of motion and neck supple.   Lymphadenopathy:      Cervical: No cervical adenopathy.   Skin:     General: Skin is warm and dry.      Findings: No erythema or rash.   Neurological:      Mental Status: He is alert and oriented to person, place, and time.   Psychiatric:         Mood and Affect: Mood normal.         Thought Content: Thought content normal.         Judgment: Judgment normal.         Assessment:       1. Flu vaccine need    2. Chronic bilateral low back pain with bilateral sciatica    3. Primary osteoarthritis of both hips    4. Lumbar radiculopathy    5. Benign prostatic hyperplasia with urinary frequency        Plan:       Flu vaccine need  -     Influenza (FLUAD) - Quadrivalent (Adjuvanted) *Preferred* (65+) (PF)    Chronic bilateral low back pain with bilateral sciatica  -     HYDROcodone-acetaminophen (NORCO) 5-325 mg per tablet; Take 1 tablet by mouth every 12 (twelve) hours as needed for Pain.  Dispense: 60 tablet; Refill: 0    Primary osteoarthritis of both hips  -     HYDROcodone-acetaminophen (NORCO) 5-325 mg per tablet; Take 1 tablet by mouth every 12 (twelve) hours as needed for Pain.  Dispense: 60 tablet; Refill: 0    Lumbar radiculopathy  -     HYDROcodone-acetaminophen (NORCO) 5-325 mg per tablet; Take 1 tablet by mouth every 12 (twelve) hours as needed for Pain.  Dispense: 60 tablet; Refill: 0    Benign prostatic hyperplasia with urinary frequency  -     silodosin (RAPAFLO) 8 mg Cap capsule; Take 1 capsule (8 mg total) by mouth once daily.  Dispense: 30 capsule; Refill: 11  -     Ambulatory referral/consult to Urology; Future; Expected date: 11/01/2023  -     Ambulatory referral/consult to Urology; Future; Expected date: 11/04/2023        Medication List with Changes/Refills   New Medications    SILODOSIN (RAPAFLO) 8 MG CAP CAPSULE    Take 1 capsule (8 mg  total) by mouth once daily.   Current Medications    ALBUTEROL (PROVENTIL/VENTOLIN HFA) 90 MCG/ACTUATION INHALER    INHALE 2 PUFFS BY MOUTH EVERY 6 HOURS AS NEEDED    ALPRAZOLAM (XANAX) 1 MG TABLET    Take 1 tablet (1 mg total) by mouth nightly as needed for Anxiety.    ASPIRIN (ECOTRIN) 81 MG EC TABLET    Take 81 mg by mouth once daily.    GABAPENTIN (NEURONTIN) 600 MG TABLET    Take 1 tablet by mouth twice daily    LEVOCETIRIZINE (XYZAL) 5 MG TABLET    TAKE 1 TABLET BY MOUTH ONCE DAILY IN THE EVENING   Changed and/or Refilled Medications    Modified Medication Previous Medication    HYDROCODONE-ACETAMINOPHEN (NORCO) 5-325 MG PER TABLET HYDROcodone-acetaminophen (NORCO) 5-325 mg per tablet       Take 1 tablet by mouth every 12 (twelve) hours as needed for Pain.    Take 1 tablet by mouth every 12 (twelve) hours as needed for Pain.   Discontinued Medications    TAMSULOSIN (FLOMAX) 0.4 MG CAP    Take 1 capsule by mouth once daily    TERAZOSIN (HYTRIN) 5 MG CAPSULE    TAKE 1 CAPSULE BY MOUTH IN THE EVENING

## 2023-10-25 NOTE — PROGRESS NOTES
Verified pt ID using name and . Mia YBARRA Administered Flu 65+ in left deltoid per physician order using aseptic technique. Aspirated and no blood return noted. Pt tolerated well with no adverse reactions noted.

## 2023-11-02 ENCOUNTER — TELEPHONE (OUTPATIENT)
Dept: UROLOGY | Facility: CLINIC | Age: 73
End: 2023-11-02

## 2023-11-02 ENCOUNTER — OFFICE VISIT (OUTPATIENT)
Dept: UROLOGY | Facility: CLINIC | Age: 73
End: 2023-11-02
Payer: MEDICARE

## 2023-11-02 VITALS
BODY MASS INDEX: 24.18 KG/M2 | WEIGHT: 168.88 LBS | HEART RATE: 84 BPM | SYSTOLIC BLOOD PRESSURE: 152 MMHG | HEIGHT: 70 IN | DIASTOLIC BLOOD PRESSURE: 66 MMHG

## 2023-11-02 DIAGNOSIS — N40.1 BENIGN PROSTATIC HYPERPLASIA WITH URINARY FREQUENCY: ICD-10-CM

## 2023-11-02 DIAGNOSIS — R35.0 BENIGN PROSTATIC HYPERPLASIA WITH URINARY FREQUENCY: ICD-10-CM

## 2023-11-02 PROCEDURE — 3288F PR FALLS RISK ASSESSMENT DOCUMENTED: ICD-10-PCS | Mod: CPTII,S$GLB,, | Performed by: STUDENT IN AN ORGANIZED HEALTH CARE EDUCATION/TRAINING PROGRAM

## 2023-11-02 PROCEDURE — 99999 PR PBB SHADOW E&M-EST. PATIENT-LVL III: ICD-10-PCS | Mod: PBBFAC,,, | Performed by: STUDENT IN AN ORGANIZED HEALTH CARE EDUCATION/TRAINING PROGRAM

## 2023-11-02 PROCEDURE — 1101F PR PT FALLS ASSESS DOC 0-1 FALLS W/OUT INJ PAST YR: ICD-10-PCS | Mod: CPTII,S$GLB,, | Performed by: STUDENT IN AN ORGANIZED HEALTH CARE EDUCATION/TRAINING PROGRAM

## 2023-11-02 PROCEDURE — 1101F PT FALLS ASSESS-DOCD LE1/YR: CPT | Mod: CPTII,S$GLB,, | Performed by: STUDENT IN AN ORGANIZED HEALTH CARE EDUCATION/TRAINING PROGRAM

## 2023-11-02 PROCEDURE — 3008F BODY MASS INDEX DOCD: CPT | Mod: CPTII,S$GLB,, | Performed by: STUDENT IN AN ORGANIZED HEALTH CARE EDUCATION/TRAINING PROGRAM

## 2023-11-02 PROCEDURE — 3288F FALL RISK ASSESSMENT DOCD: CPT | Mod: CPTII,S$GLB,, | Performed by: STUDENT IN AN ORGANIZED HEALTH CARE EDUCATION/TRAINING PROGRAM

## 2023-11-02 PROCEDURE — 99204 PR OFFICE/OUTPT VISIT, NEW, LEVL IV, 45-59 MIN: ICD-10-PCS | Mod: S$GLB,,, | Performed by: STUDENT IN AN ORGANIZED HEALTH CARE EDUCATION/TRAINING PROGRAM

## 2023-11-02 PROCEDURE — 1159F PR MEDICATION LIST DOCUMENTED IN MEDICAL RECORD: ICD-10-PCS | Mod: CPTII,S$GLB,, | Performed by: STUDENT IN AN ORGANIZED HEALTH CARE EDUCATION/TRAINING PROGRAM

## 2023-11-02 PROCEDURE — 99204 OFFICE O/P NEW MOD 45 MIN: CPT | Mod: S$GLB,,, | Performed by: STUDENT IN AN ORGANIZED HEALTH CARE EDUCATION/TRAINING PROGRAM

## 2023-11-02 PROCEDURE — 3078F PR MOST RECENT DIASTOLIC BLOOD PRESSURE < 80 MM HG: ICD-10-PCS | Mod: CPTII,S$GLB,, | Performed by: STUDENT IN AN ORGANIZED HEALTH CARE EDUCATION/TRAINING PROGRAM

## 2023-11-02 PROCEDURE — 99999 PR PBB SHADOW E&M-EST. PATIENT-LVL III: CPT | Mod: PBBFAC,,, | Performed by: STUDENT IN AN ORGANIZED HEALTH CARE EDUCATION/TRAINING PROGRAM

## 2023-11-02 PROCEDURE — 3077F PR MOST RECENT SYSTOLIC BLOOD PRESSURE >= 140 MM HG: ICD-10-PCS | Mod: CPTII,S$GLB,, | Performed by: STUDENT IN AN ORGANIZED HEALTH CARE EDUCATION/TRAINING PROGRAM

## 2023-11-02 PROCEDURE — 3077F SYST BP >= 140 MM HG: CPT | Mod: CPTII,S$GLB,, | Performed by: STUDENT IN AN ORGANIZED HEALTH CARE EDUCATION/TRAINING PROGRAM

## 2023-11-02 PROCEDURE — 1125F PR PAIN SEVERITY QUANTIFIED, PAIN PRESENT: ICD-10-PCS | Mod: CPTII,S$GLB,, | Performed by: STUDENT IN AN ORGANIZED HEALTH CARE EDUCATION/TRAINING PROGRAM

## 2023-11-02 PROCEDURE — 1125F AMNT PAIN NOTED PAIN PRSNT: CPT | Mod: CPTII,S$GLB,, | Performed by: STUDENT IN AN ORGANIZED HEALTH CARE EDUCATION/TRAINING PROGRAM

## 2023-11-02 PROCEDURE — 1159F MED LIST DOCD IN RCRD: CPT | Mod: CPTII,S$GLB,, | Performed by: STUDENT IN AN ORGANIZED HEALTH CARE EDUCATION/TRAINING PROGRAM

## 2023-11-02 PROCEDURE — 3078F DIAST BP <80 MM HG: CPT | Mod: CPTII,S$GLB,, | Performed by: STUDENT IN AN ORGANIZED HEALTH CARE EDUCATION/TRAINING PROGRAM

## 2023-11-02 PROCEDURE — 3008F PR BODY MASS INDEX (BMI) DOCUMENTED: ICD-10-PCS | Mod: CPTII,S$GLB,, | Performed by: STUDENT IN AN ORGANIZED HEALTH CARE EDUCATION/TRAINING PROGRAM

## 2023-11-02 NOTE — TELEPHONE ENCOUNTER
----- Message from Javon Rosas sent at 11/2/2023  3:19 PM CDT -----  Consult/Advisory    Name Of Caller:Mrs Mills       Contact Preference:[phone]    Nature of call: Ptn wife called regarding the location of her  appt for the ultra sound. She stated she will like the appt to be  closer please call wife to assist

## 2023-11-02 NOTE — PROGRESS NOTES
"[facility] [place] [address]   Clinic Note    SUBJECTIVE:     Chief Complaint: BPH with LUTS    History of Present Illness:  Uriah Mills is a 73 y.o. male who presents to clinic for BPH with LUTS. He is new to our clinic referred by Dr. Geo Montalvo.     He was switched from Flomax and Hytrin to Rapaflo a week ago. He noticed some improvement initially with Flomax and Hytrin but symptoms continued to worsen.    IPSS Questionnaire (AUA-SS):  Over the past month    1)  Incomplete Emptying - How often have you had a sensation of not emptying your bladder completely after you finish urinating?  3 - About half the time   2)  Frequency - How often have you had to urinate again less than two hours after you finished urinating? 4 - More than half the time   3)  Intermittency - How often have you found you stopped and started again several times when you urinated?  3 - About half the time   4) Urgency - How difficult have you found it to postpone urination?  2 - Less than half the time   5) Weak Stream - How often have you had a weak urinary stream?  3 - About half the time   6) Straining  - How often have you had to push or strain to begin urination?  2 - Less than half the time   7) Nocturia - How many times did you most typically get up to urinate from the time you went to bed until the time you got up in the morning?  3 - 3 times   Total score:  0-7 mild, 8-19 moderate, 20-35 severe 20   Quality of Life:  4 - Mostly Dissatisfied      PMH notable for asthma/COPD - uses rescue inhaler 2-3 times daily currently, more in the summer    Anticoagulation:  Yes ASA 81 mg    OBJECTIVE:     Estimated body mass index is 24.23 kg/m² as calculated from the following:    Height as of this encounter: 5' 10" (1.778 m).    Weight as of this encounter: 76.6 kg (168 lb 14 oz).    Vital Signs (Most Recent)  Pulse: 84 (11/02/23 1356)  BP: (!) 152/66 (11/02/23 1356)    Physical Exam  Constitutional:       Appearance: Normal appearance. "   HENT:      Head: Normocephalic and atraumatic.   Eyes:      Conjunctiva/sclera: Conjunctivae normal.   Cardiovascular:      Rate and Rhythm: Normal rate.   Pulmonary:      Effort: Pulmonary effort is normal.   Abdominal:      General: Abdomen is flat. There is no distension.      Tenderness: There is no abdominal tenderness.   Genitourinary:     Comments: Prostate ~30-35g, nontender, no nodules  Musculoskeletal:         General: Normal range of motion.   Skin:     General: Skin is warm and dry.   Neurological:      General: No focal deficit present.      Mental Status: He is alert and oriented to person, place, and time.   Psychiatric:         Mood and Affect: Mood normal.         Behavior: Behavior normal.         Thought Content: Thought content normal.         Judgment: Judgment normal.     Lab Results   Component Value Date    BUN 20 08/28/2023    CREATININE 1.4 08/28/2023    WBC 8.41 03/08/2022    HGB 15.1 03/08/2022    HCT 46.2 03/08/2022     03/08/2022    AST 9 (L) 08/28/2023    ALT 6 (L) 08/28/2023    ALKPHOS 102 08/28/2023    ALBUMIN 3.9 08/28/2023        Lab Results   Component Value Date    PSA 2.3 08/28/2023    PSA 1.6 03/08/2022    PSA 1.5 12/16/2020    PSA 1.4 03/13/2019    PSA 1.2 11/15/2017       Urine dip showed + blood, - leukocyte esterase, - nitrite, - glucose, - protein.    ASSESSMENT     1. Benign prostatic hyperplasia with urinary frequency      PLAN:   1. Benign prostatic hyperplasia with urinary frequency  -     POCT urine dipstick without microscope  -     POCT Bladder Scan  -     Ambulatory referral/consult to Urology  -     US Retroperitoneal Complete; Future; Expected date: 11/02/2023  -     Cystoscopy; Future       Discussed adding finasteride vs outlet procedure, patient interested in UroLift. Will schedule cystoscopy and RBUS.  Rory Griffith MD     Letter to Geo Montalvo MD

## 2023-11-16 ENCOUNTER — PROCEDURE VISIT (OUTPATIENT)
Dept: UROLOGY | Facility: CLINIC | Age: 73
End: 2023-11-16
Payer: MEDICARE

## 2023-11-16 VITALS
HEIGHT: 70 IN | DIASTOLIC BLOOD PRESSURE: 70 MMHG | SYSTOLIC BLOOD PRESSURE: 138 MMHG | BODY MASS INDEX: 23.95 KG/M2 | WEIGHT: 167.31 LBS | HEART RATE: 105 BPM

## 2023-11-16 DIAGNOSIS — N40.1 BENIGN PROSTATIC HYPERPLASIA WITH URINARY FREQUENCY: ICD-10-CM

## 2023-11-16 DIAGNOSIS — R35.0 BENIGN PROSTATIC HYPERPLASIA WITH URINARY FREQUENCY: ICD-10-CM

## 2023-11-16 LAB
BILIRUB SERPL-MCNC: 1 MG/DL
BLOOD URINE, POC: NEGATIVE
CLARITY, POC UA: NORMAL
COLOR, POC UA: NORMAL
GLUCOSE UR QL STRIP: NEGATIVE
KETONES UR QL STRIP: NORMAL
LEUKOCYTE ESTERASE URINE, POC: NEGATIVE
NITRITE, POC UA: NEGATIVE
PH, POC UA: 5
PROTEIN, POC: NORMAL
SPECIFIC GRAVITY, POC UA: 1.02
UROBILINOGEN, POC UA: NORMAL

## 2023-11-16 PROCEDURE — 81002 URINALYSIS NONAUTO W/O SCOPE: CPT | Mod: S$GLB,,, | Performed by: STUDENT IN AN ORGANIZED HEALTH CARE EDUCATION/TRAINING PROGRAM

## 2023-11-16 PROCEDURE — 81002 POCT URINE DIPSTICK WITHOUT MICROSCOPE: ICD-10-PCS | Mod: S$GLB,,, | Performed by: STUDENT IN AN ORGANIZED HEALTH CARE EDUCATION/TRAINING PROGRAM

## 2023-11-16 PROCEDURE — 52000 CYSTOURETHROSCOPY: CPT | Mod: S$GLB,,, | Performed by: STUDENT IN AN ORGANIZED HEALTH CARE EDUCATION/TRAINING PROGRAM

## 2023-11-16 PROCEDURE — 52000 CYSTOSCOPY: ICD-10-PCS | Mod: S$GLB,,, | Performed by: STUDENT IN AN ORGANIZED HEALTH CARE EDUCATION/TRAINING PROGRAM

## 2023-11-16 PROCEDURE — 87086 URINE CULTURE/COLONY COUNT: CPT | Performed by: STUDENT IN AN ORGANIZED HEALTH CARE EDUCATION/TRAINING PROGRAM

## 2023-11-16 RX ORDER — LIDOCAINE HYDROCHLORIDE 20 MG/ML
JELLY TOPICAL
Status: COMPLETED | OUTPATIENT
Start: 2023-11-16 | End: 2023-11-16

## 2023-11-16 RX ORDER — CIPROFLOXACIN 500 MG/1
500 TABLET ORAL
Status: COMPLETED | OUTPATIENT
Start: 2023-11-16 | End: 2023-11-16

## 2023-11-16 RX ADMIN — LIDOCAINE HYDROCHLORIDE: 20 JELLY TOPICAL at 02:11

## 2023-11-16 RX ADMIN — CIPROFLOXACIN 500 MG: 500 TABLET ORAL at 02:11

## 2023-11-16 NOTE — PROCEDURES
"Uriah Mills is a 73 y.o. male patient.    Pulse: 105 (11/16/23 1304)  BP: 138/70 (11/16/23 1304)  Weight: 75.9 kg (167 lb 5.3 oz) (11/16/23 1304)  Height: 5' 10" (177.8 cm) (11/16/23 1304)       Cystoscopy    Date/Time: 11/16/2023 1:53 PM    Performed by: Rory Griffith MD  Authorized by: Rory Griffith MD    Timeout: prior to procedure the correct patient, procedure, and site was verified    Prep: patient was prepped and draped in usual sterile fashion    Local anesthesia used?: Yes    Anesthesia:  Intraurethral instillation  Indications: BPH    Position:  Supine  Anesthesia:  Intraurethral instillation  Patient sedated?: No    Preparation: Patient was prepped and draped in usual sterile fashion    Scope type:  Flexible cystoscope  External exam normal: Yes    Digital exam performed: No    Urethra normal: Yes    Prostate normal: No (trilobar hyperplasia, very small intravesical median lobe)     Hyperplasia   Trilobar  Length (cm):  5  Bladder neck normal: Yes    Bladder normal: No    Number of tumors:  2  Tumor 1:     Size (mm):  10    Anatomy:  Pedunculated    Location:  Floor  Tumor 2:     Size (mm):  7    Anatomy:  Sessile    Location:  Posterior wall   patient tolerated the procedure well with no immediate complications  Comments:      Two low-grade appearing tumors, both ~ 1 cm; one pedunculated right next to left UO, the other on the left posterior wall. Trilobar prostatic hyperplasia with small IV median lobe. Patient would be a good UroLift candidate, which he prefers over TURP. Discussed with him that if bladder cancer is muscle invasive cystectomy may be indicated, which would eliminate the need for outlet procedure. Patient still wishes to proceed. Risks, benefits, alternatives discussed. Consent signed. Will schedule TURBT with bilateral retrograde pyelograms for upper tract evaluation, plus UroLift.  Hold ASA for 7 days prior to surgery.     11/16/2023    "

## 2023-11-18 LAB — BACTERIA UR CULT: NO GROWTH

## 2023-11-20 DIAGNOSIS — G89.29 CHRONIC BILATERAL LOW BACK PAIN WITH BILATERAL SCIATICA: ICD-10-CM

## 2023-11-20 DIAGNOSIS — M54.42 CHRONIC BILATERAL LOW BACK PAIN WITH BILATERAL SCIATICA: ICD-10-CM

## 2023-11-20 DIAGNOSIS — M54.16 LUMBAR RADICULOPATHY: ICD-10-CM

## 2023-11-20 DIAGNOSIS — M54.41 CHRONIC BILATERAL LOW BACK PAIN WITH BILATERAL SCIATICA: ICD-10-CM

## 2023-11-20 RX ORDER — GABAPENTIN 600 MG/1
600 TABLET ORAL 2 TIMES DAILY
Qty: 60 TABLET | Refills: 0 | Status: SHIPPED | OUTPATIENT
Start: 2023-11-20 | End: 2023-12-21

## 2023-11-20 NOTE — TELEPHONE ENCOUNTER
No care due was identified.  Health [facility] Embedded Care Due Messages. Reference number: [id number].   11/20/2023 9:16:00 AM CST

## 2023-12-01 ENCOUNTER — OFFICE VISIT (OUTPATIENT)
Dept: UROLOGY | Facility: CLINIC | Age: 73
End: 2023-12-01
Payer: MEDICARE

## 2023-12-01 DIAGNOSIS — R35.0 BENIGN PROSTATIC HYPERPLASIA WITH URINARY FREQUENCY: Primary | ICD-10-CM

## 2023-12-01 DIAGNOSIS — N40.1 BENIGN PROSTATIC HYPERPLASIA WITH URINARY FREQUENCY: Primary | ICD-10-CM

## 2023-12-01 PROCEDURE — 1159F PR MEDICATION LIST DOCUMENTED IN MEDICAL RECORD: ICD-10-PCS | Mod: CPTII,S$GLB,, | Performed by: UROLOGY

## 2023-12-01 PROCEDURE — 99213 OFFICE O/P EST LOW 20 MIN: CPT | Mod: S$GLB,,, | Performed by: UROLOGY

## 2023-12-01 PROCEDURE — 99999 PR PBB SHADOW E&M-EST. PATIENT-LVL I: ICD-10-PCS | Mod: PBBFAC,,, | Performed by: UROLOGY

## 2023-12-01 PROCEDURE — 1160F RVW MEDS BY RX/DR IN RCRD: CPT | Mod: CPTII,S$GLB,, | Performed by: UROLOGY

## 2023-12-01 PROCEDURE — 99999 PR PBB SHADOW E&M-EST. PATIENT-LVL I: CPT | Mod: PBBFAC,,, | Performed by: UROLOGY

## 2023-12-01 PROCEDURE — 99213 PR OFFICE/OUTPT VISIT, EST, LEVL III, 20-29 MIN: ICD-10-PCS | Mod: S$GLB,,, | Performed by: UROLOGY

## 2023-12-01 PROCEDURE — 1160F PR REVIEW ALL MEDS BY PRESCRIBER/CLIN PHARMACIST DOCUMENTED: ICD-10-PCS | Mod: CPTII,S$GLB,, | Performed by: UROLOGY

## 2023-12-01 PROCEDURE — 1159F MED LIST DOCD IN RCRD: CPT | Mod: CPTII,S$GLB,, | Performed by: UROLOGY

## 2023-12-01 NOTE — PROGRESS NOTES
Subjective:      Uriah Mills is a 73 y.o. male who returns today regarding his BPH.    He is POD2 s/p Urolift and TURBT by Dr. Griffith.    Here for VT. No c/o. No problems post-op.    The following portions of the patient's history were reviewed and updated as appropriate: allergies, current medications, past family history, past medical history, past social history, past surgical history and problem list.    Review of Systems  A comprehensive multipoint review of systems was negative except as otherwise stated in the HPI.     Objective:   Vitals: There were no vitals taken for this visit.     Physical Exam   General: alert and oriented, no acute distress  Respiratory: Symmetric expansion, non-labored breathing  Neuro: no gross deficits  Psych: normal judgment and insight, normal mood/affect and non-anxious    Voiding Trial (Fill/Pull/Void): 150cc of sterile saline was instilled into the bladder through the previously placed capps catheter.  The capps balloon was then deflated and the capps removed.  The patient subsequently voided 150cc, consistent with successful voiding trial.  The capps was not replaced.     Assessment and Plan:   1. Benign non-nodular prostatic hyperplasia with lower urinary tract symptoms  2. Bladder tumor  -- Passed VT  -- FU as scheduled w/ Dr. Griffith

## 2023-12-15 ENCOUNTER — HOSPITAL ENCOUNTER (OUTPATIENT)
Dept: RADIOLOGY | Facility: OTHER | Age: 73
Discharge: HOME | End: 2023-12-15
Attending: ANESTHESIOLOGY
Payer: MEDICARE

## 2023-12-15 ENCOUNTER — OFFICE VISIT (OUTPATIENT)
Dept: PAIN MEDICINE | Facility: CLINIC | Age: 73
End: 2023-12-15
Payer: MEDICARE

## 2023-12-15 VITALS
BODY MASS INDEX: 23.15 KG/M2 | DIASTOLIC BLOOD PRESSURE: 63 MMHG | RESPIRATION RATE: 18 BRPM | OXYGEN SATURATION: 98 % | WEIGHT: 166 LBS | TEMPERATURE: 97 F | HEART RATE: 90 BPM | SYSTOLIC BLOOD PRESSURE: 117 MMHG

## 2023-12-15 DIAGNOSIS — M54.16 LUMBAR RADICULOPATHY: ICD-10-CM

## 2023-12-15 DIAGNOSIS — M46.1 SACROILIITIS: ICD-10-CM

## 2023-12-15 DIAGNOSIS — M53.3 SACROILIAC JOINT PAIN: ICD-10-CM

## 2023-12-15 DIAGNOSIS — M25.559 HIP PAIN, UNSPECIFIED LATERALITY: ICD-10-CM

## 2023-12-15 DIAGNOSIS — M53.3 SACROILIAC JOINT PAIN: Primary | ICD-10-CM

## 2023-12-15 PROCEDURE — 1160F PR REVIEW ALL MEDS BY PRESCRIBER/CLIN PHARMACIST DOCUMENTED: ICD-10-PCS | Mod: CPTII,S$GLB,, | Performed by: ANESTHESIOLOGY

## 2023-12-15 PROCEDURE — 1160F RVW MEDS BY RX/DR IN RCRD: CPT | Mod: CPTII,S$GLB,, | Performed by: ANESTHESIOLOGY

## 2023-12-15 PROCEDURE — 99999 PR PBB SHADOW E&M-EST. PATIENT-LVL III: CPT | Mod: PBBFAC,,, | Performed by: ANESTHESIOLOGY

## 2023-12-15 PROCEDURE — 73521 XR HIPS BILATERAL 2 VIEW INCL AP PELVIS: ICD-10-PCS | Mod: 26,,, | Performed by: RADIOLOGY

## 2023-12-15 PROCEDURE — 3008F PR BODY MASS INDEX (BMI) DOCUMENTED: ICD-10-PCS | Mod: CPTII,S$GLB,, | Performed by: ANESTHESIOLOGY

## 2023-12-15 PROCEDURE — 1101F PR PT FALLS ASSESS DOC 0-1 FALLS W/OUT INJ PAST YR: ICD-10-PCS | Mod: CPTII,S$GLB,, | Performed by: ANESTHESIOLOGY

## 2023-12-15 PROCEDURE — 99214 OFFICE O/P EST MOD 30 MIN: CPT | Mod: GC,S$GLB,, | Performed by: ANESTHESIOLOGY

## 2023-12-15 PROCEDURE — 73521 X-RAY EXAM HIPS BI 2 VIEWS: CPT | Mod: TC,FY

## 2023-12-15 PROCEDURE — 3074F PR MOST RECENT SYSTOLIC BLOOD PRESSURE < 130 MM HG: ICD-10-PCS | Mod: CPTII,S$GLB,, | Performed by: ANESTHESIOLOGY

## 2023-12-15 PROCEDURE — 73521 X-RAY EXAM HIPS BI 2 VIEWS: CPT | Mod: 26,,, | Performed by: RADIOLOGY

## 2023-12-15 PROCEDURE — 99999 PR PBB SHADOW E&M-EST. PATIENT-LVL III: ICD-10-PCS | Mod: PBBFAC,,, | Performed by: ANESTHESIOLOGY

## 2023-12-15 PROCEDURE — 3288F PR FALLS RISK ASSESSMENT DOCUMENTED: ICD-10-PCS | Mod: CPTII,S$GLB,, | Performed by: ANESTHESIOLOGY

## 2023-12-15 PROCEDURE — 1101F PT FALLS ASSESS-DOCD LE1/YR: CPT | Mod: CPTII,S$GLB,, | Performed by: ANESTHESIOLOGY

## 2023-12-15 PROCEDURE — 3288F FALL RISK ASSESSMENT DOCD: CPT | Mod: CPTII,S$GLB,, | Performed by: ANESTHESIOLOGY

## 2023-12-15 PROCEDURE — 1125F AMNT PAIN NOTED PAIN PRSNT: CPT | Mod: CPTII,S$GLB,, | Performed by: ANESTHESIOLOGY

## 2023-12-15 PROCEDURE — 3078F PR MOST RECENT DIASTOLIC BLOOD PRESSURE < 80 MM HG: ICD-10-PCS | Mod: CPTII,S$GLB,, | Performed by: ANESTHESIOLOGY

## 2023-12-15 PROCEDURE — 3008F BODY MASS INDEX DOCD: CPT | Mod: CPTII,S$GLB,, | Performed by: ANESTHESIOLOGY

## 2023-12-15 PROCEDURE — 3074F SYST BP LT 130 MM HG: CPT | Mod: CPTII,S$GLB,, | Performed by: ANESTHESIOLOGY

## 2023-12-15 PROCEDURE — 99214 PR OFFICE/OUTPT VISIT, EST, LEVL IV, 30-39 MIN: ICD-10-PCS | Mod: GC,S$GLB,, | Performed by: ANESTHESIOLOGY

## 2023-12-15 PROCEDURE — 1125F PR PAIN SEVERITY QUANTIFIED, PAIN PRESENT: ICD-10-PCS | Mod: CPTII,S$GLB,, | Performed by: ANESTHESIOLOGY

## 2023-12-15 PROCEDURE — 3078F DIAST BP <80 MM HG: CPT | Mod: CPTII,S$GLB,, | Performed by: ANESTHESIOLOGY

## 2023-12-15 PROCEDURE — 1159F PR MEDICATION LIST DOCUMENTED IN MEDICAL RECORD: ICD-10-PCS | Mod: CPTII,S$GLB,, | Performed by: ANESTHESIOLOGY

## 2023-12-15 PROCEDURE — 1159F MED LIST DOCD IN RCRD: CPT | Mod: CPTII,S$GLB,, | Performed by: ANESTHESIOLOGY

## 2023-12-15 NOTE — H&P (VIEW-ONLY)
Chronic patient Established Note (Follow up visit)      Interval History 12/15/2023:    Uriah Mills presents to the clinic for a follow-up appointment for low back and hip pain. Since the last visit, Uriah Mills states the pain has been persistant. Current pain intensity is 8/10 but he reports it is usually a 10/10. He has been tolerating his pain over the past few years but had a flare up in October which led him to schedule this appointment.    Interval History (12/14/21):  He returns today for follow up.  He reports that bilateral sacroiliac joint steroid injections has been helpful for the bilateral low back pain.  He reports roughly 60% relief.  He is happy with these results and does not feel that further treatment needed at this time.  He is not interested in participating in further physical therapy.        Interval History (11/11/21):  He returns today for follow up.  He reports that bilateral lumbar radiofrequency ablations has been helpful for the bilateral lumbar pain.  He reports 95% relief of lumbar back pain.  He states that he continues to have pain in the bilateral lumbosacral regions over the bilateral sacroiliac joints.  This pain radiates to the bilateral buttocks and lateral hips.  He denies any associated numbness, tingling, weakness, bowel bladder dysfunction.        Interval History (7/1/21):  He returns today for follow up.  He reports that bilateral S1 transforaminal epidural steroid injection has been helpful for the bilateral low back and lower extremity pain.  He reports roughly 40% relief.  He continues to have some back pain in the bilateral lower lumbar/lumbosacral region.  The pain does not radiate.  He denies any other changes in the quality or location was pain.  He denies any new or worsening symptoms.        Interval History (1/19/21):  He returns today for follow up.  He reports that his pain is unchanged in quality location since last encounter.  He denies any new or  worsening symptoms.  We have received records from previous pain management provider.  Records indicate the patient has undergone multiple lumbar interventional procedures including sacroiliac joint injections, lumbar radiofrequency ablations, and bilateral L4 and L5 transforaminal epidural steroid injections.  Patient denies any significant relief from these procedures.        Initial Encounter (1/5/21):  Uriah Mills is a 71 y.o. male who presents today with chronic bilateral low back and lower extremity pain.  Patient has had this problem for many years.  No specific inciting event or injury noted.  The pain is located across the lower lumbar region radiate to the bilateral hips and posterior thighs.  Patient reports associated numbness in the right posterior thigh.  He denies any pain or numbness distal to the knees.  He denies any focal weakness or bowel bladder dysfunction.  The pain is constant worse with activity.  Patient reports undergoing multiple interventional procedures in the past with Dr. Kin Palomino.  He states that no these procedures were particularly helpful.  He was also tried on multiple opioid pain medications.  Some of these help but they also cause significant constipation and therefore these medications were not continued.  More recently, patient has been taking tramadol prescribed by his primary care physician.  He states this medication does not cause any problems but also did not provide any significant relief..   This pain is described in detail below.    Pain Disability Index Review:      12/15/2023    11:13 AM   Last 3 PDI Scores   Pain Disability Index (PDI) 56       Pain Medications:    - Opioids: Lorcet (Hydrocodone/Acetaminophen)  Hydrocodone 2 weeks since he took any, wasn't helping pain much  gabapentin    Opioid Contract: no     report:  Reviewed and inconsistent with medication use as prescribed. (Pt report he has stopped taking it).    Pain Procedures: October 2021: RFA  of bilateral L3, L4, L5                               June 2021: Transforaminal epidural at bilateral S1                               December 2021: Bilateral sacroiliac joint injection    Physical Therapy/Home Exercise: no (has tried accupuncture without significant relief)    Imaging: MRI lumbar spine 2020    Alignment: Normal.     Vertebrae: 5 lumbar-type vertebral bodies. No aggressive marrow replacement process or fracture.     Discs: Satisfactory height.  Mild diffuse desiccation of disc material predominantly L4-L5 and L5-S1     Cord: Normal. Conus terminates at T12-L1.     Degenerative findings:     T12-L1: There is no focal disc herniation.No significant central canal narrowing . No significant neural foraminal narrowing.     L1-L2: There is no focal disc herniation.No significant central canal narrowing . No significant neural foraminal narrowing.     L2-L3: There is no focal disc herniation.No significant central canal narrowing . No significant neural foraminal narrowing.     L3-L4: There is no focal disc herniation.No significant central canal narrowing . No significant neural foraminal narrowing.     L4-L5: Mild broad-based bulging of disc material with small superimposed central protrusion.  No significant central canal narrowing . No significant neural foraminal narrowing.     L5-S1: Mild broad-based bulging of disc material with small superimposed central protrusion.  No significant central canal narrowing . No significant neural foraminal narrowing.     Paraspinal muscles & soft tissues: Unremarkable.     Impression:     Degenerative changes disc space level 4-L5 and L5-S1. no significant central or foraminal stenosis.    Allergies: Review of patient's allergies indicates:  No Known Allergies    Current Medications:   Current Outpatient Medications   Medication Sig Dispense Refill    albuterol (PROVENTIL/VENTOLIN HFA) 90 mcg/actuation inhaler INHALE 2 PUFFS BY MOUTH EVERY 6 HOURS AS NEEDED 25.5 g  3    aspirin (ECOTRIN) 81 MG EC tablet Take 81 mg by mouth once daily.      gabapentin (NEURONTIN) 600 MG tablet Take 1 tablet by mouth twice daily 60 tablet 0    HYDROcodone-acetaminophen (NORCO) 5-325 mg per tablet Take 1 tablet by mouth every 12 (twelve) hours as needed for Pain. 60 tablet 0    levocetirizine (XYZAL) 5 MG tablet TAKE 1 TABLET BY MOUTH ONCE DAILY IN THE EVENING 90 tablet 3    oxybutynin (DITROPAN) 5 MG Tab Take 1 tablet (5 mg total) by mouth 3 (three) times daily as needed (bladder spasms (urge to urinate)). 10 tablet 0    silodosin (RAPAFLO) 8 mg Cap capsule Take 1 capsule (8 mg total) by mouth once daily. 30 capsule 11    ALPRAZolam (XANAX) 1 MG tablet Take 1 tablet (1 mg total) by mouth nightly as needed for Anxiety. 30 tablet 0     No current facility-administered medications for this visit.     Facility-Administered Medications Ordered in Other Visits   Medication Dose Route Frequency Provider Last Rate Last Admin    0.9%  NaCl infusion   Intravenous Continuous Rory Griffith MD        ALPRAZolam dissolvable tablet 1 mg  1 mg Oral Once PRN Colt Lenz Jr., MD        BUPivacaine (PF) 0.25% (2.5 mg/ml) injection 25 mg  10 mL Intramuscular Once Colt Lenz Jr., MD        ceFAZolin 2 g in dextrose 5 % in water (D5W) 100 mL IVPB (MB+)  2 g Intravenous On Call Procedure Rory Griffith MD        dexAMETHasone sodium phos (PF) injection 10 mg  10 mg Epidural Once Colt Lenz Jr., MD        LIDOcaine (PF) 10 mg/ml (1%) injection 10 mg  1 mL Other Once Colt Lenz Jr., MD        LIDOcaine (PF) 10 mg/ml (1%) injection 10 mg  1 mL Intradermal Once PRN Rory Griffith MD        LIDOcaine HCL 20 mg/ml (2%) injection 10 mL  10 mL Other Once Colt Lenz Jr., MD        LIDOcaine HCL 20 mg/ml (2%) injection 10 mL  10 mL Other Once Colt Lenz Jr., MD           REVIEW OF SYSTEMS:    GENERAL:  No weight loss, malaise or fevers.  HEENT:  Negative for  frequent or significant headaches.  NECK:  Negative for lumps, goiter, pain and significant neck swelling.  RESPIRATORY:  Negative for cough, wheezing or shortness of breath.  CARDIOVASCULAR:  Negative for chest pain, leg swelling or palpitations.  GI:  Negative for abdominal discomfort, blood in stools or black stools or change in bowel habits.  MUSCULOSKELETAL:  See HPI.  SKIN:  Negative for lesions, rash, and itching.  PSYCH:  Negative for sleep disturbance, mood disorder and recent psychosocial stressors.  HEMATOLOGY/LYMPHOLOGY:  Negative for prolonged bleeding, bruising easily or swollen nodes.  NEURO:   No history of headaches, syncope, paralysis, seizures or tremors.  All other reviewed and negative other than HPI.    Past Medical History:  Past Medical History:   Diagnosis Date    Allergy     Arthritis     BPH (benign prostatic hyperplasia)     Carpal tunnel syndrome     Chronic hip pain, bilateral     Chronic low back pain     COPD (chronic obstructive pulmonary disease)     DDD (degenerative disc disease), lumbar     Dorsalgia     Gastrointestinal disease     Lumbar radiculopathy     Lumbar spondylosis     Osteoarthritis of both hips     Osteoarthritis of spine     Skin cancer        Past Surgical History:  Past Surgical History:   Procedure Laterality Date    APPENDECTOMY      CARPAL TUNNEL RELEASE Right 08/12/2019    Procedure: RELEASE, CARPAL TUNNEL right;  Surgeon: Roopa Hanna MD;  Location: [facility] OR;  Service: Orthopedics;  Laterality: Right;    COLONOSCOPY      CYSTOSCOPY WITH INSERTION OF MINIMALLY INVASIVE IMPLANT TO ENLARGE PROSTATIC URETHRA N/A 11/29/2023    Procedure: CYSTOSCOPY, WITH INSERTION OF UROLIFT IMPLANT;  Surgeon: Rory Griffith MD;  Location: [facility] OR;  Service: Urology;  Laterality: N/A;    CYSTOSCOPY, WITH INSERTION OF UROLIFT IMPLANT  11/29/2023    EPIDURAL STEROID INJECTION INTO LUMBAR SPINE Bilateral 06/17/2021    Bilateral ALVIN per  06/17/2021    GASTRIC  FUNDOPLICATION      HERNIA REPAIR  1990s    INJECTION OF ANESTHETIC AGENT AROUND MEDIAL BRANCH NERVES INNERVATING LUMBAR FACET JOINT Bilateral 07/15/2021    Procedure: Block-nerve-medial branch-lumbar---BILATERAL L3, L4, L5;  Surgeon: Colt Lenz Jr., MD;  Location: [facility]IN [facility];  Service: Pain Management;  Laterality: Bilateral;    INJECTION OF ANESTHETIC AGENT AROUND MEDIAL BRANCH NERVES INNERVATING LUMBAR FACET JOINT Bilateral 09/23/2021    Procedure: Block-nerve-medial branch-lumbar---BILATERAL L3, L4, L5;  Surgeon: Colt Lenz Jr., MD;  Location: [facility];  Service: Pain Management;  Laterality: Bilateral;    INJECTION OF JOINT Bilateral 12/02/2021    Procedure: Injection, Joint---BILATERAL SACROILIAC INJECTION;  Surgeon: Colt Lezn Jr., MD;  Location: [facility];  Service: Pain Management;  Laterality: Bilateral;    INJECTION OF STEROID Left 08/12/2019    Procedure: INJECTION, STEROID;  Surgeon: Roopa Hanna MD;  Location: [facility] OR;  Service: Orthopedics;  Laterality: Left;    PYELOGRAM, RETROGRADE (Bilateral)    11/29/2023    RADIOFREQUENCY ABLATION Left 10/14/2021    Procedure: Radiofrequency Ablation---LEFT L3, L4, L5;  Surgeon: Colt Lenz Jr., MD;  Location: [facility];  Service: Pain Management;  Laterality: Left;    RADIOFREQUENCY ABLATION Right 10/28/2021    Procedure: Radiofrequency Ablation---RIGHT L3, L4, L5;  Surgeon: Clot Lenz Jr., MD;  Location: [facility];  Service: Pain Management;  Laterality: Right;    RETROGRADE PYELOGRAPHY Bilateral 11/29/2023    Procedure: PYELOGRAM, RETROGRADE;  Surgeon: Rory Griffith MD;  Location: [facility] OR;  Service: Urology;  Laterality: Bilateral;    ROTATOR CUFF REPAIR Right     SACROILIAC JOINT INJECTION Bilateral 12/02/2021    SKIN CANCER EXCISION      TRANSFORAMINAL EPIDURAL INJECTION OF STEROID Bilateral 06/17/2021    Procedure: Injection,steroid,epidural,transforaminal approach---BILATERAL  S1;  Surgeon: Colt Lenz Jr., MD;  Location: [facility] PAIN [facility];  Service: Pain Management;  Laterality: Bilateral;    TURBT (TRANSURETHRAL RESECTION OF BLADDER TUMOR) N/A 11/29/2023    Procedure: TURBT (TRANSURETHRAL RESECTION OF BLADDER TUMOR);  Surgeon: Rory Griffith MD;  Location: [facility] OR;  Service: Urology;  Laterality: N/A;  with UroLift and with bilateral retrograde pyelograms    URBT (TRANSURETHRAL RESECTION OF BLADDER TUMOR)  11/29/2023       Family History:  Family History   Problem Relation Age of Onset    Diabetes Mother     No Known Problems Father        Social History:  Social History     Socioeconomic History    Marital status:    Tobacco Use    Smoking status: Every Day     Types: Cigars    Smokeless tobacco: Never    Tobacco comments:     8 small cigars per day down from 10.   Substance and Sexual Activity    Alcohol use: No    Drug use: No    Sexual activity: Not Currently     Partners: Female     Birth control/protection: None       OBJECTIVE:    /63   Pulse 90   Temp 97.2 °F (36.2 °C)   Resp 18   Wt 75.3 kg (166 lb 0.1 oz)   SpO2 98%   BMI 23.15 kg/m²     PHYSICAL EXAMINATION:    General appearance: Well appearing, in no acute distress, alert and oriented x3.  Psych:  Mood and affect appropriate.  Skin: Skin color, texture, turgor normal, no rashes or lesions, in both upper and lower body.  Head/face:  Atraumatic, normocephalic. No palpable lymph nodes  Neck: No pain to palpation over the cervical paraspinous muscles. Spurling Negative. No pain with neck flexion, extension, or lateral flexion. .  Cor: RRR  Pulm: CTA  GI: Abdomen soft and non-tender.  Back: Straight leg raising in the sitting and supine positions is negative to radicular pain. No pain to palpation over the spine or costovertebral angles. Normal range of motion without pain reproduction.  Extremities: Peripheral joint ROM is full and pain free without obvious instability or laxity in all four  extremities. No deformities, edema, or skin discoloration. Good capillary refill.  Musculoskeletal: Shoulder, hip, sacroiliac and knee provocative maneuvers are negative. Bilateral upper and lower extremity strength is normal and symmetric.  No atrophy or tone abnormalities are noted. Tenderness to palpation of lumbar vertabra. No tenderness to palpation to the sacroiliac joint.   Neuro: Bilateral upper and lower extremity coordination and muscle stretch reflexes are physiologic and symmetric.  Plantar response are downgoing. No loss of sensation is noted.  Gait: Normal.    ASSESSMENT: 73 y.o. year old male with lower back and hip pain, consistent with sacroiliitis.     1. Sacroiliac joint pain  Procedure Order to Pain Management    X-Ray Hips Bilateral 2 View Incl AP Pelvis      2. Sacroiliitis  Procedure Order to Pain Management    X-Ray Hips Bilateral 2 View Incl AP Pelvis      3. Hip pain, unspecified laterality  X-Ray Hips Bilateral 2 View Incl AP Pelvis            PLAN:     - Schedule for Bilateral SI joint injection with his pain and progress with a home exercise program.  - RTC in 2 weeks after the procedure.  - Ordered Xray with hips  -Follow up with his urologist to see if he can have an MRI  - Counseled patient regarding the importance of physical therapy.    The above plan and management options were discussed at length with patient. Patient is in agreement with the above and verbalized understanding.    Bryant Acosta MS3  UQ-OCS  12/15/2023    I spent a total of 30 minutes on the day of the visit.  This includes face to face time and non-face to face time preparing to see the patient by reviewing previous labs/imaging, obtaining and/or reviewing separately obtained history, documenting clinical information in the electronic or other health record, independently interpreting results and communicating results to the patient/family/caregiver.    Jeanmarie Jauregui

## 2023-12-15 NOTE — PROGRESS NOTES
Chronic patient Established Note (Follow up visit)      Interval History 12/15/2023:    Uriah Mills presents to the clinic for a follow-up appointment for low back and hip pain. Since the last visit, Uriah Mills states the pain has been persistant. Current pain intensity is 8/10 but he reports it is usually a 10/10. He has been tolerating his pain over the past few years but had a flare up in October which led him to schedule this appointment.    Interval History (12/14/21):  He returns today for follow up.  He reports that bilateral sacroiliac joint steroid injections has been helpful for the bilateral low back pain.  He reports roughly 60% relief.  He is happy with these results and does not feel that further treatment needed at this time.  He is not interested in participating in further physical therapy.        Interval History (11/11/21):  He returns today for follow up.  He reports that bilateral lumbar radiofrequency ablations has been helpful for the bilateral lumbar pain.  He reports 95% relief of lumbar back pain.  He states that he continues to have pain in the bilateral lumbosacral regions over the bilateral sacroiliac joints.  This pain radiates to the bilateral buttocks and lateral hips.  He denies any associated numbness, tingling, weakness, bowel bladder dysfunction.        Interval History (7/1/21):  He returns today for follow up.  He reports that bilateral S1 transforaminal epidural steroid injection has been helpful for the bilateral low back and lower extremity pain.  He reports roughly 40% relief.  He continues to have some back pain in the bilateral lower lumbar/lumbosacral region.  The pain does not radiate.  He denies any other changes in the quality or location was pain.  He denies any new or worsening symptoms.        Interval History (1/19/21):  He returns today for follow up.  He reports that his pain is unchanged in quality location since last encounter.  He denies any new or  worsening symptoms.  We have received records from previous pain management provider.  Records indicate the patient has undergone multiple lumbar interventional procedures including sacroiliac joint injections, lumbar radiofrequency ablations, and bilateral L4 and L5 transforaminal epidural steroid injections.  Patient denies any significant relief from these procedures.        Initial Encounter (1/5/21):  Uriah Mills is a 71 y.o. male who presents today with chronic bilateral low back and lower extremity pain.  Patient has had this problem for many years.  No specific inciting event or injury noted.  The pain is located across the lower lumbar region radiate to the bilateral hips and posterior thighs.  Patient reports associated numbness in the right posterior thigh.  He denies any pain or numbness distal to the knees.  He denies any focal weakness or bowel bladder dysfunction.  The pain is constant worse with activity.  Patient reports undergoing multiple interventional procedures in the past with Dr. Kin Palomino.  He states that no these procedures were particularly helpful.  He was also tried on multiple opioid pain medications.  Some of these help but they also cause significant constipation and therefore these medications were not continued.  More recently, patient has been taking tramadol prescribed by his primary care physician.  He states this medication does not cause any problems but also did not provide any significant relief..   This pain is described in detail below.    Pain Disability Index Review:      12/15/2023    11:13 AM   Last 3 PDI Scores   Pain Disability Index (PDI) 56       Pain Medications:    - Opioids: Lorcet (Hydrocodone/Acetaminophen)  Hydrocodone 2 weeks since he took any, wasn't helping pain much  gabapentin    Opioid Contract: no     report:  Reviewed and inconsistent with medication use as prescribed. (Pt report he has stopped taking it).    Pain Procedures: October 2021: RFA  of bilateral L3, L4, L5                               June 2021: Transforaminal epidural at bilateral S1                               December 2021: Bilateral sacroiliac joint injection    Physical Therapy/Home Exercise: no (has tried accupuncture without significant relief)    Imaging: MRI lumbar spine 2020    Alignment: Normal.     Vertebrae: 5 lumbar-type vertebral bodies. No aggressive marrow replacement process or fracture.     Discs: Satisfactory height.  Mild diffuse desiccation of disc material predominantly L4-L5 and L5-S1     Cord: Normal. Conus terminates at T12-L1.     Degenerative findings:     T12-L1: There is no focal disc herniation.No significant central canal narrowing . No significant neural foraminal narrowing.     L1-L2: There is no focal disc herniation.No significant central canal narrowing . No significant neural foraminal narrowing.     L2-L3: There is no focal disc herniation.No significant central canal narrowing . No significant neural foraminal narrowing.     L3-L4: There is no focal disc herniation.No significant central canal narrowing . No significant neural foraminal narrowing.     L4-L5: Mild broad-based bulging of disc material with small superimposed central protrusion.  No significant central canal narrowing . No significant neural foraminal narrowing.     L5-S1: Mild broad-based bulging of disc material with small superimposed central protrusion.  No significant central canal narrowing . No significant neural foraminal narrowing.     Paraspinal muscles & soft tissues: Unremarkable.     Impression:     Degenerative changes disc space level 4-L5 and L5-S1. no significant central or foraminal stenosis.    Allergies: Review of patient's allergies indicates:  No Known Allergies    Current Medications:   Current Outpatient Medications   Medication Sig Dispense Refill    albuterol (PROVENTIL/VENTOLIN HFA) 90 mcg/actuation inhaler INHALE 2 PUFFS BY MOUTH EVERY 6 HOURS AS NEEDED 25.5 g  3    aspirin (ECOTRIN) 81 MG EC tablet Take 81 mg by mouth once daily.      gabapentin (NEURONTIN) 600 MG tablet Take 1 tablet by mouth twice daily 60 tablet 0    HYDROcodone-acetaminophen (NORCO) 5-325 mg per tablet Take 1 tablet by mouth every 12 (twelve) hours as needed for Pain. 60 tablet 0    levocetirizine (XYZAL) 5 MG tablet TAKE 1 TABLET BY MOUTH ONCE DAILY IN THE EVENING 90 tablet 3    oxybutynin (DITROPAN) 5 MG Tab Take 1 tablet (5 mg total) by mouth 3 (three) times daily as needed (bladder spasms (urge to urinate)). 10 tablet 0    silodosin (RAPAFLO) 8 mg Cap capsule Take 1 capsule (8 mg total) by mouth once daily. 30 capsule 11    ALPRAZolam (XANAX) 1 MG tablet Take 1 tablet (1 mg total) by mouth nightly as needed for Anxiety. 30 tablet 0     No current facility-administered medications for this visit.     Facility-Administered Medications Ordered in Other Visits   Medication Dose Route Frequency Provider Last Rate Last Admin    0.9%  NaCl infusion   Intravenous Continuous Rory Griffith MD        ALPRAZolam dissolvable tablet 1 mg  1 mg Oral Once PRN Colt Lenz Jr., MD        BUPivacaine (PF) 0.25% (2.5 mg/ml) injection 25 mg  10 mL Intramuscular Once Colt Lenz Jr., MD        ceFAZolin 2 g in dextrose 5 % in water (D5W) 100 mL IVPB (MB+)  2 g Intravenous On Call Procedure Rory Griffith MD        dexAMETHasone sodium phos (PF) injection 10 mg  10 mg Epidural Once Colt Lenz Jr., MD        LIDOcaine (PF) 10 mg/ml (1%) injection 10 mg  1 mL Other Once Colt Lenz Jr., MD        LIDOcaine (PF) 10 mg/ml (1%) injection 10 mg  1 mL Intradermal Once PRN Rory Griffith MD        LIDOcaine HCL 20 mg/ml (2%) injection 10 mL  10 mL Other Once Colt Lenz Jr., MD        LIDOcaine HCL 20 mg/ml (2%) injection 10 mL  10 mL Other Once Colt Lenz Jr., MD           REVIEW OF SYSTEMS:    GENERAL:  No weight loss, malaise or fevers.  HEENT:  Negative for  frequent or significant headaches.  NECK:  Negative for lumps, goiter, pain and significant neck swelling.  RESPIRATORY:  Negative for cough, wheezing or shortness of breath.  CARDIOVASCULAR:  Negative for chest pain, leg swelling or palpitations.  GI:  Negative for abdominal discomfort, blood in stools or black stools or change in bowel habits.  MUSCULOSKELETAL:  See HPI.  SKIN:  Negative for lesions, rash, and itching.  PSYCH:  Negative for sleep disturbance, mood disorder and recent psychosocial stressors.  HEMATOLOGY/LYMPHOLOGY:  Negative for prolonged bleeding, bruising easily or swollen nodes.  NEURO:   No history of headaches, syncope, paralysis, seizures or tremors.  All other reviewed and negative other than HPI.    Past Medical History:  Past Medical History:   Diagnosis Date    Allergy     Arthritis     BPH (benign prostatic hyperplasia)     Carpal tunnel syndrome     Chronic hip pain, bilateral     Chronic low back pain     COPD (chronic obstructive pulmonary disease)     DDD (degenerative disc disease), lumbar     Dorsalgia     Gastrointestinal disease     Lumbar radiculopathy     Lumbar spondylosis     Osteoarthritis of both hips     Osteoarthritis of spine     Skin cancer        Past Surgical History:  Past Surgical History:   Procedure Laterality Date    APPENDECTOMY      CARPAL TUNNEL RELEASE Right 08/12/2019    Procedure: RELEASE, CARPAL TUNNEL right;  Surgeon: Roopa Hanna MD;  Location: [facility] OR;  Service: Orthopedics;  Laterality: Right;    COLONOSCOPY      CYSTOSCOPY WITH INSERTION OF MINIMALLY INVASIVE IMPLANT TO ENLARGE PROSTATIC URETHRA N/A 11/29/2023    Procedure: CYSTOSCOPY, WITH INSERTION OF UROLIFT IMPLANT;  Surgeon: Rory Griffith MD;  Location: [facility] OR;  Service: Urology;  Laterality: N/A;    CYSTOSCOPY, WITH INSERTION OF UROLIFT IMPLANT  11/29/2023    EPIDURAL STEROID INJECTION INTO LUMBAR SPINE Bilateral 06/17/2021    Bilateral ALVIN per  06/17/2021    GASTRIC  FUNDOPLICATION      HERNIA REPAIR  1990s    INJECTION OF ANESTHETIC AGENT AROUND MEDIAL BRANCH NERVES INNERVATING LUMBAR FACET JOINT Bilateral 07/15/2021    Procedure: Block-nerve-medial branch-lumbar---BILATERAL L3, L4, L5;  Surgeon: Colt Lenz Jr., MD;  Location: [facility] [place] [facility];  Service: Pain Management;  Laterality: Bilateral;    INJECTION OF ANESTHETIC AGENT AROUND MEDIAL BRANCH NERVES INNERVATING LUMBAR FACET JOINT Bilateral 09/23/2021    Procedure: Block-nerve-medial branch-lumbar---BILATERAL L3, L4, L5;  Surgeon: Colt Lenz Jr., MD;  Location: [facility] [place] [facility];  Service: Pain Management;  Laterality: Bilateral;    INJECTION OF JOINT Bilateral 12/02/2021    Procedure: Injection, Joint---BILATERAL SACROILIAC INJECTION;  Surgeon: Colt Lenz Jr., MD;  Location: [facility] [place] [facility];  Service: Pain Management;  Laterality: Bilateral;    INJECTION OF STEROID Left 08/12/2019    Procedure: INJECTION, STEROID;  Surgeon: Roopa Hanna MD;  Location: [facility] OR;  Service: Orthopedics;  Laterality: Left;    PYELOGRAM, RETROGRADE (Bilateral)    11/29/2023    RADIOFREQUENCY ABLATION Left 10/14/2021    Procedure: Radiofrequency Ablation---LEFT L3, L4, L5;  Surgeon: Colt Lenz Jr., MD;  Location: [facility] [place] [facility];  Service: Pain Management;  Laterality: Left;    RADIOFREQUENCY ABLATION Right 10/28/2021    Procedure: Radiofrequency Ablation---RIGHT L3, L4, L5;  Surgeon: Colt Lenz Jr., MD;  Location: [facility] [place] [facility];  Service: Pain Management;  Laterality: Right;    RETROGRADE PYELOGRAPHY Bilateral 11/29/2023    Procedure: PYELOGRAM, RETROGRADE;  Surgeon: Rory Griffith MD;  Location: [facility] [place] OR;  Service: Urology;  Laterality: Bilateral;    ROTATOR CUFF REPAIR Right     SACROILIAC JOINT INJECTION Bilateral 12/02/2021    SKIN CANCER EXCISION      TRANSFORAMINAL EPIDURAL INJECTION OF STEROID Bilateral 06/17/2021    Procedure: Injection,steroid,epidural,transforaminal approach---BILATERAL  S1;  Surgeon: Colt Lenz Jr., MD;  Location: [facility] PAIN MGMT;  Service: Pain Management;  Laterality: Bilateral;    TURBT (TRANSURETHRAL RESECTION OF BLADDER TUMOR) N/A 11/29/2023    Procedure: TURBT (TRANSURETHRAL RESECTION OF BLADDER TUMOR);  Surgeon: Rory Griffith MD;  Location: [facility] OR;  Service: Urology;  Laterality: N/A;  with UroLift and with bilateral retrograde pyelograms    URBT (TRANSURETHRAL RESECTION OF BLADDER TUMOR)  11/29/2023       Family History:  Family History   Problem Relation Age of Onset    Diabetes Mother     No Known Problems Father        Social History:  Social History     Socioeconomic History    Marital status:    Tobacco Use    Smoking status: Every Day     Types: Cigars    Smokeless tobacco: Never    Tobacco comments:     8 small cigars per day down from 10.   Substance and Sexual Activity    Alcohol use: No    Drug use: No    Sexual activity: Not Currently     Partners: Female     Birth control/protection: None       OBJECTIVE:    /63   Pulse 90   Temp 97.2 °F (36.2 °C)   Resp 18   Wt 75.3 kg (166 lb 0.1 oz)   SpO2 98%   BMI 23.15 kg/m²     PHYSICAL EXAMINATION:    General appearance: Well appearing, in no acute distress, alert and oriented x3.  Psych:  Mood and affect appropriate.  Skin: Skin color, texture, turgor normal, no rashes or lesions, in both upper and lower body.  Head/face:  Atraumatic, normocephalic. No palpable lymph nodes  Neck: No pain to palpation over the cervical paraspinous muscles. Spurling Negative. No pain with neck flexion, extension, or lateral flexion. .  Cor: RRR  Pulm: CTA  GI: Abdomen soft and non-tender.  Back: Straight leg raising in the sitting and supine positions is negative to radicular pain. No pain to palpation over the spine or costovertebral angles. Normal range of motion without pain reproduction.  Extremities: Peripheral joint ROM is full and pain free without obvious instability or laxity in all four  extremities. No deformities, edema, or skin discoloration. Good capillary refill.  Musculoskeletal: Shoulder, hip, sacroiliac and knee provocative maneuvers are negative. Bilateral upper and lower extremity strength is normal and symmetric.  No atrophy or tone abnormalities are noted. Tenderness to palpation of lumbar vertabra. No tenderness to palpation to the sacroiliac joint.   Neuro: Bilateral upper and lower extremity coordination and muscle stretch reflexes are physiologic and symmetric.  Plantar response are downgoing. No loss of sensation is noted.  Gait: Normal.    ASSESSMENT: 73 y.o. year old male with lower back and hip pain, consistent with sacroiliitis.     1. Sacroiliac joint pain  Procedure Order to Pain Management    X-Ray Hips Bilateral 2 View Incl AP Pelvis      2. Sacroiliitis  Procedure Order to Pain Management    X-Ray Hips Bilateral 2 View Incl AP Pelvis      3. Hip pain, unspecified laterality  X-Ray Hips Bilateral 2 View Incl AP Pelvis            PLAN:     - Schedule for Bilateral SI joint injection with his pain and progress with a home exercise program.  - RTC in 2 weeks after the procedure.  - Ordered Xray with hips  -Follow up with his urologist to see if he can have an MRI  - Counseled patient regarding the importance of physical therapy.    The above plan and management options were discussed at length with patient. Patient is in agreement with the above and verbalized understanding.    Bryant Acosta MS3  [facility]-OC[facility]  12/15/2023    I spent a total of 30 minutes on the day of the visit.  This includes face to face time and non-face to face time preparing to see the patient by reviewing previous labs/imaging, obtaining and/or reviewing separately obtained history, documenting clinical information in the electronic or other health record, independently interpreting results and communicating results to the patient/family/caregiver.    Jeanmarie Jauregui

## 2023-12-18 DIAGNOSIS — M53.3 SACROILIAC JOINT PAIN: Primary | ICD-10-CM

## 2023-12-20 ENCOUNTER — PATIENT MESSAGE (OUTPATIENT)
Dept: PAIN MEDICINE | Facility: OTHER | Age: 73
End: 2023-12-20
Payer: MEDICARE

## 2023-12-20 DIAGNOSIS — M54.41 CHRONIC BILATERAL LOW BACK PAIN WITH BILATERAL SCIATICA: ICD-10-CM

## 2023-12-20 DIAGNOSIS — M54.16 LUMBAR RADICULOPATHY: ICD-10-CM

## 2023-12-20 DIAGNOSIS — G89.29 CHRONIC BILATERAL LOW BACK PAIN WITH BILATERAL SCIATICA: ICD-10-CM

## 2023-12-20 DIAGNOSIS — M54.42 CHRONIC BILATERAL LOW BACK PAIN WITH BILATERAL SCIATICA: ICD-10-CM

## 2023-12-20 NOTE — TELEPHONE ENCOUNTER
No care due was identified.  [facility] Embedded Care Due Messages. Reference number: [id number].   12/20/2023 9:55:33 AM CST

## 2023-12-21 RX ORDER — GABAPENTIN 600 MG/1
600 TABLET ORAL 2 TIMES DAILY
Qty: 60 TABLET | Refills: 0 | Status: SHIPPED | OUTPATIENT
Start: 2023-12-21 | End: 2024-01-27

## 2023-12-22 ENCOUNTER — TELEPHONE (OUTPATIENT)
Dept: PAIN MEDICINE | Facility: CLINIC | Age: 73
End: 2023-12-22
Payer: MEDICARE

## 2023-12-22 DIAGNOSIS — M53.3 SACROILIAC JOINT PAIN: Primary | ICD-10-CM

## 2024-01-02 ENCOUNTER — PROCEDURE VISIT (OUTPATIENT)
Dept: PAIN MEDICINE | Facility: CLINIC | Age: 74
End: 2024-01-02
Payer: MEDICARE

## 2024-01-02 VITALS
BODY MASS INDEX: 23.15 KG/M2 | WEIGHT: 166 LBS | RESPIRATION RATE: 18 BRPM | OXYGEN SATURATION: 98 % | DIASTOLIC BLOOD PRESSURE: 65 MMHG | HEART RATE: 81 BPM | SYSTOLIC BLOOD PRESSURE: 130 MMHG | TEMPERATURE: 97 F

## 2024-01-02 DIAGNOSIS — M75.51 SUBACROMIAL BURSITIS OF RIGHT SHOULDER JOINT: Primary | ICD-10-CM

## 2024-01-02 PROCEDURE — 20611 DRAIN/INJ JOINT/BURSA W/US: CPT | Mod: RT,S$GLB,, | Performed by: ANESTHESIOLOGY

## 2024-01-05 ENCOUNTER — TELEPHONE (OUTPATIENT)
Dept: UROLOGY | Facility: CLINIC | Age: 74
End: 2024-01-05
Payer: MEDICARE

## 2024-01-05 ENCOUNTER — PATIENT MESSAGE (OUTPATIENT)
Dept: ADMINISTRATIVE | Facility: OTHER | Age: 74
End: 2024-01-05
Payer: MEDICARE

## 2024-01-07 ENCOUNTER — PATIENT MESSAGE (OUTPATIENT)
Dept: ADMINISTRATIVE | Facility: OTHER | Age: 74
End: 2024-01-07
Payer: MEDICARE

## 2024-01-11 ENCOUNTER — HOSPITAL ENCOUNTER (OUTPATIENT)
Facility: OTHER | Age: 74
Discharge: HOME | End: 2024-01-11
Attending: ANESTHESIOLOGY | Admitting: ANESTHESIOLOGY
Payer: MEDICARE

## 2024-01-11 VITALS
BODY MASS INDEX: 25.05 KG/M2 | HEIGHT: 70 IN | TEMPERATURE: 98 F | SYSTOLIC BLOOD PRESSURE: 149 MMHG | DIASTOLIC BLOOD PRESSURE: 65 MMHG | HEART RATE: 74 BPM | OXYGEN SATURATION: 97 % | WEIGHT: 175 LBS | RESPIRATION RATE: 16 BRPM

## 2024-01-11 DIAGNOSIS — G89.29 CHRONIC PAIN: ICD-10-CM

## 2024-01-11 DIAGNOSIS — M46.1 SACROILIITIS: Primary | ICD-10-CM

## 2024-01-11 PROCEDURE — 25500020 PHARM REV CODE 255: Performed by: ANESTHESIOLOGY

## 2024-01-11 PROCEDURE — 27096 INJECT SACROILIAC JOINT: CPT | Mod: 50,,, | Performed by: ANESTHESIOLOGY

## 2024-01-11 PROCEDURE — 63600175 PHARM REV CODE 636 W HCPCS: Performed by: ANESTHESIOLOGY

## 2024-01-11 PROCEDURE — 25000003 PHARM REV CODE 250: Performed by: ANESTHESIOLOGY

## 2024-01-11 PROCEDURE — 27096 INJECT SACROILIAC JOINT: CPT | Mod: 50 | Performed by: ANESTHESIOLOGY

## 2024-01-11 RX ORDER — LIDOCAINE HYDROCHLORIDE 20 MG/ML
INJECTION, SOLUTION INFILTRATION; PERINEURAL
Status: DISCONTINUED | OUTPATIENT
Start: 2024-01-11 | End: 2024-01-11 | Stop reason: HOSPADM

## 2024-01-11 RX ORDER — BUPIVACAINE HYDROCHLORIDE 2.5 MG/ML
INJECTION, SOLUTION EPIDURAL; INFILTRATION; INTRACAUDAL
Status: DISCONTINUED | OUTPATIENT
Start: 2024-01-11 | End: 2024-01-11 | Stop reason: HOSPADM

## 2024-01-11 RX ORDER — MIDAZOLAM HYDROCHLORIDE 1 MG/ML
INJECTION INTRAMUSCULAR; INTRAVENOUS
Status: DISCONTINUED | OUTPATIENT
Start: 2024-01-11 | End: 2024-01-11 | Stop reason: HOSPADM

## 2024-01-11 RX ORDER — FENTANYL CITRATE 50 UG/ML
INJECTION, SOLUTION INTRAMUSCULAR; INTRAVENOUS
Status: DISCONTINUED | OUTPATIENT
Start: 2024-01-11 | End: 2024-01-11 | Stop reason: HOSPADM

## 2024-01-11 RX ORDER — SODIUM CHLORIDE 9 MG/ML
INJECTION, SOLUTION INTRAVENOUS CONTINUOUS
Status: DISCONTINUED | OUTPATIENT
Start: 2024-01-11 | End: 2024-01-11 | Stop reason: HOSPADM

## 2024-01-11 RX ORDER — TRIAMCINOLONE ACETONIDE 40 MG/ML
INJECTION, SUSPENSION INTRA-ARTICULAR; INTRAMUSCULAR
Status: DISCONTINUED | OUTPATIENT
Start: 2024-01-11 | End: 2024-01-11 | Stop reason: HOSPADM

## 2024-01-11 NOTE — DISCHARGE SUMMARY
Discharge Note  Short Stay      SUMMARY     Admit Date: 1/11/2024    Attending Physician: Jeanmarie Jauregui      Discharge Physician: Jeanmarie Jauregui      Discharge Date: 1/11/2024 2:14 PM    Procedure(s) (LRB):  INJECTION,SACROILIAC JOINT BILATERAL (Bilateral)    Final Diagnosis: Sacroiliac joint pain [M53.3]    Disposition: Home or self care    Patient Instructions:   Current Discharge Medication List        CONTINUE these medications which have NOT CHANGED    Details   albuterol (PROVENTIL/VENTOLIN HFA) 90 mcg/actuation inhaler INHALE 2 PUFFS BY MOUTH EVERY 6 HOURS AS NEEDED  Qty: 25.5 g, Refills: 3    Associated Diagnoses: Bronchitis      ALPRAZolam (XANAX) 1 MG tablet Take 1 tablet (1 mg total) by mouth nightly as needed for Anxiety.  Qty: 30 tablet, Refills: 0    Associated Diagnoses: Insomnia, unspecified type; Anxiety      aspirin (ECOTRIN) 81 MG EC tablet Take 81 mg by mouth once daily.      gabapentin (NEURONTIN) 600 MG tablet Take 1 tablet by mouth twice daily  Qty: 60 tablet, Refills: 0    Associated Diagnoses: Chronic bilateral low back pain with bilateral sciatica; Lumbar radiculopathy      HYDROcodone-acetaminophen (NORCO) 5-325 mg per tablet Take 1 tablet by mouth every 12 (twelve) hours as needed for Pain.  Qty: 60 tablet, Refills: 0    Comments: Quantity prescribed more than 7 day supply? Yes, quantity medically necessary  Associated Diagnoses: Chronic bilateral low back pain with bilateral sciatica; Primary osteoarthritis of both hips; Lumbar radiculopathy      levocetirizine (XYZAL) 5 MG tablet TAKE 1 TABLET BY MOUTH ONCE DAILY IN THE EVENING  Qty: 90 tablet, Refills: 3    Associated Diagnoses: Chronic sinusitis, unspecified location      oxybutynin (DITROPAN) 5 MG Tab Take 1 tablet (5 mg total) by mouth 3 (three) times daily as needed (bladder spasms (urge to urinate)).  Qty: 10 tablet, Refills: 0      silodosin (RAPAFLO) 8 mg Cap capsule Take 1 capsule (8 mg total) by mouth once daily.  Qty: 30  capsule, Refills: 11    Associated Diagnoses: Benign prostatic hyperplasia with urinary frequency                 Discharge Diagnosis: Sacroiliac joint pain [M53.3]  Condition on Discharge: Stable with no complications to procedure   Diet on Discharge: Same as before.  Activity: as per instruction sheet.  Discharge to: Home with a responsible adult.  Follow up: 2 weeks

## 2024-01-11 NOTE — DISCHARGE INSTRUCTIONS

## 2024-01-11 NOTE — OP NOTE
Sacroiliac Joint Injection under Fluoroscopic Guidance    The procedure, risks, benefits, and options were discussed with the patient. There are no contraindications to the procedure. The patent expressed understanding and agreed to the procedure. Informed written consent was obtained prior to the start of the procedure and can be found in the patient's chart.    PATIENT NAME: Uriah Mills   MRN: [id number]     DATE OF PROCEDURE: 01/11/2024    PROCEDURE: Bilateral Sacroiliac Joint Injection under Fluoroscopic Guidance    PRE-OP DIAGNOSIS: Sacroiliac joint pain [M53.3]    POST-OP DIAGNOSIS: Same    PHYSICIAN: Jeanmarie Jauregui MD    ASSISTANTS: None     MEDICATIONS INJECTED: Preservative-free Kenalog 40mg with 7cc of Bupivacine 0.25%     LOCAL ANESTHETIC INJECTED: Xylocaine 2%     SEDATION: Versed 2mg and Fentanyl 50mcg                                                                                                                                                                                     Conscious sedation ordered by M.D. Patient re-evaluation prior to administration of conscious sedation. No changes noted in patient's status from initial evaluation. The patient's vital signs were monitored by RN and patient remained hemodynamically stable throughout the procedure.    Event Time In   Sedation Start 1409   Sedation End 1412       ESTIMATED BLOOD LOSS: None    COMPLICATIONS: None    TECHNIQUE: Time-out was performed to identify the patient and procedure to be performed. With the patient laying in a prone position, the surgical area was prepped and draped in the usual sterile fashion using ChloraPrep and a fenestrated drape. The sacroiliac joint was determined under fluoroscopy guidance. Skin anesthesia was achieved by injecting Lidocaine 2% over the injection site. The sacroiliac joint was  then approached with a 25 gauge, 3.5 inch spinal quinke needle that was introduced under fluoroscopic guidance in the AP  and Lateral views. Once the needle tip was in the area of the joint, and there was no blood, contrast dye Omnipaque (300mg/mL) was injected to confirm placement and there was no vascular runoff. Fluoroscopic imaging in the AP and lateral views revealed a clear outline of the joint space. 4 mL of the medication mixture listed above was injected slowly intraarticular and marvin-articular. Displacement of the radio opaque contrast after injection of the medication confirmed that the medication went into the area of the joint. The needles were removed and bleeding was nil. The same procedure was repeated on the contralateral side. A sterile dressing was applied. No specimens collected. The patient tolerated the procedure well.     The patient was monitored after the procedure in the recovery area. They were given post-procedure and discharge instructions to follow at home. The patient was discharged in a stable condition.        Jeanmarie Jauregui MD

## 2024-01-26 ENCOUNTER — OFFICE VISIT (OUTPATIENT)
Dept: SPINE | Facility: CLINIC | Age: 74
End: 2024-01-26
Payer: MEDICARE

## 2024-01-26 VITALS
HEIGHT: 70 IN | HEART RATE: 85 BPM | OXYGEN SATURATION: 100 % | BODY MASS INDEX: 25.06 KG/M2 | RESPIRATION RATE: 18 BRPM | DIASTOLIC BLOOD PRESSURE: 62 MMHG | WEIGHT: 175.06 LBS | SYSTOLIC BLOOD PRESSURE: 145 MMHG

## 2024-01-26 DIAGNOSIS — M47.816 LUMBAR SPONDYLOSIS: Primary | ICD-10-CM

## 2024-01-26 DIAGNOSIS — M54.16 LUMBAR RADICULOPATHY: ICD-10-CM

## 2024-01-26 DIAGNOSIS — G89.29 CHRONIC BILATERAL LOW BACK PAIN WITH BILATERAL SCIATICA: ICD-10-CM

## 2024-01-26 DIAGNOSIS — G89.4 CHRONIC PAIN SYNDROME: ICD-10-CM

## 2024-01-26 DIAGNOSIS — M54.41 CHRONIC BILATERAL LOW BACK PAIN WITH BILATERAL SCIATICA: ICD-10-CM

## 2024-01-26 DIAGNOSIS — M54.42 CHRONIC BILATERAL LOW BACK PAIN WITH BILATERAL SCIATICA: ICD-10-CM

## 2024-01-26 PROCEDURE — 99213 OFFICE O/P EST LOW 20 MIN: CPT | Mod: S$GLB,,, | Performed by: NURSE PRACTITIONER

## 2024-01-26 PROCEDURE — 99999 PR PBB SHADOW E&M-EST. PATIENT-LVL V: CPT | Mod: PBBFAC,,, | Performed by: NURSE PRACTITIONER

## 2024-01-26 NOTE — H&P (VIEW-ONLY)
Chronic patient Established Note (Follow up visit)    Interval History     Interval History 1/26/2024:  The patient is here today for follow up of back pain. He is s/p bilateral SI joint injection with 60% relief. His primary complaint today is lower back pain, higher than previous injection site. It is stabbing and throbbing in nature. No radiation into the legs. There is associated stiffness. He has a lot of pain and stiffness first thing in the morning. He has completed PT multiple times without much improvement in symptoms. He continues with HEP. His pain today is 5/10.    Interval History 12/15/2023:  Uriah Mills presents to the clinic for a follow-up appointment for low back and hip pain. Since the last visit, Uriah Mills states the pain has been persistant. Current pain intensity is 8/10 but he reports it is usually a 10/10. He has been tolerating his pain over the past few years but had a flare up in October which led him to schedule this appointment.     Interval History (12/14/21):  He returns today for follow up.  He reports that bilateral sacroiliac joint steroid injections has been helpful for the bilateral low back pain.  He reports roughly 60% relief.  He is happy with these results and does not feel that further treatment needed at this time.  He is not interested in participating in further physical therapy.        Interval History (11/11/21):  He returns today for follow up.  He reports that bilateral lumbar radiofrequency ablations has been helpful for the bilateral lumbar pain.  He reports 95% relief of lumbar back pain.  He states that he continues to have pain in the bilateral lumbosacral regions over the bilateral sacroiliac joints.  This pain radiates to the bilateral buttocks and lateral hips.  He denies any associated numbness, tingling, weakness, bowel bladder dysfunction.        Interval History (7/1/21):  He returns today for follow up.  He reports that bilateral S1 transforaminal  epidural steroid injection has been helpful for the bilateral low back and lower extremity pain.  He reports roughly 40% relief.  He continues to have some back pain in the bilateral lower lumbar/lumbosacral region.  The pain does not radiate.  He denies any other changes in the quality or location was pain.  He denies any new or worsening symptoms.        Interval History (1/19/21):  He returns today for follow up.  He reports that his pain is unchanged in quality location since last encounter.  He denies any new or worsening symptoms.  We have received records from previous pain management provider.  Records indicate the patient has undergone multiple lumbar interventional procedures including sacroiliac joint injections, lumbar radiofrequency ablations, and bilateral L4 and L5 transforaminal epidural steroid injections.  Patient denies any significant relief from these procedures.        Initial Encounter (1/5/21):  Uriah Mills is a 71 y.o. male who presents today with chronic bilateral low back and lower extremity pain.  Patient has had this problem for many years.  No specific inciting event or injury noted.  The pain is located across the lower lumbar region radiate to the bilateral hips and posterior thighs.  Patient reports associated numbness in the right posterior thigh.  He denies any pain or numbness distal to the knees.  He denies any focal weakness or bowel bladder dysfunction.  The pain is constant worse with activity.  Patient reports undergoing multiple interventional procedures in the past with Dr. Kin Palomino.  He states that no these procedures were particularly helpful.  He was also tried on multiple opioid pain medications.  Some of these help but they also cause significant constipation and therefore these medications were not continued.  More recently, patient has been taking tramadol prescribed by his primary care physician.  He states this medication does not cause any problems but also  did not provide any significant relief..   This pain is described in detail below.    Pain Disability Index Review:      1/26/2024     1:08 PM 1/2/2024     1:23 PM 12/15/2023    11:13 AM   Last 3 PDI Scores   Pain Disability Index (PDI) 25 56 56       Pain Medications:    - Opioids: Lorcet (Hydrocodone/Acetaminophen)  Hydrocodone 2 weeks since he took any, wasn't helping pain much  gabapentin    Opioid Contract: no     report:  Reviewed and inconsistent with medication use as prescribed. (Pt report he has stopped taking it).    Pain Procedures: October 2021: RFA of bilateral L3, L4, L5                               June 2021: Transforaminal epidural at bilateral S1                               December 2021: Bilateral sacroiliac joint injection                    1/11/24 B/L SI joint injections- 60% relief    Physical Therapy/Home Exercise: no (has tried accupuncture without significant relief)    Imaging: MRI lumbar spine 2020    Alignment: Normal.     Vertebrae: 5 lumbar-type vertebral bodies. No aggressive marrow replacement process or fracture.     Discs: Satisfactory height.  Mild diffuse desiccation of disc material predominantly L4-L5 and L5-S1     Cord: Normal. Conus terminates at T12-L1.     Degenerative findings:     T12-L1: There is no focal disc herniation.No significant central canal narrowing . No significant neural foraminal narrowing.     L1-L2: There is no focal disc herniation.No significant central canal narrowing . No significant neural foraminal narrowing.     L2-L3: There is no focal disc herniation.No significant central canal narrowing . No significant neural foraminal narrowing.     L3-L4: There is no focal disc herniation.No significant central canal narrowing . No significant neural foraminal narrowing.     L4-L5: Mild broad-based bulging of disc material with small superimposed central protrusion.  No significant central canal narrowing . No significant neural foraminal narrowing.      L5-S1: Mild broad-based bulging of disc material with small superimposed central protrusion.  No significant central canal narrowing . No significant neural foraminal narrowing.     Paraspinal muscles & soft tissues: Unremarkable.     Impression:     Degenerative changes disc space level 4-L5 and L5-S1. no significant central or foraminal stenosis.    Allergies: Review of patient's allergies indicates:  No Known Allergies    Current Medications:   Current Outpatient Medications   Medication Sig Dispense Refill    albuterol (PROVENTIL/VENTOLIN HFA) 90 mcg/actuation inhaler INHALE 2 PUFFS BY MOUTH EVERY 6 HOURS AS NEEDED 25.5 g 3    aspirin (ECOTRIN) 81 MG EC tablet Take 81 mg by mouth once daily.      gabapentin (NEURONTIN) 600 MG tablet Take 1 tablet by mouth twice daily 60 tablet 0    HYDROcodone-acetaminophen (NORCO) 5-325 mg per tablet Take 1 tablet by mouth every 12 (twelve) hours as needed for Pain. 60 tablet 0    levocetirizine (XYZAL) 5 MG tablet TAKE 1 TABLET BY MOUTH ONCE DAILY IN THE EVENING 90 tablet 3    oxybutynin (DITROPAN) 5 MG Tab Take 1 tablet (5 mg total) by mouth 3 (three) times daily as needed (bladder spasms (urge to urinate)). 10 tablet 0    silodosin (RAPAFLO) 8 mg Cap capsule Take 1 capsule (8 mg total) by mouth once daily. 30 capsule 11    ALPRAZolam (XANAX) 1 MG tablet Take 1 tablet (1 mg total) by mouth nightly as needed for Anxiety. 30 tablet 0     No current facility-administered medications for this visit.     Facility-Administered Medications Ordered in Other Visits   Medication Dose Route Frequency Provider Last Rate Last Admin    0.9%  NaCl infusion   Intravenous Continuous Rory Griffith MD        ALPRAZolam dissolvable tablet 1 mg  1 mg Oral Once PRN Colt Lenz Jr., MD        BUPivacaine (PF) 0.25% (2.5 mg/ml) injection 25 mg  10 mL Intramuscular Once Colt Lenz Jr., MD        ceFAZolin 2 g in dextrose 5 % in water (D5W) 100 mL IVPB (MB+)  2 g Intravenous On  Call Procedure Rory Griffith MD        dexAMETHasone sodium phos (PF) injection 10 mg  10 mg Epidural Once Colt Lenz Jr., MD        LIDOcaine (PF) 10 mg/ml (1%) injection 10 mg  1 mL Other Once Colt Lenz Jr., MD        LIDOcaine (PF) 10 mg/ml (1%) injection 10 mg  1 mL Intradermal Once PRN Rory Griffith MD        LIDOcaine HCL 20 mg/ml (2%) injection 10 mL  10 mL Other Once Colt Lenz Jr., MD        LIDOcaine HCL 20 mg/ml (2%) injection 10 mL  10 mL Other Once Colt Lenz Jr., MD           REVIEW OF SYSTEMS:    GENERAL:  No weight loss, malaise or fevers.  HEENT:  Negative for frequent or significant headaches.  NECK:  Negative for lumps, goiter, pain and significant neck swelling.  RESPIRATORY:  Negative for cough, wheezing or shortness of breath.  CARDIOVASCULAR:  Negative for chest pain, leg swelling or palpitations.  GI:  Negative for abdominal discomfort, blood in stools or black stools or change in bowel habits.  MUSCULOSKELETAL:  See HPI.  SKIN:  Negative for lesions, rash, and itching.  PSYCH:  Negative for sleep disturbance, mood disorder and recent psychosocial stressors.  HEMATOLOGY/LYMPHOLOGY:  Negative for prolonged bleeding, bruising easily or swollen nodes.  NEURO:   No history of headaches, syncope, paralysis, seizures or tremors.  All other reviewed and negative other than HPI.    Past Medical History:  Past Medical History:   Diagnosis Date    Allergy     Arthritis     BPH (benign prostatic hyperplasia)     Carpal tunnel syndrome     Chronic hip pain, bilateral     Chronic low back pain     COPD (chronic obstructive pulmonary disease)     DDD (degenerative disc disease), lumbar     Dorsalgia     Gastrointestinal disease     Lumbar radiculopathy     Lumbar spondylosis     Osteoarthritis of both hips     Osteoarthritis of spine     Skin cancer        Past Surgical History:  Past Surgical History:   Procedure Laterality Date    APPENDECTOMY      CARPAL  TUNNEL RELEASE Right 08/12/2019    Procedure: RELEASE, CARPAL TUNNEL right;  Surgeon: Roopa Hanna MD;  Location: [facility] OR;  Service: Orthopedics;  Laterality: Right;    COLONOSCOPY      CYSTOSCOPY WITH INSERTION OF MINIMALLY INVASIVE IMPLANT TO ENLARGE PROSTATIC URETHRA N/A 11/29/2023    Procedure: CYSTOSCOPY, WITH INSERTION OF UROLIFT IMPLANT;  Surgeon: Rory Griffith MD;  Location: [facility] OR;  Service: Urology;  Laterality: N/A;    CYSTOSCOPY, WITH INSERTION OF UROLIFT IMPLANT  11/29/2023    EPIDURAL STEROID INJECTION INTO LUMBAR SPINE Bilateral 06/17/2021    Bilateral ALVIN per  06/17/2021    GASTRIC FUNDOPLICATION      HERNIA REPAIR  1990s    INJECTION OF ANESTHETIC AGENT AROUND MEDIAL BRANCH NERVES INNERVATING LUMBAR FACET JOINT Bilateral 07/15/2021    Procedure: Block-nerve-medial branch-lumbar---BILATERAL L3, L4, L5;  Surgeon: Colt Lenz Jr., MD;  Location: [facility] PAIN [facility];  Service: Pain Management;  Laterality: Bilateral;    INJECTION OF ANESTHETIC AGENT AROUND MEDIAL BRANCH NERVES INNERVATING LUMBAR FACET JOINT Bilateral 09/23/2021    Procedure: Block-nerve-medial branch-lumbar---BILATERAL L3, L4, L5;  Surgeon: Colt Lenz Jr., MD;  Location: [facility] PAIN [facility];  Service: Pain Management;  Laterality: Bilateral;    INJECTION OF JOINT Bilateral 12/02/2021    Procedure: Injection, Joint---BILATERAL SACROILIAC INJECTION;  Surgeon: Colt Lenz Jr., MD;  Location: [facility];  Service: Pain Management;  Laterality: Bilateral;    INJECTION OF STEROID Left 08/12/2019    Procedure: INJECTION, STEROID;  Surgeon: Roopa Hanna MD;  Location: [facility] OR;  Service: Orthopedics;  Laterality: Left;    INJECTION, SACROILIAC JOINT Bilateral 1/11/2024    Procedure: INJECTION,SACROILIAC JOINT BILATERAL;  Surgeon: Jeanmarie Jauregui MD;  Location: [facility]IN [facility];  Service: Pain Management;  Laterality: Bilateral;  [phone]    PYELOGRAM, RETROGRADE (Bilateral)    11/29/2023     RADIOFREQUENCY ABLATION Left 10/14/2021    Procedure: Radiofrequency Ablation---LEFT L3, L4, L5;  Surgeon: Colt Lenz Jr., MD;  Location: [facility]IN [facility];  Service: Pain Management;  Laterality: Left;    RADIOFREQUENCY ABLATION Right 10/28/2021    Procedure: Radiofrequency Ablation---RIGHT L3, L4, L5;  Surgeon: Colt Lenz Jr., MD;  Location: [facility]IN [facility];  Service: Pain Management;  Laterality: Right;    RETROGRADE PYELOGRAPHY Bilateral 11/29/2023    Procedure: PYELOGRAM, RETROGRADE;  Surgeon: Rory Griffith MD;  Location: [facility] OR;  Service: Urology;  Laterality: Bilateral;    ROTATOR CUFF REPAIR Right     SACROILIAC JOINT INJECTION Bilateral 12/02/2021    SKIN CANCER EXCISION      TRANSFORAMINAL EPIDURAL INJECTION OF STEROID Bilateral 06/17/2021    Procedure: Injection,steroid,epidural,transforaminal approach---BILATERAL S1;  Surgeon: Colt Lenz Jr., MD;  Location: [facility]IN [facility];  Service: Pain Management;  Laterality: Bilateral;    TURBT (TRANSURETHRAL RESECTION OF BLADDER TUMOR) N/A 11/29/2023    Procedure: TURBT (TRANSURETHRAL RESECTION OF BLADDER TUMOR);  Surgeon: Rory Griffith MD;  Location: [facility] OR;  Service: Urology;  Laterality: N/A;  with UroLift and with bilateral retrograde pyelograms    URBT (TRANSURETHRAL RESECTION OF BLADDER TUMOR)  11/29/2023       Family History:  Family History   Problem Relation Age of Onset    Diabetes Mother     No Known Problems Father        Social History:  Social History     Socioeconomic History    Marital status:    Tobacco Use    Smoking status: Every Day     Types: Cigars    Smokeless tobacco: Never    Tobacco comments:     8 small cigars per day down from 10.   Substance and Sexual Activity    Alcohol use: No    Drug use: No    Sexual activity: Not Currently     Partners: Female     Birth control/protection: None       OBJECTIVE:    BP (!) 145/62 (BP Location: Right arm, Patient Position: Sitting, BP Method: Medium (Automatic))   " Pulse 85   Resp 18   Ht 5' 10" (1.778 m)   Wt 79.4 kg (175 lb 0.7 oz)   SpO2 100%   BMI 25.12 kg/m²     PHYSICAL EXAMINATION:    General appearance: Well appearing, in no acute distress, alert and oriented x3.  Psych:  Mood and affect appropriate.  Skin: Skin color, texture, turgor normal, no rashes or lesions, in both upper and lower body.  Head/face:  Atraumatic, normocephalic. No palpable lymph nodes  Back: Straight leg raising in the sitting and supine positions is negative to radicular pain. TTP over lumbar facet joints. Limited ROM with pain on extension > flexion. Positive facet loading bilaterally.  Extremities: Peripheral joint ROM is full and pain free without obvious instability or laxity in all four extremities. No deformities, edema, or skin discoloration. Good capillary refill.  Musculoskeletal: Lower extremity strength is normal and symmetric.  No atrophy or tone abnormalities are noted.   Neuro: Bilateral upper and lower extremity coordination and muscle stretch reflexes are physiologic and symmetric.  Plantar response are downgoing. No loss of sensation is noted.  Gait: Antalgic.    ASSESSMENT: 73 y.o. year old male with lower back and hip pain, consistent with sacroiliitis.     1. Lumbar spondylosis  Procedure Order to Pain Management      2. Chronic pain syndrome            PLAN:     - I personally reviewed and interpreted relevant and pertinent imaging. Results were discussed with the patient today.     - He is s/p Bilateral SI joint injection with good relief of buttock pain. Pain today is located to lower back secondary to lumbar facet arthropathy. I will schedule the patient for bilateral MBB at L3 to L5.The patient has axial pain that is non-radiating. The patient also previously completed over 12 weeks of PT exercises in the past 6 months. Current Pain Level is 5/10. Pain Disability Index Score today is 5/10.    - RTC after completion of procedures.    - Counseled patient regarding " the importance of physical therapy.    The above plan and management options were discussed at length with patient. Patient is in agreement with the above and verbalized understanding.    Flaquita Joshi   01/26/2024

## 2024-01-26 NOTE — PROGRESS NOTES
Chronic patient Established Note (Follow up visit)    Interval History     Interval History 1/26/2024:  The patient is here today for follow up of back pain. He is s/p bilateral SI joint injection with 60% relief. His primary complaint today is lower back pain, higher than previous injection site. It is stabbing and throbbing in nature. No radiation into the legs. There is associated stiffness. He has a lot of pain and stiffness first thing in the morning. He has completed PT multiple times without much improvement in symptoms. He continues with HEP. His pain today is 5/10.    Interval History 12/15/2023:  Uriah Mills presents to the clinic for a follow-up appointment for low back and hip pain. Since the last visit, Uriah Mills states the pain has been persistant. Current pain intensity is 8/10 but he reports it is usually a 10/10. He has been tolerating his pain over the past few years but had a flare up in October which led him to schedule this appointment.     Interval History (12/14/21):  He returns today for follow up.  He reports that bilateral sacroiliac joint steroid injections has been helpful for the bilateral low back pain.  He reports roughly 60% relief.  He is happy with these results and does not feel that further treatment needed at this time.  He is not interested in participating in further physical therapy.        Interval History (11/11/21):  He returns today for follow up.  He reports that bilateral lumbar radiofrequency ablations has been helpful for the bilateral lumbar pain.  He reports 95% relief of lumbar back pain.  He states that he continues to have pain in the bilateral lumbosacral regions over the bilateral sacroiliac joints.  This pain radiates to the bilateral buttocks and lateral hips.  He denies any associated numbness, tingling, weakness, bowel bladder dysfunction.        Interval History (7/1/21):  He returns today for follow up.  He reports that bilateral S1 transforaminal  epidural steroid injection has been helpful for the bilateral low back and lower extremity pain.  He reports roughly 40% relief.  He continues to have some back pain in the bilateral lower lumbar/lumbosacral region.  The pain does not radiate.  He denies any other changes in the quality or location was pain.  He denies any new or worsening symptoms.        Interval History (1/19/21):  He returns today for follow up.  He reports that his pain is unchanged in quality location since last encounter.  He denies any new or worsening symptoms.  We have received records from previous pain management provider.  Records indicate the patient has undergone multiple lumbar interventional procedures including sacroiliac joint injections, lumbar radiofrequency ablations, and bilateral L4 and L5 transforaminal epidural steroid injections.  Patient denies any significant relief from these procedures.        Initial Encounter (1/5/21):  Uriah Mills is a 71 y.o. male who presents today with chronic bilateral low back and lower extremity pain.  Patient has had this problem for many years.  No specific inciting event or injury noted.  The pain is located across the lower lumbar region radiate to the bilateral hips and posterior thighs.  Patient reports associated numbness in the right posterior thigh.  He denies any pain or numbness distal to the knees.  He denies any focal weakness or bowel bladder dysfunction.  The pain is constant worse with activity.  Patient reports undergoing multiple interventional procedures in the past with Dr. Kin Palomino.  He states that no these procedures were particularly helpful.  He was also tried on multiple opioid pain medications.  Some of these help but they also cause significant constipation and therefore these medications were not continued.  More recently, patient has been taking tramadol prescribed by his primary care physician.  He states this medication does not cause any problems but also  did not provide any significant relief..   This pain is described in detail below.    Pain Disability Index Review:      1/26/2024     1:08 PM 1/2/2024     1:23 PM 12/15/2023    11:13 AM   Last 3 PDI Scores   Pain Disability Index (PDI) 25 56 56       Pain Medications:    - Opioids: Lorcet (Hydrocodone/Acetaminophen)  Hydrocodone 2 weeks since he took any, wasn't helping pain much  gabapentin    Opioid Contract: no     report:  Reviewed and inconsistent with medication use as prescribed. (Pt report he has stopped taking it).    Pain Procedures: October 2021: RFA of bilateral L3, L4, L5                               June 2021: Transforaminal epidural at bilateral S1                               December 2021: Bilateral sacroiliac joint injection                    1/11/24 B/L SI joint injections- 60% relief    Physical Therapy/Home Exercise: no (has tried accupuncture without significant relief)    Imaging: MRI lumbar spine 2020    Alignment: Normal.     Vertebrae: 5 lumbar-type vertebral bodies. No aggressive marrow replacement process or fracture.     Discs: Satisfactory height.  Mild diffuse desiccation of disc material predominantly L4-L5 and L5-S1     Cord: Normal. Conus terminates at T12-L1.     Degenerative findings:     T12-L1: There is no focal disc herniation.No significant central canal narrowing . No significant neural foraminal narrowing.     L1-L2: There is no focal disc herniation.No significant central canal narrowing . No significant neural foraminal narrowing.     L2-L3: There is no focal disc herniation.No significant central canal narrowing . No significant neural foraminal narrowing.     L3-L4: There is no focal disc herniation.No significant central canal narrowing . No significant neural foraminal narrowing.     L4-L5: Mild broad-based bulging of disc material with small superimposed central protrusion.  No significant central canal narrowing . No significant neural foraminal narrowing.      L5-S1: Mild broad-based bulging of disc material with small superimposed central protrusion.  No significant central canal narrowing . No significant neural foraminal narrowing.     Paraspinal muscles & soft tissues: Unremarkable.     Impression:     Degenerative changes disc space level 4-L5 and L5-S1. no significant central or foraminal stenosis.    Allergies: Review of patient's allergies indicates:  No Known Allergies    Current Medications:   Current Outpatient Medications   Medication Sig Dispense Refill    albuterol (PROVENTIL/VENTOLIN HFA) 90 mcg/actuation inhaler INHALE 2 PUFFS BY MOUTH EVERY 6 HOURS AS NEEDED 25.5 g 3    aspirin (ECOTRIN) 81 MG EC tablet Take 81 mg by mouth once daily.      gabapentin (NEURONTIN) 600 MG tablet Take 1 tablet by mouth twice daily 60 tablet 0    HYDROcodone-acetaminophen (NORCO) 5-325 mg per tablet Take 1 tablet by mouth every 12 (twelve) hours as needed for Pain. 60 tablet 0    levocetirizine (XYZAL) 5 MG tablet TAKE 1 TABLET BY MOUTH ONCE DAILY IN THE EVENING 90 tablet 3    oxybutynin (DITROPAN) 5 MG Tab Take 1 tablet (5 mg total) by mouth 3 (three) times daily as needed (bladder spasms (urge to urinate)). 10 tablet 0    silodosin (RAPAFLO) 8 mg Cap capsule Take 1 capsule (8 mg total) by mouth once daily. 30 capsule 11    ALPRAZolam (XANAX) 1 MG tablet Take 1 tablet (1 mg total) by mouth nightly as needed for Anxiety. 30 tablet 0     No current facility-administered medications for this visit.     Facility-Administered Medications Ordered in Other Visits   Medication Dose Route Frequency Provider Last Rate Last Admin    0.9%  NaCl infusion   Intravenous Continuous Rory Griffith MD        ALPRAZolam dissolvable tablet 1 mg  1 mg Oral Once PRN Colt Lenz Jr., MD        BUPivacaine (PF) 0.25% (2.5 mg/ml) injection 25 mg  10 mL Intramuscular Once Colt Lenz Jr., MD        ceFAZolin 2 g in dextrose 5 % in water (D5W) 100 mL IVPB (MB+)  2 g Intravenous On  Call Procedure Rory Girffith MD        dexAMETHasone sodium phos (PF) injection 10 mg  10 mg Epidural Once Colt Lenz Jr., MD        LIDOcaine (PF) 10 mg/ml (1%) injection 10 mg  1 mL Other Once Colt Lenz Jr., MD        LIDOcaine (PF) 10 mg/ml (1%) injection 10 mg  1 mL Intradermal Once PRN Rory Griffith MD        LIDOcaine HCL 20 mg/ml (2%) injection 10 mL  10 mL Other Once Colt Lenz Jr., MD        LIDOcaine HCL 20 mg/ml (2%) injection 10 mL  10 mL Other Once Colt Lenz Jr., MD           REVIEW OF SYSTEMS:    GENERAL:  No weight loss, malaise or fevers.  HEENT:  Negative for frequent or significant headaches.  NECK:  Negative for lumps, goiter, pain and significant neck swelling.  RESPIRATORY:  Negative for cough, wheezing or shortness of breath.  CARDIOVASCULAR:  Negative for chest pain, leg swelling or palpitations.  GI:  Negative for abdominal discomfort, blood in stools or black stools or change in bowel habits.  MUSCULOSKELETAL:  See HPI.  SKIN:  Negative for lesions, rash, and itching.  PSYCH:  Negative for sleep disturbance, mood disorder and recent psychosocial stressors.  HEMATOLOGY/LYMPHOLOGY:  Negative for prolonged bleeding, bruising easily or swollen nodes.  NEURO:   No history of headaches, syncope, paralysis, seizures or tremors.  All other reviewed and negative other than HPI.    Past Medical History:  Past Medical History:   Diagnosis Date    Allergy     Arthritis     BPH (benign prostatic hyperplasia)     Carpal tunnel syndrome     Chronic hip pain, bilateral     Chronic low back pain     COPD (chronic obstructive pulmonary disease)     DDD (degenerative disc disease), lumbar     Dorsalgia     Gastrointestinal disease     Lumbar radiculopathy     Lumbar spondylosis     Osteoarthritis of both hips     Osteoarthritis of spine     Skin cancer        Past Surgical History:  Past Surgical History:   Procedure Laterality Date    APPENDECTOMY      CARPAL  TUNNEL RELEASE Right 08/12/2019    Procedure: RELEASE, CARPAL TUNNEL right;  Surgeon: Roopa Hanna MD;  Location: [facility] OR;  Service: Orthopedics;  Laterality: Right;    COLONOSCOPY      CYSTOSCOPY WITH INSERTION OF MINIMALLY INVASIVE IMPLANT TO ENLARGE PROSTATIC URETHRA N/A 11/29/2023    Procedure: CYSTOSCOPY, WITH INSERTION OF UROLIFT IMPLANT;  Surgeon: Rory Griffith MD;  Location: [facility] [place] OR;  Service: Urology;  Laterality: N/A;    CYSTOSCOPY, WITH INSERTION OF UROLIFT IMPLANT  11/29/2023    EPIDURAL STEROID INJECTION INTO LUMBAR SPINE Bilateral 06/17/2021    Bilateral ALVIN per  06/17/2021    GASTRIC FUNDOPLICATION      HERNIA REPAIR  1990s    INJECTION OF ANESTHETIC AGENT AROUND MEDIAL BRANCH NERVES INNERVATING LUMBAR FACET JOINT Bilateral 07/15/2021    Procedure: Block-nerve-medial branch-lumbar---BILATERAL L3, L4, L5;  Surgeon: Colt Lenz Jr., MD;  Location: [facility] [place] PAIN [facility];  Service: Pain Management;  Laterality: Bilateral;    INJECTION OF ANESTHETIC AGENT AROUND MEDIAL BRANCH NERVES INNERVATING LUMBAR FACET JOINT Bilateral 09/23/2021    Procedure: Block-nerve-medial branch-lumbar---BILATERAL L3, L4, L5;  Surgeon: Colt Lenz Jr., MD;  Location: [facility] [place] PAIN [facility];  Service: Pain Management;  Laterality: Bilateral;    INJECTION OF JOINT Bilateral 12/02/2021    Procedure: Injection, Joint---BILATERAL SACROILIAC INJECTION;  Surgeon: Colt Lenz Jr., MD;  Location: [facility] [place] [facility]IN [facility];  Service: Pain Management;  Laterality: Bilateral;    INJECTION OF STEROID Left 08/12/2019    Procedure: INJECTION, STEROID;  Surgeon: Roopa Hanna MD;  Location: [facility] OR;  Service: Orthopedics;  Laterality: Left;    INJECTION, SACROILIAC JOINT Bilateral 1/11/2024    Procedure: INJECTION,SACROILIAC JOINT BILATERAL;  Surgeon: Jeanmarie Jauregui MD;  Location: [facility] PAIN [facility]GT;  Service: Pain Management;  Laterality: Bilateral;  [phone]    PYELOGRAM, RETROGRADE (Bilateral)    11/29/2023     RADIOFREQUENCY ABLATION Left 10/14/2021    Procedure: Radiofrequency Ablation---LEFT L3, L4, L5;  Surgeon: Colt Lenz Jr., MD;  Location: [facility]IN [facility];  Service: Pain Management;  Laterality: Left;    RADIOFREQUENCY ABLATION Right 10/28/2021    Procedure: Radiofrequency Ablation---RIGHT L3, L4, L5;  Surgeon: Colt Lenz Jr., MD;  Location: [facility] PAIN MGMT;  Service: Pain Management;  Laterality: Right;    RETROGRADE PYELOGRAPHY Bilateral 11/29/2023    Procedure: PYELOGRAM, RETROGRADE;  Surgeon: Rory Griffith MD;  Location: [facility] OR;  Service: Urology;  Laterality: Bilateral;    ROTATOR CUFF REPAIR Right     SACROILIAC JOINT INJECTION Bilateral 12/02/2021    SKIN CANCER EXCISION      TRANSFORAMINAL EPIDURAL INJECTION OF STEROID Bilateral 06/17/2021    Procedure: Injection,steroid,epidural,transforaminal approach---BILATERAL S1;  Surgeon: Colt Lenz Jr., MD;  Location: [facility] PAIN [facility];  Service: Pain Management;  Laterality: Bilateral;    TURBT (TRANSURETHRAL RESECTION OF BLADDER TUMOR) N/A 11/29/2023    Procedure: TURBT (TRANSURETHRAL RESECTION OF BLADDER TUMOR);  Surgeon: Rory Griffith MD;  Location: [facility] OR;  Service: Urology;  Laterality: N/A;  with UroLift and with bilateral retrograde pyelograms    URBT (TRANSURETHRAL RESECTION OF BLADDER TUMOR)  11/29/2023       Family History:  Family History   Problem Relation Age of Onset    Diabetes Mother     No Known Problems Father        Social History:  Social History     Socioeconomic History    Marital status:    Tobacco Use    Smoking status: Every Day     Types: Cigars    Smokeless tobacco: Never    Tobacco comments:     8 small cigars per day down from 10.   Substance and Sexual Activity    Alcohol use: No    Drug use: No    Sexual activity: Not Currently     Partners: Female     Birth control/protection: None       OBJECTIVE:    BP (!) 145/62 (BP Location: Right arm, Patient Position: Sitting, BP Method: Medium (Automatic))   " Pulse 85   Resp 18   Ht 5' 10" (1.778 m)   Wt 79.4 kg (175 lb 0.7 oz)   SpO2 100%   BMI 25.12 kg/m²     PHYSICAL EXAMINATION:    General appearance: Well appearing, in no acute distress, alert and oriented x3.  Psych:  Mood and affect appropriate.  Skin: Skin color, texture, turgor normal, no rashes or lesions, in both upper and lower body.  Head/face:  Atraumatic, normocephalic. No palpable lymph nodes  Back: Straight leg raising in the sitting and supine positions is negative to radicular pain. TTP over lumbar facet joints. Limited ROM with pain on extension > flexion. Positive facet loading bilaterally.  Extremities: Peripheral joint ROM is full and pain free without obvious instability or laxity in all four extremities. No deformities, edema, or skin discoloration. Good capillary refill.  Musculoskeletal: Lower extremity strength is normal and symmetric.  No atrophy or tone abnormalities are noted.   Neuro: Bilateral upper and lower extremity coordination and muscle stretch reflexes are physiologic and symmetric.  Plantar response are downgoing. No loss of sensation is noted.  Gait: Antalgic.    ASSESSMENT: 73 y.o. year old male with lower back and hip pain, consistent with sacroiliitis.     1. Lumbar spondylosis  Procedure Order to Pain Management      2. Chronic pain syndrome            PLAN:     - I personally reviewed and interpreted relevant and pertinent imaging. Results were discussed with the patient today.     - He is s/p Bilateral SI joint injection with good relief of buttock pain. Pain today is located to lower back secondary to lumbar facet arthropathy. I will schedule the patient for bilateral MBB at L3 to L5.The patient has axial pain that is non-radiating. The patient also previously completed over 12 weeks of PT exercises in the past 6 months. Current Pain Level is 5/10. Pain Disability Index Score today is 5/10.    - RTC after completion of procedures.    - Counseled patient regarding " the importance of physical therapy.    The above plan and management options were discussed at length with patient. Patient is in agreement with the above and verbalized understanding.    Flaquita Joshi   01/26/2024

## 2024-01-26 NOTE — TELEPHONE ENCOUNTER
No care due was identified.  [facility] Embedded Care Due Messages. Reference number: [id number].   1/26/2024 6:50:55 AM CST

## 2024-01-27 RX ORDER — GABAPENTIN 600 MG/1
600 TABLET ORAL 2 TIMES DAILY
Qty: 60 TABLET | Refills: 0 | Status: SHIPPED | OUTPATIENT
Start: 2024-01-27 | End: 2024-02-26

## 2024-01-29 ENCOUNTER — PATIENT OUTREACH (OUTPATIENT)
Dept: ADMINISTRATIVE | Facility: HOSPITAL | Age: 74
End: 2024-01-29
Payer: MEDICARE

## 2024-01-29 NOTE — PROGRESS NOTES
Health Maintenance Due   Topic Date Due    High Dose Statin  Never done    RSV Vaccine (Age 60+ and Pregnant patients) (1 - 1-dose 60+ series) Never done    Colorectal Cancer Screening  06/23/2023        Chart review done.   HM updated.   Immunizations reviewed & updated.   Care Everywhere updated.

## 2024-01-30 ENCOUNTER — PATIENT MESSAGE (OUTPATIENT)
Dept: PAIN MEDICINE | Facility: OTHER | Age: 74
End: 2024-01-30
Payer: MEDICARE

## 2024-02-12 ENCOUNTER — OFFICE VISIT (OUTPATIENT)
Dept: PRIMARY CARE CLINIC | Facility: CLINIC | Age: 74
End: 2024-02-12
Payer: MEDICARE

## 2024-02-12 ENCOUNTER — HOSPITAL ENCOUNTER (OUTPATIENT)
Dept: RADIOLOGY | Facility: HOSPITAL | Age: 74
Discharge: HOME | End: 2024-02-12
Attending: INTERNAL MEDICINE
Payer: MEDICARE

## 2024-02-12 VITALS
HEART RATE: 73 BPM | OXYGEN SATURATION: 94 % | WEIGHT: 162.06 LBS | RESPIRATION RATE: 18 BRPM | HEIGHT: 70 IN | BODY MASS INDEX: 23.2 KG/M2 | DIASTOLIC BLOOD PRESSURE: 58 MMHG | SYSTOLIC BLOOD PRESSURE: 132 MMHG

## 2024-02-12 DIAGNOSIS — L98.9 SKIN SORE: Primary | ICD-10-CM

## 2024-02-12 DIAGNOSIS — J40 BRONCHITIS: ICD-10-CM

## 2024-02-12 DIAGNOSIS — M54.41 CHRONIC BILATERAL LOW BACK PAIN WITH BILATERAL SCIATICA: ICD-10-CM

## 2024-02-12 DIAGNOSIS — N18.31 CHRONIC KIDNEY DISEASE, STAGE 3A: ICD-10-CM

## 2024-02-12 DIAGNOSIS — G47.00 INSOMNIA, UNSPECIFIED TYPE: ICD-10-CM

## 2024-02-12 DIAGNOSIS — F41.9 ANXIETY: ICD-10-CM

## 2024-02-12 DIAGNOSIS — M54.42 CHRONIC BILATERAL LOW BACK PAIN WITH BILATERAL SCIATICA: ICD-10-CM

## 2024-02-12 DIAGNOSIS — G89.29 CHRONIC BILATERAL LOW BACK PAIN WITH BILATERAL SCIATICA: ICD-10-CM

## 2024-02-12 PROCEDURE — 99999 PR PBB SHADOW E&M-EST. PATIENT-LVL V: CPT | Mod: PBBFAC,,, | Performed by: INTERNAL MEDICINE

## 2024-02-12 PROCEDURE — 99214 OFFICE O/P EST MOD 30 MIN: CPT | Mod: S$GLB,,, | Performed by: INTERNAL MEDICINE

## 2024-02-12 RX ORDER — PREDNISONE 20 MG/1
20 TABLET ORAL 2 TIMES DAILY
Qty: 10 TABLET | Refills: 0 | Status: SHIPPED | OUTPATIENT
Start: 2024-02-12 | End: 2024-02-17

## 2024-02-12 RX ORDER — ALPRAZOLAM 1 MG/1
1 TABLET ORAL NIGHTLY PRN
Qty: 30 TABLET | Refills: 2 | Status: SHIPPED | OUTPATIENT
Start: 2024-02-12

## 2024-02-12 RX ORDER — GUAIFENESIN 600 MG/1
1200 TABLET, EXTENDED RELEASE ORAL 2 TIMES DAILY
Qty: 20 TABLET | Refills: 1 | Status: SHIPPED | OUTPATIENT
Start: 2024-02-12 | End: 2024-05-30 | Stop reason: CLARIF

## 2024-02-12 RX ORDER — AMOXICILLIN AND CLAVULANATE POTASSIUM 875; 125 MG/1; MG/1
1 TABLET, FILM COATED ORAL EVERY 12 HOURS
Qty: 20 TABLET | Refills: 0 | Status: SHIPPED | OUTPATIENT
Start: 2024-02-12 | End: 2024-05-30 | Stop reason: CLARIF

## 2024-02-14 NOTE — PROGRESS NOTES
Subjective:       Patient ID: Uriah Mills is a 73 y.o. male.    Chief Complaint: Follow-up (3 month)     patient visit today for three-month follow-up he has history of BPH carpal tunnel syndrome chronic bilateral hip pain chronic lower back pain COPD degenerative disc disease in the lumbar spine osteoarthritis of the hips patient reports 2 weeks ago he was sitting on the chair in the front porch want on the leg of the chair gave out patient fell and hurt his left ankle with a skin abrasion still hurt with redness and open skin sore patient has been using Neosporin no other injury for his lower back pain with sciatica he had sacroiliac joint injection bilaterally back pains better since then currently does not have to take pain medication he also complained of coughing congestion short of breath in the last few days and patient is still smoking he denies fever chill nausea vomiting deny body ache.  Patient still with chronic anxiety insomnia request refill medication now has been off opiate  Review of Systems   Constitutional:  Negative for unexpected weight change.   Respiratory:  Positive for cough and shortness of breath.    Cardiovascular:  Negative for chest pain.   Gastrointestinal:  Negative for abdominal pain.   Musculoskeletal:  Positive for arthralgias and back pain.   Psychiatric/Behavioral:  Positive for dysphoric mood.        Objective:      Physical Exam  Vitals and nursing note reviewed.   Constitutional:       General: He is not in acute distress.     Appearance: He is well-developed.   HENT:      Head: Normocephalic and atraumatic.      Right Ear: External ear normal.      Left Ear: External ear normal.      Nose: Congestion and rhinorrhea present.      Mouth/Throat:      Pharynx: No oropharyngeal exudate.   Eyes:      Extraocular Movements: Extraocular movements intact.      Conjunctiva/sclera: Conjunctivae normal.      Pupils: Pupils are equal, round, and reactive to light.   Neck:       Thyroid: No thyromegaly.   Cardiovascular:      Rate and Rhythm: Normal rate and regular rhythm.      Heart sounds: Normal heart sounds. No murmur heard.     No friction rub. No gallop.   Pulmonary:      Effort: Pulmonary effort is normal. No respiratory distress.      Breath sounds: Normal breath sounds. No rales (Bilateral rhonchi and wheezing).   Abdominal:      General: Bowel sounds are normal. There is no distension.      Palpations: Abdomen is soft.      Tenderness: There is no abdominal tenderness. There is no guarding or rebound.   Musculoskeletal:         General: No tenderness or deformity. Normal range of motion.      Cervical back: Normal range of motion and neck supple.   Lymphadenopathy:      Cervical: No cervical adenopathy.   Skin:     General: Skin is warm and dry.      Findings: No erythema or rash.   Neurological:      Mental Status: He is alert and oriented to person, place, and time.   Psychiatric:         Mood and Affect: Mood normal.         Thought Content: Thought content normal.         Judgment: Judgment normal.         Assessment:       1. Skin sore    2. Insomnia, unspecified type    3. Anxiety    4. Bronchitis    5. Chronic kidney disease, stage 3a    6. Chronic bilateral low back pain with bilateral sciatica        Plan:       Skin sore  Comments:  left ankle    Insomnia, unspecified type  -     ALPRAZolam (XANAX) 1 MG tablet; Take 1 tablet (1 mg total) by mouth nightly as needed for Anxiety.  Dispense: 30 tablet; Refill: 2    Anxiety  -     ALPRAZolam (XANAX) 1 MG tablet; Take 1 tablet (1 mg total) by mouth nightly as needed for Anxiety.  Dispense: 30 tablet; Refill: 2    Bronchitis  Comments:  Chest x-ray if not better  Orders:  -     X-Ray Chest PA And Lateral; Future; Expected date: 02/12/2024  -     amoxicillin-clavulanate 875-125mg (AUGMENTIN) 875-125 mg per tablet; Take 1 tablet by mouth every 12 (twelve) hours.  Dispense: 20 tablet; Refill: 0  -     guaiFENesin (MUCINEX) 600 mg  12 hr tablet; Take 2 tablets (1,200 mg total) by mouth 2 (two) times daily.  Dispense: 20 tablet; Refill: 1  -     predniSONE (DELTASONE) 20 MG tablet; Take 1 tablet (20 mg total) by mouth 2 (two) times daily. for 5 days  Dispense: 10 tablet; Refill: 0    Chronic kidney disease, stage 3a  Comments:  Stable no change avoid NSAIDs    Chronic bilateral low back pain with bilateral sciatica  Comments:  Back pain better since sacroiliac joint injection bilaterally has appointment follow-up with pain management next week        Medication List with Changes/Refills   New Medications    AMOXICILLIN-CLAVULANATE 875-125MG (AUGMENTIN) 875-125 MG PER TABLET    Take 1 tablet by mouth every 12 (twelve) hours.    GUAIFENESIN (MUCINEX) 600 MG 12 HR TABLET    Take 2 tablets (1,200 mg total) by mouth 2 (two) times daily.    PREDNISONE (DELTASONE) 20 MG TABLET    Take 1 tablet (20 mg total) by mouth 2 (two) times daily. for 5 days   Current Medications    ALBUTEROL (PROVENTIL/VENTOLIN HFA) 90 MCG/ACTUATION INHALER    INHALE 2 PUFFS BY MOUTH EVERY 6 HOURS AS NEEDED    ASPIRIN (ECOTRIN) 81 MG EC TABLET    Take 81 mg by mouth once daily.    GABAPENTIN (NEURONTIN) 600 MG TABLET    Take 1 tablet by mouth twice daily    LEVOCETIRIZINE (XYZAL) 5 MG TABLET    TAKE 1 TABLET BY MOUTH ONCE DAILY IN THE EVENING    OXYBUTYNIN (DITROPAN) 5 MG TAB    Take 1 tablet (5 mg total) by mouth 3 (three) times daily as needed (bladder spasms (urge to urinate)).    SILODOSIN (RAPAFLO) 8 MG CAP CAPSULE    Take 1 capsule (8 mg total) by mouth once daily.   Changed and/or Refilled Medications    Modified Medication Previous Medication    ALPRAZOLAM (XANAX) 1 MG TABLET ALPRAZolam (XANAX) 1 MG tablet       Take 1 tablet (1 mg total) by mouth nightly as needed for Anxiety.    Take 1 tablet (1 mg total) by mouth nightly as needed for Anxiety.   Discontinued Medications    HYDROCODONE-ACETAMINOPHEN (NORCO) 5-325 MG PER TABLET    Take 1 tablet by mouth every 12  (twelve) hours as needed for Pain.

## 2024-02-16 ENCOUNTER — HOSPITAL ENCOUNTER (OUTPATIENT)
Facility: OTHER | Age: 74
Discharge: HOME | End: 2024-02-16
Attending: ANESTHESIOLOGY | Admitting: ANESTHESIOLOGY
Payer: MEDICARE

## 2024-02-16 VITALS
WEIGHT: 165 LBS | HEIGHT: 70 IN | TEMPERATURE: 97 F | RESPIRATION RATE: 16 BRPM | BODY MASS INDEX: 23.62 KG/M2 | DIASTOLIC BLOOD PRESSURE: 78 MMHG | HEART RATE: 70 BPM | OXYGEN SATURATION: 99 % | SYSTOLIC BLOOD PRESSURE: 155 MMHG

## 2024-02-16 DIAGNOSIS — M47.816 SPONDYLOSIS WITHOUT MYELOPATHY OR RADICULOPATHY, LUMBAR REGION: ICD-10-CM

## 2024-02-16 DIAGNOSIS — M47.816 LUMBAR SPONDYLOSIS: Primary | ICD-10-CM

## 2024-02-16 DIAGNOSIS — M51.36 DDD (DEGENERATIVE DISC DISEASE), LUMBAR: ICD-10-CM

## 2024-02-16 DIAGNOSIS — M47.816 SPONDYLOSIS OF LUMBAR REGION WITHOUT MYELOPATHY OR RADICULOPATHY: ICD-10-CM

## 2024-02-16 DIAGNOSIS — M54.16 LUMBAR RADICULOPATHY: ICD-10-CM

## 2024-02-16 PROCEDURE — 64494 INJ PARAVERT F JNT L/S 2 LEV: CPT | Mod: 50 | Performed by: ANESTHESIOLOGY

## 2024-02-16 PROCEDURE — 25000003 PHARM REV CODE 250: Performed by: ANESTHESIOLOGY

## 2024-02-16 PROCEDURE — 64494 INJ PARAVERT F JNT L/S 2 LEV: CPT | Mod: 50,KX,, | Performed by: ANESTHESIOLOGY

## 2024-02-16 PROCEDURE — 64493 INJ PARAVERT F JNT L/S 1 LEV: CPT | Mod: 50,KX,, | Performed by: ANESTHESIOLOGY

## 2024-02-16 PROCEDURE — 63600175 PHARM REV CODE 636 W HCPCS: Mod: JZ,JG | Performed by: ANESTHESIOLOGY

## 2024-02-16 PROCEDURE — 64493 INJ PARAVERT F JNT L/S 1 LEV: CPT | Mod: 50 | Performed by: ANESTHESIOLOGY

## 2024-02-16 RX ORDER — SODIUM CHLORIDE 9 MG/ML
INJECTION, SOLUTION INTRAVENOUS CONTINUOUS
Status: DISCONTINUED | OUTPATIENT
Start: 2024-02-16 | End: 2024-02-16 | Stop reason: HOSPADM

## 2024-02-16 RX ORDER — ALPRAZOLAM 0.5 MG/1
0.5 TABLET, ORALLY DISINTEGRATING ORAL ONCE
Status: COMPLETED | OUTPATIENT
Start: 2024-02-16 | End: 2024-02-16

## 2024-02-16 RX ORDER — BUPIVACAINE HYDROCHLORIDE 2.5 MG/ML
INJECTION, SOLUTION EPIDURAL; INFILTRATION; INTRACAUDAL
Status: DISCONTINUED | OUTPATIENT
Start: 2024-02-16 | End: 2024-02-16 | Stop reason: HOSPADM

## 2024-02-16 RX ORDER — LIDOCAINE HYDROCHLORIDE 20 MG/ML
INJECTION, SOLUTION INFILTRATION; PERINEURAL
Status: DISCONTINUED | OUTPATIENT
Start: 2024-02-16 | End: 2024-02-16 | Stop reason: HOSPADM

## 2024-02-16 RX ADMIN — ALPRAZOLAM 0.5 MG: 0.5 TABLET, ORALLY DISINTEGRATING ORAL at 01:02

## 2024-02-16 NOTE — DISCHARGE INSTRUCTIONS

## 2024-02-16 NOTE — OP NOTE
Diagnostic Lumbar Medial Branch Block Under Fluoroscopy    The procedure, risks, benefits, and options were discussed with the patient. There are no contraindications to the procedure. The patent expressed understanding and agreed to the procedure. Informed written consent was obtained prior to the start of the procedure and can be found in the patient's chart.    PATIENT NAME: Uriah Mills   MRN: [id number]     DATE OF PROCEDURE: 02/16/2024                                           PROCEDURE:  Diagnostic Bilateral L3, L4, and L5 Lumbar Medial Branch Block under Fluoroscopy    PRE-OP DIAGNOSIS: Lumbar spondylosis [M47.816] Lumbar spondylosis [M47.816]    POST-OP DIAGNOSIS: Same    PHYSICIAN: Jeanmarie Jauregui MD    ASSISTANTS: Michelle Pace, [facility] Fellow      MEDICATIONS INJECTED:  Bupivicaine 0.25%    LOCAL ANESTHETIC INJECTED:   Xylocaine 2%    SEDATION: None    ESTIMATED BLOOD LOSS:  None    COMPLICATIONS:  None.    INTERVAL HISTORY: Patient has clinical and imaging findings suggestive of facet mediated pain.    TECHNIQUE: Time-out was performed to identify the patient and procedure to be performed. With the patient laying in a prone position, the surgical area was prepped and draped in the usual sterile fashion using ChloraPrep and fenestrated drape. The levels were determined under fluoroscopic guidance. Skin anesthesia was achieved by injecting Lidocaine 2% over the injection sites. A 22 gauge, 3.5 inch needle was introduced into the medial branch nerves at the junctions of the superior articular process and the transverse processes of the targeted sites using AP, lateral and/or contralateral oblique fluoroscopic imaging. After negative aspiration for blood or CSF was confirmed, 1 mL of the anesthetic listed above was then slowly injected at each site. The needles were removed and bleeding was nil. A sterile dressing was applied. No specimens collected. The patient tolerated the procedure  well.      The patient was monitored after the procedure in the recovery area. They were given post-procedure and discharge instructions to follow at home. The patient was discharged in a stable condition.    Aster Schwartz DO    I reviewed and edited the fellow's note. I conducted my own interview and physical examination. I agree with the findings. I was present and supervising all critical portions of the procedure.      Jeanmarie Jauregui MD

## 2024-02-16 NOTE — PROGRESS NOTES
I have personally taken the history and examined this patient. I agree with the resident's note as stated above.  The patient was seen to go over the EMG results. The patient is still having pain, N/T symptoms.     The EMG is positive for Carpal Tunnel Syndrome. It is severe in nature.    Plan: We discussed there results of the EMG in detail. WE discussed conservative treatment as well as surgical treatment.  The Patient is interested in surgery at this time.    I have explained the risks, benefits, and alternatives of the procedure to the patient in great detail. The patient voices understanding and all questions have been answered. The patient agrees with to proceed as planned. Consents were performed in clinic.  Pt also has hand pain and difficulty with driving-N/T with driving. He is a retired   He is worried about his hand pain, inability to make a fist along with the N/T. Discussed inflammation in details with opt and pt's wife. He already takes tumeric. Will order parffin and CC   17-Feb-2024

## 2024-02-17 ENCOUNTER — PATIENT MESSAGE (OUTPATIENT)
Dept: PAIN MEDICINE | Facility: CLINIC | Age: 74
End: 2024-02-17
Payer: MEDICARE

## 2024-02-19 ENCOUNTER — TELEPHONE (OUTPATIENT)
Dept: PAIN MEDICINE | Facility: CLINIC | Age: 74
End: 2024-02-19
Payer: MEDICARE

## 2024-02-19 DIAGNOSIS — M47.816 LUMBAR SPONDYLOSIS: Primary | ICD-10-CM

## 2024-02-19 NOTE — TELEPHONE ENCOUNTER
Medial Branch Block Diary   lumbar    1    Pre procedure pain level 10  Percentage of relief within 24 hours of procedure- 100%  Post procedure level 0  Any Improvements in ADL: normal activities.

## 2024-02-20 ENCOUNTER — PATIENT MESSAGE (OUTPATIENT)
Dept: PAIN MEDICINE | Facility: OTHER | Age: 74
End: 2024-02-20
Payer: MEDICARE

## 2024-02-20 DIAGNOSIS — M47.816 LUMBAR SPONDYLOSIS: Primary | ICD-10-CM

## 2024-02-24 DIAGNOSIS — M54.41 CHRONIC BILATERAL LOW BACK PAIN WITH BILATERAL SCIATICA: ICD-10-CM

## 2024-02-24 DIAGNOSIS — G89.29 CHRONIC BILATERAL LOW BACK PAIN WITH BILATERAL SCIATICA: ICD-10-CM

## 2024-02-24 DIAGNOSIS — M54.42 CHRONIC BILATERAL LOW BACK PAIN WITH BILATERAL SCIATICA: ICD-10-CM

## 2024-02-24 DIAGNOSIS — M54.16 LUMBAR RADICULOPATHY: ICD-10-CM

## 2024-02-24 NOTE — TELEPHONE ENCOUNTER
No care due was identified.  Health [facility] Embedded Care Due Messages. Reference number: [id number].   2/24/2024 6:50:55 AM CST

## 2024-02-26 RX ORDER — GABAPENTIN 600 MG/1
600 TABLET ORAL 2 TIMES DAILY
Qty: 60 TABLET | Refills: 0 | Status: SHIPPED | OUTPATIENT
Start: 2024-02-26 | End: 2024-03-26

## 2024-02-27 ENCOUNTER — PATIENT MESSAGE (OUTPATIENT)
Dept: ADMINISTRATIVE | Facility: OTHER | Age: 74
End: 2024-02-27
Payer: MEDICARE

## 2024-03-03 DIAGNOSIS — J32.9 CHRONIC SINUSITIS, UNSPECIFIED LOCATION: ICD-10-CM

## 2024-03-03 RX ORDER — LEVOCETIRIZINE DIHYDROCHLORIDE 5 MG/1
TABLET, FILM COATED ORAL
Qty: 90 TABLET | Refills: 3 | Status: SHIPPED | OUTPATIENT
Start: 2024-03-03

## 2024-03-03 NOTE — TELEPHONE ENCOUNTER
No care due was identified.  [facility] Embedded Care Due Messages. Reference number: [id number].   3/03/2024 2:21:10 PM CST

## 2024-03-03 NOTE — TELEPHONE ENCOUNTER
Refill Decision Note   Uriah Mills  is requesting a refill authorization.  Brief Assessment and Rationale for Refill:  Approve     Medication Therapy Plan:         Comments:     Note composed:4:13 PM 03/03/2024

## 2024-03-07 ENCOUNTER — OFFICE VISIT (OUTPATIENT)
Dept: UROLOGY | Facility: CLINIC | Age: 74
End: 2024-03-07
Payer: MEDICARE

## 2024-03-07 VITALS
HEART RATE: 81 BPM | SYSTOLIC BLOOD PRESSURE: 131 MMHG | BODY MASS INDEX: 22.89 KG/M2 | WEIGHT: 159.5 LBS | DIASTOLIC BLOOD PRESSURE: 64 MMHG

## 2024-03-07 DIAGNOSIS — R35.0 BENIGN PROSTATIC HYPERPLASIA WITH URINARY FREQUENCY: Primary | ICD-10-CM

## 2024-03-07 DIAGNOSIS — N40.1 BENIGN PROSTATIC HYPERPLASIA WITH URINARY FREQUENCY: Primary | ICD-10-CM

## 2024-03-07 PROCEDURE — 99213 OFFICE O/P EST LOW 20 MIN: CPT | Mod: S$GLB,,, | Performed by: STUDENT IN AN ORGANIZED HEALTH CARE EDUCATION/TRAINING PROGRAM

## 2024-03-07 PROCEDURE — 99999 PR PBB SHADOW E&M-EST. PATIENT-LVL III: CPT | Mod: PBBFAC,,, | Performed by: STUDENT IN AN ORGANIZED HEALTH CARE EDUCATION/TRAINING PROGRAM

## 2024-03-07 NOTE — PROGRESS NOTES
"[facility]rology [facility] [address]   Clinic Note    SUBJECTIVE:     Chief Complaint: BPH with LUTS    History of Present Illness:  Uriah Mills is a 73 y.o. male who presents to clinic for BPH with LUTS. He is established to our clinic.    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 He passed subsequent voiding trial. Path from resection was benign.   He reports "30% improvement" in his urinary symptom. The only bothersome symptom is weak stream. He is still taking Rapaflo, no other medications for urinary symptoms.       PMH notable for asthma/COPD - uses rescue inhaler 2-3 times daily currently, more in the summer    Anticoagulation:  Yes ASA 81 mg    OBJECTIVE:     Estimated body mass index is 22.89 kg/m² as calculated from the following:    Height as of 2/16/24: 5' 10" (1.778 m).    Weight as of this encounter: 72.3 kg (159 lb 8 oz).    Vital Signs (Most Recent)  Pulse: 81 (03/07/24 1019)  BP: 131/64 (03/07/24 1019)    Physical Exam  Constitutional:       Appearance: Normal appearance.   HENT:      Head: Normocephalic and atraumatic.   Eyes:      Conjunctiva/sclera: Conjunctivae normal.   Cardiovascular:      Rate and Rhythm: Normal rate.   Pulmonary:      Effort: Pulmonary effort is normal.   Abdominal:      General: Abdomen is flat. There is no distension.      Tenderness: There is no abdominal tenderness.   Musculoskeletal:         General: Normal range of motion.   Skin:     General: Skin is warm and dry.   Neurological:      General: No focal deficit present.      Mental Status: He is alert and oriented to person, place, and time.   Psychiatric:         Mood and Affect: Mood normal.         Behavior: Behavior normal.         Thought Content: Thought content normal.         Judgment: Judgment normal.       Lab Results   Component Value Date    BUN 20 08/28/2023    CREATININE 1.4 08/28/2023    WBC 8.41 03/08/2022    HGB 15.1 03/08/2022    HCT 46.2 " 03/08/2022     03/08/2022    AST 9 (L) 08/28/2023    ALT 6 (L) 08/28/2023    ALKPHOS 102 08/28/2023    ALBUMIN 3.9 08/28/2023        Lab Results   Component Value Date    PSA 2.3 08/28/2023    PSA 1.6 03/08/2022    PSA 1.5 12/16/2020    PSA 1.4 03/13/2019    PSA 1.2 11/15/2017         ASSESSMENT     1. Benign prostatic hyperplasia with urinary frequency        PLAN:   1. Benign prostatic hyperplasia with urinary frequency         Discussed adding finasteride versus cysto/SUDS to evaluate for RASMUSSEN vs hypocontractile detrusor. Patient is not bothered enough by symptoms to pursue either option.   Continue Rapaflo. RTC 6 months.     Rory Griffith MD

## 2024-03-08 ENCOUNTER — HOSPITAL ENCOUNTER (OUTPATIENT)
Facility: OTHER | Age: 74
Discharge: HOME | End: 2024-03-08
Attending: ANESTHESIOLOGY | Admitting: ANESTHESIOLOGY
Payer: MEDICARE

## 2024-03-08 VITALS
WEIGHT: 175 LBS | HEART RATE: 74 BPM | BODY MASS INDEX: 25.05 KG/M2 | TEMPERATURE: 98 F | HEIGHT: 70 IN | RESPIRATION RATE: 16 BRPM | DIASTOLIC BLOOD PRESSURE: 77 MMHG | SYSTOLIC BLOOD PRESSURE: 190 MMHG | OXYGEN SATURATION: 97 %

## 2024-03-08 DIAGNOSIS — G89.29 CHRONIC PAIN: ICD-10-CM

## 2024-03-08 DIAGNOSIS — M51.36 DDD (DEGENERATIVE DISC DISEASE), LUMBAR: ICD-10-CM

## 2024-03-08 DIAGNOSIS — M47.816 LUMBAR SPONDYLOSIS: Primary | ICD-10-CM

## 2024-03-08 PROCEDURE — 64493 INJ PARAVERT F JNT L/S 1 LEV: CPT | Mod: 50,KX,, | Performed by: ANESTHESIOLOGY

## 2024-03-08 PROCEDURE — 64493 INJ PARAVERT F JNT L/S 1 LEV: CPT | Mod: 50 | Performed by: ANESTHESIOLOGY

## 2024-03-08 PROCEDURE — 63600175 PHARM REV CODE 636 W HCPCS: Mod: JZ,JG | Performed by: ANESTHESIOLOGY

## 2024-03-08 PROCEDURE — 64494 INJ PARAVERT F JNT L/S 2 LEV: CPT | Mod: 50 | Performed by: ANESTHESIOLOGY

## 2024-03-08 PROCEDURE — 25000003 PHARM REV CODE 250: Performed by: ANESTHESIOLOGY

## 2024-03-08 PROCEDURE — 64494 INJ PARAVERT F JNT L/S 2 LEV: CPT | Mod: 50,KX,, | Performed by: ANESTHESIOLOGY

## 2024-03-08 RX ORDER — LIDOCAINE HYDROCHLORIDE 20 MG/ML
INJECTION, SOLUTION INFILTRATION; PERINEURAL
Status: DISCONTINUED | OUTPATIENT
Start: 2024-03-08 | End: 2024-03-08 | Stop reason: HOSPADM

## 2024-03-08 RX ORDER — ALPRAZOLAM 0.5 MG/1
0.5 TABLET, ORALLY DISINTEGRATING ORAL
Status: DISCONTINUED | OUTPATIENT
Start: 2024-03-08 | End: 2024-03-08 | Stop reason: HOSPADM

## 2024-03-08 RX ORDER — BUPIVACAINE HYDROCHLORIDE 2.5 MG/ML
INJECTION, SOLUTION EPIDURAL; INFILTRATION; INTRACAUDAL
Status: DISCONTINUED | OUTPATIENT
Start: 2024-03-08 | End: 2024-03-08 | Stop reason: HOSPADM

## 2024-03-08 RX ORDER — SODIUM CHLORIDE 9 MG/ML
INJECTION, SOLUTION INTRAVENOUS CONTINUOUS
Status: DISCONTINUED | OUTPATIENT
Start: 2024-03-08 | End: 2024-03-08 | Stop reason: HOSPADM

## 2024-03-08 RX ADMIN — ALPRAZOLAM 0.5 MG: 0.5 TABLET, ORALLY DISINTEGRATING ORAL at 12:03

## 2024-03-08 NOTE — DISCHARGE INSTRUCTIONS

## 2024-03-08 NOTE — DISCHARGE SUMMARY
Discharge Note  Short Stay      SUMMARY     Admit Date: 3/8/2024    Attending Physician: Tres Polanco      Discharge Physician: Tres Polanco      Discharge Date: 3/8/2024 1:32 PM    Procedure(s) (LRB):  BLOCK, NERVE BILATERAL L3, L4, AND L5 MEDIAL BRANCH (Bilateral)    Final Diagnosis: Lumbar spondylosis [M47.816]    Disposition: Home or self care    Patient Instructions:   Current Discharge Medication List        CONTINUE these medications which have NOT CHANGED    Details   albuterol (PROVENTIL/VENTOLIN HFA) 90 mcg/actuation inhaler INHALE 2 PUFFS BY MOUTH EVERY 6 HOURS AS NEEDED  Qty: 25.5 g, Refills: 3    Associated Diagnoses: Bronchitis      ALPRAZolam (XANAX) 1 MG tablet Take 1 tablet (1 mg total) by mouth nightly as needed for Anxiety.  Qty: 30 tablet, Refills: 2    Associated Diagnoses: Insomnia, unspecified type; Anxiety      amoxicillin-clavulanate 875-125mg (AUGMENTIN) 875-125 mg per tablet Take 1 tablet by mouth every 12 (twelve) hours.  Qty: 20 tablet, Refills: 0    Associated Diagnoses: Bronchitis      aspirin (ECOTRIN) 81 MG EC tablet Take 81 mg by mouth once daily.      gabapentin (NEURONTIN) 600 MG tablet Take 1 tablet by mouth twice daily  Qty: 60 tablet, Refills: 0    Associated Diagnoses: Chronic bilateral low back pain with bilateral sciatica; Lumbar radiculopathy      guaiFENesin (MUCINEX) 600 mg 12 hr tablet Take 2 tablets (1,200 mg total) by mouth 2 (two) times daily.  Qty: 20 tablet, Refills: 1    Associated Diagnoses: Bronchitis      levocetirizine (XYZAL) 5 MG tablet TAKE 1 TABLET BY MOUTH ONCE DAILY IN THE EVENING  Qty: 90 tablet, Refills: 3    Associated Diagnoses: Chronic sinusitis, unspecified location      oxybutynin (DITROPAN) 5 MG Tab Take 1 tablet (5 mg total) by mouth 3 (three) times daily as needed (bladder spasms (urge to urinate)).  Qty: 10 tablet, Refills: 0      silodosin (RAPAFLO) 8 mg Cap capsule Take 1 capsule (8 mg total) by mouth once daily.  Qty: 30  capsule, Refills: 11    Associated Diagnoses: Benign prostatic hyperplasia with urinary frequency                 Discharge Diagnosis: Lumbar spondylosis [M47.816]  Condition on Discharge: Stable with no complications to procedure   Diet on Discharge: Same as before.  Activity: as per instruction sheet.  Discharge to: Home with a responsible adult.  Follow up: 2-4 weeks       Please call my office or pager at [phone] if experienced any weakness or loss of sensation, fever > 101.5, pain uncontrolled with oral medications, persistent nausea/vomiting/or diarrhea, redness or drainage from the incisions, or any other worrisome concerns. If physician on call was not reached or could not communicate with our office for any reason please go to the nearest emergency department

## 2024-03-08 NOTE — H&P
HPI  Patient presenting for Procedure(s) (LRB):  BLOCK, NERVE BILATERAL L3, L4, AND L5 MEDIAL BRANCH (Bilateral)     Patient on Anti-coagulation No    No health changes since previous encounter. No changes in pain since procedure was scheduled at previous visit. Denies any fevers or infections. Denies any issues with clotting or bleeding.     Current Facility-Administered Medications on File Prior to Encounter   Medication Dose Route Frequency Provider Last Rate Last Admin    0.9%  NaCl infusion   Intravenous Continuous Rory Griffith MD        ALPRAZolam dissolvable tablet 1 mg  1 mg Oral Once PRN Colt Lenz Jr., MD        BUPivacaine (PF) 0.25% (2.5 mg/ml) injection 25 mg  10 mL Intramuscular Once Colt Lenz Jr., MD        ceFAZolin 2 g in dextrose 5 % in water (D5W) 100 mL IVPB (MB+)  2 g Intravenous On Call Procedure Rory Griffith MD        dexAMETHasone sodium phos (PF) injection 10 mg  10 mg Epidural Once Colt Lenz Jr., MD        LIDOcaine (PF) 10 mg/ml (1%) injection 10 mg  1 mL Other Once Colt Lenz Jr., MD        LIDOcaine (PF) 10 mg/ml (1%) injection 10 mg  1 mL Intradermal Once PRN Rory Griffith MD        LIDOcaine HCL 20 mg/ml (2%) injection 10 mL  10 mL Other Once Colt Lenz Jr., MD        LIDOcaine HCL 20 mg/ml (2%) injection 10 mL  10 mL Other Once Colt Lenz Jr., MD         Current Outpatient Medications on File Prior to Encounter   Medication Sig Dispense Refill    albuterol (PROVENTIL/VENTOLIN HFA) 90 mcg/actuation inhaler INHALE 2 PUFFS BY MOUTH EVERY 6 HOURS AS NEEDED 25.5 g 3    ALPRAZolam (XANAX) 1 MG tablet Take 1 tablet (1 mg total) by mouth nightly as needed for Anxiety. 30 tablet 2    amoxicillin-clavulanate 875-125mg (AUGMENTIN) 875-125 mg per tablet Take 1 tablet by mouth every 12 (twelve) hours. (Patient not taking: Reported on 3/7/2024) 20 tablet 0    aspirin (ECOTRIN) 81 MG EC tablet Take 81 mg by mouth once daily.       guaiFENesin (MUCINEX) 600 mg 12 hr tablet Take 2 tablets (1,200 mg total) by mouth 2 (two) times daily. 20 tablet 1    oxybutynin (DITROPAN) 5 MG Tab Take 1 tablet (5 mg total) by mouth 3 (three) times daily as needed (bladder spasms (urge to urinate)). 10 tablet 0    silodosin (RAPAFLO) 8 mg Cap capsule Take 1 capsule (8 mg total) by mouth once daily. 30 capsule 11     Past Medical History:   Diagnosis Date    Allergy     Arthritis     BPH (benign prostatic hyperplasia)     Carpal tunnel syndrome     Chronic hip pain, bilateral     Chronic low back pain     COPD (chronic obstructive pulmonary disease)     DDD (degenerative disc disease), lumbar     Dorsalgia     Gastrointestinal disease     Lumbar radiculopathy     Lumbar spondylosis     Osteoarthritis of both hips     Osteoarthritis of spine     Skin cancer      Past Surgical History:   Procedure Laterality Date    APPENDECTOMY      CARPAL TUNNEL RELEASE Right 08/12/2019    Procedure: RELEASE, CARPAL TUNNEL right;  Surgeon: Roopa Hanna MD;  Location: [facility] OR;  Service: Orthopedics;  Laterality: Right;    COLONOSCOPY      CYSTOSCOPY WITH INSERTION OF MINIMALLY INVASIVE IMPLANT TO ENLARGE PROSTATIC URETHRA N/A 11/29/2023    Procedure: CYSTOSCOPY, WITH INSERTION OF UROLIFT IMPLANT;  Surgeon: Rory Griffith MD;  Location: [facility] OR;  Service: Urology;  Laterality: N/A;    CYSTOSCOPY, WITH INSERTION OF UROLIFT IMPLANT  11/29/2023    EPIDURAL STEROID INJECTION INTO LUMBAR SPINE Bilateral 06/17/2021    Bilateral ALVIN per  06/17/2021    GASTRIC FUNDOPLICATION      HERNIA REPAIR  1990s    INJECTION OF ANESTHETIC AGENT AROUND MEDIAL BRANCH NERVES INNERVATING LUMBAR FACET JOINT Bilateral 07/15/2021    Procedure: Block-nerve-medial branch-lumbar---BILATERAL L3, L4, L5;  Surgeon: Colt Lenz Jr., MD;  Location: [facility] PAIN MGMT;  Service: Pain Management;  Laterality: Bilateral;    INJECTION OF ANESTHETIC AGENT AROUND MEDIAL BRANCH NERVES  INNERVATING LUMBAR FACET JOINT Bilateral 09/23/2021    Procedure: Block-nerve-medial branch-lumbar---BILATERAL L3, L4, L5;  Surgeon: Colt Lenz Jr., MD;  Location: [facility] PAIN MG[facility];  Service: Pain Management;  Laterality: Bilateral;    INJECTION OF ANESTHETIC AGENT AROUND NERVE Bilateral 2/16/2024    Procedure: BLOCK, NERVE BILATERAL L3, 4, 5 MEDIAL BRANCH;  Surgeon: Jeanmarie Jauregui MD;  Location: [facility] PAIN MGT;  Service: Pain Management;  Laterality: Bilateral;  [phone]    INJECTION OF JOINT Bilateral 12/02/2021    Procedure: Injection, Joint---BILATERAL SACROILIAC INJECTION;  Surgeon: Colt Lenz Jr., MD;  Location: [facility] PAIN MGMT;  Service: Pain Management;  Laterality: Bilateral;    INJECTION OF STEROID Left 08/12/2019    Procedure: INJECTION, STEROID;  Surgeon: Roopa Hanna MD;  Location: [facility] OR;  Service: Orthopedics;  Laterality: Left;    INJECTION, SACROILIAC JOINT Bilateral 1/11/2024    Procedure: INJECTION,SACROILIAC JOINT BILATERAL;  Surgeon: Jeanmarie Jauregui MD;  Location: [facility] PAIN MGT;  Service: Pain Management;  Laterality: Bilateral;  [phone]    PYELOGRAM, RETROGRADE (Bilateral)    11/29/2023    RADIOFREQUENCY ABLATION Left 10/14/2021    Procedure: Radiofrequency Ablation---LEFT L3, L4, L5;  Surgeon: Colt Lenz Jr., MD;  Location: [facility] PAIN MGMT;  Service: Pain Management;  Laterality: Left;    RADIOFREQUENCY ABLATION Right 10/28/2021    Procedure: Radiofrequency Ablation---RIGHT L3, L4, L5;  Surgeon: Colt Lenz Jr., MD;  Location: [facility] PAIN MGMT;  Service: Pain Management;  Laterality: Right;    RETROGRADE PYELOGRAPHY Bilateral 11/29/2023    Procedure: PYELOGRAM, RETROGRADE;  Surgeon: Rory Griffith MD;  Location: [facility] OR;  Service: Urology;  Laterality: Bilateral;    ROTATOR CUFF REPAIR Right     SACROILIAC JOINT INJECTION Bilateral 12/02/2021    SKIN CANCER EXCISION      TRANSFORAMINAL EPIDURAL INJECTION OF STEROID Bilateral 06/17/2021     Procedure: Injection,steroid,epidural,transforaminal approach---BILATERAL S1;  Surgeon: Colt Lenz Jr., MD;  Location: [facility] PAIN MGMT;  Service: Pain Management;  Laterality: Bilateral;    TURBT (TRANSURETHRAL RESECTION OF BLADDER TUMOR) N/A 11/29/2023    Procedure: TURBT (TRANSURETHRAL RESECTION OF BLADDER TUMOR);  Surgeon: Rory Griffith MD;  Location: [facility] OR;  Service: Urology;  Laterality: N/A;  with UroLift and with bilateral retrograde pyelograms    URBT (TRANSURETHRAL RESECTION OF BLADDER TUMOR)  11/29/2023     Review of patient's allergies indicates:  No Known Allergies   No current facility-administered medications for this encounter.     Facility-Administered Medications Ordered in Other Encounters   Medication    0.9%  NaCl infusion    ALPRAZolam dissolvable tablet 1 mg    BUPivacaine (PF) 0.25% (2.5 mg/ml) injection 25 mg    ceFAZolin 2 g in dextrose 5 % in water (D5W) 100 mL IVPB (MB+)    dexAMETHasone sodium phos (PF) injection 10 mg    LIDOcaine (PF) 10 mg/ml (1%) injection 10 mg    LIDOcaine (PF) 10 mg/ml (1%) injection 10 mg    LIDOcaine HCL 20 mg/ml (2%) injection 10 mL    LIDOcaine HCL 20 mg/ml (2%) injection 10 mL       PMHx, PSHx, Allergies, Medications reviewed in epic    ROS negative except pain complaints in HPI    OBJECTIVE:    There were no vitals taken for this visit.    PHYSICAL EXAMINATION:    GENERAL: Well appearing, in no acute distress, alert and oriented.  PSYCH:  Mood and affect appropriate.  SKIN: Skin color, texture, turgor normal in procedure area, no rashes or lesions which will impact the procedure.  CV: RRR with palpation of the radial artery.  PULM: No evidence of respiratory difficulty, symmetric chest rise. Clear to auscultation.  NEURO: Alert. Oriented. Speech fluent and appropriate. Moving all extremities.    Plan:    Proceed with procedure as planned Procedure(s) (LRB):  BLOCK, NERVE BILATERAL L3, L4, AND L5 MEDIAL BRANCH (Bilateral)    Patirck D  Collin  03/08/2024

## 2024-03-08 NOTE — OP NOTE
Diagnostic Lumbar Medial Branch Block Under Fluoroscopy    The procedure, risks, benefits, and options were discussed with the patient. There are no contraindications to the procedure. The patent expressed understanding and agreed to the procedure. Informed written consent was obtained prior to the start of the procedure and can be found in the patient's chart.    PATIENT NAME: Uriah Mills   MRN: [id number]     DATE OF PROCEDURE: 03/08/2024                                           PROCEDURE:  Diagnostic Bilateral L3, L4, and L5 Lumbar Medial Branch Block under Fluoroscopy    PRE-OP DIAGNOSIS: Lumbar spondylosis [M47.816] Lumbar spondylosis [M47.816]    POST-OP DIAGNOSIS: Same    PHYSICIAN: Jeanmarie Jauregui MD    ASSISTANTS: Justin Feng MD    MEDICATIONS INJECTED:  Bupivicaine 0.25%    LOCAL ANESTHETIC INJECTED:   Xylocaine 2%    SEDATION: None    ESTIMATED BLOOD LOSS:  None    COMPLICATIONS:  None.    INTERVAL HISTORY: Patient has clinical and imaging findings suggestive of facet mediated pain.    TECHNIQUE: Time-out was performed to identify the patient and procedure to be performed. With the patient laying in a prone position, the surgical area was prepped and draped in the usual sterile fashion using ChloraPrep and fenestrated drape. The levels were determined under fluoroscopic guidance. Skin anesthesia was achieved by injecting Lidocaine 2% over the injection sites. A 25 gauge, 3.5 inch needle was introduced into the medial branch nerves at the junctions of the superior articular process and the transverse processes of the targeted sites using AP, lateral and/or contralateral oblique fluoroscopic imaging. After negative aspiration for blood or CSF was confirmed, 1 mL of the anesthetic listed above was then slowly injected at each site. The needles were removed and bleeding was nil. A sterile dressing was applied. No specimens collected. The patient tolerated the procedure well.    PRE-PROCEDURE PAIN SCORE:  7-10/10    POST-PROCEDURE PAIN SCORE: 0/10    The patient was monitored after the procedure in the recovery area. They were given post-procedure and discharge instructions to follow at home. The patient was discharged in a stable condition.    Tres Polanco MD    I reviewed and edited the fellow's note. I conducted my own interview and physical examination. I agree with the findings. I was present and supervising all critical portions of the procedure.      Jeanmarie Jauregui MD

## 2024-03-11 ENCOUNTER — TELEPHONE (OUTPATIENT)
Dept: PAIN MEDICINE | Facility: CLINIC | Age: 74
End: 2024-03-11
Payer: MEDICARE

## 2024-03-11 ENCOUNTER — PATIENT MESSAGE (OUTPATIENT)
Dept: PAIN MEDICINE | Facility: CLINIC | Age: 74
End: 2024-03-11
Payer: MEDICARE

## 2024-03-11 DIAGNOSIS — M47.816 LUMBAR SPONDYLOSIS: Primary | ICD-10-CM

## 2024-03-11 NOTE — TELEPHONE ENCOUNTER
Medial Branch Block Diary   lumbar    1    Pre procedure pain level 8  Percentage of relief within 24 hours of procedure- 80%  Post procedure level 5  Any Improvements in ADL: bathing, dressing, eating, transferring, using toilet, and walking.

## 2024-03-12 ENCOUNTER — PATIENT MESSAGE (OUTPATIENT)
Dept: PAIN MEDICINE | Facility: OTHER | Age: 74
End: 2024-03-12
Payer: MEDICARE

## 2024-03-14 ENCOUNTER — TELEPHONE (OUTPATIENT)
Dept: ORTHOPEDICS | Facility: CLINIC | Age: 74
End: 2024-03-14
Payer: MEDICARE

## 2024-03-14 DIAGNOSIS — M79.641 RIGHT HAND PAIN: Primary | ICD-10-CM

## 2024-03-14 NOTE — TELEPHONE ENCOUNTER
I called the patient and reminded them of their appointment on 04/04/2024 at 11:00 a.m. with Dr. Hanna. I informed them where they need to go and to arrive before 10:00 a.m. to get their XR done. The patient's wife answered and she agreed and understood.

## 2024-03-25 DIAGNOSIS — M54.42 CHRONIC BILATERAL LOW BACK PAIN WITH BILATERAL SCIATICA: ICD-10-CM

## 2024-03-25 DIAGNOSIS — G89.29 CHRONIC BILATERAL LOW BACK PAIN WITH BILATERAL SCIATICA: ICD-10-CM

## 2024-03-25 DIAGNOSIS — M54.16 LUMBAR RADICULOPATHY: ICD-10-CM

## 2024-03-25 DIAGNOSIS — M54.41 CHRONIC BILATERAL LOW BACK PAIN WITH BILATERAL SCIATICA: ICD-10-CM

## 2024-03-25 NOTE — TELEPHONE ENCOUNTER
No care due was identified.  Health [facility] Embedded Care Due Messages. Reference number: [id number].   3/25/2024 6:50:44 AM CDT

## 2024-03-26 RX ORDER — GABAPENTIN 600 MG/1
600 TABLET ORAL 2 TIMES DAILY
Qty: 60 TABLET | Refills: 0 | Status: SHIPPED | OUTPATIENT
Start: 2024-03-26 | End: 2024-04-29

## 2024-04-04 ENCOUNTER — HOSPITAL ENCOUNTER (OUTPATIENT)
Dept: RADIOLOGY | Facility: OTHER | Age: 74
Discharge: HOME | End: 2024-04-04
Attending: ORTHOPAEDIC SURGERY
Payer: MEDICARE

## 2024-04-04 ENCOUNTER — OFFICE VISIT (OUTPATIENT)
Dept: ORTHOPEDICS | Facility: CLINIC | Age: 74
End: 2024-04-04
Payer: MEDICARE

## 2024-04-04 VITALS — HEIGHT: 70 IN | BODY MASS INDEX: 25.06 KG/M2 | WEIGHT: 175.06 LBS

## 2024-04-04 DIAGNOSIS — R22.31 MASS OF RIGHT WRIST: Primary | ICD-10-CM

## 2024-04-04 DIAGNOSIS — M79.641 RIGHT HAND PAIN: ICD-10-CM

## 2024-04-04 PROCEDURE — 1125F AMNT PAIN NOTED PAIN PRSNT: CPT | Mod: CPTII,S$GLB,, | Performed by: ORTHOPAEDIC SURGERY

## 2024-04-04 PROCEDURE — 1101F PT FALLS ASSESS-DOCD LE1/YR: CPT | Mod: CPTII,S$GLB,, | Performed by: ORTHOPAEDIC SURGERY

## 2024-04-04 PROCEDURE — 73130 X-RAY EXAM OF HAND: CPT | Mod: 26,RT,, | Performed by: RADIOLOGY

## 2024-04-04 PROCEDURE — 99999 PR PBB SHADOW E&M-EST. PATIENT-LVL III: CPT | Mod: PBBFAC,,, | Performed by: ORTHOPAEDIC SURGERY

## 2024-04-04 PROCEDURE — 73130 X-RAY EXAM OF HAND: CPT | Mod: TC,FY,RT

## 2024-04-04 PROCEDURE — 3008F BODY MASS INDEX DOCD: CPT | Mod: CPTII,S$GLB,, | Performed by: ORTHOPAEDIC SURGERY

## 2024-04-04 PROCEDURE — 3288F FALL RISK ASSESSMENT DOCD: CPT | Mod: CPTII,S$GLB,, | Performed by: ORTHOPAEDIC SURGERY

## 2024-04-04 PROCEDURE — 99204 OFFICE O/P NEW MOD 45 MIN: CPT | Mod: S$GLB,,, | Performed by: ORTHOPAEDIC SURGERY

## 2024-04-04 NOTE — PROGRESS NOTES
[facility]  History & Physical  Orthopedics    SUBJECTIVE:     Chief Complaint:  Right wrist mass    Referring Provider: Self, Aaareferral     History of Present Illness:  Patient is a 73 y.o. right hand dominant male who presents today with complaints of right wrist mass which was noticed 3 months ago.  Reports tenderness at right mass region on palpation which is causing him discomfort. Has not noted any fluctuance.  Has tried wrist bracing which gives him mild relief.  Denies any numbness or tingling.  He has a history of a carpal tunnel release in 2019.  Denies any recent trauma.     The patient denies any fevers, chills, N/V, D/C and presents for evaluation.    Review of patient's allergies indicates:  No Known Allergies    Past Medical History:   Diagnosis Date    Allergy     Arthritis     BPH (benign prostatic hyperplasia)     Carpal tunnel syndrome     Chronic hip pain, bilateral     Chronic low back pain     COPD (chronic obstructive pulmonary disease)     DDD (degenerative disc disease), lumbar     Dorsalgia     Gastrointestinal disease     Lumbar radiculopathy     Lumbar spondylosis     Osteoarthritis of both hips     Osteoarthritis of spine     Skin cancer      Past Surgical History:   Procedure Laterality Date    APPENDECTOMY      CARPAL TUNNEL RELEASE Right 08/12/2019    Procedure: RELEASE, CARPAL TUNNEL right;  Surgeon: Roopa Hanna MD;  Location: [facility] OR;  Service: Orthopedics;  Laterality: Right;    COLONOSCOPY      CYSTOSCOPY WITH INSERTION OF MINIMALLY INVASIVE IMPLANT TO ENLARGE PROSTATIC URETHRA N/A 11/29/2023    Procedure: CYSTOSCOPY, WITH INSERTION OF UROLIFT IMPLANT;  Surgeon: Rory Griffith MD;  Location: [facility] OR;  Service: Urology;  Laterality: N/A;    CYSTOSCOPY, WITH INSERTION OF UROLIFT IMPLANT  11/29/2023    EPIDURAL STEROID INJECTION INTO LUMBAR SPINE Bilateral 06/17/2021    Bilateral ALVIN per  06/17/2021    GASTRIC FUNDOPLICATION      HERNIA  REPAIR  1990s    INJECTION OF ANESTHETIC AGENT AROUND MEDIAL BRANCH NERVES INNERVATING LUMBAR FACET JOINT Bilateral 07/15/2021    Procedure: Block-nerve-medial branch-lumbar---BILATERAL L3, L4, L5;  Surgeon: Colt Lenz Jr., MD;  Location: [facility]note 3] PAIN MGMT;  Service: Pain Management;  Laterality: Bilateral;    INJECTION OF ANESTHETIC AGENT AROUND MEDIAL BRANCH NERVES INNERVATING LUMBAR FACET JOINT Bilateral 09/23/2021    Procedure: Block-nerve-medial branch-lumbar---BILATERAL L3, L4, L5;  Surgeon: Colt Lenz Jr., MD;  Location: [facility][note 3] PAIN MGMT;  Service: Pain Management;  Laterality: Bilateral;    INJECTION OF ANESTHETIC AGENT AROUND NERVE Bilateral 2/16/2024    Procedure: BLOCK, NERVE BILATERAL L3, 4, 5 MEDIAL BRANCH;  Surgeon: Jeanmarie Jauregui MD;  Location: [facility] [place] PAIN MGT;  Service: Pain Management;  Laterality: Bilateral;  [phone]    INJECTION OF ANESTHETIC AGENT AROUND NERVE Bilateral 3/8/2024    Procedure: BLOCK, NERVE BILATERAL L3, L4, AND L5 MEDIAL BRANCH;  Surgeon: Jeanmarie Jauregui MD;  Location: [facility] [place], operated [facility] PAIN MGT;  Service: Pain Management;  Laterality: Bilateral;  [phone]    INJECTION OF JOINT Bilateral 12/02/2021    Procedure: Injection, Joint---BILATERAL SACROILIAC INJECTION;  Surgeon: Colt Lenz Jr., MD;  Location: [facility][note 3] PAIN MGMT;  Service: Pain Management;  Laterality: Bilateral;    INJECTION OF STEROID Left 08/12/2019    Procedure: INJECTION, STEROID;  Surgeon: Roopa Hanna MD;  Location: [facility] [place] OR;  Service: Orthopedics;  Laterality: Left;    INJECTION, SACROILIAC JOINT Bilateral 1/11/2024    Procedure: INJECTION,SACROILIAC JOINT BILATERAL;  Surgeon: Jeanmarie Jauregui MD;  Location: [facility] [place], [facility] PAIN MGT;  Service: Pain Management;  Laterality: Bilateral;  [phone]    PYELOGRAM, RETROGRADE (Bilateral)    11/29/2023    RADIOFREQUENCY ABLATION Left 10/14/2021    Procedure: Radiofrequency Ablation---LEFT L3, L4, L5;  Surgeon: Colt Lenz Jr., MD;  Location: [facility][note 3]  PAIN MGMT;  Service: Pain Management;  Laterality: Left;    RADIOFREQUENCY ABLATION Right 10/28/2021    Procedure: Radiofrequency Ablation---RIGHT L3, L4, L5;  Surgeon: Colt Lenz Jr., MD;  Location: [facility] PAIN MGMT;  Service: Pain Management;  Laterality: Right;    RETROGRADE PYELOGRAPHY Bilateral 11/29/2023    Procedure: PYELOGRAM, RETROGRADE;  Surgeon: Rory Griffith MD;  Location: [facility] OR;  Service: Urology;  Laterality: Bilateral;    ROTATOR CUFF REPAIR Right     SACROILIAC JOINT INJECTION Bilateral 12/02/2021    SKIN CANCER EXCISION      TRANSFORAMINAL EPIDURAL INJECTION OF STEROID Bilateral 06/17/2021    Procedure: Injection,steroid,epidural,transforaminal approach---BILATERAL S1;  Surgeon: Colt Lenz Jr., MD;  Location: [facility] PAIN MGMT;  Service: Pain Management;  Laterality: Bilateral;    TURBT (TRANSURETHRAL RESECTION OF BLADDER TUMOR) N/A 11/29/2023    Procedure: TURBT (TRANSURETHRAL RESECTION OF BLADDER TUMOR);  Surgeon: Rory Griffith MD;  Location: [facility] OR;  Service: Urology;  Laterality: N/A;  with UroLift and with bilateral retrograde pyelograms    URBT (TRANSURETHRAL RESECTION OF BLADDER TUMOR)  11/29/2023     Family History   Problem Relation Age of Onset    Diabetes Mother     No Known Problems Father      Social History     Tobacco Use    Smoking status: Every Day     Types: Cigars    Smokeless tobacco: Never    Tobacco comments:     8 small cigars per day down from 10.   Substance Use Topics    Alcohol use: No    Drug use: No        Review of Systems:  Constitutional: Denies fever/chills  Neurological: Denies numbness/tingling (any exceptions noted in orthopaedic exam)   Psychiatric/Behavioral: Denies change in normal mood  Eyes: Denies change in vision  Cardiovascular: Denies chest pain  Respiratory: Denies shortness of breath  Hematologic/Lymphatic: Denies easy bleeding/bruising   Skin: Denies new rash or skin lesions   Gastrointestinal: Denies nausea/vomitting/diarrhea,  change in bowel habits, abdominal pain   Allergic/Immunologic: Denies adverse reactions to current medications  Musculoskeletal: see HPI      OBJECTIVE:     Vital Signs (Most Recent)       Physical Exam:  Gen:  No acute distress  CV:  Peripherally well-perfused.  Pulses 2+ bilaterally.  Lungs:  Normal respiratory effort.  Abdomen:  Soft, non-tender, non-distended  Head/Neck:  Normocephalic.  Atraumatic. No TTP, AROM and PROM intact without pain  Neuro:  CN intact without deficit, SILT throughout B/L Upper & Lower Extremities    Bilateral Hand/Wrist Examination:    Observation/Inspection:  Swelling  right volar wrist mass, raised, firm approximately 0.5cm     Deformity  none  Discoloration  none     Scars   none    Atrophy  none  +TTP right volar wrist mass  HAND/WRIST EXAMINATION:  Finkelstein's Test   Neg  WHAT Test    Neg  Snuff box tenderness   Neg  Loyd's Test    Neg  Hook of Hamate Tenderness  Neg  CMC grind    Neg  Circumduction test   Neg    Neurovascular Exam:  Digits WWP, brisk CR < 3s throughout  NVI motor/LTS to M/R/U nerves, radial pulse 2+  Tinel's Test - Carpal Tunnel  Neg  Tinel's Test - Cubital Tunnel  Neg  Phalen's Test    Neg  Median Nerve Compression Test Neg    ROM hand full, painless    ROM wrist full, painless    ROM elbow full, painless    Abdomen not guarded  Respirations nonlabored  Perfusion intact    Diagnostic Results:   XRAY right hand 04/04/2024  FINDINGS:  Hand complete three views right.     No fracture, dislocation, or bone destruction seen.  No acute trauma seen.  There is mild DJD.  No erosion or foreign body seen.     Imaging - I independently viewed the patient's imaging as well as the radiology report.    EMG - none     ASSESSMENT/PLAN:     Uriah was seen today for swelling and pain.    Diagnoses and all orders for this visit:    Mass of right wrist         73 y.o. yo male with right wrist mass      Plan:  X-ray reviewed and discussed in clinic no signs of acute fractures or  dislocation.  Based off HPI and exam today right volar wrist mass likely ganglion cyst.  Pathophysiology of ganglion cyst discussed with patient.  Discussed options of observation versus surgical intervention.  Due to the pain patient would like to have this surgically removed.  Risks and benefits were discussed.  Surgical consents were signed.

## 2024-04-12 ENCOUNTER — HOSPITAL ENCOUNTER (OUTPATIENT)
Facility: OTHER | Age: 74
Discharge: HOME | End: 2024-04-12
Attending: ANESTHESIOLOGY | Admitting: ANESTHESIOLOGY
Payer: MEDICARE

## 2024-04-12 VITALS
DIASTOLIC BLOOD PRESSURE: 65 MMHG | OXYGEN SATURATION: 95 % | BODY MASS INDEX: 25.05 KG/M2 | SYSTOLIC BLOOD PRESSURE: 143 MMHG | RESPIRATION RATE: 16 BRPM | HEART RATE: 65 BPM | HEIGHT: 70 IN | TEMPERATURE: 98 F | WEIGHT: 175 LBS

## 2024-04-12 DIAGNOSIS — M47.9 OSTEOARTHRITIS OF SPINE, UNSPECIFIED SPINAL OSTEOARTHRITIS COMPLICATION STATUS, UNSPECIFIED SPINAL REGION: Primary | ICD-10-CM

## 2024-04-12 DIAGNOSIS — M47.819 SPONDYLOSIS WITHOUT MYELOPATHY: ICD-10-CM

## 2024-04-12 DIAGNOSIS — G89.29 CHRONIC PAIN: ICD-10-CM

## 2024-04-12 PROCEDURE — 25000003 PHARM REV CODE 250: Performed by: ANESTHESIOLOGY

## 2024-04-12 PROCEDURE — 64635 DESTROY LUMB/SAC FACET JNT: CPT | Mod: RT | Performed by: ANESTHESIOLOGY

## 2024-04-12 PROCEDURE — 64636 DESTROY L/S FACET JNT ADDL: CPT | Mod: RT,,, | Performed by: ANESTHESIOLOGY

## 2024-04-12 PROCEDURE — 63600175 PHARM REV CODE 636 W HCPCS: Performed by: ANESTHESIOLOGY

## 2024-04-12 PROCEDURE — 25000003 PHARM REV CODE 250: Performed by: STUDENT IN AN ORGANIZED HEALTH CARE EDUCATION/TRAINING PROGRAM

## 2024-04-12 PROCEDURE — 64635 DESTROY LUMB/SAC FACET JNT: CPT | Mod: RT,,, | Performed by: ANESTHESIOLOGY

## 2024-04-12 PROCEDURE — 99152 MOD SED SAME PHYS/QHP 5/>YRS: CPT | Mod: ,,, | Performed by: ANESTHESIOLOGY

## 2024-04-12 PROCEDURE — 64636 DESTROY L/S FACET JNT ADDL: CPT | Mod: RT | Performed by: ANESTHESIOLOGY

## 2024-04-12 PROCEDURE — 99152 MOD SED SAME PHYS/QHP 5/>YRS: CPT | Performed by: ANESTHESIOLOGY

## 2024-04-12 RX ORDER — MIDAZOLAM HYDROCHLORIDE 1 MG/ML
INJECTION INTRAMUSCULAR; INTRAVENOUS
Status: DISCONTINUED | OUTPATIENT
Start: 2024-04-12 | End: 2024-04-12 | Stop reason: HOSPADM

## 2024-04-12 RX ORDER — DEXAMETHASONE SODIUM PHOSPHATE 10 MG/ML
INJECTION INTRAMUSCULAR; INTRAVENOUS
Status: DISCONTINUED | OUTPATIENT
Start: 2024-04-12 | End: 2024-04-12 | Stop reason: HOSPADM

## 2024-04-12 RX ORDER — LIDOCAINE HYDROCHLORIDE 20 MG/ML
INJECTION, SOLUTION INFILTRATION; PERINEURAL
Status: DISCONTINUED | OUTPATIENT
Start: 2024-04-12 | End: 2024-04-12 | Stop reason: HOSPADM

## 2024-04-12 RX ORDER — SODIUM CHLORIDE 9 MG/ML
INJECTION, SOLUTION INTRAVENOUS CONTINUOUS
Status: DISCONTINUED | OUTPATIENT
Start: 2024-04-12 | End: 2024-04-12 | Stop reason: HOSPADM

## 2024-04-12 RX ORDER — FENTANYL CITRATE 50 UG/ML
INJECTION, SOLUTION INTRAMUSCULAR; INTRAVENOUS
Status: DISCONTINUED | OUTPATIENT
Start: 2024-04-12 | End: 2024-04-12 | Stop reason: HOSPADM

## 2024-04-12 RX ORDER — BUPIVACAINE HYDROCHLORIDE 2.5 MG/ML
INJECTION, SOLUTION EPIDURAL; INFILTRATION; INTRACAUDAL
Status: DISCONTINUED | OUTPATIENT
Start: 2024-04-12 | End: 2024-04-12 | Stop reason: HOSPADM

## 2024-04-12 NOTE — DISCHARGE INSTRUCTIONS
Thank you for allowing us to care for you today. You may receive a survey about the care we provided. Your feedback is valuable and helps us provide excellent care throughout the community.     Home Care Instructions for Pain Management:    1. DIET:   You may resume your normal diet today.   2. BATHING:   You may shower with luke warm water. No tub baths or anything that will soak injection sites under water for the next 24 hours.  3. DRESSING:   You may remove your bandage today.   4. ACTIVITY LEVEL:   You may resume your normal activities 24 hrs after your procedure. Nothing strenuous today.  5. MEDICATIONS:   You may resume your normal medications today. To restart blood thinners, ask your doctor.  6. DRIVING    If you have received any sedatives by mouth today, you may not drive for 12 hours.    If you have received any sedation through your IV, you may not drive for 24 hrs.   7. SPECIAL INSTRUCTIONS:   No heat to the injection site for 24 hrs including, hot bath or shower, heating pad, moist heat, or hot tubs.    Use ice pack to injection site for any pain or discomfort.  Apply ice packs for 20 minute intervals as needed.    IF you have diabetes, be sure to monitor your blood sugar more closely. IF your injection contained steroids your blood sugar levels may become higher than normal.    If you are still having pain upon discharge:  Your pain may improve over the next 48 hours. The anesthetic (numbing medication) works immediately to 48 hours. IF your injection contained a steroid (anti-inflammatory medication), it takes approximately 3 days to start feeling relief and 7-10 days to see your greatest results from the medication. It is possible you may need subsequent injections. This would be discussed at your follow up appointment with pain management or your referring doctor.    Please call the PAIN MANAGEMENT office at [phone] or ON CALL pager at [phone] if you experienced any:   -Weakness or  Telephone Encounter by Celia Melara at 09/05/18 02:12 PM     Author:  Celia Melara Service:  (none) Author Type:  Patient      Filed:  09/05/18 02:13 PM Encounter Date:  9/5/2018 Status:  Signed     :  Celia Melara (Patient )              FINA STAPLES    Patient Age: 65 year old    ACCT STATUS:   MESSAGE:[AE1.1T]   Pt spouse calling again. States she already knows rx sent [facility] at [address] but demands to speak with clinical stating she needs to speak with someone now. Please advise.[AE1.1M]    Next and Last Visit with Provider and Department  Next visit with EAGLE SANTOYO is on No match found  Next visit with [facility] is on No match found  Last visit with EAGLE SANTOYO was on 07/16/2018 at  4:40 PM in [facility]  Last visit with [facility] was on 07/16/2018 at  4:40 PM in [facility]     WEIGHT AND HEIGHT: As of 08/23/2018 weight is 226 lbs.(102.513 kg). Height is 6' 2\"(1.880 m).   BMI is 29.00 kg/(m^2) calculated from:     Height 6' 2\" (1.88 m) as of 8/23/18     Weight 226 lb (102.513 kg) as of 8/23/18      No Known Allergies  Current outpatient prescriptions       Medication  Sig Dispense Refill   • phytonadione (MEPHYTON) 5 MG tablet Take 1 Tab by mouth once for 1 dose. 1 Tab 0   • bisacodyl (DULCOLAX) 5 MG EC tablet See Colonoscopy Instructions. 2 Tab 0   • simethicone (MYLICON) 125 MG chewable tablet See Colonoscopy instructions. 2 Tab 0   • PEG 3350-KCl-NaBcb-NaCl-NaSulf (GOLYTELY) 236 G suspension See Colonoscopy Instructions. 4000 mL 0   • warfarin (COUMADIN) 5 MG tablet Take 5mg (1 tab) on Tues with 1mg tabs for total of 9mg.  Then take 10mg (2 tabs) on the other 6 days of the week and as directed 173 Tab 0   • warfarin (COUMADIN) 1 MG tablet Take 4 tabs (4mg) on Tues with a 5mg tab for a total of 9mg on Tues and 10mg (using 5mg prescription) on all other days and as directed. 56 Tab 0   •  loss of sensation  -Fever > 101.5  -Pain uncontrolled with oral medications   -Persistent nausea, vomiting, or diarrhea  -Redness or drainage from the injection sites, or any other worrisome concerns.   If physician on call was not reached or could not communicate with our office for any reason please go to the nearest emergency department.    metformin (GLUCOPHAGE) 500 MG tablet Take 2 Tabs by mouth 2 (two) times daily with meals. 120 Tab 1   • ALPRAZolam (XANAX) 0.5 MG tablet TAKE ONE TABLET BY MOUTH TWICE DAILY AS NEEDED FOR ANXIETY 60 Tab 5   • Dapagliflozin Propanediol (FARXIGA) 5 MG TABS Tablet Take 1 Tab by mouth daily. Take in the morning with or without food 30 Tab 2   • atorvastatin (LIPITOR) 20 MG tablet Take 1 Tab by mouth daily. 90 Tab 3   • glimepiride (AMARYL) 4 MG tablet Take 2 Tabs by mouth every morning before breakfast. 60 Tab 5   • furosemide (LASIX) 40 MG tablet Take 1 Tab by mouth daily. 90 Tab 3   • metoprolol (LOPRESSOR) 25 MG tablet Take 1 Tab by mouth 2 (two) times daily. 180 Tab 3   • lisinopril (PRINIVIL,ZESTRIL) 2.5 MG tablet Take 1 Tab by mouth daily. 90 Tab 3   • warfarin (COUMADIN) 1 MG tablet Take 4 tabs (4mg) along with a 5 mg tab for a total dosing of 9 mg daily and as directed 375 Tab 0   • DIABETIC TESTING METER Select based on [facility], patient or insurance preference. Test 2X dailyDx: 250.00 DM TYPE II-UNCOMPL. Patient wants Rely meter. 1 Each PRN   • Multiple Vitamins-Minerals (MULTIVITAL) OR TABS 1 TABLET DAILY     • Aspirin 81 MG OR TABS 1 TABLET DAILY        PHARMACY to use:[AE1.1T] see below[AE1.1M]           Pharmacy preference(s) on file:    [facility] [address]    CALL BACK INFO:[AE1.1T] DO NOT LEAVE A MESSAGE - contact patient directly[AE1.1M]  ROUTING:[AE1.1T] Patient's physician/staff[AE1.1M]        PCP: Gil Vasquez MD         INS: Payor: [employer] / Plan: *No Plan* / Product Type: *No Product type* / Note: This is the primary coverage, but no account was found for this location or the patient's primary location.   ADDRESS:  [address] [place] [id number][AE1.1T]         Revision History        User Key Date/Time User Provider Type Action    > AE1.1 09/05/18 02:13 PM Celia Melara Patient  Sign    M - Manual, T - Template

## 2024-04-12 NOTE — OP NOTE
Therapeutic Lumbar Medial Branch Radiofrequency Ablation under Fluoroscopy     The procedure, risks, benefits, and options were discussed with the patient. There are no contraindications to the procedure. The patent expressed understanding and agreed to the procedure. Informed written consent was obtained prior to the start of the procedure and can be found in the patient's chart.        PATIENT NAME: Uriah Mills   MRN: [id number]     DATE OF PROCEDURE: 04/12/2024     PROCEDURE:  Right L3, L4, and L5 Lumbar Radiofrequency Ablation under Fluoroscopy    PRE-OP DIAGNOSIS: Lumbar spondylosis [M47.816] Lumbar spondylosis [M47.816]    POST-OP DIAGNOSIS: Same    PHYSICIAN: Jeanmarie Jauregui MD    ASSISTANTS: Everette Johnston MD ([employer] PM&R Resident)     MEDICATIONS INJECTED:  Preservative-free Decadron 10mg with 9cc of Bupivicaine 0.25%    LOCAL ANESTHETIC INJECTED:   Xylocaine 2%    SEDATION: Versed 2mg and Fentanyl 50mcg                                                                                                                                                                                     Conscious sedation ordered by M.MIKE. Patient re-evaluation prior to administration of conscious sedation. No changes noted in patient's status from initial evaluation. The patient's vital signs were monitored by RN and patient remained hemodynamically stable throughout the procedure.    Event Time In   Sedation Start 0825   Sedation End 0837       ESTIMATED BLOOD LOSS:  None    COMPLICATIONS:  None     INTERVAL HISTORY: Patient has clinical and imaging findings suggestive of facet mediated pain. Patients has completed 2 previous diagnostic medial branch blocks at specified levels with at least 80% relief for the expected duration of the local anesthetic utilized.    TECHNIQUE: Time-out was performed to identify the patient and procedure to be performed. With the patient laying in a prone position, the surgical area was prepped and  draped in the usual sterile fashion using ChloraPrep and fenestrated drape. The levels were determined under fluoroscopic guidance. Skin anesthesia was achieved by injecting Lidocaine 2% over the injection sites. A 18 gauge 10mm curved active tip needle was introduced to the anatomic local of the medial branch at each of the above levels using AP, lateral and/or contralateral oblique fluoroscopic imaging. Then sensory and motor testing was performed to confirm that the needle tips were in the correct location. After negative aspiration for blood or CSF was confirmed, 1 mL of the lidocaine 2% listed above was injected slowly at each site. This was followed by thermal lesioning at 80 degrees celsius for 90 seconds. Needles were then rotated, depth was checked with lateral fluoroscopy, and an additional ablation was performed without pain. That was followed by slowly injecting 1 mL of the medication mixture listed above at each site. The needles were removed and bleeding was nil. A sterile dressing was applied. No specimens collected. The patient tolerated the procedure well and did not have any procedure related motor deficit at the conclusion of the procedure.    The patient was monitored after the procedure in the recovery area. They were given post-procedure and discharge instructions to follow at home. The patient was discharged in a stable condition.    Shakeel Arnold MD    I reviewed and edited the fellow's note. I conducted my own interview and physical examination. I agree with the findings. I was present and supervising all critical portions of the procedure.      Jeanmarie Jauregui MD

## 2024-04-12 NOTE — H&P
HPI  Patient presenting for Procedure(s) (LRB):  RADIOFREQUENCY ABLATION RIGHT L3, L4, AND L5 1 OF 2 (Right)     Patient on Anti-coagulation No    No health changes since previous encounter    Past Medical History:   Diagnosis Date    Allergy     Arthritis     BPH (benign prostatic hyperplasia)     Carpal tunnel syndrome     Chronic hip pain, bilateral     Chronic low back pain     COPD (chronic obstructive pulmonary disease)     DDD (degenerative disc disease), lumbar     Dorsalgia     Gastrointestinal disease     Lumbar radiculopathy     Lumbar spondylosis     Osteoarthritis of both hips     Osteoarthritis of spine     Skin cancer      Past Surgical History:   Procedure Laterality Date    APPENDECTOMY      CARPAL TUNNEL RELEASE Right 08/12/2019    Procedure: RELEASE, CARPAL TUNNEL right;  Surgeon: Roopa Hanna MD;  Location: [facility] [place] OR;  Service: Orthopedics;  Laterality: Right;    COLONOSCOPY      CYSTOSCOPY WITH INSERTION OF MINIMALLY INVASIVE IMPLANT TO ENLARGE PROSTATIC URETHRA N/A 11/29/2023    Procedure: CYSTOSCOPY, WITH INSERTION OF UROLIFT IMPLANT;  Surgeon: Rory Griffith MD;  Location: [facility] OR;  Service: Urology;  Laterality: N/A;    CYSTOSCOPY, WITH INSERTION OF UROLIFT IMPLANT  11/29/2023    EPIDURAL STEROID INJECTION INTO LUMBAR SPINE Bilateral 06/17/2021    Bilateral ALVIN per  06/17/2021    GASTRIC FUNDOPLICATION      HERNIA REPAIR  1990s    INJECTION OF ANESTHETIC AGENT AROUND MEDIAL BRANCH NERVES INNERVATING LUMBAR FACET JOINT Bilateral 07/15/2021    Procedure: Block-nerve-medial branch-lumbar---BILATERAL L3, L4, L5;  Surgeon: Colt Lenz Jr., MD;  Location: [facility] PAIN [facility];  Service: Pain Management;  Laterality: Bilateral;    INJECTION OF ANESTHETIC AGENT AROUND MEDIAL BRANCH NERVES INNERVATING LUMBAR FACET JOINT Bilateral 09/23/2021    Procedure: Block-nerve-medial branch-lumbar---BILATERAL L3, L4, L5;  Surgeon: Colt Lenz Jr., MD;  Location: [facility] PAIN [facility];   Service: Pain Management;  Laterality: Bilateral;    INJECTION OF ANESTHETIC AGENT AROUND NERVE Bilateral 2/16/2024    Procedure: BLOCK, NERVE BILATERAL L3, 4, 5 MEDIAL BRANCH;  Surgeon: Jeanmarie Jauregui MD;  Location: [facility] PAIN [facility];  Service: Pain Management;  Laterality: Bilateral;  [phone]    INJECTION OF ANESTHETIC AGENT AROUND NERVE Bilateral 3/8/2024    Procedure: BLOCK, NERVE BILATERAL L3, L4, AND L5 MEDIAL BRANCH;  Surgeon: Jeanmarie Jauregui MD;  Location: [facility] PAIN [facility];  Service: Pain Management;  Laterality: Bilateral;  [phone]    INJECTION OF JOINT Bilateral 12/02/2021    Procedure: Injection, Joint---BILATERAL SACROILIAC INJECTION;  Surgeon: Colt Lenz Jr., MD;  Location: [facility] PAIN [facility];  Service: Pain Management;  Laterality: Bilateral;    INJECTION OF STEROID Left 08/12/2019    Procedure: INJECTION, STEROID;  Surgeon: Roopa Hanna MD;  Location: [facility] OR;  Service: Orthopedics;  Laterality: Left;    INJECTION, SACROILIAC JOINT Bilateral 1/11/2024    Procedure: INJECTION,SACROILIAC JOINT BILATERAL;  Surgeon: Jeanmarie Jauregui MD;  Location: [facility] PAIN [facility];  Service: Pain Management;  Laterality: Bilateral;  [phone]    PYELOGRAM, RETROGRADE (Bilateral)    11/29/2023    RADIOFREQUENCY ABLATION Left 10/14/2021    Procedure: Radiofrequency Ablation---LEFT L3, L4, L5;  Surgeon: Colt Lenz Jr., MD;  Location: [facility] PAIN [facility];  Service: Pain Management;  Laterality: Left;    RADIOFREQUENCY ABLATION Right 10/28/2021    Procedure: Radiofrequency Ablation---RIGHT L3, L4, L5;  Surgeon: Colt Lenz Jr., MD;  Location: [facility]IN [facility];  Service: Pain Management;  Laterality: Right;    RETROGRADE PYELOGRAPHY Bilateral 11/29/2023    Procedure: PYELOGRAM, RETROGRADE;  Surgeon: Rory Griffith MD;  Location: [facility] OR;  Service: Urology;  Laterality: Bilateral;    ROTATOR CUFF REPAIR Right     SACROILIAC JOINT INJECTION Bilateral 12/02/2021    SKIN CANCER EXCISION       "TRANSFORAMINAL EPIDURAL INJECTION OF STEROID Bilateral 06/17/2021    Procedure: Injection,steroid,epidural,transforaminal approach---BILATERAL S1;  Surgeon: Colt Lenz Jr., MD;  Location: [facility] PAIN [facility];  Service: Pain Management;  Laterality: Bilateral;    TURBT (TRANSURETHRAL RESECTION OF BLADDER TUMOR) N/A 11/29/2023    Procedure: TURBT (TRANSURETHRAL RESECTION OF BLADDER TUMOR);  Surgeon: Rory Griffith MD;  Location: [facility] OR;  Service: Urology;  Laterality: N/A;  with UroLift and with bilateral retrograde pyelograms    URBT (TRANSURETHRAL RESECTION OF BLADDER TUMOR)  11/29/2023     Review of patient's allergies indicates:  No Known Allergies   Current Facility-Administered Medications   Medication    0.9%  NaCl infusion     Facility-Administered Medications Ordered in Other Encounters   Medication    0.9%  NaCl infusion    ALPRAZolam dissolvable tablet 1 mg    BUPivacaine (PF) 0.25% (2.5 mg/ml) injection 25 mg    ceFAZolin 2 g in dextrose 5 % in water (D5W) 100 mL IVPB (MB+)    dexAMETHasone sodium phos (PF) injection 10 mg    LIDOcaine (PF) 10 mg/ml (1%) injection 10 mg    LIDOcaine (PF) 10 mg/ml (1%) injection 10 mg    LIDOcaine HCL 20 mg/ml (2%) injection 10 mL    LIDOcaine HCL 20 mg/ml (2%) injection 10 mL       PMHx, PSHx, Allergies, Medications reviewed in epic    ROS negative except pain complaints in HPI    OBJECTIVE:    BP (!) 140/65 (BP Location: Right arm, Patient Position: Lying)   Pulse 69   Temp 97.6 °F (36.4 °C) (Oral)   Resp 18   Ht 5' 10" (1.778 m)   Wt 79.4 kg (175 lb)   SpO2 (!) 93%   BMI 25.11 kg/m²     PHYSICAL EXAMINATION:    GENERAL: Well appearing, in no acute distress, alert and oriented x3.  PSYCH:  Mood and affect appropriate.  SKIN: Skin color, texture, turgor normal, no rashes or lesions which will impact the procedure.  CV: RRR with palpation of the radial artery.  PULM: No evidence of respiratory difficulty, symmetric chest rise. Clear to auscultation.  NEURO: " Cranial nerves grossly intact.    Plan:    Proceed with procedure as planned Procedure(s) (LRB):  RADIOFREQUENCY ABLATION RIGHT L3, L4, AND L5 1 OF 2 (Right)    Everette Johnston  04/12/2024

## 2024-04-12 NOTE — DISCHARGE SUMMARY
Discharge Note  Short Stay      SUMMARY     Admit Date: 4/12/2024    Attending Physician: Jeanmarie Jauregui MD    Discharge Physician: Shakeel Arnold MD      Discharge Date: 4/12/2024 8:27 AM    Procedure(s) (LRB):  RADIOFREQUENCY ABLATION RIGHT L3, L4, AND L5 1 OF 2 (Right)    Final Diagnosis: Lumbar spondylosis [M47.816]    Disposition: Home or self care    Patient Instructions:   Current Discharge Medication List        CONTINUE these medications which have NOT CHANGED    Details   albuterol (PROVENTIL/VENTOLIN HFA) 90 mcg/actuation inhaler INHALE 2 PUFFS BY MOUTH EVERY 6 HOURS AS NEEDED  Qty: 25.5 g, Refills: 3    Associated Diagnoses: Bronchitis      ALPRAZolam (XANAX) 1 MG tablet Take 1 tablet (1 mg total) by mouth nightly as needed for Anxiety.  Qty: 30 tablet, Refills: 2    Associated Diagnoses: Insomnia, unspecified type; Anxiety      amoxicillin-clavulanate 875-125mg (AUGMENTIN) 875-125 mg per tablet Take 1 tablet by mouth every 12 (twelve) hours.  Qty: 20 tablet, Refills: 0    Associated Diagnoses: Bronchitis      aspirin (ECOTRIN) 81 MG EC tablet Take 81 mg by mouth once daily.      gabapentin (NEURONTIN) 600 MG tablet Take 1 tablet by mouth twice daily  Qty: 60 tablet, Refills: 0    Associated Diagnoses: Chronic bilateral low back pain with bilateral sciatica; Lumbar radiculopathy      guaiFENesin (MUCINEX) 600 mg 12 hr tablet Take 2 tablets (1,200 mg total) by mouth 2 (two) times daily.  Qty: 20 tablet, Refills: 1    Associated Diagnoses: Bronchitis      levocetirizine (XYZAL) 5 MG tablet TAKE 1 TABLET BY MOUTH ONCE DAILY IN THE EVENING  Qty: 90 tablet, Refills: 3    Associated Diagnoses: Chronic sinusitis, unspecified location      oxybutynin (DITROPAN) 5 MG Tab Take 1 tablet (5 mg total) by mouth 3 (three) times daily as needed (bladder spasms (urge to urinate)).  Qty: 10 tablet, Refills: 0      silodosin (RAPAFLO) 8 mg Cap capsule Take 1 capsule (8 mg total) by mouth once daily.  Qty: 30 capsule,  Refills: 11    Associated Diagnoses: Benign prostatic hyperplasia with urinary frequency                 Discharge Diagnosis: Lumbar spondylosis [M47.816]  Condition on Discharge: Stable with no complications to procedure   Diet on Discharge: Same as before.  Activity: as per instruction sheet.  Discharge to: Home with a responsible adult.  Follow up: 2-4 weeks       Please call my office or pager at [phone] if experienced any weakness or loss of sensation, fever > 101.5, pain uncontrolled with oral medications, persistent nausea/vomiting/or diarrhea, redness or drainage from the incisions, or any other worrisome concerns. If physician on call was not reached or could not communicate with our office for any reason please go to the nearest emergency department      Shakeel Arnold M.D.  PGY-5  Interventional Pain Management Fellow  [facility]  Pager: [phone]    04/12/2024

## 2024-04-14 ENCOUNTER — PATIENT MESSAGE (OUTPATIENT)
Dept: ADMINISTRATIVE | Facility: OTHER | Age: 74
End: 2024-04-14
Payer: MEDICARE

## 2024-04-17 NOTE — PROGRESS NOTES
I have personally taken the history and examined this patient. I agree with the resident's note as stated above.       Mass of right wrist           73 y.o. yo male with right wrist mass        Plan:  X-ray reviewed and discussed in clinic no signs of acute fractures or dislocation.  Based off HPI and exam today right volar wrist mass likely ganglion cyst.  Pathophysiology of ganglion cyst discussed with patient.  Discussed options of observation versus surgical intervention.  Due to the pain patient would like to have this surgically removed.  Risks and benefits were discussed.  Surgical consents were signed.

## 2024-04-26 ENCOUNTER — HOSPITAL ENCOUNTER (OUTPATIENT)
Facility: OTHER | Age: 74
Discharge: HOME | End: 2024-04-26
Attending: ANESTHESIOLOGY | Admitting: ANESTHESIOLOGY
Payer: MEDICARE

## 2024-04-26 ENCOUNTER — TELEPHONE (OUTPATIENT)
Dept: PAIN MEDICINE | Facility: CLINIC | Age: 74
End: 2024-04-26
Payer: MEDICARE

## 2024-04-26 VITALS
TEMPERATURE: 98 F | SYSTOLIC BLOOD PRESSURE: 166 MMHG | BODY MASS INDEX: 25.05 KG/M2 | RESPIRATION RATE: 16 BRPM | DIASTOLIC BLOOD PRESSURE: 72 MMHG | OXYGEN SATURATION: 96 % | HEART RATE: 84 BPM | HEIGHT: 70 IN | WEIGHT: 175 LBS

## 2024-04-26 DIAGNOSIS — G89.29 CHRONIC PAIN: ICD-10-CM

## 2024-04-26 DIAGNOSIS — M47.816 LUMBAR SPONDYLOSIS: ICD-10-CM

## 2024-04-26 DIAGNOSIS — M51.36 DDD (DEGENERATIVE DISC DISEASE), LUMBAR: ICD-10-CM

## 2024-04-26 DIAGNOSIS — M47.816 OSTEOARTHRITIS OF LUMBAR SPINE WITHOUT MYELOPATHY OR RADICULOPATHY: Primary | ICD-10-CM

## 2024-04-26 PROCEDURE — 64635 DESTROY LUMB/SAC FACET JNT: CPT | Mod: LT,,, | Performed by: ANESTHESIOLOGY

## 2024-04-26 PROCEDURE — 63600175 PHARM REV CODE 636 W HCPCS: Mod: JZ,JG | Performed by: ANESTHESIOLOGY

## 2024-04-26 PROCEDURE — 64635 DESTROY LUMB/SAC FACET JNT: CPT | Mod: LT | Performed by: ANESTHESIOLOGY

## 2024-04-26 PROCEDURE — 64636 DESTROY L/S FACET JNT ADDL: CPT | Mod: LT | Performed by: ANESTHESIOLOGY

## 2024-04-26 PROCEDURE — 99152 MOD SED SAME PHYS/QHP 5/>YRS: CPT | Mod: ,,, | Performed by: ANESTHESIOLOGY

## 2024-04-26 PROCEDURE — 99152 MOD SED SAME PHYS/QHP 5/>YRS: CPT | Performed by: ANESTHESIOLOGY

## 2024-04-26 PROCEDURE — 25000003 PHARM REV CODE 250: Performed by: ANESTHESIOLOGY

## 2024-04-26 PROCEDURE — 64636 DESTROY L/S FACET JNT ADDL: CPT | Mod: LT,,, | Performed by: ANESTHESIOLOGY

## 2024-04-26 RX ORDER — SODIUM CHLORIDE 9 MG/ML
INJECTION, SOLUTION INTRAVENOUS CONTINUOUS
Status: ACTIVE | OUTPATIENT
Start: 2024-04-26

## 2024-04-26 RX ORDER — DEXAMETHASONE SODIUM PHOSPHATE 10 MG/ML
INJECTION INTRAMUSCULAR; INTRAVENOUS
Status: DISCONTINUED | OUTPATIENT
Start: 2024-04-26 | End: 2024-04-26 | Stop reason: HOSPADM

## 2024-04-26 RX ORDER — FENTANYL CITRATE 50 UG/ML
INJECTION, SOLUTION INTRAMUSCULAR; INTRAVENOUS
Status: DISCONTINUED | OUTPATIENT
Start: 2024-04-26 | End: 2024-04-26 | Stop reason: HOSPADM

## 2024-04-26 RX ORDER — LIDOCAINE HYDROCHLORIDE 20 MG/ML
INJECTION, SOLUTION INFILTRATION; PERINEURAL
Status: DISCONTINUED | OUTPATIENT
Start: 2024-04-26 | End: 2024-04-26 | Stop reason: HOSPADM

## 2024-04-26 RX ORDER — MIDAZOLAM HYDROCHLORIDE 1 MG/ML
INJECTION INTRAMUSCULAR; INTRAVENOUS
Status: DISCONTINUED | OUTPATIENT
Start: 2024-04-26 | End: 2024-04-26 | Stop reason: HOSPADM

## 2024-04-26 RX ORDER — BUPIVACAINE HYDROCHLORIDE 2.5 MG/ML
INJECTION, SOLUTION EPIDURAL; INFILTRATION; INTRACAUDAL
Status: DISCONTINUED | OUTPATIENT
Start: 2024-04-26 | End: 2024-04-26 | Stop reason: HOSPADM

## 2024-04-26 NOTE — OP NOTE
Therapeutic Lumbar Medial Branch Radiofrequency Ablation under Fluoroscopy     The procedure, risks, benefits, and options were discussed with the patient. There are no contraindications to the procedure. The patent expressed understanding and agreed to the procedure. Informed written consent was obtained prior to the start of the procedure and can be found in the patient's chart.        PATIENT NAME: Uriah Mills   MRN: [id number]     DATE OF PROCEDURE: 04/26/2024     PROCEDURE:  Left L3, L4, and L5 Lumbar Radiofrequency Ablation under Fluoroscopy    PRE-OP DIAGNOSIS: Lumbar spondylosis [M47.816] Lumbar spondylosis [M47.816]    POST-OP DIAGNOSIS: Same    PHYSICIAN: Jeanmarie Jauregui MD    ASSISTANTS: GIA Gurrola MD  [employer] PM[employer],, pain fellow       MEDICATIONS INJECTED:  Preservative-free Decadron 10mg with 9cc of Bupivicaine 0.25%    LOCAL ANESTHETIC INJECTED:   Xylocaine 2%    SEDATION: Versed 2mg and Fentanyl 75mcg                                                                                                                                                                                     Conscious sedation ordered by M.MIKE. Patient re-evaluation prior to administration of conscious sedation. No changes noted in patient's status from initial evaluation. The patient's vital signs were monitored by RN and patient remained hemodynamically stable throughout the procedure.    Event Time In   Sedation Start 1324   Sedation End 1336       ESTIMATED BLOOD LOSS:  None    COMPLICATIONS:  None     INTERVAL HISTORY: Patient has clinical and imaging findings suggestive of facet mediated pain. Patients has completed 2 previous diagnostic medial branch blocks at specified levels with at least 80% relief for the expected duration of the local anesthetic utilized.    TECHNIQUE: Time-out was performed to identify the patient and procedure to be performed. With the patient laying in a prone position, the surgical area was prepped  and draped in the usual sterile fashion using ChloraPrep and fenestrated drape. The levels were determined under fluoroscopic guidance. Skin anesthesia was achieved by injecting Lidocaine 2% over the injection sites. A 18 gauge 10mm curved active tip needle was introduced to the anatomic local of the medial branch at each of the above levels using AP, lateral and/or contralateral oblique fluoroscopic imaging. Then sensory and motor testing was performed to confirm that the needle tips were in the correct location. After negative aspiration for blood or CSF was confirmed, 1 mL of the lidocaine 2% listed above was injected slowly at each site. This was followed by thermal lesioning at 80 degrees celsius for 90 seconds. That was followed by slowly injecting 1 mL of the medication mixture listed above at each site. The needles were removed and bleeding was nil. A sterile dressing was applied. No specimens collected. The patient tolerated the procedure well and did not have any procedure related motor deficit at the conclusion of the procedure.    Jovanny Gurrola MD    The patient was monitored after the procedure in the recovery area. They were given post-procedure and discharge instructions to follow at home. The patient was discharged in a stable condition.    Jeanmarie Jauregui MD

## 2024-04-26 NOTE — H&P
HPI  Patient presenting for Procedure(s) (LRB):  RADIOFREQUENCY ABLATION LEFT L3, L4, AND L5 2 OF 2 (Left)     Patient on Anti-coagulation No    No health changes since previous encounter    Past Medical History:   Diagnosis Date    Allergy     Arthritis     BPH (benign prostatic hyperplasia)     Carpal tunnel syndrome     Chronic hip pain, bilateral     Chronic low back pain     COPD (chronic obstructive pulmonary disease)     DDD (degenerative disc disease), lumbar     Dorsalgia     Gastrointestinal disease     Lumbar radiculopathy     Lumbar spondylosis     Osteoarthritis of both hips     Osteoarthritis of spine     Skin cancer      Past Surgical History:   Procedure Laterality Date    APPENDECTOMY      CARPAL TUNNEL RELEASE Right 08/12/2019    Procedure: RELEASE, CARPAL TUNNEL right;  Surgeon: Roopa Hanna MD;  Location: [facility] OR;  Service: Orthopedics;  Laterality: Right;    COLONOSCOPY      CYSTOSCOPY WITH INSERTION OF MINIMALLY INVASIVE IMPLANT TO ENLARGE PROSTATIC URETHRA N/A 11/29/2023    Procedure: CYSTOSCOPY, WITH INSERTION OF UROLIFT IMPLANT;  Surgeon: Rory Griffith MD;  Location: [facility] OR;  Service: Urology;  Laterality: N/A;    CYSTOSCOPY, WITH INSERTION OF UROLIFT IMPLANT  11/29/2023    EPIDURAL STEROID INJECTION INTO LUMBAR SPINE Bilateral 06/17/2021    Bilateral ALVIN per  06/17/2021    GASTRIC FUNDOPLICATION      HERNIA REPAIR  1990s    INJECTION OF ANESTHETIC AGENT AROUND MEDIAL BRANCH NERVES INNERVATING LUMBAR FACET JOINT Bilateral 07/15/2021    Procedure: Block-nerve-medial branch-lumbar---BILATERAL L3, L4, L5;  Surgeon: Colt Lenz Jr., MD;  Location: [facility] PAIN [facility];  Service: Pain Management;  Laterality: Bilateral;    INJECTION OF ANESTHETIC AGENT AROUND MEDIAL BRANCH NERVES INNERVATING LUMBAR FACET JOINT Bilateral 09/23/2021    Procedure: Block-nerve-medial branch-lumbar---BILATERAL L3, L4, L5;  Surgeon: Colt Lenz Jr., MD;  Location: [facility] PAIN [facility];   Service: Pain Management;  Laterality: Bilateral;    INJECTION OF ANESTHETIC AGENT AROUND NERVE Bilateral 2/16/2024    Procedure: BLOCK, NERVE BILATERAL L3, 4, 5 MEDIAL BRANCH;  Surgeon: Jeanmarie Jauregui MD;  Location: [facility] PAIN [facility];  Service: Pain Management;  Laterality: Bilateral;  [phone]    INJECTION OF ANESTHETIC AGENT AROUND NERVE Bilateral 3/8/2024    Procedure: BLOCK, NERVE BILATERAL L3, L4, AND L5 MEDIAL BRANCH;  Surgeon: Jeanmarie Jauregui MD;  Location: [facility] PAIN [facility];  Service: Pain Management;  Laterality: Bilateral;  [phone]    INJECTION OF JOINT Bilateral 12/02/2021    Procedure: Injection, Joint---BILATERAL SACROILIAC INJECTION;  Surgeon: Colt Lenz Jr., MD;  Location: [facility];  Service: Pain Management;  Laterality: Bilateral;    INJECTION OF STEROID Left 08/12/2019    Procedure: INJECTION, STEROID;  Surgeon: Roopa Hanna MD;  Location: [facility] OR;  Service: Orthopedics;  Laterality: Left;    INJECTION, SACROILIAC JOINT Bilateral 1/11/2024    Procedure: INJECTION,SACROILIAC JOINT BILATERAL;  Surgeon: Jeanmarie Jauregui MD;  Location: [facility] PAIN [facility];  Service: Pain Management;  Laterality: Bilateral;  [phone]    PYELOGRAM, RETROGRADE (Bilateral)    11/29/2023    RADIOFREQUENCY ABLATION Left 10/14/2021    Procedure: Radiofrequency Ablation---LEFT L3, L4, L5;  Surgeon: Colt Lenz Jr., MD;  Location: [facility]IN [facility];  Service: Pain Management;  Laterality: Left;    RADIOFREQUENCY ABLATION Right 10/28/2021    Procedure: Radiofrequency Ablation---RIGHT L3, L4, L5;  Surgeon: Colt Lenz Jr., MD;  Location: [facility]IN [facility];  Service: Pain Management;  Laterality: Right;    RADIOFREQUENCY ABLATION Right 4/12/2024    Procedure: RADIOFREQUENCY ABLATION RIGHT L3, L4, AND L5 1 OF 2;  Surgeon: Jeanmarie Jauregui MD;  Location: [facility] PAIN [facility];  Service: Pain Management;  Laterality: Right;  [phone]    RETROGRADE PYELOGRAPHY Bilateral 11/29/2023    Procedure:  PYELOGRAM, RETROGRADE;  Surgeon: Rory Griffith MD;  Location: [facility] OR;  Service: Urology;  Laterality: Bilateral;    ROTATOR CUFF REPAIR Right     SACROILIAC JOINT INJECTION Bilateral 12/02/2021    SKIN CANCER EXCISION      TRANSFORAMINAL EPIDURAL INJECTION OF STEROID Bilateral 06/17/2021    Procedure: Injection,steroid,epidural,transforaminal approach---BILATERAL S1;  Surgeon: Colt Lenz Jr., MD;  Location: [facility]IN [facility];  Service: Pain Management;  Laterality: Bilateral;    TURBT (TRANSURETHRAL RESECTION OF BLADDER TUMOR) N/A 11/29/2023    Procedure: TURBT (TRANSURETHRAL RESECTION OF BLADDER TUMOR);  Surgeon: Rory Griffith MD;  Location: [facility] OR;  Service: Urology;  Laterality: N/A;  with UroLift and with bilateral retrograde pyelograms    URBT (TRANSURETHRAL RESECTION OF BLADDER TUMOR)  11/29/2023     Review of patient's allergies indicates:  No Known Allergies   Current Facility-Administered Medications   Medication Dose Route Frequency Provider Last Rate Last Admin    0.9%  NaCl infusion   Intravenous Continuous Jovanny Gurrola MD         Facility-Administered Medications Ordered in Other Encounters   Medication Dose Route Frequency Provider Last Rate Last Admin    0.9%  NaCl infusion   Intravenous Continuous Rory Griffith MD        ALPRAZolam dissolvable tablet 1 mg  1 mg Oral Once PRN Colt Lenz Jr., MD        BUPivacaine (PF) 0.25% (2.5 mg/ml) injection 25 mg  10 mL Intramuscular Once Colt Lenz Jr., MD        ceFAZolin 2 g in dextrose 5 % in water (D5W) 100 mL IVPB (MB+)  2 g Intravenous On Call Procedure Rory Griffith MD        dexAMETHasone sodium phos (PF) injection 10 mg  10 mg Epidural Once Colt Lenz Jr., MD        LIDOcaine (PF) 10 mg/ml (1%) injection 10 mg  1 mL Other Once Colt Lenz Jr., MD        LIDOcaine (PF) 10 mg/ml (1%) injection 10 mg  1 mL Intradermal Once PRN Rory Griffith MD        LIDOcaine HCL 20 mg/ml (2%) injection 10 mL   "10 mL Other Once Colt Lenz Jr., MD        LIDOcaine HCL 20 mg/ml (2%) injection 10 mL  10 mL Other Once Colt Lenz Jr., MD           PMHx, PSHx, Allergies, Medications reviewed in epic    ROS negative except pain complaints in HPI    OBJECTIVE:    BP (!) 143/65 (BP Location: Right arm, Patient Position: Sitting)   Pulse 78   Temp 98.1 °F (36.7 °C) (Oral)   Resp 16   Ht 5' 10" (1.778 m)   Wt 79.4 kg (175 lb)   SpO2 98%   BMI 25.11 kg/m²     PHYSICAL EXAMINATION:    GENERAL: Well appearing, in no acute distress, alert and oriented x3.  PSYCH:  Mood and affect appropriate.  SKIN: Skin color, texture, turgor normal, no rashes or lesions which will impact the procedure.  CV: RRR with palpation of the radial artery.  PULM: No evidence of respiratory difficulty, symmetric chest rise. Clear to auscultation.  NEURO: Cranial nerves grossly intact.    Plan:    Proceed with procedure as planned Procedure(s) (LRB):  RADIOFREQUENCY ABLATION LEFT L3, L4, AND L5 2 OF 2 (Left)    Anurag Torres MD  04/26/2024     "

## 2024-04-26 NOTE — DISCHARGE SUMMARY
Discharge Note  Short Stay      SUMMARY     Admit Date: 4/26/2024    Attending Physician: Jovanny Gurrola      Discharge Physician: Jovanny Gurrola      Discharge Date: 4/26/2024 1:38 PM    Procedure(s) (LRB):  RADIOFREQUENCY ABLATION LEFT L3, L4, AND L5 2 OF 2 (Left)    Final Diagnosis: Lumbar spondylosis [M47.816]    Disposition: Home or self care    Patient Instructions:   Current Discharge Medication List        CONTINUE these medications which have NOT CHANGED    Details   albuterol (PROVENTIL/VENTOLIN HFA) 90 mcg/actuation inhaler INHALE 2 PUFFS BY MOUTH EVERY 6 HOURS AS NEEDED  Qty: 25.5 g, Refills: 3    Associated Diagnoses: Bronchitis      ALPRAZolam (XANAX) 1 MG tablet Take 1 tablet (1 mg total) by mouth nightly as needed for Anxiety.  Qty: 30 tablet, Refills: 2    Associated Diagnoses: Insomnia, unspecified type; Anxiety      amoxicillin-clavulanate 875-125mg (AUGMENTIN) 875-125 mg per tablet Take 1 tablet by mouth every 12 (twelve) hours.  Qty: 20 tablet, Refills: 0    Associated Diagnoses: Bronchitis      aspirin (ECOTRIN) 81 MG EC tablet Take 81 mg by mouth once daily.      gabapentin (NEURONTIN) 600 MG tablet Take 1 tablet by mouth twice daily  Qty: 60 tablet, Refills: 0    Associated Diagnoses: Chronic bilateral low back pain with bilateral sciatica; Lumbar radiculopathy      guaiFENesin (MUCINEX) 600 mg 12 hr tablet Take 2 tablets (1,200 mg total) by mouth 2 (two) times daily.  Qty: 20 tablet, Refills: 1    Associated Diagnoses: Bronchitis      levocetirizine (XYZAL) 5 MG tablet TAKE 1 TABLET BY MOUTH ONCE DAILY IN THE EVENING  Qty: 90 tablet, Refills: 3    Associated Diagnoses: Chronic sinusitis, unspecified location      oxybutynin (DITROPAN) 5 MG Tab Take 1 tablet (5 mg total) by mouth 3 (three) times daily as needed (bladder spasms (urge to urinate)).  Qty: 10 tablet, Refills: 0      silodosin (RAPAFLO) 8 mg Cap capsule Take 1 capsule (8 mg total) by mouth once daily.  Qty: 30 capsule, Refills: 11     Associated Diagnoses: Benign prostatic hyperplasia with urinary frequency                 Discharge Diagnosis: Lumbar spondylosis [M47.816]  Condition on Discharge: Stable with no complications to procedure   Diet on Discharge: Same as before.  Activity: as per instruction sheet.  Discharge to: Home with a responsible adult.  Follow up: 2-4 weeks       Please call my office or pager at [phone] if experienced any weakness or loss of sensation, fever > 101.5, pain uncontrolled with oral medications, persistent nausea/vomiting/or diarrhea, redness or drainage from the incisions, or any other worrisome concerns. If physician on call was not reached or could not communicate with our office for any reason please go to the nearest emergency department

## 2024-04-26 NOTE — DISCHARGE INSTRUCTIONS
Adult Procedural Sedation Instructions    Recovery After Procedural Sedation (Adult)  You have been given medicine by vein to make you sleep during your surgery. This may have included both a pain medicine and sleeping medicine. Most of the effects have worn off. But you may still have some drowsiness for the next 6 to 8 hours.  Home care  Follow these guidelines when you get home:  For the next 8 hours, you should be watched by a responsible adult. This person should make sure your condition is not getting worse.  Don't drink any alcohol for the next 24 hours.  Don't drive, operate dangerous machinery, or make important business or personal decisions during the next 24 hours.  Note: Your healthcare provider may tell you not to take any medicine by mouth for pain or sleep in the next 4 hours. These medicines may react with the medicines you were given in the hospital. This could cause a much stronger response than usual.  Follow-up care  Follow up with your healthcare provider if you are not alert and back to your usual level of activity within 12 hours.  When to seek medical advice  Call your healthcare provider right away if any of these occur:  Drowsiness gets worse  Weakness or dizziness gets worse  Repeated vomiting  You can't be awakened   Date Last Reviewed: 10/18/2016  © 9755-7163 [employer]. [address] [place] [address], [place] [address]. All rights reserved. This information is not intended as a substitute for professional medical care. Always follow your healthcare professional's instructions.         Thank you for allowing us to care for you today. You may receive a survey about the care we provided. Your feedback is valuable and helps us provide excellent care throughout the community.     Home Care Instructions for Pain Management:    1. DIET:   You may resume your normal diet today.   2. BATHING:   You may shower with luke warm water. No tub baths or anything that will soak injection sites  under water for the next 24 hours.  3. DRESSING:   You may remove your bandage today.   4. ACTIVITY LEVEL:   You may resume your normal activities 24 hrs after your procedure. Nothing strenuous today.  5. MEDICATIONS:   You may resume your normal medications today. To restart blood thinners, ask your doctor.  6. DRIVING    If you have received any sedatives by mouth today, you may not drive for 12 hours.    If you have received any sedation through your IV, you may not drive for 24 hrs.   7. SPECIAL INSTRUCTIONS:   No heat to the injection site for 24 hrs including, hot bath or shower, heating pad, moist heat, or hot tubs.    Use ice pack to injection site for any pain or discomfort.  Apply ice packs for 20 minute intervals as needed.    IF you have diabetes, be sure to monitor your blood sugar more closely. IF your injection contained steroids your blood sugar levels may become higher than normal.    If you are still having pain upon discharge:  Your pain may improve over the next 48 hours. The anesthetic (numbing medication) works immediately to 48 hours. IF your injection contained a steroid (anti-inflammatory medication), it takes approximately 3 days to start feeling relief and 7-10 days to see your greatest results from the medication. It is possible you may need subsequent injections. This would be discussed at your follow up appointment with pain management or your referring doctor.    Please call the PAIN MANAGEMENT office at [phone] or ON CALL pager at [phone] if you experienced any:   -Weakness or loss of sensation  -Fever > 101.5  -Pain uncontrolled with oral medications   -Persistent nausea, vomiting, or diarrhea  -Redness or drainage from the injection sites, or any other worrisome concerns.   If physician on call was not reached or could not communicate with our office for any reason please go to the nearest emergency department.

## 2024-04-29 DIAGNOSIS — M54.41 CHRONIC BILATERAL LOW BACK PAIN WITH BILATERAL SCIATICA: ICD-10-CM

## 2024-04-29 DIAGNOSIS — M54.16 LUMBAR RADICULOPATHY: ICD-10-CM

## 2024-04-29 DIAGNOSIS — M54.42 CHRONIC BILATERAL LOW BACK PAIN WITH BILATERAL SCIATICA: ICD-10-CM

## 2024-04-29 DIAGNOSIS — G89.29 CHRONIC BILATERAL LOW BACK PAIN WITH BILATERAL SCIATICA: ICD-10-CM

## 2024-04-29 RX ORDER — GABAPENTIN 600 MG/1
600 TABLET ORAL 2 TIMES DAILY
Qty: 60 TABLET | Refills: 0 | Status: SHIPPED | OUTPATIENT
Start: 2024-04-29 | End: 2024-05-24

## 2024-04-29 NOTE — TELEPHONE ENCOUNTER
No care due was identified.  [facility] Embedded Care Due Messages. Reference number: [id number].   4/29/2024 6:51:20 AM CDT

## 2024-05-10 ENCOUNTER — OFFICE VISIT (OUTPATIENT)
Dept: PAIN MEDICINE | Facility: CLINIC | Age: 74
End: 2024-05-10
Payer: MEDICARE

## 2024-05-10 VITALS
TEMPERATURE: 98 F | WEIGHT: 167.75 LBS | DIASTOLIC BLOOD PRESSURE: 64 MMHG | HEART RATE: 84 BPM | SYSTOLIC BLOOD PRESSURE: 123 MMHG | BODY MASS INDEX: 24.07 KG/M2

## 2024-05-10 DIAGNOSIS — G57.01 PIRIFORMIS SYNDROME OF RIGHT SIDE: ICD-10-CM

## 2024-05-10 DIAGNOSIS — M46.1 SACROILIITIS: Primary | ICD-10-CM

## 2024-05-10 DIAGNOSIS — M47.816 OSTEOARTHRITIS OF LUMBAR SPINE WITHOUT MYELOPATHY OR RADICULOPATHY: ICD-10-CM

## 2024-05-10 DIAGNOSIS — M47.816 SPONDYLOSIS OF LUMBAR REGION WITHOUT MYELOPATHY OR RADICULOPATHY: ICD-10-CM

## 2024-05-10 DIAGNOSIS — M51.36 DDD (DEGENERATIVE DISC DISEASE), LUMBAR: ICD-10-CM

## 2024-05-10 DIAGNOSIS — G89.4 CHRONIC PAIN SYNDROME: ICD-10-CM

## 2024-05-10 PROCEDURE — 99999 PR PBB SHADOW E&M-EST. PATIENT-LVL IV: CPT | Mod: PBBFAC,,,

## 2024-05-10 PROCEDURE — 1101F PT FALLS ASSESS-DOCD LE1/YR: CPT | Mod: CPTII,S$GLB,,

## 2024-05-10 PROCEDURE — 1125F AMNT PAIN NOTED PAIN PRSNT: CPT | Mod: CPTII,S$GLB,,

## 2024-05-10 PROCEDURE — 3078F DIAST BP <80 MM HG: CPT | Mod: CPTII,S$GLB,,

## 2024-05-10 PROCEDURE — 3008F BODY MASS INDEX DOCD: CPT | Mod: CPTII,S$GLB,,

## 2024-05-10 PROCEDURE — 3074F SYST BP LT 130 MM HG: CPT | Mod: CPTII,S$GLB,,

## 2024-05-10 PROCEDURE — 1160F RVW MEDS BY RX/DR IN RCRD: CPT | Mod: CPTII,S$GLB,,

## 2024-05-10 PROCEDURE — 1159F MED LIST DOCD IN RCRD: CPT | Mod: CPTII,S$GLB,,

## 2024-05-10 PROCEDURE — 3288F FALL RISK ASSESSMENT DOCD: CPT | Mod: CPTII,S$GLB,,

## 2024-05-10 PROCEDURE — 99214 OFFICE O/P EST MOD 30 MIN: CPT | Mod: S$GLB,,,

## 2024-05-10 NOTE — PROGRESS NOTES
Chronic patient Established Note (Follow up visit)    Interval History     Interval History 5/10/2024:  Uriah Mills returns to clinic s/p right then left  L3, L4, L5 RFA on 4/12/2024 and 4/26/2024 respectively.  He reports 80% relief from these procedures. He also reports 80% relief at least from the previous bilateral SIJ injections. He reports return of his bilateral SIJ pain right > left with a new pain in his right buttock that radiates down the posterior aspect of his right thigh.  He describes the pain as shooting, dull.  Exacerbating factors: moving from sitting to standing, sitting up in bed. Mitigating factors: resting.  He has previously received Noco from his PCP last fill 10/31/2023.  He is inquiring about restarting pain medications.  He does not participate in HEP or PT currently. The patient denies fever/night sweats, urinary incontinence, bowel incontinence, significant weight changes, significant motor weakness or changes, or loss of sensations. His pain today is 8/10.     Interval History 1/26/2024:  The patient is here today for follow up of back pain. He is s/p bilateral SI joint injection with 60% relief. His primary complaint today is lower back pain, higher than previous injection site. It is stabbing and throbbing in nature. No radiation into the legs. There is associated stiffness. He has a lot of pain and stiffness first thing in the morning. He has completed PT multiple times without much improvement in symptoms. He continues with HEP. His pain today is 5/10.    Interval History 12/15/2023:  Uriah Mills presents to the clinic for a follow-up appointment for low back and hip pain. Since the last visit, Uriah Mills states the pain has been persistant. Current pain intensity is 8/10 but he reports it is usually a 10/10. He has been tolerating his pain over the past few years but had a flare up in October which led him to schedule this appointment.     Interval History (12/14/21):  He  returns today for follow up.  He reports that bilateral sacroiliac joint steroid injections has been helpful for the bilateral low back pain.  He reports roughly 60% relief.  He is happy with these results and does not feel that further treatment needed at this time.  He is not interested in participating in further physical therapy.        Interval History (11/11/21):  He returns today for follow up.  He reports that bilateral lumbar radiofrequency ablations has been helpful for the bilateral lumbar pain.  He reports 95% relief of lumbar back pain.  He states that he continues to have pain in the bilateral lumbosacral regions over the bilateral sacroiliac joints.  This pain radiates to the bilateral buttocks and lateral hips.  He denies any associated numbness, tingling, weakness, bowel bladder dysfunction.        Interval History (7/1/21):  He returns today for follow up.  He reports that bilateral S1 transforaminal epidural steroid injection has been helpful for the bilateral low back and lower extremity pain.  He reports roughly 40% relief.  He continues to have some back pain in the bilateral lower lumbar/lumbosacral region.  The pain does not radiate.  He denies any other changes in the quality or location was pain.  He denies any new or worsening symptoms.        Interval History (1/19/21):  He returns today for follow up.  He reports that his pain is unchanged in quality location since last encounter.  He denies any new or worsening symptoms.  We have received records from previous pain management provider.  Records indicate the patient has undergone multiple lumbar interventional procedures including sacroiliac joint injections, lumbar radiofrequency ablations, and bilateral L4 and L5 transforaminal epidural steroid injections.  Patient denies any significant relief from these procedures.        Initial Encounter (1/5/21):  Uriah Mills is a 71 y.o. male who presents today with chronic bilateral low back  and lower extremity pain.  Patient has had this problem for many years.  No specific inciting event or injury noted.  The pain is located across the lower lumbar region radiate to the bilateral hips and posterior thighs.  Patient reports associated numbness in the right posterior thigh.  He denies any pain or numbness distal to the knees.  He denies any focal weakness or bowel bladder dysfunction.  The pain is constant worse with activity.  Patient reports undergoing multiple interventional procedures in the past with Dr. Kin Palomino.  He states that no these procedures were particularly helpful.  He was also tried on multiple opioid pain medications.  Some of these help but they also cause significant constipation and therefore these medications were not continued.  More recently, patient has been taking tramadol prescribed by his primary care physician.  He states this medication does not cause any problems but also did not provide any significant relief..   This pain is described in detail below.    Pain Disability Index Review:      1/26/2024     1:08 PM 1/2/2024     1:23 PM 12/15/2023    11:13 AM   Last 3 PDI Scores   Pain Disability Index (PDI) 25 56 56       Pain Medications:    - Opioids: Lorcet (Hydrocodone/Acetaminophen)  Hydrocodone 2 weeks since he took any, wasn't helping pain much  gabapentin    Opioid Contract: no     report:  Reviewed and inconsistent with medication use as prescribed. (Pt report he has stopped taking it).    Pain Procedures:   October 2021: RFA of bilateral L3, L4, L5  June 2021: Transforaminal epidural at bilateral S1  December 2021: Bilateral sacroiliac joint injection   1/11/24 B/L SI joint injections- 80% relief  4/12/2024 - Right L3, L4, L5 RFA - 80% relief  4/26/2024 - Left L3, L4, L5 RFA - 80% relief    Physical Therapy/Home Exercise: no (has tried accupuncture without significant relief)    Imaging: MRI lumbar spine 2020    Alignment: Normal.     Vertebrae: 5 lumbar-type  vertebral bodies. No aggressive marrow replacement process or fracture.     Discs: Satisfactory height.  Mild diffuse desiccation of disc material predominantly L4-L5 and L5-S1     Cord: Normal. Conus terminates at T12-L1.     Degenerative findings:     T12-L1: There is no focal disc herniation.No significant central canal narrowing . No significant neural foraminal narrowing.     L1-L2: There is no focal disc herniation.No significant central canal narrowing . No significant neural foraminal narrowing.     L2-L3: There is no focal disc herniation.No significant central canal narrowing . No significant neural foraminal narrowing.     L3-L4: There is no focal disc herniation.No significant central canal narrowing . No significant neural foraminal narrowing.     L4-L5: Mild broad-based bulging of disc material with small superimposed central protrusion.  No significant central canal narrowing . No significant neural foraminal narrowing.     L5-S1: Mild broad-based bulging of disc material with small superimposed central protrusion.  No significant central canal narrowing . No significant neural foraminal narrowing.     Paraspinal muscles & soft tissues: Unremarkable.     Impression:     Degenerative changes disc space level 4-L5 and L5-S1. no significant central or foraminal stenosis.    Allergies: Review of patient's allergies indicates:  No Known Allergies    Current Medications:   Current Outpatient Medications   Medication Sig Dispense Refill    albuterol (PROVENTIL/VENTOLIN HFA) 90 mcg/actuation inhaler INHALE 2 PUFFS BY MOUTH EVERY 6 HOURS AS NEEDED 25.5 g 3    ALPRAZolam (XANAX) 1 MG tablet Take 1 tablet (1 mg total) by mouth nightly as needed for Anxiety. 30 tablet 2    amoxicillin-clavulanate 875-125mg (AUGMENTIN) 875-125 mg per tablet Take 1 tablet by mouth every 12 (twelve) hours. 20 tablet 0    aspirin (ECOTRIN) 81 MG EC tablet Take 81 mg by mouth once daily.      gabapentin (NEURONTIN) 600 MG tablet Take  1 tablet by mouth twice daily 60 tablet 0    guaiFENesin (MUCINEX) 600 mg 12 hr tablet Take 2 tablets (1,200 mg total) by mouth 2 (two) times daily. 20 tablet 1    levocetirizine (XYZAL) 5 MG tablet TAKE 1 TABLET BY MOUTH ONCE DAILY IN THE EVENING 90 tablet 3    oxybutynin (DITROPAN) 5 MG Tab Take 1 tablet (5 mg total) by mouth 3 (three) times daily as needed (bladder spasms (urge to urinate)). 10 tablet 0    silodosin (RAPAFLO) 8 mg Cap capsule Take 1 capsule (8 mg total) by mouth once daily. 30 capsule 11     No current facility-administered medications for this visit.     Facility-Administered Medications Ordered in Other Visits   Medication Dose Route Frequency Provider Last Rate Last Admin    0.9%  NaCl infusion   Intravenous Continuous Rory Griffith MD        0.9%  NaCl infusion   Intravenous Continuous Jovanny Gurrola MD        ALPRAZolam dissolvable tablet 1 mg  1 mg Oral Once PRN Colt Lenz Jr., MD        BUPivacaine (PF) 0.25% (2.5 mg/ml) injection 25 mg  10 mL Intramuscular Once Colt Lenz Jr., MD        ceFAZolin 2 g in dextrose 5 % in water (D5W) 100 mL IVPB (MB+)  2 g Intravenous On Call Procedure Rory Griffith MD        dexAMETHasone sodium phos (PF) injection 10 mg  10 mg Epidural Once Colt Lenz Jr., MD        LIDOcaine (PF) 10 mg/ml (1%) injection 10 mg  1 mL Other Once Colt Lenz Jr., MD        LIDOcaine (PF) 10 mg/ml (1%) injection 10 mg  1 mL Intradermal Once PRN Rory Griffith MD        LIDOcaine HCL 20 mg/ml (2%) injection 10 mL  10 mL Other Once Colt Lenz Jr., MD        LIDOcaine HCL 20 mg/ml (2%) injection 10 mL  10 mL Other Once Colt Lenz Jr., MD           REVIEW OF SYSTEMS:    GENERAL:  No weight loss, malaise or fevers.  HEENT:  Negative for frequent or significant headaches.  NECK:  Negative for lumps, goiter, pain and significant neck swelling.  RESPIRATORY:  Negative for cough, wheezing or shortness of  breath.  CARDIOVASCULAR:  Negative for chest pain, leg swelling or palpitations.  GI:  Negative for abdominal discomfort, blood in stools or black stools or change in bowel habits.  MUSCULOSKELETAL:  See HPI.  SKIN:  Negative for lesions, rash, and itching.  PSYCH:  Negative for sleep disturbance, mood disorder and recent psychosocial stressors.  HEMATOLOGY/LYMPHOLOGY:  Negative for prolonged bleeding, bruising easily or swollen nodes.  NEURO:   No history of headaches, syncope, paralysis, seizures or tremors.  All other reviewed and negative other than HPI.    Past Medical History:  Past Medical History:   Diagnosis Date    Allergy     Arthritis     BPH (benign prostatic hyperplasia)     Carpal tunnel syndrome     Chronic hip pain, bilateral     Chronic low back pain     COPD (chronic obstructive pulmonary disease)     DDD (degenerative disc disease), lumbar     Dorsalgia     Gastrointestinal disease     Lumbar radiculopathy     Lumbar spondylosis     Osteoarthritis of both hips     Osteoarthritis of spine     Skin cancer        Past Surgical History:  Past Surgical History:   Procedure Laterality Date    APPENDECTOMY      CARPAL TUNNEL RELEASE Right 08/12/2019    Procedure: RELEASE, CARPAL TUNNEL right;  Surgeon: Roopa Hanna MD;  Location: [facility];  Service: Orthopedics;  Laterality: Right;    COLONOSCOPY      CYSTOSCOPY WITH INSERTION OF MINIMALLY INVASIVE IMPLANT TO ENLARGE PROSTATIC URETHRA N/A 11/29/2023    Procedure: CYSTOSCOPY, WITH INSERTION OF UROLIFT IMPLANT;  Surgeon: Rory Griffith MD;  Location: [facility] OR;  Service: Urology;  Laterality: N/A;    CYSTOSCOPY, WITH INSERTION OF UROLIFT IMPLANT  11/29/2023    EPIDURAL STEROID INJECTION INTO LUMBAR SPINE Bilateral 06/17/2021    Bilateral ALVIN per  06/17/2021    GASTRIC FUNDOPLICATION      HERNIA REPAIR  1990s    INJECTION OF ANESTHETIC AGENT AROUND MEDIAL BRANCH NERVES INNERVATING LUMBAR FACET JOINT Bilateral 07/15/2021    Procedure:  Block-nerve-medial branch-lumbar---BILATERAL L3, L4, L5;  Surgeon: Colt Lenz Jr., MD;  Location: [facility];  Service: Pain Management;  Laterality: Bilateral;    INJECTION OF ANESTHETIC AGENT AROUND MEDIAL BRANCH NERVES INNERVATING LUMBAR FACET JOINT Bilateral 09/23/2021    Procedure: Block-nerve-medial branch-lumbar---BILATERAL L3, L4, L5;  Surgeon: Colt Lenz Jr., MD;  Location: [facility];  Service: Pain Management;  Laterality: Bilateral;    INJECTION OF ANESTHETIC AGENT AROUND NERVE Bilateral 2/16/2024    Procedure: BLOCK, NERVE BILATERAL L3, 4, 5 MEDIAL BRANCH;  Surgeon: Jeanmarie Jauregui MD;  Location: [facility]IN [facility];  Service: Pain Management;  Laterality: Bilateral;  [phone]    INJECTION OF ANESTHETIC AGENT AROUND NERVE Bilateral 3/8/2024    Procedure: BLOCK, NERVE BILATERAL L3, L4, AND L5 MEDIAL BRANCH;  Surgeon: Jeanmarie Jauregui MD;  Location: [facility];  Service: Pain Management;  Laterality: Bilateral;  [phone]    INJECTION OF JOINT Bilateral 12/02/2021    Procedure: Injection, Joint---BILATERAL SACROILIAC INJECTION;  Surgeon: Colt Lenz Jr., MD;  Location: [facility];  Service: Pain Management;  Laterality: Bilateral;    INJECTION OF STEROID Left 08/12/2019    Procedure: INJECTION, STEROID;  Surgeon: Roopa Hanna MD;  Location: [facility] OR;  Service: Orthopedics;  Laterality: Left;    INJECTION, SACROILIAC JOINT Bilateral 1/11/2024    Procedure: INJECTION,SACROILIAC JOINT BILATERAL;  Surgeon: Jeanmarie Jauregui MD;  Location: [facility];  Service: Pain Management;  Laterality: Bilateral;  [phone]    PYELOGRAM, RETROGRADE (Bilateral)    11/29/2023    RADIOFREQUENCY ABLATION Left 10/14/2021    Procedure: Radiofrequency Ablation---LEFT L3, L4, L5;  Surgeon: Colt Lenz Jr., MD;  Location: [facility];  Service: Pain Management;  Laterality: Left;    RADIOFREQUENCY ABLATION Right 10/28/2021    Procedure: Radiofrequency  Ablation---RIGHT L3, L4, L5;  Surgeon: Colt Lenz Jr., MD;  Location: [facility];  Service: Pain Management;  Laterality: Right;    RADIOFREQUENCY ABLATION Right 4/12/2024    Procedure: RADIOFREQUENCY ABLATION RIGHT L3, L4, AND L5 1 OF 2;  Surgeon: Jeanmarie Jauregui MD;  Location: [facility] PAIN [facility];  Service: Pain Management;  Laterality: Right;  [phone]    RADIOFREQUENCY ABLATION Left 4/26/2024    Procedure: RADIOFREQUENCY ABLATION LEFT L3, L4, AND L5 2 OF 2;  Surgeon: Jeanmarie Jauregui MD;  Location: [facility] PAIN [facility];  Service: Pain Management;  Laterality: Left;  [phone]    RETROGRADE PYELOGRAPHY Bilateral 11/29/2023    Procedure: PYELOGRAM, RETROGRADE;  Surgeon: Rory Griffith MD;  Location: [facility] OR;  Service: Urology;  Laterality: Bilateral;    ROTATOR CUFF REPAIR Right     SACROILIAC JOINT INJECTION Bilateral 12/02/2021    SKIN CANCER EXCISION      TRANSFORAMINAL EPIDURAL INJECTION OF STEROID Bilateral 06/17/2021    Procedure: Injection,steroid,epidural,transforaminal approach---BILATERAL S1;  Surgeon: Colt Lenz Jr., MD;  Location: [facility];  Service: Pain Management;  Laterality: Bilateral;    TURBT (TRANSURETHRAL RESECTION OF BLADDER TUMOR) N/A 11/29/2023    Procedure: TURBT (TRANSURETHRAL RESECTION OF BLADDER TUMOR);  Surgeon: Rory Griffith MD;  Location: [facility] OR;  Service: Urology;  Laterality: N/A;  with UroLift and with bilateral retrograde pyelograms    URBT (TRANSURETHRAL RESECTION OF BLADDER TUMOR)  11/29/2023       Family History:  Family History   Problem Relation Name Age of Onset    Diabetes Mother Marly Mills     No Known Problems Father         Social History:  Social History     Socioeconomic History    Marital status:    Tobacco Use    Smoking status: Every Day     Types: Cigars    Smokeless tobacco: Never    Tobacco comments:     8 small cigars per day down from 10.   Substance and Sexual Activity    Alcohol use: No    Drug use: No     Sexual activity: Not Currently     Partners: Female     Birth control/protection: None       OBJECTIVE:    There were no vitals taken for this visit.    PHYSICAL EXAMINATION:     General appearance: Well appearing, in no acute distress, alert and oriented x3.  Psych:  Mood and affect appropriate.  Skin: Skin color, texture, turgor normal, no rashes or lesions, in both upper and lower body.  Head/face:  Atraumatic, normocephalic. No palpable lymph nodes  Back: Straight leg raising in the sitting and supine positions is negative to radicular pain. TTP over lumbar facet joints. Limited ROM with pain on extension > flexion. Positive facet loading bilaterally.  Positive tenderness over bilateral SIJ right > left with positive thigh and sacral thrust test, Positive FABERE,Ganselin and Yeoman's test bilaterally. Negative FADIR   Extremities: Peripheral joint ROM is full and pain free without obvious instability or laxity in all four extremities. No deformities, edema, or skin discoloration. Good capillary refill.  Musculoskeletal: Lower extremity strength is normal and symmetric.  No atrophy or tone abnormalities are noted. TTP over right piriformis with positive piriformis stretch test.   Neuro: Bilateral upper and lower extremity coordination and muscle stretch reflexes are physiologic and symmetric.  Plantar response are downgoing. No loss of sensation is noted.  Gait: Antalgic.    ASSESSMENT: 73 y.o. year old male with lower back and hip pain, consistent with sacroiliitis.     1. Sacroiliitis  Procedure Order to Pain Management      2. Piriformis syndrome of right side  Procedure Order to Pain Management      3. DDD (degenerative disc disease), lumbar        4. 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 Chronic pain syndrome        6. Spondylosis of lumbar region without myelopathy or radiculopathy              PLAN:     - I personally reviewed and interpreted relevant and pertinent  imaging. Results were discussed with the patient today.     - He is s/p bilateral L3, L4, L5 RFA with 80% relief    - Procedure order placed for bilateral SIJ and Right Piriformis    - Encouraged HEP and PT.     - RTC after completion of procedures.    - Counseled patient regarding the importance of physical therapy.    - He is aware that I cannot prescribe opioids.    - If he would like to discuss pain medications, he will need to f/u after above procedure with MD.     The above plan and management options were discussed at length with patient. Patient is in agreement with the above and verbalized understanding.    Nataliia Graves NP   05/10/2024

## 2024-05-10 NOTE — H&P (VIEW-ONLY)
Chronic patient Established Note (Follow up visit)    Interval History     Interval History 5/10/2024:  Uriah Mills returns to clinic s/p right then left  L3, L4, L5 RFA on 4/12/2024 and 4/26/2024 respectively.  He reports 80% relief from these procedures. He also reports 80% relief at least from the previous bilateral SIJ injections. He reports return of his bilateral SIJ pain right > left with a new pain in his right buttock that radiates down the posterior aspect of his right thigh.  He describes the pain as shooting, dull.  Exacerbating factors: moving from sitting to standing, sitting up in bed. Mitigating factors: resting.  He has previously received Noco from his PCP last fill 10/31/2023.  He is inquiring about restarting pain medications.  He does not participate in HEP or PT currently. The patient denies fever/night sweats, urinary incontinence, bowel incontinence, significant weight changes, significant motor weakness or changes, or loss of sensations. His pain today is 8/10.     Interval History 1/26/2024:  The patient is here today for follow up of back pain. He is s/p bilateral SI joint injection with 60% relief. His primary complaint today is lower back pain, higher than previous injection site. It is stabbing and throbbing in nature. No radiation into the legs. There is associated stiffness. He has a lot of pain and stiffness first thing in the morning. He has completed PT multiple times without much improvement in symptoms. He continues with HEP. His pain today is 5/10.    Interval History 12/15/2023:  Uriah Mills presents to the clinic for a follow-up appointment for low back and hip pain. Since the last visit, Uriah Mills states the pain has been persistant. Current pain intensity is 8/10 but he reports it is usually a 10/10. He has been tolerating his pain over the past few years but had a flare up in October which led him to schedule this appointment.     Interval History (12/14/21):  He  returns today for follow up.  He reports that bilateral sacroiliac joint steroid injections has been helpful for the bilateral low back pain.  He reports roughly 60% relief.  He is happy with these results and does not feel that further treatment needed at this time.  He is not interested in participating in further physical therapy.        Interval History (11/11/21):  He returns today for follow up.  He reports that bilateral lumbar radiofrequency ablations has been helpful for the bilateral lumbar pain.  He reports 95% relief of lumbar back pain.  He states that he continues to have pain in the bilateral lumbosacral regions over the bilateral sacroiliac joints.  This pain radiates to the bilateral buttocks and lateral hips.  He denies any associated numbness, tingling, weakness, bowel bladder dysfunction.        Interval History (7/1/21):  He returns today for follow up.  He reports that bilateral S1 transforaminal epidural steroid injection has been helpful for the bilateral low back and lower extremity pain.  He reports roughly 40% relief.  He continues to have some back pain in the bilateral lower lumbar/lumbosacral region.  The pain does not radiate.  He denies any other changes in the quality or location was pain.  He denies any new or worsening symptoms.        Interval History (1/19/21):  He returns today for follow up.  He reports that his pain is unchanged in quality location since last encounter.  He denies any new or worsening symptoms.  We have received records from previous pain management provider.  Records indicate the patient has undergone multiple lumbar interventional procedures including sacroiliac joint injections, lumbar radiofrequency ablations, and bilateral L4 and L5 transforaminal epidural steroid injections.  Patient denies any significant relief from these procedures.        Initial Encounter (1/5/21):  Uriah Mills is a 71 y.o. male who presents today with chronic bilateral low back  and lower extremity pain.  Patient has had this problem for many years.  No specific inciting event or injury noted.  The pain is located across the lower lumbar region radiate to the bilateral hips and posterior thighs.  Patient reports associated numbness in the right posterior thigh.  He denies any pain or numbness distal to the knees.  He denies any focal weakness or bowel bladder dysfunction.  The pain is constant worse with activity.  Patient reports undergoing multiple interventional procedures in the past with Dr. Kin Palomino.  He states that no these procedures were particularly helpful.  He was also tried on multiple opioid pain medications.  Some of these help but they also cause significant constipation and therefore these medications were not continued.  More recently, patient has been taking tramadol prescribed by his primary care physician.  He states this medication does not cause any problems but also did not provide any significant relief..   This pain is described in detail below.    Pain Disability Index Review:      1/26/2024     1:08 PM 1/2/2024     1:23 PM 12/15/2023    11:13 AM   Last 3 PDI Scores   Pain Disability Index (PDI) 25 56 56       Pain Medications:    - Opioids: Lorcet (Hydrocodone/Acetaminophen)  Hydrocodone 2 weeks since he took any, wasn't helping pain much  gabapentin    Opioid Contract: no     report:  Reviewed and inconsistent with medication use as prescribed. (Pt report he has stopped taking it).    Pain Procedures:   October 2021: RFA of bilateral L3, L4, L5  June 2021: Transforaminal epidural at bilateral S1  December 2021: Bilateral sacroiliac joint injection   1/11/24 B/L SI joint injections- 80% relief  4/12/2024 - Right L3, L4, L5 RFA - 80% relief  4/26/2024 - Left L3, L4, L5 RFA - 80% relief    Physical Therapy/Home Exercise: no (has tried accupuncture without significant relief)    Imaging: MRI lumbar spine 2020    Alignment: Normal.     Vertebrae: 5 lumbar-type  vertebral bodies. No aggressive marrow replacement process or fracture.     Discs: Satisfactory height.  Mild diffuse desiccation of disc material predominantly L4-L5 and L5-S1     Cord: Normal. Conus terminates at T12-L1.     Degenerative findings:     T12-L1: There is no focal disc herniation.No significant central canal narrowing . No significant neural foraminal narrowing.     L1-L2: There is no focal disc herniation.No significant central canal narrowing . No significant neural foraminal narrowing.     L2-L3: There is no focal disc herniation.No significant central canal narrowing . No significant neural foraminal narrowing.     L3-L4: There is no focal disc herniation.No significant central canal narrowing . No significant neural foraminal narrowing.     L4-L5: Mild broad-based bulging of disc material with small superimposed central protrusion.  No significant central canal narrowing . No significant neural foraminal narrowing.     L5-S1: Mild broad-based bulging of disc material with small superimposed central protrusion.  No significant central canal narrowing . No significant neural foraminal narrowing.     Paraspinal muscles & soft tissues: Unremarkable.     Impression:     Degenerative changes disc space level 4-L5 and L5-S1. no significant central or foraminal stenosis.    Allergies: Review of patient's allergies indicates:  No Known Allergies    Current Medications:   Current Outpatient Medications   Medication Sig Dispense Refill    albuterol (PROVENTIL/VENTOLIN HFA) 90 mcg/actuation inhaler INHALE 2 PUFFS BY MOUTH EVERY 6 HOURS AS NEEDED 25.5 g 3    ALPRAZolam (XANAX) 1 MG tablet Take 1 tablet (1 mg total) by mouth nightly as needed for Anxiety. 30 tablet 2    amoxicillin-clavulanate 875-125mg (AUGMENTIN) 875-125 mg per tablet Take 1 tablet by mouth every 12 (twelve) hours. 20 tablet 0    aspirin (ECOTRIN) 81 MG EC tablet Take 81 mg by mouth once daily.      gabapentin (NEURONTIN) 600 MG tablet Take  1 tablet by mouth twice daily 60 tablet 0    guaiFENesin (MUCINEX) 600 mg 12 hr tablet Take 2 tablets (1,200 mg total) by mouth 2 (two) times daily. 20 tablet 1    levocetirizine (XYZAL) 5 MG tablet TAKE 1 TABLET BY MOUTH ONCE DAILY IN THE EVENING 90 tablet 3    oxybutynin (DITROPAN) 5 MG Tab Take 1 tablet (5 mg total) by mouth 3 (three) times daily as needed (bladder spasms (urge to urinate)). 10 tablet 0    silodosin (RAPAFLO) 8 mg Cap capsule Take 1 capsule (8 mg total) by mouth once daily. 30 capsule 11     No current facility-administered medications for this visit.     Facility-Administered Medications Ordered in Other Visits   Medication Dose Route Frequency Provider Last Rate Last Admin    0.9%  NaCl infusion   Intravenous Continuous Rory Griffith MD        0.9%  NaCl infusion   Intravenous Continuous Jovanny Gurrola MD        ALPRAZolam dissolvable tablet 1 mg  1 mg Oral Once PRN Colt Lenz Jr., MD        BUPivacaine (PF) 0.25% (2.5 mg/ml) injection 25 mg  10 mL Intramuscular Once Colt Lenz Jr., MD        ceFAZolin 2 g in dextrose 5 % in water (D5W) 100 mL IVPB (MB+)  2 g Intravenous On Call Procedure Rory Griffith MD        dexAMETHasone sodium phos (PF) injection 10 mg  10 mg Epidural Once Colt Lenz Jr., MD        LIDOcaine (PF) 10 mg/ml (1%) injection 10 mg  1 mL Other Once Colt Lenz Jr., MD        LIDOcaine (PF) 10 mg/ml (1%) injection 10 mg  1 mL Intradermal Once PRN Rory Griffith MD        LIDOcaine HCL 20 mg/ml (2%) injection 10 mL  10 mL Other Once Colt Lenz Jr., MD        LIDOcaine HCL 20 mg/ml (2%) injection 10 mL  10 mL Other Once Colt Lenz Jr., MD           REVIEW OF SYSTEMS:    GENERAL:  No weight loss, malaise or fevers.  HEENT:  Negative for frequent or significant headaches.  NECK:  Negative for lumps, goiter, pain and significant neck swelling.  RESPIRATORY:  Negative for cough, wheezing or shortness of  breath.  CARDIOVASCULAR:  Negative for chest pain, leg swelling or palpitations.  GI:  Negative for abdominal discomfort, blood in stools or black stools or change in bowel habits.  MUSCULOSKELETAL:  See HPI.  SKIN:  Negative for lesions, rash, and itching.  PSYCH:  Negative for sleep disturbance, mood disorder and recent psychosocial stressors.  HEMATOLOGY/LYMPHOLOGY:  Negative for prolonged bleeding, bruising easily or swollen nodes.  NEURO:   No history of headaches, syncope, paralysis, seizures or tremors.  All other reviewed and negative other than HPI.    Past Medical History:  Past Medical History:   Diagnosis Date    Allergy     Arthritis     BPH (benign prostatic hyperplasia)     Carpal tunnel syndrome     Chronic hip pain, bilateral     Chronic low back pain     COPD (chronic obstructive pulmonary disease)     DDD (degenerative disc disease), lumbar     Dorsalgia     Gastrointestinal disease     Lumbar radiculopathy     Lumbar spondylosis     Osteoarthritis of both hips     Osteoarthritis of spine     Skin cancer        Past Surgical History:  Past Surgical History:   Procedure Laterality Date    APPENDECTOMY      CARPAL TUNNEL RELEASE Right 08/12/2019    Procedure: RELEASE, CARPAL TUNNEL right;  Surgeon: Roopa Hanna MD;  Location: [facility];  Service: Orthopedics;  Laterality: Right;    COLONOSCOPY      CYSTOSCOPY WITH INSERTION OF MINIMALLY INVASIVE IMPLANT TO ENLARGE PROSTATIC URETHRA N/A 11/29/2023    Procedure: CYSTOSCOPY, WITH INSERTION OF UROLIFT IMPLANT;  Surgeon: Rory Griffith MD;  Location: [facility] OR;  Service: Urology;  Laterality: N/A;    CYSTOSCOPY, WITH INSERTION OF UROLIFT IMPLANT  11/29/2023    EPIDURAL STEROID INJECTION INTO LUMBAR SPINE Bilateral 06/17/2021    Bilateral ALVIN per  06/17/2021    GASTRIC FUNDOPLICATION      HERNIA REPAIR  1990s    INJECTION OF ANESTHETIC AGENT AROUND MEDIAL BRANCH NERVES INNERVATING LUMBAR FACET JOINT Bilateral 07/15/2021    Procedure:  Block-nerve-medial branch-lumbar---BILATERAL L3, L4, L5;  Surgeon: Colt Lenz Jr., MD;  Location: [facility];  Service: Pain Management;  Laterality: Bilateral;    INJECTION OF ANESTHETIC AGENT AROUND MEDIAL BRANCH NERVES INNERVATING LUMBAR FACET JOINT Bilateral 09/23/2021    Procedure: Block-nerve-medial branch-lumbar---BILATERAL L3, L4, L5;  Surgeon: Colt Lenz Jr., MD;  Location: [facility];  Service: Pain Management;  Laterality: Bilateral;    INJECTION OF ANESTHETIC AGENT AROUND NERVE Bilateral 2/16/2024    Procedure: BLOCK, NERVE BILATERAL L3, 4, 5 MEDIAL BRANCH;  Surgeon: Jeanmarie Jauregui MD;  Location: [facility];  Service: Pain Management;  Laterality: Bilateral;  [phone]    INJECTION OF ANESTHETIC AGENT AROUND NERVE Bilateral 3/8/2024    Procedure: BLOCK, NERVE BILATERAL L3, L4, AND L5 MEDIAL BRANCH;  Surgeon: Jeanmarie Jauregui MD;  Location: [facility];  Service: Pain Management;  Laterality: Bilateral;  [phone]    INJECTION OF JOINT Bilateral 12/02/2021    Procedure: Injection, Joint---BILATERAL SACROILIAC INJECTION;  Surgeon: Colt Lenz Jr., MD;  Location: [facility];  Service: Pain Management;  Laterality: Bilateral;    INJECTION OF STEROID Left 08/12/2019    Procedure: INJECTION, STEROID;  Surgeon: Roopa Hanna MD;  Location: [facility] OR;  Service: Orthopedics;  Laterality: Left;    INJECTION, SACROILIAC JOINT Bilateral 1/11/2024    Procedure: INJECTION,SACROILIAC JOINT BILATERAL;  Surgeon: Jeanmarie Jauregui MD;  Location: [facility];  Service: Pain Management;  Laterality: Bilateral;  [phone]    PYELOGRAM, RETROGRADE (Bilateral)    11/29/2023    RADIOFREQUENCY ABLATION Left 10/14/2021    Procedure: Radiofrequency Ablation---LEFT L3, L4, L5;  Surgeon: Colt Lenz Jr., MD;  Location: [facility];  Service: Pain Management;  Laterality: Left;    RADIOFREQUENCY ABLATION Right 10/28/2021    Procedure: Radiofrequency  Ablation---RIGHT L3, L4, L5;  Surgeon: Colt Lenz Jr., MD;  Location: [facility];  Service: Pain Management;  Laterality: Right;    RADIOFREQUENCY ABLATION Right 4/12/2024    Procedure: RADIOFREQUENCY ABLATION RIGHT L3, L4, AND L5 1 OF 2;  Surgeon: Jeanmarie Jauregui MD;  Location: [facility];  Service: Pain Management;  Laterality: Right;  [phone]    RADIOFREQUENCY ABLATION Left 4/26/2024    Procedure: RADIOFREQUENCY ABLATION LEFT L3, L4, AND L5 2 OF 2;  Surgeon: Jeanmarie Jauregui MD;  Location: [facility];  Service: Pain Management;  Laterality: Left;  [phone]    RETROGRADE PYELOGRAPHY Bilateral 11/29/2023    Procedure: PYELOGRAM, RETROGRADE;  Surgeon: Rory Griffith MD;  Location: [facility] OR;  Service: Urology;  Laterality: Bilateral;    ROTATOR CUFF REPAIR Right     SACROILIAC JOINT INJECTION Bilateral 12/02/2021    SKIN CANCER EXCISION      TRANSFORAMINAL EPIDURAL INJECTION OF STEROID Bilateral 06/17/2021    Procedure: Injection,steroid,epidural,transforaminal approach---BILATERAL S1;  Surgeon: Colt Lenz Jr., MD;  Location: [facility];  Service: Pain Management;  Laterality: Bilateral;    TURBT (TRANSURETHRAL RESECTION OF BLADDER TUMOR) N/A 11/29/2023    Procedure: TURBT (TRANSURETHRAL RESECTION OF BLADDER TUMOR);  Surgeon: Rory Griffith MD;  Location: [facility] OR;  Service: Urology;  Laterality: N/A;  with UroLift and with bilateral retrograde pyelograms    URBT (TRANSURETHRAL RESECTION OF BLADDER TUMOR)  11/29/2023       Family History:  Family History   Problem Relation Name Age of Onset    Diabetes Mother Marly Mills     No Known Problems Father         Social History:  Social History     Socioeconomic History    Marital status:    Tobacco Use    Smoking status: Every Day     Types: Cigars    Smokeless tobacco: Never    Tobacco comments:     8 small cigars per day down from 10.   Substance and Sexual Activity    Alcohol use: No    Drug use: No     Sexual activity: Not Currently     Partners: Female     Birth control/protection: None       OBJECTIVE:    There were no vitals taken for this visit.    PHYSICAL EXAMINATION:     General appearance: Well appearing, in no acute distress, alert and oriented x3.  Psych:  Mood and affect appropriate.  Skin: Skin color, texture, turgor normal, no rashes or lesions, in both upper and lower body.  Head/face:  Atraumatic, normocephalic. No palpable lymph nodes  Back: Straight leg raising in the sitting and supine positions is negative to radicular pain. TTP over lumbar facet joints. Limited ROM with pain on extension > flexion. Positive facet loading bilaterally.  Positive tenderness over bilateral SIJ right > left with positive thigh and sacral thrust test, Positive FABERE,Ganselin and Yeoman's test bilaterally. Negative FADIR   Extremities: Peripheral joint ROM is full and pain free without obvious instability or laxity in all four extremities. No deformities, edema, or skin discoloration. Good capillary refill.  Musculoskeletal: Lower extremity strength is normal and symmetric.  No atrophy or tone abnormalities are noted. TTP over right piriformis with positive piriformis stretch test.   Neuro: Bilateral upper and lower extremity coordination and muscle stretch reflexes are physiologic and symmetric.  Plantar response are downgoing. No loss of sensation is noted.  Gait: Antalgic.    ASSESSMENT: 73 y.o. year old male with lower back and hip pain, consistent with sacroiliitis.     1. Sacroiliitis  Procedure Order to Pain Management      2. Piriformis syndrome of right side  Procedure Order to Pain Management      3. DDD (degenerative disc disease), lumbar        4. 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 Chronic pain syndrome        6. Spondylosis of lumbar region without myelopathy or radiculopathy              PLAN:     - I personally reviewed and interpreted relevant and pertinent  imaging. Results were discussed with the patient today.     - He is s/p bilateral L3, L4, L5 RFA with 80% relief    - Procedure order placed for bilateral SIJ and Right Piriformis    - Encouraged HEP and PT.     - RTC after completion of procedures.    - Counseled patient regarding the importance of physical therapy.    - He is aware that I cannot prescribe opioids.    - If he would like to discuss pain medications, he will need to f/u after above procedure with MD.     The above plan and management options were discussed at length with patient. Patient is in agreement with the above and verbalized understanding.    Nataliia Graves NP   05/10/2024

## 2024-05-10 NOTE — H&P (VIEW-ONLY)
Chronic patient Established Note (Follow up visit)    Interval History     Interval History 5/10/2024:  Uriah Mills returns to clinic s/p right then left  L3, L4, L5 RFA on 4/12/2024 and 4/26/2024 respectively.  He reports 80% relief from these procedures. He also reports 80% relief at least from the previous bilateral SIJ injections. He reports return of his bilateral SIJ pain right > left with a new pain in his right buttock that radiates down the posterior aspect of his right thigh.  He describes the pain as shooting, dull.  Exacerbating factors: moving from sitting to standing, sitting up in bed. Mitigating factors: resting.  He has previously received Noco from his PCP last fill 10/31/2023.  He is inquiring about restarting pain medications.  He does not participate in HEP or PT currently. The patient denies fever/night sweats, urinary incontinence, bowel incontinence, significant weight changes, significant motor weakness or changes, or loss of sensations. His pain today is 8/10.     Interval History 1/26/2024:  The patient is here today for follow up of back pain. He is s/p bilateral SI joint injection with 60% relief. His primary complaint today is lower back pain, higher than previous injection site. It is stabbing and throbbing in nature. No radiation into the legs. There is associated stiffness. He has a lot of pain and stiffness first thing in the morning. He has completed PT multiple times without much improvement in symptoms. He continues with HEP. His pain today is 5/10.    Interval History 12/15/2023:  Uriah Mills presents to the clinic for a follow-up appointment for low back and hip pain. Since the last visit, Uriah Mills states the pain has been persistant. Current pain intensity is 8/10 but he reports it is usually a 10/10. He has been tolerating his pain over the past few years but had a flare up in October which led him to schedule this appointment.     Interval History (12/14/21):  He  returns today for follow up.  He reports that bilateral sacroiliac joint steroid injections has been helpful for the bilateral low back pain.  He reports roughly 60% relief.  He is happy with these results and does not feel that further treatment needed at this time.  He is not interested in participating in further physical therapy.        Interval History (11/11/21):  He returns today for follow up.  He reports that bilateral lumbar radiofrequency ablations has been helpful for the bilateral lumbar pain.  He reports 95% relief of lumbar back pain.  He states that he continues to have pain in the bilateral lumbosacral regions over the bilateral sacroiliac joints.  This pain radiates to the bilateral buttocks and lateral hips.  He denies any associated numbness, tingling, weakness, bowel bladder dysfunction.        Interval History (7/1/21):  He returns today for follow up.  He reports that bilateral S1 transforaminal epidural steroid injection has been helpful for the bilateral low back and lower extremity pain.  He reports roughly 40% relief.  He continues to have some back pain in the bilateral lower lumbar/lumbosacral region.  The pain does not radiate.  He denies any other changes in the quality or location was pain.  He denies any new or worsening symptoms.        Interval History (1/19/21):  He returns today for follow up.  He reports that his pain is unchanged in quality location since last encounter.  He denies any new or worsening symptoms.  We have received records from previous pain management provider.  Records indicate the patient has undergone multiple lumbar interventional procedures including sacroiliac joint injections, lumbar radiofrequency ablations, and bilateral L4 and L5 transforaminal epidural steroid injections.  Patient denies any significant relief from these procedures.        Initial Encounter (1/5/21):  Uriah Mills is a 71 y.o. male who presents today with chronic bilateral low back  and lower extremity pain.  Patient has had this problem for many years.  No specific inciting event or injury noted.  The pain is located across the lower lumbar region radiate to the bilateral hips and posterior thighs.  Patient reports associated numbness in the right posterior thigh.  He denies any pain or numbness distal to the knees.  He denies any focal weakness or bowel bladder dysfunction.  The pain is constant worse with activity.  Patient reports undergoing multiple interventional procedures in the past with Dr. Kin Palomino.  He states that no these procedures were particularly helpful.  He was also tried on multiple opioid pain medications.  Some of these help but they also cause significant constipation and therefore these medications were not continued.  More recently, patient has been taking tramadol prescribed by his primary care physician.  He states this medication does not cause any problems but also did not provide any significant relief..   This pain is described in detail below.    Pain Disability Index Review:      1/26/2024     1:08 PM 1/2/2024     1:23 PM 12/15/2023    11:13 AM   Last 3 PDI Scores   Pain Disability Index (PDI) 25 56 56       Pain Medications:    - Opioids: Lorcet (Hydrocodone/Acetaminophen)  Hydrocodone 2 weeks since he took any, wasn't helping pain much  gabapentin    Opioid Contract: no     report:  Reviewed and inconsistent with medication use as prescribed. (Pt report he has stopped taking it).    Pain Procedures:   October 2021: RFA of bilateral L3, L4, L5  June 2021: Transforaminal epidural at bilateral S1  December 2021: Bilateral sacroiliac joint injection   1/11/24 B/L SI joint injections- 80% relief  4/12/2024 - Right L3, L4, L5 RFA - 80% relief  4/26/2024 - Left L3, L4, L5 RFA - 80% relief    Physical Therapy/Home Exercise: no (has tried accupuncture without significant relief)    Imaging: MRI lumbar spine 2020    Alignment: Normal.     Vertebrae: 5 lumbar-type  vertebral bodies. No aggressive marrow replacement process or fracture.     Discs: Satisfactory height.  Mild diffuse desiccation of disc material predominantly L4-L5 and L5-S1     Cord: Normal. Conus terminates at T12-L1.     Degenerative findings:     T12-L1: There is no focal disc herniation.No significant central canal narrowing . No significant neural foraminal narrowing.     L1-L2: There is no focal disc herniation.No significant central canal narrowing . No significant neural foraminal narrowing.     L2-L3: There is no focal disc herniation.No significant central canal narrowing . No significant neural foraminal narrowing.     L3-L4: There is no focal disc herniation.No significant central canal narrowing . No significant neural foraminal narrowing.     L4-L5: Mild broad-based bulging of disc material with small superimposed central protrusion.  No significant central canal narrowing . No significant neural foraminal narrowing.     L5-S1: Mild broad-based bulging of disc material with small superimposed central protrusion.  No significant central canal narrowing . No significant neural foraminal narrowing.     Paraspinal muscles & soft tissues: Unremarkable.     Impression:     Degenerative changes disc space level 4-L5 and L5-S1. no significant central or foraminal stenosis.    Allergies: Review of patient's allergies indicates:  No Known Allergies    Current Medications:   Current Outpatient Medications   Medication Sig Dispense Refill    albuterol (PROVENTIL/VENTOLIN HFA) 90 mcg/actuation inhaler INHALE 2 PUFFS BY MOUTH EVERY 6 HOURS AS NEEDED 25.5 g 3    ALPRAZolam (XANAX) 1 MG tablet Take 1 tablet (1 mg total) by mouth nightly as needed for Anxiety. 30 tablet 2    amoxicillin-clavulanate 875-125mg (AUGMENTIN) 875-125 mg per tablet Take 1 tablet by mouth every 12 (twelve) hours. 20 tablet 0    aspirin (ECOTRIN) 81 MG EC tablet Take 81 mg by mouth once daily.      gabapentin (NEURONTIN) 600 MG tablet Take  1 tablet by mouth twice daily 60 tablet 0    guaiFENesin (MUCINEX) 600 mg 12 hr tablet Take 2 tablets (1,200 mg total) by mouth 2 (two) times daily. 20 tablet 1    levocetirizine (XYZAL) 5 MG tablet TAKE 1 TABLET BY MOUTH ONCE DAILY IN THE EVENING 90 tablet 3    oxybutynin (DITROPAN) 5 MG Tab Take 1 tablet (5 mg total) by mouth 3 (three) times daily as needed (bladder spasms (urge to urinate)). 10 tablet 0    silodosin (RAPAFLO) 8 mg Cap capsule Take 1 capsule (8 mg total) by mouth once daily. 30 capsule 11     No current facility-administered medications for this visit.     Facility-Administered Medications Ordered in Other Visits   Medication Dose Route Frequency Provider Last Rate Last Admin    0.9%  NaCl infusion   Intravenous Continuous Rory Griffith MD        0.9%  NaCl infusion   Intravenous Continuous Jovanny Gurrola MD        ALPRAZolam dissolvable tablet 1 mg  1 mg Oral Once PRN Colt Lenz Jr., MD        BUPivacaine (PF) 0.25% (2.5 mg/ml) injection 25 mg  10 mL Intramuscular Once Colt Lenz Jr., MD        ceFAZolin 2 g in dextrose 5 % in water (D5W) 100 mL IVPB (MB+)  2 g Intravenous On Call Procedure Rory Griffith MD        dexAMETHasone sodium phos (PF) injection 10 mg  10 mg Epidural Once Colt Lenz Jr., MD        LIDOcaine (PF) 10 mg/ml (1%) injection 10 mg  1 mL Other Once Colt Lenz Jr., MD        LIDOcaine (PF) 10 mg/ml (1%) injection 10 mg  1 mL Intradermal Once PRN Rory Griffith MD        LIDOcaine HCL 20 mg/ml (2%) injection 10 mL  10 mL Other Once Colt Lenz Jr., MD        LIDOcaine HCL 20 mg/ml (2%) injection 10 mL  10 mL Other Once Colt Lenz Jr., MD           REVIEW OF SYSTEMS:    GENERAL:  No weight loss, malaise or fevers.  HEENT:  Negative for frequent or significant headaches.  NECK:  Negative for lumps, goiter, pain and significant neck swelling.  RESPIRATORY:  Negative for cough, wheezing or shortness of  breath.  CARDIOVASCULAR:  Negative for chest pain, leg swelling or palpitations.  GI:  Negative for abdominal discomfort, blood in stools or black stools or change in bowel habits.  MUSCULOSKELETAL:  See HPI.  SKIN:  Negative for lesions, rash, and itching.  PSYCH:  Negative for sleep disturbance, mood disorder and recent psychosocial stressors.  HEMATOLOGY/LYMPHOLOGY:  Negative for prolonged bleeding, bruising easily or swollen nodes.  NEURO:   No history of headaches, syncope, paralysis, seizures or tremors.  All other reviewed and negative other than HPI.    Past Medical History:  Past Medical History:   Diagnosis Date    Allergy     Arthritis     BPH (benign prostatic hyperplasia)     Carpal tunnel syndrome     Chronic hip pain, bilateral     Chronic low back pain     COPD (chronic obstructive pulmonary disease)     DDD (degenerative disc disease), lumbar     Dorsalgia     Gastrointestinal disease     Lumbar radiculopathy     Lumbar spondylosis     Osteoarthritis of both hips     Osteoarthritis of spine     Skin cancer        Past Surgical History:  Past Surgical History:   Procedure Laterality Date    APPENDECTOMY      CARPAL TUNNEL RELEASE Right 08/12/2019    Procedure: RELEASE, CARPAL TUNNEL right;  Surgeon: Roopa Hanna MD;  Location: [facility];  Service: Orthopedics;  Laterality: Right;    COLONOSCOPY      CYSTOSCOPY WITH INSERTION OF MINIMALLY INVASIVE IMPLANT TO ENLARGE PROSTATIC URETHRA N/A 11/29/2023    Procedure: CYSTOSCOPY, WITH INSERTION OF UROLIFT IMPLANT;  Surgeon: Rory Griffith MD;  Location: [facility] OR;  Service: Urology;  Laterality: N/A;    CYSTOSCOPY, WITH INSERTION OF UROLIFT IMPLANT  11/29/2023    EPIDURAL STEROID INJECTION INTO LUMBAR SPINE Bilateral 06/17/2021    Bilateral ALVIN per  06/17/2021    GASTRIC FUNDOPLICATION      HERNIA REPAIR  1990s    INJECTION OF ANESTHETIC AGENT AROUND MEDIAL BRANCH NERVES INNERVATING LUMBAR FACET JOINT Bilateral 07/15/2021    Procedure:  Block-nerve-medial branch-lumbar---BILATERAL L3, L4, L5;  Surgeon: Colt Lenz Jr., MD;  Location: [facility];  Service: Pain Management;  Laterality: Bilateral;    INJECTION OF ANESTHETIC AGENT AROUND MEDIAL BRANCH NERVES INNERVATING LUMBAR FACET JOINT Bilateral 09/23/2021    Procedure: Block-nerve-medial branch-lumbar---BILATERAL L3, L4, L5;  Surgeon: Colt Lenz Jr., MD;  Location: [facility];  Service: Pain Management;  Laterality: Bilateral;    INJECTION OF ANESTHETIC AGENT AROUND NERVE Bilateral 2/16/2024    Procedure: BLOCK, NERVE BILATERAL L3, 4, 5 MEDIAL BRANCH;  Surgeon: Jeanmarie Jauregui MD;  Location: [facility]IN [facility];  Service: Pain Management;  Laterality: Bilateral;  [phone]    INJECTION OF ANESTHETIC AGENT AROUND NERVE Bilateral 3/8/2024    Procedure: BLOCK, NERVE BILATERAL L3, L4, AND L5 MEDIAL BRANCH;  Surgeon: Jeanmarie Jauregui MD;  Location: [facility];  Service: Pain Management;  Laterality: Bilateral;  [phone]    INJECTION OF JOINT Bilateral 12/02/2021    Procedure: Injection, Joint---BILATERAL SACROILIAC INJECTION;  Surgeon: Colt Lenz Jr., MD;  Location: [facility];  Service: Pain Management;  Laterality: Bilateral;    INJECTION OF STEROID Left 08/12/2019    Procedure: INJECTION, STEROID;  Surgeon: Roopa Hanna MD;  Location: [facility] OR;  Service: Orthopedics;  Laterality: Left;    INJECTION, SACROILIAC JOINT Bilateral 1/11/2024    Procedure: INJECTION,SACROILIAC JOINT BILATERAL;  Surgeon: Jeanmarie Jauregui MD;  Location: [facility];  Service: Pain Management;  Laterality: Bilateral;  [phone]    PYELOGRAM, RETROGRADE (Bilateral)    11/29/2023    RADIOFREQUENCY ABLATION Left 10/14/2021    Procedure: Radiofrequency Ablation---LEFT L3, L4, L5;  Surgeon: Colt Lenz Jr., MD;  Location: [facility];  Service: Pain Management;  Laterality: Left;    RADIOFREQUENCY ABLATION Right 10/28/2021    Procedure: Radiofrequency  Ablation---RIGHT L3, L4, L5;  Surgeon: Colt Lenz Jr., MD;  Location: [facility]IN [facility];  Service: Pain Management;  Laterality: Right;    RADIOFREQUENCY ABLATION Right 4/12/2024    Procedure: RADIOFREQUENCY ABLATION RIGHT L3, L4, AND L5 1 OF 2;  Surgeon: Jeanmarie Jauregui MD;  Location: [facility];  Service: Pain Management;  Laterality: Right;  [phone]    RADIOFREQUENCY ABLATION Left 4/26/2024    Procedure: RADIOFREQUENCY ABLATION LEFT L3, L4, AND L5 2 OF 2;  Surgeon: Jeanmarie Jauregui MD;  Location: [facility];  Service: Pain Management;  Laterality: Left;  [phone]    RETROGRADE PYELOGRAPHY Bilateral 11/29/2023    Procedure: PYELOGRAM, RETROGRADE;  Surgeon: Rory Griffith MD;  Location: [facility] OR;  Service: Urology;  Laterality: Bilateral;    ROTATOR CUFF REPAIR Right     SACROILIAC JOINT INJECTION Bilateral 12/02/2021    SKIN CANCER EXCISION      TRANSFORAMINAL EPIDURAL INJECTION OF STEROID Bilateral 06/17/2021    Procedure: Injection,steroid,epidural,transforaminal approach---BILATERAL S1;  Surgeon: Colt Lenz Jr., MD;  Location: [facility];  Service: Pain Management;  Laterality: Bilateral;    TURBT (TRANSURETHRAL RESECTION OF BLADDER TUMOR) N/A 11/29/2023    Procedure: TURBT (TRANSURETHRAL RESECTION OF BLADDER TUMOR);  Surgeon: Rory Griffith MD;  Location: [facility] OR;  Service: Urology;  Laterality: N/A;  with UroLift and with bilateral retrograde pyelograms    URBT (TRANSURETHRAL RESECTION OF BLADDER TUMOR)  11/29/2023       Family History:  Family History   Problem Relation Name Age of Onset    Diabetes Mother Marly Mills     No Known Problems Father         Social History:  Social History     Socioeconomic History    Marital status:    Tobacco Use    Smoking status: Every Day     Types: Cigars    Smokeless tobacco: Never    Tobacco comments:     8 small cigars per day down from 10.   Substance and Sexual Activity    Alcohol use: No    Drug use: No     Sexual activity: Not Currently     Partners: Female     Birth control/protection: None       OBJECTIVE:    There were no vitals taken for this visit.    PHYSICAL EXAMINATION:     General appearance: Well appearing, in no acute distress, alert and oriented x3.  Psych:  Mood and affect appropriate.  Skin: Skin color, texture, turgor normal, no rashes or lesions, in both upper and lower body.  Head/face:  Atraumatic, normocephalic. No palpable lymph nodes  Back: Straight leg raising in the sitting and supine positions is negative to radicular pain. TTP over lumbar facet joints. Limited ROM with pain on extension > flexion. Positive facet loading bilaterally.  Positive tenderness over bilateral SIJ right > left with positive thigh and sacral thrust test, Positive FABERE,Ganselin and Yeoman's test bilaterally. Negative FADIR   Extremities: Peripheral joint ROM is full and pain free without obvious instability or laxity in all four extremities. No deformities, edema, or skin discoloration. Good capillary refill.  Musculoskeletal: Lower extremity strength is normal and symmetric.  No atrophy or tone abnormalities are noted. TTP over right piriformis with positive piriformis stretch test.   Neuro: Bilateral upper and lower extremity coordination and muscle stretch reflexes are physiologic and symmetric.  Plantar response are downgoing. No loss of sensation is noted.  Gait: Antalgic.    ASSESSMENT: 73 y.o. year old male with lower back and hip pain, consistent with sacroiliitis.     1. Sacroiliitis  Procedure Order to Pain Management      2. Piriformis syndrome of right side  Procedure Order to Pain Management      3. DDD (degenerative disc disease), lumbar        4. 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 Chronic pain syndrome        6. Spondylosis of lumbar region without myelopathy or radiculopathy              PLAN:     - I personally reviewed and interpreted relevant and pertinent  imaging. Results were discussed with the patient today.     - He is s/p bilateral L3, L4, L5 RFA with 80% relief    - Procedure order placed for bilateral SIJ and Right Piriformis    - Encouraged HEP and PT.     - RTC after completion of procedures.    - Counseled patient regarding the importance of physical therapy.    - He is aware that I cannot prescribe opioids.    - If he would like to discuss pain medications, he will need to f/u after above procedure with MD.     The above plan and management options were discussed at length with patient. Patient is in agreement with the above and verbalized understanding.    Nataliia Graves NP   05/10/2024

## 2024-05-13 ENCOUNTER — PATIENT MESSAGE (OUTPATIENT)
Dept: PAIN MEDICINE | Facility: OTHER | Age: 74
End: 2024-05-13
Payer: MEDICARE

## 2024-05-15 ENCOUNTER — ANESTHESIA EVENT (OUTPATIENT)
Dept: SURGERY | Facility: OTHER | Age: 74
End: 2024-05-15
Payer: MEDICARE

## 2024-05-19 ENCOUNTER — PATIENT MESSAGE (OUTPATIENT)
Dept: ADMINISTRATIVE | Facility: OTHER | Age: 74
End: 2024-05-19
Payer: MEDICARE

## 2024-05-21 ENCOUNTER — PATIENT MESSAGE (OUTPATIENT)
Dept: ADMINISTRATIVE | Facility: HOSPITAL | Age: 74
End: 2024-05-21
Payer: MEDICARE

## 2024-05-24 ENCOUNTER — HOSPITAL ENCOUNTER (OUTPATIENT)
Facility: OTHER | Age: 74
Discharge: HOME | End: 2024-05-24
Attending: ANESTHESIOLOGY | Admitting: ANESTHESIOLOGY
Payer: MEDICARE

## 2024-05-24 VITALS
RESPIRATION RATE: 16 BRPM | BODY MASS INDEX: 25.05 KG/M2 | WEIGHT: 175 LBS | DIASTOLIC BLOOD PRESSURE: 74 MMHG | HEART RATE: 75 BPM | OXYGEN SATURATION: 96 % | SYSTOLIC BLOOD PRESSURE: 175 MMHG | HEIGHT: 70 IN | TEMPERATURE: 99 F

## 2024-05-24 DIAGNOSIS — M46.1 SACROILIITIS: Primary | ICD-10-CM

## 2024-05-24 DIAGNOSIS — M54.41 CHRONIC BILATERAL LOW BACK PAIN WITH BILATERAL SCIATICA: ICD-10-CM

## 2024-05-24 DIAGNOSIS — G89.29 CHRONIC BILATERAL LOW BACK PAIN WITH BILATERAL SCIATICA: ICD-10-CM

## 2024-05-24 DIAGNOSIS — M79.18 MYOFASCIAL PAIN: ICD-10-CM

## 2024-05-24 DIAGNOSIS — G89.29 CHRONIC PAIN: ICD-10-CM

## 2024-05-24 DIAGNOSIS — M54.16 LUMBAR RADICULOPATHY: ICD-10-CM

## 2024-05-24 DIAGNOSIS — M54.42 CHRONIC BILATERAL LOW BACK PAIN WITH BILATERAL SCIATICA: ICD-10-CM

## 2024-05-24 PROCEDURE — 25500020 PHARM REV CODE 255: Performed by: ANESTHESIOLOGY

## 2024-05-24 PROCEDURE — 99152 MOD SED SAME PHYS/QHP 5/>YRS: CPT | Performed by: ANESTHESIOLOGY

## 2024-05-24 PROCEDURE — 27096 INJECT SACROILIAC JOINT: CPT | Mod: 50,,, | Performed by: ANESTHESIOLOGY

## 2024-05-24 PROCEDURE — 25000003 PHARM REV CODE 250: Performed by: ANESTHESIOLOGY

## 2024-05-24 PROCEDURE — 20552 NJX 1/MLT TRIGGER POINT 1/2: CPT | Mod: 59,,, | Performed by: ANESTHESIOLOGY

## 2024-05-24 PROCEDURE — 20552 NJX 1/MLT TRIGGER POINT 1/2: CPT | Mod: 59 | Performed by: ANESTHESIOLOGY

## 2024-05-24 PROCEDURE — 63600175 PHARM REV CODE 636 W HCPCS: Performed by: ANESTHESIOLOGY

## 2024-05-24 PROCEDURE — 25000003 PHARM REV CODE 250: Performed by: STUDENT IN AN ORGANIZED HEALTH CARE EDUCATION/TRAINING PROGRAM

## 2024-05-24 PROCEDURE — 27096 INJECT SACROILIAC JOINT: CPT | Mod: 50 | Performed by: ANESTHESIOLOGY

## 2024-05-24 RX ORDER — BUPIVACAINE HYDROCHLORIDE 2.5 MG/ML
INJECTION, SOLUTION EPIDURAL; INFILTRATION; INTRACAUDAL
Status: DISCONTINUED | OUTPATIENT
Start: 2024-05-24 | End: 2024-05-24 | Stop reason: HOSPADM

## 2024-05-24 RX ORDER — MIDAZOLAM HYDROCHLORIDE 1 MG/ML
INJECTION INTRAMUSCULAR; INTRAVENOUS
Status: DISCONTINUED | OUTPATIENT
Start: 2024-05-24 | End: 2024-05-24 | Stop reason: HOSPADM

## 2024-05-24 RX ORDER — SODIUM CHLORIDE 9 MG/ML
INJECTION, SOLUTION INTRAVENOUS CONTINUOUS
Status: DISCONTINUED | OUTPATIENT
Start: 2024-05-24 | End: 2024-05-24 | Stop reason: HOSPADM

## 2024-05-24 RX ORDER — LIDOCAINE HYDROCHLORIDE 20 MG/ML
INJECTION, SOLUTION INFILTRATION; PERINEURAL
Status: DISCONTINUED | OUTPATIENT
Start: 2024-05-24 | End: 2024-05-24 | Stop reason: HOSPADM

## 2024-05-24 RX ORDER — TRIAMCINOLONE ACETONIDE 40 MG/ML
INJECTION, SUSPENSION INTRA-ARTICULAR; INTRAMUSCULAR
Status: DISCONTINUED | OUTPATIENT
Start: 2024-05-24 | End: 2024-05-24 | Stop reason: HOSPADM

## 2024-05-24 RX ORDER — GABAPENTIN 600 MG/1
600 TABLET ORAL 2 TIMES DAILY
Qty: 60 TABLET | Refills: 0 | Status: SHIPPED | OUTPATIENT
Start: 2024-05-24

## 2024-05-24 RX ORDER — FENTANYL CITRATE 50 UG/ML
INJECTION, SOLUTION INTRAMUSCULAR; INTRAVENOUS
Status: DISCONTINUED | OUTPATIENT
Start: 2024-05-24 | End: 2024-05-24 | Stop reason: HOSPADM

## 2024-05-24 NOTE — TELEPHONE ENCOUNTER
No care due was identified.  [facility] Embedded Care Due Messages. Reference number: [id number].   5/24/2024 10:53:07 AM CDT

## 2024-05-24 NOTE — OP NOTE
Sacroiliac Joint and Piriformis Muscle Injection under Fluoroscopic Guidance    The procedure, risks, benefits, and options were discussed with the patient. There are no contraindications to the procedure. The patent expressed understanding and agreed to the procedure. Informed written consent was obtained prior to the start of the procedure and can be found in the patient's chart.    PATIENT NAME: Uriah Mills   MRN: [id number]     DATE OF PROCEDURE: 05/24/2024    PROCEDURE: Bilateral Sacroiliac Joint Injection under Fluoroscopic Guidance  PROCEDURE: Right Piriformis Muscle Injection under Fluoroscopic Guidance    PRE-OP DIAGNOSIS: Sacroiliitis [M46.1]  Piriformis syndrome of right side [G57.01]  Myofascial pain    POST-OP DIAGNOSIS: Same    PHYSICIAN: Jeanmarie Jauregui MD    ASSISTANTS: Anurag Torres MD Fellow     MEDICATIONS INJECTED: Preservative-free Kenalog 40mg with 7cc of Bupivacine 0.25%     LOCAL ANESTHETIC INJECTED: Xylocaine 2%     SEDATION: Versed 2mg and Fentanyl 100mcg                                                                                                                                                                                     Conscious sedation ordered by M.DMaeve Patient re-evaluation prior to administration of conscious sedation. No changes noted in patient's status from initial evaluation. The patient's vital signs were monitored by RN and patient remained hemodynamically stable throughout the procedure.    Event Time In   Sedation Start 1332   Sedation End 1346       ESTIMATED BLOOD LOSS: None    COMPLICATIONS: None    TECHNIQUE: Time-out was performed to identify the patient and procedure to be performed. With the patient laying in a prone position, the surgical area was prepped and draped in the usual sterile fashion using ChloraPrep and a fenestrated drape. The sacroiliac joint was determined under fluoroscopy guidance. Skin anesthesia was achieved by injecting Lidocaine 2% over  the injection site. The sacroiliac joint was  then approached with a 25 gauge, 3.5 inch spinal quinke needle that was introduced under fluoroscopic guidance in the AP and Lateral views. Once the needle tip was in the area of the joint, and there was no blood, contrast dye Omnipaque (300mg/mL) was injected to confirm placement and there was no vascular runoff. Fluoroscopic imaging in the AP and lateral views revealed a clear outline of the joint space. 4 mL of the medication mixture listed above was injected slowly intraarticular and marvin-articular. Displacement of the radio opaque contrast after injection of the medication confirmed that the medication went into the area of the joint. The needles were removed and bleeding was nil. The same procedure was repeated on the contralateral side. A sterile dressing was applied. No specimens collected. The patient tolerated the procedure well.     TECHNIQUE: Time-out was performed to identify the patient and procedure to be performed. With the patient laying in a prone position, the surgical area was prepped and draped in the usual sterile fashion using ChloraPrep and a fenestrated drape.The area overlying the right piriformis muscle was identified under fluoroscopy in the AP view. Skin anesthesia was achieved by injecting Lidocaine 2% over the injection sites. A 25 gauge,  3.5 inch spinal quinke needle was then advanced 3 cm inferior and 3 cm lateral to the inferior aspect of the SI joint. Once the piriformis muscle was thought to be encountered, Contrast dye  Omnipaque (300mg/mL) was injected to confirm placement and there was no vascular runoff. Confirmation of spread was identified within the piriformis muscle using AP fluoroscopic views. Then  4 mL of the medication mixture listed above was injected slowly. The needles were removed and bleeding was nil. A sterile dressing was applied. No specimens collected. The patient tolerated the procedure well.     The patient was  monitored after the procedure in the recovery area. They were given post-procedure and discharge instructions to follow at home. The patient was discharged in a stable condition.    Anurag Torres MD    I reviewed and edited the fellow's note. I conducted my own interview and physical examination. I agree with the findings. I was present and supervising all critical portions of the procedure.    Jeanmarie Jauregui MD

## 2024-05-24 NOTE — DISCHARGE SUMMARY
Discharge Note  Short Stay      SUMMARY     Admit Date: 5/24/2024    Attending Physician: Jeanmarie Jauregui MD      Discharge Physician: Anurag Torres MD      Discharge Date: 5/24/2024 1:44 PM    Procedure(s) (LRB):  INJECTION, BILATERAL SI AND RIGHT PIRIFORMIS (N/A)    Final Diagnosis: Sacroiliitis [M46.1]  Piriformis syndrome of right side [G57.01]    Disposition: Home or self care    Patient Instructions:   Current Discharge Medication List        CONTINUE these medications which have NOT CHANGED    Details   albuterol (PROVENTIL/VENTOLIN HFA) 90 mcg/actuation inhaler INHALE 2 PUFFS BY MOUTH EVERY 6 HOURS AS NEEDED  Qty: 25.5 g, Refills: 3    Associated Diagnoses: Bronchitis      ALPRAZolam (XANAX) 1 MG tablet Take 1 tablet (1 mg total) by mouth nightly as needed for Anxiety.  Qty: 30 tablet, Refills: 2    Associated Diagnoses: Insomnia, unspecified type; Anxiety      amoxicillin-clavulanate 875-125mg (AUGMENTIN) 875-125 mg per tablet Take 1 tablet by mouth every 12 (twelve) hours.  Qty: 20 tablet, Refills: 0    Associated Diagnoses: Bronchitis      aspirin (ECOTRIN) 81 MG EC tablet Take 81 mg by mouth once daily.      gabapentin (NEURONTIN) 600 MG tablet Take 1 tablet by mouth twice daily  Qty: 60 tablet, Refills: 0    Associated Diagnoses: Chronic bilateral low back pain with bilateral sciatica; Lumbar radiculopathy      guaiFENesin (MUCINEX) 600 mg 12 hr tablet Take 2 tablets (1,200 mg total) by mouth 2 (two) times daily.  Qty: 20 tablet, Refills: 1    Associated Diagnoses: Bronchitis      levocetirizine (XYZAL) 5 MG tablet TAKE 1 TABLET BY MOUTH ONCE DAILY IN THE EVENING  Qty: 90 tablet, Refills: 3    Associated Diagnoses: Chronic sinusitis, unspecified location      oxybutynin (DITROPAN) 5 MG Tab Take 1 tablet (5 mg total) by mouth 3 (three) times daily as needed (bladder spasms (urge to urinate)).  Qty: 10 tablet, Refills: 0      silodosin (RAPAFLO) 8 mg Cap capsule Take 1 capsule (8 mg total) by mouth  once daily.  Qty: 30 capsule, Refills: 11    Associated Diagnoses: Benign prostatic hyperplasia with urinary frequency                 Discharge Diagnosis: Sacroiliitis [M46.1]  Piriformis syndrome of right side [G57.01]  Condition on Discharge: Stable with no complications to procedure   Diet on Discharge: Same as before.  Activity: as per instruction sheet.  Discharge to: Home with a responsible adult.  Follow up: 2-4 weeks       Please call my office, on call number [phone] or pager at [phone] if experienced any weakness or loss of sensation, fever > 101.5, pain uncontrolled with oral medications, persistent nausea/vomiting/or diarrhea, redness or drainage from the incisions, or any other worrisome concerns. If physician on call was not reached or could not communicate with our office for any reason please go to the nearest emergency department

## 2024-05-24 NOTE — DISCHARGE INSTRUCTIONS

## 2024-05-29 ENCOUNTER — TELEPHONE (OUTPATIENT)
Dept: PAIN MEDICINE | Facility: CLINIC | Age: 74
End: 2024-05-29
Payer: MEDICARE

## 2024-05-29 ENCOUNTER — PATIENT MESSAGE (OUTPATIENT)
Dept: SPINE | Facility: CLINIC | Age: 74
End: 2024-05-29
Payer: MEDICARE

## 2024-05-29 NOTE — TELEPHONE ENCOUNTER
STAFF LVM AND ALSO SENT AN PT PORTAL REGARDS OF RESCHEDULING SCHEDULED APPOINTMENT ON 6/11 WITH , PT APPOINTMENT HAS NOW BEEN CANCELLED .

## 2024-05-30 ENCOUNTER — HOSPITAL ENCOUNTER (OUTPATIENT)
Dept: PREADMISSION TESTING | Facility: OTHER | Age: 74
Discharge: HOME | End: 2024-05-30
Attending: ORTHOPAEDIC SURGERY
Payer: MEDICARE

## 2024-05-30 VITALS
OXYGEN SATURATION: 95 % | DIASTOLIC BLOOD PRESSURE: 68 MMHG | HEIGHT: 70 IN | RESPIRATION RATE: 17 BRPM | TEMPERATURE: 98 F | SYSTOLIC BLOOD PRESSURE: 156 MMHG | HEART RATE: 80 BPM | BODY MASS INDEX: 25.05 KG/M2 | WEIGHT: 175 LBS

## 2024-05-30 RX ORDER — SODIUM CHLORIDE, SODIUM LACTATE, POTASSIUM CHLORIDE, CALCIUM CHLORIDE 600; 310; 30; 20 MG/100ML; MG/100ML; MG/100ML; MG/100ML
INJECTION, SOLUTION INTRAVENOUS CONTINUOUS
Status: CANCELLED | OUTPATIENT
Start: 2024-05-30

## 2024-05-30 RX ORDER — ACETAMINOPHEN 500 MG
1000 TABLET ORAL
Status: CANCELLED | OUTPATIENT
Start: 2024-05-30 | End: 2024-05-30

## 2024-05-30 NOTE — DISCHARGE INSTRUCTIONS
Information to Prepare you for your Surgery    PRE-ADMIT TESTING -  [phone]    [address]  [facility]          Your surgery has been scheduled at [facility]. We are pleased to have the opportunity to serve you. For Further Information please call [phone].    On the day of surgery please report to the Information Desk on the 1st floor.    CONTACT YOUR PHYSICIAN'S OFFICE THE DAY PRIOR TO YOUR SURGERY TO OBTAIN YOUR ARRIVAL TIME.     The evening before surgery do not eat anything after 9 p.m. ( this includes hard candy, chewing gum and mints).  You may only have GATORADE, POWERADE AND WATER  from 9 p.m. until you leave your home.   DO NOT DRINK ANY LIQUIDS ON THE WAY TO THE HOSPITAL.      Why does your anesthesiologist allow you to drink Gatorade/Powerade before surgery?  Gatorade/Powerade helps to increase your comfort before surgery and to decrease your nausea after surgery. The carbohydrates in Gatorade/Powerade help reduce your body's stress response to surgery.  If you are a diabetic-drink only water prior to surgery.    Outpatient Surgery- May allow 2 adult (18 and older) Support Persons (1 being the designated ) for all surgical/procedural patients. A breastfeeding mother will be allowed her infant and 2 adult Support Persons. No one under the age of 18 will be allowed in the building. No swapping out of visitors in the [facility].      SPECIAL MEDICATION INSTRUCTIONS: TAKE medications checked off by the Anesthesiologist on your Medication List.    Angiogram Patients: Take medications as instructed by your physician, including aspirin.     Surgery Patients:    If you take ASPIRIN - Your PHYSICIAN/SURGEON will need to inform you IF/OR when you need to stop taking aspirin prior to your surgery.     The week prior to surgery do not ot take any medications containing IBUPROFEN or NSAIDS ( Advil, Motrin, Goodys, BC, Aleve, Naproxen etc)  If you are not sure if you should take a medicine please call your surgeon's office.  Ok to take Tylenol    Do Not Wear any make-up (especially eye make-up) to surgery. Please remove any false eyelashes or eyelash extensions. If you arrive the day of surgery with makeup/eyelashes on you will be required to remove prior to surgery. (There is a risk of corneal abrasions if eye makeup/eyelash extensions are not removed)      Leave all valuables at home.   Do Not wear any jewelry or watches, including any metal in body piercings. Jewelry must be removed prior to coming to the hospital.  There is a possibility that rings that are unable to be removed may be cut off if they are on the surgical extremity.    Please remove all hair extensions, wigs, clips and any other metal accessories/ ornaments from your hair.  These items may pose a flammable/fire risk in Surgery and must be removed.    Do not shave your surgical area at least 5 days prior to your surgery. The surgical prep will be performed at the hospital according to Infection Control regulations.    Contact Lens must be removed before surgery. Either do not wear the contact lens or bring a case and solution for storage.  Please bring a container for eyeglasses or dentures as required.  Bring any paperwork your physician has provided, such as consent forms,  history and physicals, doctor's orders, etc.   Bring comfortable clothes that are loose fitting to wear upon discharge. Take into consideration the type of surgery being performed.  Maintain your diet as advised per your physician the day prior to surgery.      Adequate rest the night before surgery is advised.   Park in the Parking lot behind the hospital or in the [facility] across the street from the parking lot. Parking is complimentary.  If you will be discharged the same day as your procedure, please arrange for a responsible adult to drive you home or to accompany you if traveling by taxi.    YOU WILL NOT BE PERMITTED TO DRIVE OR TO LEAVE THE HOSPITAL ALONE AFTER SURGERY.   If you are being discharged the same day, it is strongly recommended that you arrange for someone to remain with you for the first 24 hrs following your surgery.    The Surgeon will speak to your family/visitor after your surgery regarding the outcome of your surgery and post op care.  The Surgeon may speak to you after your surgery, but there is a possibility you may not remember the details.  Please check with your family members regarding the conversation with the Surgeon.    We strongly recommend whoever is bringing you home be present for discharge instructions.  This will ensure a thorough understanding for your post op home care.          Thank you for your cooperation.  The Staff of [facility].            Bathing Instructions with Hibiclens    Shower the evening before and morning of your procedure with Chlorhexidine (Hibiclens)  do not use Chlorhexidine on your face or genitals. Do not get in your eyes.  Wash your face with water and your regular face wash/soap  Use your regular shampoo  Apply Chlorhexidine (Hibiclens) directly on your skin or on a wet washcloth and wash gently. When showering: Move away from the shower stream when applying Chlorhexidine (Hibiclens) to avoid rinsing off too soon.  Rinse thoroughly with warm water  Do not dilute Chlorhexidine (Hibiclens)   Dry off as usual, do not use any deodorant, powder, body lotions, perfume, after shave or cologne.

## 2024-05-30 NOTE — ANESTHESIA PREPROCEDURE EVALUATION
05/30/2024  Uriah Mills is a 73 y.o., male.      Pre-op Assessment    I have reviewed the Patient Summary Reports.     I have reviewed the Nursing Notes. I have reviewed the NPO Status.   I have reviewed the Medications.     Review of Systems  Anesthesia Hx:  No problems with previous Anesthesia             Denies Family Hx of Anesthesia complications.    Denies Personal Hx of Anesthesia complications.                    Social:  Smoker Smokes and inhales 8 small cigars a day      Hematology/Oncology:  Hematology Normal                       --  Cancer in past history:                 Oncology Comments: skin     EENT/Dental:  EENT/Dental Normal           Cardiovascular:  Cardiovascular Normal Exercise tolerance: poor                                           Pulmonary:   COPD, moderate                     Renal/:  Chronic Renal Disease, CKD                Musculoskeletal:  Arthritis          Spine Disorders: lumbar Chronic Pain, Disc disease and Degenerative disease           Neurological:  Neurology Normal                                      Endocrine:  Endocrine Normal            Dermatological:  Skin Normal    Psych:  Psychiatric Normal                    Physical Exam  General: Well nourished, Cooperative, Oriented and Alert    Airway:  Mallampati: II / II  Mouth Opening: Normal  TM Distance: Normal  Neck ROM: Normal ROM    Dental:  Intact        Anesthesia Plan  Type of Anesthesia, risks & benefits discussed:    Anesthesia Type: Gen Supraglottic Airway, Gen Natural Airway, MAC, Regional  Intra-op Monitoring Plan: Standard ASA Monitors  Post Op Pain Control Plan: multimodal analgesia  Induction:  IV  Airway Plan: Video and Direct  Informed Consent: Informed consent signed with the Patient and all parties understand the risks and agree with anesthesia plan.  All questions answered.   ASA Score:  3  Day of Surgery Review of History & Physical: H&P Update referred to the surgeon/provider.  Anesthesia Plan Notes: Old labs and EKG consistent over many years. No need to repeat.    Ready For Surgery From Anesthesia Perspective.     .

## 2024-06-03 ENCOUNTER — PATIENT MESSAGE (OUTPATIENT)
Dept: PREADMISSION TESTING | Facility: OTHER | Age: 74
End: 2024-06-03
Payer: MEDICARE

## 2024-06-03 RX ORDER — TRAMADOL HYDROCHLORIDE 50 MG/1
50 TABLET ORAL EVERY 6 HOURS PRN
Qty: 10 TABLET | Refills: 0 | Status: SHIPPED | OUTPATIENT
Start: 2024-06-03

## 2024-06-03 NOTE — PRE-PROCEDURE INSTRUCTIONS
Information to Prepare you for your Surgery    PRE-ADMIT TESTING -  [phone]    [address]  [facility]          Your surgery has been scheduled at [facility]. We are pleased to have the opportunity to serve you. For Further Information please call [phone].    On the day of surgery please report to the Information Desk on the 1st floor.    CONTACT YOUR PHYSICIAN'S OFFICE THE DAY PRIOR TO YOUR SURGERY TO OBTAIN YOUR ARRIVAL TIME.     The evening before surgery do not eat anything after 9 p.m. ( this includes hard candy, chewing gum and mints).  You may only have GATORADE, POWERADE AND WATER  from 9 p.m. until you leave your home.   DO NOT DRINK ANY LIQUIDS ON THE WAY TO THE HOSPITAL.      Why does your anesthesiologist allow you to drink Gatorade/Powerade before surgery?  Gatorade/Powerade helps to increase your comfort before surgery and to decrease your nausea after surgery. The carbohydrates in Gatorade/Powerade help reduce your body's stress response to surgery.  If you are a diabetic-drink only water prior to surgery.    Outpatient Surgery- May allow 2 adult (18 and older) Support Persons (1 being the designated ) for all surgical/procedural patients. A breastfeeding mother will be allowed her infant and 2 adult Support Persons. No one under the age of 18 will be allowed in the building. No swapping out of visitors in the [facility].      SPECIAL MEDICATION INSTRUCTIONS: TAKE medications checked off by the Anesthesiologist on your Medication List.    Angiogram Patients: Take medications as instructed by your physician, including aspirin.     Surgery Patients:    If you take ASPIRIN - Your PHYSICIAN/SURGEON will need to inform you IF/OR when you need to stop taking aspirin prior to your surgery.     The week prior to surgery do not ot take any medications containing IBUPROFEN or NSAIDS ( Advil, Motrin, Goodys, BC, Aleve, Naproxen etc)  If you are not sure if you should take a medicine please call your surgeon's office.  Ok to take Tylenol    Do Not Wear any make-up (especially eye make-up) to surgery. Please remove any false eyelashes or eyelash extensions. If you arrive the day of surgery with makeup/eyelashes on you will be required to remove prior to surgery. (There is a risk of corneal abrasions if eye makeup/eyelash extensions are not removed)      Leave all valuables at home.   Do Not wear any jewelry or watches, including any metal in body piercings. Jewelry must be removed prior to coming to the hospital.  There is a possibility that rings that are unable to be removed may be cut off if they are on the surgical extremity.    Please remove all hair extensions, wigs, clips and any other metal accessories/ ornaments from your hair.  These items may pose a flammable/fire risk in Surgery and must be removed.    Do not shave your surgical area at least 5 days prior to your surgery. The surgical prep will be performed at the hospital according to Infection Control regulations.    Contact Lens must be removed before surgery. Either do not wear the contact lens or bring a case and solution for storage.  Please bring a container for eyeglasses or dentures as required.  Bring any paperwork your physician has provided, such as consent forms,  history and physicals, doctor's orders, etc.   Bring comfortable clothes that are loose fitting to wear upon discharge. Take into consideration the type of surgery being performed.  Maintain your diet as advised per your physician the day prior to surgery.      Adequate rest the night before surgery is advised.   Park in the Parking lot behind the hospital or in the [place] [facility] across the street from the parking lot. Parking is complimentary.  If you will be discharged the same day as your procedure, please arrange for a responsible adult to drive you home or to accompany you if traveling by taxi.    YOU WILL NOT BE PERMITTED TO DRIVE OR TO LEAVE THE HOSPITAL ALONE AFTER SURGERY.   If you are being discharged the same day, it is strongly recommended that you arrange for someone to remain with you for the first 24 hrs following your surgery.    The Surgeon will speak to your family/visitor after your surgery regarding the outcome of your surgery and post op care.  The Surgeon may speak to you after your surgery, but there is a possibility you may not remember the details.  Please check with your family members regarding the conversation with the Surgeon.    We strongly recommend whoever is bringing you home be present for discharge instructions.  This will ensure a thorough understanding for your post op home care.          Thank you for your cooperation.  The Staff of [facility].            Bathing Instructions with Hibiclens    Shower the evening before and morning of your procedure with Chlorhexidine (Hibiclens)  do not use Chlorhexidine on your face or genitals. Do not get in your eyes.  Wash your face with water and your regular face wash/soap  Use your regular shampoo  Apply Chlorhexidine (Hibiclens) directly on your skin or on a wet washcloth and wash gently. When showering: Move away from the shower stream when applying Chlorhexidine (Hibiclens) to avoid rinsing off too soon.  Rinse thoroughly with warm water  Do not dilute Chlorhexidine (Hibiclens)   Dry off as usual, do not use any deodorant, powder, body lotions, perfume, after shave or cologne.

## 2024-06-04 ENCOUNTER — TELEPHONE (OUTPATIENT)
Dept: ORTHOPEDICS | Facility: CLINIC | Age: 74
End: 2024-06-04
Payer: MEDICARE

## 2024-06-04 NOTE — TELEPHONE ENCOUNTER
Spoke c pts wife, Yamilex.  Informed of 6:00 a.m arrival time for  06/05/24 surgery at the [facility]. Reminded pt of NPO status. Pts wife expressed understanding & was thankful.

## 2024-06-04 NOTE — TELEPHONE ENCOUNTER
Spoke c pt. Informed pt of 6:00 arrival time for tomorrow'ssurgery at the [facility]. Reminded pt of NPO status. Pt expressed understanding & was thankful.

## 2024-06-05 ENCOUNTER — ANESTHESIA (OUTPATIENT)
Dept: SURGERY | Facility: OTHER | Age: 74
End: 2024-06-05
Payer: MEDICARE

## 2024-06-05 ENCOUNTER — HOSPITAL ENCOUNTER (OUTPATIENT)
Facility: OTHER | Age: 74
Discharge: HOME | End: 2024-06-05
Attending: ORTHOPAEDIC SURGERY | Admitting: ORTHOPAEDIC SURGERY
Payer: MEDICARE

## 2024-06-05 DIAGNOSIS — M67.431 GANGLION CYST OF VOLAR ASPECT OF RIGHT WRIST: Primary | ICD-10-CM

## 2024-06-05 PROBLEM — R22.31 MASS OF RIGHT WRIST: Status: ACTIVE | Noted: 2024-06-05

## 2024-06-05 PROCEDURE — 71000016 HC POSTOP RECOV ADDL HR: Performed by: ORTHOPAEDIC SURGERY

## 2024-06-05 PROCEDURE — 88304 TISSUE EXAM BY PATHOLOGIST: CPT | Mod: 26,,, | Performed by: PATHOLOGY

## 2024-06-05 PROCEDURE — 37000009 HC ANESTHESIA EA ADD 15 MINS: Performed by: ORTHOPAEDIC SURGERY

## 2024-06-05 PROCEDURE — 88304 TISSUE EXAM BY PATHOLOGIST: CPT | Performed by: PATHOLOGY

## 2024-06-05 PROCEDURE — 25000003 PHARM REV CODE 250: Performed by: NURSE ANESTHETIST, CERTIFIED REGISTERED

## 2024-06-05 PROCEDURE — 71000015 HC POSTOP RECOV 1ST HR: Performed by: ORTHOPAEDIC SURGERY

## 2024-06-05 PROCEDURE — 63600175 PHARM REV CODE 636 W HCPCS: Performed by: ANESTHESIOLOGY

## 2024-06-05 PROCEDURE — 63600175 PHARM REV CODE 636 W HCPCS: Performed by: NURSE ANESTHETIST, CERTIFIED REGISTERED

## 2024-06-05 PROCEDURE — 25000003 PHARM REV CODE 250: Performed by: ORTHOPAEDIC SURGERY

## 2024-06-05 PROCEDURE — 76942 ECHO GUIDE FOR BIOPSY: CPT | Performed by: ANESTHESIOLOGY

## 2024-06-05 PROCEDURE — 63600175 PHARM REV CODE 636 W HCPCS

## 2024-06-05 PROCEDURE — 37000008 HC ANESTHESIA 1ST 15 MINUTES: Performed by: ORTHOPAEDIC SURGERY

## 2024-06-05 PROCEDURE — 36000707: Performed by: ORTHOPAEDIC SURGERY

## 2024-06-05 PROCEDURE — 25111 REMOVE WRIST TENDON LESION: CPT | Mod: RT,,, | Performed by: ORTHOPAEDIC SURGERY

## 2024-06-05 PROCEDURE — D9220A PRA ANESTHESIA: Mod: ,,, | Performed by: NURSE ANESTHETIST, CERTIFIED REGISTERED

## 2024-06-05 PROCEDURE — 25000003 PHARM REV CODE 250: Performed by: ANESTHESIOLOGY

## 2024-06-05 PROCEDURE — 63600175 PHARM REV CODE 636 W HCPCS: Mod: JZ,JG | Performed by: ANESTHESIOLOGY

## 2024-06-05 PROCEDURE — 36000706: Performed by: ORTHOPAEDIC SURGERY

## 2024-06-05 RX ORDER — BUPIVACAINE HYDROCHLORIDE 5 MG/ML
INJECTION, SOLUTION EPIDURAL; INTRACAUDAL
Status: COMPLETED | OUTPATIENT
Start: 2024-06-05 | End: 2024-06-05

## 2024-06-05 RX ORDER — PROPOFOL 10 MG/ML
VIAL (ML) INTRAVENOUS CONTINUOUS PRN
Status: DISCONTINUED | OUTPATIENT
Start: 2024-06-05 | End: 2024-06-05

## 2024-06-05 RX ORDER — PROPOFOL 10 MG/ML
VIAL (ML) INTRAVENOUS
Status: DISCONTINUED | OUTPATIENT
Start: 2024-06-05 | End: 2024-06-05

## 2024-06-05 RX ORDER — BACITRACIN ZINC 500 UNIT/G
OINTMENT (GRAM) TOPICAL
Status: DISCONTINUED | OUTPATIENT
Start: 2024-06-05 | End: 2024-06-05 | Stop reason: HOSPADM

## 2024-06-05 RX ORDER — SODIUM CHLORIDE 0.9 % (FLUSH) 0.9 %
3 SYRINGE (ML) INJECTION
Status: DISCONTINUED | OUTPATIENT
Start: 2024-06-05 | End: 2024-06-05 | Stop reason: HOSPADM

## 2024-06-05 RX ORDER — MEPERIDINE HYDROCHLORIDE 25 MG/ML
12.5 INJECTION INTRAMUSCULAR; INTRAVENOUS; SUBCUTANEOUS ONCE AS NEEDED
Status: DISCONTINUED | OUTPATIENT
Start: 2024-06-05 | End: 2024-06-05 | Stop reason: HOSPADM

## 2024-06-05 RX ORDER — HYDROMORPHONE HYDROCHLORIDE 2 MG/ML
0.4 INJECTION, SOLUTION INTRAMUSCULAR; INTRAVENOUS; SUBCUTANEOUS EVERY 5 MIN PRN
Status: DISCONTINUED | OUTPATIENT
Start: 2024-06-05 | End: 2024-06-05 | Stop reason: HOSPADM

## 2024-06-05 RX ORDER — MIDAZOLAM HYDROCHLORIDE 1 MG/ML
INJECTION INTRAMUSCULAR; INTRAVENOUS
Status: DISCONTINUED | OUTPATIENT
Start: 2024-06-05 | End: 2024-06-05

## 2024-06-05 RX ORDER — PROCHLORPERAZINE EDISYLATE 5 MG/ML
5 INJECTION INTRAMUSCULAR; INTRAVENOUS EVERY 30 MIN PRN
Status: DISCONTINUED | OUTPATIENT
Start: 2024-06-05 | End: 2024-06-05 | Stop reason: HOSPADM

## 2024-06-05 RX ORDER — LIDOCAINE HYDROCHLORIDE 20 MG/ML
INJECTION INTRAVENOUS
Status: DISCONTINUED | OUTPATIENT
Start: 2024-06-05 | End: 2024-06-05

## 2024-06-05 RX ORDER — SODIUM CHLORIDE, SODIUM LACTATE, POTASSIUM CHLORIDE, CALCIUM CHLORIDE 600; 310; 30; 20 MG/100ML; MG/100ML; MG/100ML; MG/100ML
INJECTION, SOLUTION INTRAVENOUS CONTINUOUS
Status: DISCONTINUED | OUTPATIENT
Start: 2024-06-05 | End: 2024-06-05 | Stop reason: HOSPADM

## 2024-06-05 RX ORDER — OXYCODONE HYDROCHLORIDE 5 MG/1
5 TABLET ORAL
Status: DISCONTINUED | OUTPATIENT
Start: 2024-06-05 | End: 2024-06-05 | Stop reason: HOSPADM

## 2024-06-05 RX ORDER — ACETAMINOPHEN 500 MG
1000 TABLET ORAL
Status: COMPLETED | OUTPATIENT
Start: 2024-06-05 | End: 2024-06-05

## 2024-06-05 RX ORDER — FENTANYL CITRATE 50 UG/ML
INJECTION, SOLUTION INTRAMUSCULAR; INTRAVENOUS
Status: DISCONTINUED | OUTPATIENT
Start: 2024-06-05 | End: 2024-06-05

## 2024-06-05 RX ORDER — CEFAZOLIN SODIUM 1 G/3ML
2 INJECTION, POWDER, FOR SOLUTION INTRAMUSCULAR; INTRAVENOUS
Status: COMPLETED | OUTPATIENT
Start: 2024-06-05 | End: 2024-06-05

## 2024-06-05 RX ADMIN — FENTANYL CITRATE 100 MCG: 50 INJECTION, SOLUTION INTRAMUSCULAR; INTRAVENOUS at 07:06

## 2024-06-05 RX ADMIN — ACETAMINOPHEN 1000 MG: 500 TABLET ORAL at 06:06

## 2024-06-05 RX ADMIN — CEFAZOLIN 2 G: 330 INJECTION, POWDER, FOR SOLUTION INTRAMUSCULAR; INTRAVENOUS at 07:06

## 2024-06-05 RX ADMIN — LIDOCAINE HYDROCHLORIDE 25 MG: 20 INJECTION, SOLUTION INTRAVENOUS at 07:06

## 2024-06-05 RX ADMIN — BUPIVACAINE HYDROCHLORIDE 30 ML: 5 INJECTION, SOLUTION EPIDURAL; INTRACAUDAL; PERINEURAL at 07:06

## 2024-06-05 RX ADMIN — PROPOFOL 50 MCG/KG/MIN: 10 INJECTION, EMULSION INTRAVENOUS at 07:06

## 2024-06-05 RX ADMIN — PROPOFOL 30 MG: 10 INJECTION, EMULSION INTRAVENOUS at 07:06

## 2024-06-05 RX ADMIN — SODIUM CHLORIDE, SODIUM LACTATE, POTASSIUM CHLORIDE, AND CALCIUM CHLORIDE: 600; 310; 30; 20 INJECTION, SOLUTION INTRAVENOUS at 07:06

## 2024-06-05 RX ADMIN — MIDAZOLAM HYDROCHLORIDE 2 MG: 1 INJECTION INTRAMUSCULAR; INTRAVENOUS at 07:06

## 2024-06-05 NOTE — ANESTHESIA PROCEDURE NOTES
Right costoclavicular    Patient location during procedure: holding area    Reason for block: primary anesthetic    Diagnosis: Right wrist ganglion   Timeout: 6/5/2024 7:22 AM   End time: 6/5/2024 7:32 AM    Staffing  Authorizing Provider: Dank Prince MD  Performing Provider: Dank Prince MD    Staffing  Other anesthesia staff: Dank Prince MD  Performed by: Dank Prince MD  Authorized by: Dank Prince MD    Preanesthetic Checklist  Completed: patient identified, IV checked, site marked, risks and benefits discussed, surgical consent, monitors and equipment checked, pre-op evaluation and timeout performed  Peripheral Block  Patient position: supine  Prep: ChloraPrep  Patient monitoring: heart rate, cardiac monitor, continuous pulse ox and frequent blood pressure checks  Block type: infraclavicular  Laterality: right  Injection technique: single shot  Needle  Needle type: Echogenic   Needle gauge: 20 G  Needle length: 4 in  Needle localization: ultrasound guidance and anatomical landmarks   -ultrasound image captured on disc.  Assessment  Injection assessment: negative aspiration, negative parasthesia and local visualized surrounding nerve  Paresthesia pain: none  Heart rate change: no  Slow fractionated injection: yes  Pain Tolerance: comfortable throughout block and no complaints  Medications:    Medications: bupivacaine (pf) (MARCAINE) injection 0.5% - Perineural   30 mL - 6/5/2024 7:32:00 AM    Additional Notes  Costoclavicular approach to infraclavicular block

## 2024-06-05 NOTE — ANESTHESIA POSTPROCEDURE EVALUATION
Anesthesia Post Evaluation    Patient: Uriah Mills    Procedure(s) Performed: Procedure(s) (LRB):  RIGHT VOLAR WRIST GANGLION CYST EXCISION (Right)    Final Anesthesia Type: MAC      Patient location during evaluation: OPS  Patient participation: Yes- Able to Participate  Level of consciousness: awake  Post-procedure vital signs: reviewed and stable  Pain management: adequate  Airway patency: patent    PONV status at discharge: No PONV  Anesthetic complications: no      Cardiovascular status: blood pressure returned to baseline and hemodynamically stable  Respiratory status: unassisted, spontaneous ventilation and room air  Hydration status: euvolemic  Follow-up not needed.              Vitals Value Taken Time   /69 06/05/24 0624   Temp 36.4 °C (97.6 °F) 06/05/24 0624   Pulse 89 06/05/24 0624   Resp 18 06/05/24 0624   SpO2 96 % 06/05/24 0624         No case tracking events are documented in the log.      Pain/Adeline Score: Pain Rating Prior to Med Admin: 0 (6/5/2024  6:19 AM)

## 2024-06-05 NOTE — BRIEF OP NOTE
[facility]  Brief Operative Note    Surgery Date: 6/5/2024     Surgeons and Role:     * Roopa Hanna MD - Primary    Assisting Surgeon: None    Pre-op Diagnosis:  Mass of right wrist [R22.31]  Right hand pain [M79.641]    Post-op Diagnosis:  Post-Op Diagnosis Codes:     * Mass of right wrist [R22.31]     * Right hand pain [M79.641]    Procedure(s) (LRB):  RIGHT VOLAR WRIST GANGLION CYST EXCISION (Right)    Anesthesia: Regional    Operative Findings: Volar wrist mass appeared to be vascular in nature. It was tied off and the hand remained well perfused. Mass excised. Normal hand perfusion after closure.    Estimated Blood Loss: 5 mL         Specimens:   Specimen (24h ago, onward)       Start     Ordered    06/05/24 0825  Specimen to Pathology, Surgery Orthopedics  Once        Comments: Pre-op Diagnosis: Mass of right wrist [R22.31]Right hand pain [M79.641]Procedure(s):RIGHT VOLAR WRIST GANGLION CYST EXCISION Number of specimens: 1Name of specimens: 1) Ganglion cyst right wrist     References:    Click here for ordering Quick Tip   Question Answer Comment   Procedure Type: Orthopedics    Specimen Class: Routine/Screening    Release to patient Immediate        06/05/24 0825                      Discharge Note    OUTCOME: Patient tolerated treatment/procedure well without complication and is now ready for discharge.    DISPOSITION: Home or Self Care    FINAL DIAGNOSIS:  Mass of right wrist    FOLLOWUP: In clinic in 2 weeks    DISCHARGE INSTRUCTIONS:    Discharge Procedure Orders   Diet general     Call MD for:  temperature >100.4     Call MD for:  persistent nausea and vomiting     Call MD for:  severe uncontrolled pain     Call MD for:  difficulty breathing, headache or visual disturbances     Call MD for:  redness, tenderness, or signs of infection (pain, swelling, redness, odor or green/yellow discharge around incision site)     Call MD for:  hives     Call MD for:  persistent dizziness or  light-headedness     Call MD for:  extreme fatigue     Leave dressing on - Keep it clean, dry, and intact until clinic visit     Non weight bearing

## 2024-06-05 NOTE — PLAN OF CARE
Uriah HECTOR Milsl has met all discharge criteria from Phase II. Vital Signs are stable, ambulating  without difficulty. Discharge instructions given, patient verbalized understanding. Discharged from facility via wheelchair in stable condition.

## 2024-06-06 ENCOUNTER — PATIENT MESSAGE (OUTPATIENT)
Dept: ADMINISTRATIVE | Facility: OTHER | Age: 74
End: 2024-06-06
Payer: MEDICARE

## 2024-06-06 VITALS
WEIGHT: 175 LBS | OXYGEN SATURATION: 97 % | RESPIRATION RATE: 18 BRPM | BODY MASS INDEX: 25.05 KG/M2 | HEIGHT: 70 IN | HEART RATE: 57 BPM | DIASTOLIC BLOOD PRESSURE: 77 MMHG | SYSTOLIC BLOOD PRESSURE: 161 MMHG | TEMPERATURE: 98 F

## 2024-06-07 ENCOUNTER — PATIENT MESSAGE (OUTPATIENT)
Dept: ADMINISTRATIVE | Facility: HOSPITAL | Age: 74
End: 2024-06-07
Payer: MEDICARE

## 2024-06-07 NOTE — OP NOTE
[facility]  Surgery Department  Operative Note    SUMMARY     Date of Procedure: 6/5/2024     Procedure: Procedure(s) (LRB):  RIGHT VOLAR WRIST GANGLION CYST EXCISION (Right)     Surgeons and Role:     * Roopa Hanna MD - Primary    Assisting Surgeon: Martha    Pre-Operative Diagnosis: Mass of right wrist [R22.31]  Right hand pain [M79.641]    Post-Operative Diagnosis: Post-Op Diagnosis Codes:     * Mass of right wrist [R22.31]     * Right hand pain [M79.641]    Anesthesia: Monitor Anesthesia Care    Technical Procedures Used: surgery    Description of the Findings of the Procedure:  Indication for procedure Mr. Mills is a 73-year-old male who an enlarging mass volar right wrist is very painful to him after much discussion with the patient elected for surgical intervention risks benefits were explained in detail consent signed in clinic     Procedure in detail the correct site was marked with the patient's participation the holding area the patient underwent regional anesthesia was brought to the operating placed in supine position underwent MAC anesthesia well-padded nonsterile tourniquet was placed on the right upper extremity right upper extremities prepped and draped normal sterile fashion a time-out was conducted for the correct procedure to be indicated IV antibiotics given patient preoperatively incision was marked out directly over the mass the arm was elevated tourniquet was insufflated 250 mmHg incision was made careful dissection down to the mass was almost subcutaneous it looked like it was vascular in nature but it was not the artery we actually put down the tourniquet and it was not pulsating we tied it off and patient still had good brisk cap refill into the palm therefore it was excised it measured roughly 2 cm x 1.5 cm as passed off the back table hemostasis was achieved the areas irrigated copious amounts normal saline Vicryl Monocryl Dermabond closed the can not skin  sterile dressing was applied patient was placed in a well-padded splint tolerated suture was brought to the recovery room in stable condition    Postop plans patient keep the dressing clean dry and intact will see the patient back in 2 weeks' time pathology report be given to patient    Significant Surgical Tasks Conducted by the Assistant(s), if Applicable: retraction    Complications: No    Estimated Blood Loss (EBL): * No values recorded between 6/5/2024  8:23 AM and 6/5/2024  9:00 AM *           Implants: * No implants in log *    Specimens:   Specimen (24h ago, onward)      None                    Condition: Good    Disposition: PACU - hemodynamically stable.    Attestation: I performed the procedure.    Discharge Note    SUMMARY     Admit Date: 6/5/2024    Discharge Date and Time: 6/5/2024 10:30 AM    Hospital Course (synopsis of major diagnoses, care, treatment, and services provided during the course of the hospital stay): surgery     Final Diagnosis: Post-Op Diagnosis Codes:     * Mass of right wrist [R22.31]     * Right hand pain [M79.641]    Disposition: Home or Self Care    Follow Up/Patient Instructions:     Medications:  Reconciled Home Medications:      Medication List        START taking these medications      traMADoL 50 mg tablet  Commonly known as: ULTRAM  Take 1 tablet (50 mg total) by mouth every 6 (six) hours as needed for Pain.            CONTINUE taking these medications      albuterol 90 mcg/actuation inhaler  Commonly known as: PROVENTIL/VENTOLIN HFA  INHALE 2 PUFFS BY MOUTH EVERY 6 HOURS AS NEEDED     ALPRAZolam 1 MG tablet  Commonly known as: XANAX  Take 1 tablet (1 mg total) by mouth nightly as needed for Anxiety.     aspirin 81 MG EC tablet  Commonly known as: ECOTRIN  Take 81 mg by mouth once daily.     gabapentin 600 MG tablet  Commonly known as: NEURONTIN  Take 1 tablet by mouth twice daily     levocetirizine 5 MG tablet  Commonly known as: XYZAL  TAKE 1 TABLET BY MOUTH ONCE DAILY IN  THE EVENING     silodosin 8 mg Cap capsule  Commonly known as: RAPAFLO  Take 1 capsule (8 mg total) by mouth once daily.            Discharge Procedure Orders   Diet general     Call MD for:  temperature >100.4     Call MD for:  persistent nausea and vomiting     Call MD for:  severe uncontrolled pain     Call MD for:  difficulty breathing, headache or visual disturbances     Call MD for:  redness, tenderness, or signs of infection (pain, swelling, redness, odor or green/yellow discharge around incision site)     Call MD for:  hives     Call MD for:  persistent dizziness or light-headedness     Call MD for:  extreme fatigue     Leave dressing on - Keep it clean, dry, and intact until clinic visit     Non weight bearing

## 2024-06-08 DIAGNOSIS — Z12.11 SCREENING FOR COLON CANCER: ICD-10-CM

## 2024-06-10 LAB
FINAL PATHOLOGIC DIAGNOSIS: NORMAL
GROSS: NORMAL
Lab: NORMAL

## 2024-06-21 ENCOUNTER — OFFICE VISIT (OUTPATIENT)
Dept: ORTHOPEDICS | Facility: CLINIC | Age: 74
End: 2024-06-21
Payer: MEDICARE

## 2024-06-21 VITALS — BODY MASS INDEX: 25.06 KG/M2 | HEIGHT: 70 IN | WEIGHT: 175.06 LBS

## 2024-06-21 DIAGNOSIS — R22.31 MASS OF RIGHT WRIST: Primary | ICD-10-CM

## 2024-06-21 DIAGNOSIS — Z98.890 POSTOPERATIVE STATE: ICD-10-CM

## 2024-06-21 PROCEDURE — 1159F MED LIST DOCD IN RCRD: CPT | Mod: CPTII,S$GLB,,

## 2024-06-21 PROCEDURE — 99024 POSTOP FOLLOW-UP VISIT: CPT | Mod: S$GLB,,,

## 2024-06-21 PROCEDURE — 99999 PR PBB SHADOW E&M-EST. PATIENT-LVL IV: CPT | Mod: PBBFAC,,,

## 2024-06-21 PROCEDURE — 3288F FALL RISK ASSESSMENT DOCD: CPT | Mod: CPTII,S$GLB,,

## 2024-06-21 PROCEDURE — 1160F RVW MEDS BY RX/DR IN RCRD: CPT | Mod: CPTII,S$GLB,,

## 2024-06-21 PROCEDURE — 1126F AMNT PAIN NOTED NONE PRSNT: CPT | Mod: CPTII,S$GLB,,

## 2024-06-21 PROCEDURE — 1101F PT FALLS ASSESS-DOCD LE1/YR: CPT | Mod: CPTII,S$GLB,,

## 2024-06-21 NOTE — PROGRESS NOTES
"Mr. Mills is here today for a post-operative visit.  He is 16 days status post right volar wrist ganglion cyst excision  by Dr. Hanna on 6/05/2024. He reports that he is well.  Pain is 0/10.  He is not taking pain medication .  He denies fever, chills, and sweats since the time of the surgery. Denies any numbness or tingling.     Physical exam:    Vitals:    06/21/24 1331   Weight: 79.4 kg (175 lb 0.7 oz)   Height: 5' 10" (1.778 m)   PainSc: 0-No pain     Vital signs are stable, patient is afebrile.  Patient is well dressed and well groomed, no acute distress.  Alert and oriented to person, place, and time.  Post op dressing taken down.  Incision is clean, dry and intact.  There is no erythema or exudate.  There is no sign of any infection. He is NVI. Sutures removed without difficulty.  Patient able to make a full composite fist with Full wrist ROM.    Assessment:   s/p right volar wrist ganglion cyst excision        Plan:  Uriah was seen today for post-op evaluation.    Diagnoses and all orders for this visit:    Mass of right wrist  -     Ambulatory referral/consult to Physical/Occupational Therapy; Future    Postoperative state  -     Ambulatory referral/consult to Physical/Occupational Therapy; Future         - PO instruction reviewed and provided to patient  Discussed and educated about aggressive scar massage  Pathology report reviewed : 1. Right wrist mass, excision:       -  Findings of inflamed ganglion cyst       -  Negative for atypia or malignancy     Patient had really good range of motion at the wrist.  2-4 weeks of wrist bracing.  We will order therapy for patient for evaluation as per protocol and for home exercises.  Nonweightbearing for 4 weeks.  Follow-up in 4 weeks to check ROM/strength.   "

## 2024-06-22 DIAGNOSIS — M54.41 CHRONIC BILATERAL LOW BACK PAIN WITH BILATERAL SCIATICA: ICD-10-CM

## 2024-06-22 DIAGNOSIS — M54.16 LUMBAR RADICULOPATHY: ICD-10-CM

## 2024-06-22 DIAGNOSIS — M54.42 CHRONIC BILATERAL LOW BACK PAIN WITH BILATERAL SCIATICA: ICD-10-CM

## 2024-06-22 DIAGNOSIS — G89.29 CHRONIC BILATERAL LOW BACK PAIN WITH BILATERAL SCIATICA: ICD-10-CM

## 2024-06-22 NOTE — TELEPHONE ENCOUNTER
No care due was identified.  [facility] Embedded Care Due Messages. Reference number: [id number].   6/22/2024 9:13:21 AM CDT

## 2024-06-24 RX ORDER — GABAPENTIN 600 MG/1
600 TABLET ORAL 2 TIMES DAILY
Qty: 60 TABLET | Refills: 0 | Status: SHIPPED | OUTPATIENT
Start: 2024-06-24

## 2024-06-27 ENCOUNTER — PATIENT OUTREACH (OUTPATIENT)
Dept: ADMINISTRATIVE | Facility: HOSPITAL | Age: 74
End: 2024-06-27
Payer: MEDICARE

## 2024-06-27 NOTE — PROGRESS NOTES
Health Maintenance Due   Topic Date Due    Annual UACr  Never done    RSV Vaccine (Age 60+ and Pregnant patients) (1 - 1-dose 60+ series) Never done    Colorectal Cancer Screening  06/23/2023        Chart review done.   HM updated.   Immunizations reviewed & updated.   Care Everywhere updated.

## 2024-07-06 DIAGNOSIS — J40 BRONCHITIS: ICD-10-CM

## 2024-07-06 NOTE — TELEPHONE ENCOUNTER
No care due was identified.  [facility] Embedded Care Due Messages. Reference number: [id number].   7/06/2024 4:19:28 PM CDT

## 2024-07-08 RX ORDER — ALBUTEROL SULFATE 90 UG/1
2 AEROSOL, METERED RESPIRATORY (INHALATION) EVERY 6 HOURS
Qty: 25.5 G | Refills: 2 | Status: SHIPPED | OUTPATIENT
Start: 2024-07-08

## 2024-07-08 NOTE — TELEPHONE ENCOUNTER
Refill Decision Note   Uriah Mills  is requesting a refill authorization.  Brief Assessment and Rationale for Refill:  Approve     Medication Therapy Plan:         Comments:     Note composed:2:09 AM 07/08/2024

## 2024-07-09 ENCOUNTER — OFFICE VISIT (OUTPATIENT)
Dept: PAIN MEDICINE | Facility: CLINIC | Age: 74
End: 2024-07-09
Payer: MEDICARE

## 2024-07-09 VITALS
HEART RATE: 77 BPM | HEIGHT: 70 IN | SYSTOLIC BLOOD PRESSURE: 132 MMHG | BODY MASS INDEX: 23.68 KG/M2 | RESPIRATION RATE: 18 BRPM | TEMPERATURE: 98 F | OXYGEN SATURATION: 100 % | WEIGHT: 165.38 LBS | DIASTOLIC BLOOD PRESSURE: 66 MMHG

## 2024-07-09 DIAGNOSIS — M60.89 OTHER MYOSITIS, MULTIPLE SITES: ICD-10-CM

## 2024-07-09 DIAGNOSIS — M16.0 PRIMARY OSTEOARTHRITIS OF BOTH HIPS: ICD-10-CM

## 2024-07-09 DIAGNOSIS — M54.16 LUMBAR RADICULOPATHY: ICD-10-CM

## 2024-07-09 DIAGNOSIS — M79.18 MYOFASCIAL PAIN: ICD-10-CM

## 2024-07-09 DIAGNOSIS — G89.4 CHRONIC PAIN SYNDROME: Primary | ICD-10-CM

## 2024-07-09 DIAGNOSIS — M51.36 DDD (DEGENERATIVE DISC DISEASE), LUMBAR: ICD-10-CM

## 2024-07-09 PROCEDURE — 3008F BODY MASS INDEX DOCD: CPT | Mod: CPTII,S$GLB,, | Performed by: ANESTHESIOLOGY

## 2024-07-09 PROCEDURE — 1159F MED LIST DOCD IN RCRD: CPT | Mod: CPTII,S$GLB,, | Performed by: ANESTHESIOLOGY

## 2024-07-09 PROCEDURE — 3288F FALL RISK ASSESSMENT DOCD: CPT | Mod: CPTII,S$GLB,, | Performed by: ANESTHESIOLOGY

## 2024-07-09 PROCEDURE — 99214 OFFICE O/P EST MOD 30 MIN: CPT | Mod: 25,S$GLB,, | Performed by: ANESTHESIOLOGY

## 2024-07-09 PROCEDURE — 1160F RVW MEDS BY RX/DR IN RCRD: CPT | Mod: CPTII,S$GLB,, | Performed by: ANESTHESIOLOGY

## 2024-07-09 PROCEDURE — 20553 NJX 1/MLT TRIGGER POINTS 3/>: CPT | Mod: S$GLB,,, | Performed by: ANESTHESIOLOGY

## 2024-07-09 PROCEDURE — 1101F PT FALLS ASSESS-DOCD LE1/YR: CPT | Mod: CPTII,S$GLB,, | Performed by: ANESTHESIOLOGY

## 2024-07-09 PROCEDURE — 1125F AMNT PAIN NOTED PAIN PRSNT: CPT | Mod: CPTII,S$GLB,, | Performed by: ANESTHESIOLOGY

## 2024-07-09 PROCEDURE — 3078F DIAST BP <80 MM HG: CPT | Mod: CPTII,S$GLB,, | Performed by: ANESTHESIOLOGY

## 2024-07-09 PROCEDURE — 99999 PR PBB SHADOW E&M-EST. PATIENT-LVL IV: CPT | Mod: PBBFAC,,, | Performed by: ANESTHESIOLOGY

## 2024-07-09 PROCEDURE — 3075F SYST BP GE 130 - 139MM HG: CPT | Mod: CPTII,S$GLB,, | Performed by: ANESTHESIOLOGY

## 2024-07-09 NOTE — PROGRESS NOTES
Chronic patient Established Note (Follow up visit)    Interval History 7/9/2023:  Patient is here for follow up after BL SIJ and right Piriformis injection on 5/52024 with 0% pain relief.  Today reports pain is mostly in his right lower back, worse with prolonged sitting, rotation of his back.  Denies leg pain or groin pain, denies new weakness, numbness, falls.  Patient is not participating in PT or HEP because it worsens his pain.  Today, pain is 5/10.      Interval History 5/10/2024:  Uriah Mills returns to clinic s/p right then left  L3, L4, L5 RFA on 4/12/2024 and 4/26/2024 respectively.  He reports 80% relief from these procedures. He also reports 80% relief at least from the previous bilateral SIJ injections. He reports return of his bilateral SIJ pain right > left with a new pain in his right buttock that radiates down the posterior aspect of his right thigh.  He describes the pain as shooting, dull.  Exacerbating factors: moving from sitting to standing, sitting up in bed. Mitigating factors: resting.  He has previously received Noco from his PCP last fill 10/31/2023.  He is inquiring about restarting pain medications.  He does not participate in HEP or PT currently. The patient denies fever/night sweats, urinary incontinence, bowel incontinence, significant weight changes, significant motor weakness or changes, or loss of sensations. His pain today is 8/10.     Interval History 1/26/2024:  The patient is here today for follow up of back pain. He is s/p bilateral SI joint injection with 60% relief. His primary complaint today is lower back pain, higher than previous injection site. It is stabbing and throbbing in nature. No radiation into the legs. There is associated stiffness. He has a lot of pain and stiffness first thing in the morning. He has completed PT multiple times without much improvement in symptoms. He continues with HEP. His pain today is 5/10.    Interval History 12/15/2023:  Uriah J  Gene presents to the clinic for a follow-up appointment for low back and hip pain. Since the last visit, Uriah Mills states the pain has been persistant. Current pain intensity is 8/10 but he reports it is usually a 10/10. He has been tolerating his pain over the past few years but had a flare up in October which led him to schedule this appointment.     Interval History (12/14/21):  He returns today for follow up.  He reports that bilateral sacroiliac joint steroid injections has been helpful for the bilateral low back pain.  He reports roughly 60% relief.  He is happy with these results and does not feel that further treatment needed at this time.  He is not interested in participating in further physical therapy.        Interval History (11/11/21):  He returns today for follow up.  He reports that bilateral lumbar radiofrequency ablations has been helpful for the bilateral lumbar pain.  He reports 95% relief of lumbar back pain.  He states that he continues to have pain in the bilateral lumbosacral regions over the bilateral sacroiliac joints.  This pain radiates to the bilateral buttocks and lateral hips.  He denies any associated numbness, tingling, weakness, bowel bladder dysfunction.        Interval History (7/1/21):  He returns today for follow up.  He reports that bilateral S1 transforaminal epidural steroid injection has been helpful for the bilateral low back and lower extremity pain.  He reports roughly 40% relief.  He continues to have some back pain in the bilateral lower lumbar/lumbosacral region.  The pain does not radiate.  He denies any other changes in the quality or location was pain.  He denies any new or worsening symptoms.        Interval History (1/19/21):  He returns today for follow up.  He reports that his pain is unchanged in quality location since last encounter.  He denies any new or worsening symptoms.  We have received records from previous pain management provider.  Records  indicate the patient has undergone multiple lumbar interventional procedures including sacroiliac joint injections, lumbar radiofrequency ablations, and bilateral L4 and L5 transforaminal epidural steroid injections.  Patient denies any significant relief from these procedures.        Initial Encounter (1/5/21):  Uriah Mills is a 71 y.o. male who presents today with chronic bilateral low back and lower extremity pain.  Patient has had this problem for many years.  No specific inciting event or injury noted.  The pain is located across the lower lumbar region radiate to the bilateral hips and posterior thighs.  Patient reports associated numbness in the right posterior thigh.  He denies any pain or numbness distal to the knees.  He denies any focal weakness or bowel bladder dysfunction.  The pain is constant worse with activity.  Patient reports undergoing multiple interventional procedures in the past with Dr. Kin Palomino.  He states that no these procedures were particularly helpful.  He was also tried on multiple opioid pain medications.  Some of these help but they also cause significant constipation and therefore these medications were not continued.  More recently, patient has been taking tramadol prescribed by his primary care physician.  He states this medication does not cause any problems but also did not provide any significant relief..   This pain is described in detail below.    Pain Disability Index Review:      7/9/2024    11:04 AM 5/10/2024     1:21 PM 1/26/2024     1:08 PM   Last 3 PDI Scores   Pain Disability Index (PDI) 40 56 25       Pain Medications:    - Opioids: Lorcet (Hydrocodone/Acetaminophen)  Hydrocodone 2 weeks since he took any, wasn't helping pain much  gabapentin    Opioid Contract: no     report:  Reviewed and inconsistent with medication use as prescribed. (Pt report he has stopped taking it).    Pain Procedures:   October 2021: RFA of bilateral L3, L4, L5  June 2021:  Transforaminal epidural at bilateral S1  December 2021: Bilateral sacroiliac joint injection   1/11/24 B/L SI joint injections- 80% relief  4/12/2024 - Right L3, L4, L5 RFA - 80% relief  4/26/2024 - Left L3, L4, L5 RFA - 80% relief  5/24/2024: Bilateral Sacroiliac Joint Injection under Fluoroscopic Guidance and Right Piriformis Muscle Injection under Fluoroscopic Guidance    Physical Therapy/Home Exercise: no (has tried accupuncture without significant relief)    Imaging: MRI lumbar spine 2020    Alignment: Normal.     Vertebrae: 5 lumbar-type vertebral bodies. No aggressive marrow replacement process or fracture.     Discs: Satisfactory height.  Mild diffuse desiccation of disc material predominantly L4-L5 and L5-S1     Cord: Normal. Conus terminates at T12-L1.     Degenerative findings:     T12-L1: There is no focal disc herniation.No significant central canal narrowing . No significant neural foraminal narrowing.     L1-L2: There is no focal disc herniation.No significant central canal narrowing . No significant neural foraminal narrowing.     L2-L3: There is no focal disc herniation.No significant central canal narrowing . No significant neural foraminal narrowing.     L3-L4: There is no focal disc herniation.No significant central canal narrowing . No significant neural foraminal narrowing.     L4-L5: Mild broad-based bulging of disc material with small superimposed central protrusion.  No significant central canal narrowing . No significant neural foraminal narrowing.     L5-S1: Mild broad-based bulging of disc material with small superimposed central protrusion.  No significant central canal narrowing . No significant neural foraminal narrowing.     Paraspinal muscles & soft tissues: Unremarkable.     Impression:     Degenerative changes disc space level 4-L5 and L5-S1. no significant central or foraminal stenosis.    Allergies: Review of patient's allergies indicates:  No Known Allergies    Current  Medications:   Current Outpatient Medications   Medication Sig Dispense Refill    albuterol (PROVENTIL/VENTOLIN HFA) 90 mcg/actuation inhaler INHALE 2 PUFFS BY MOUTH EVERY 6 HOURS AS NEEDED 25.5 g 2    ALPRAZolam (XANAX) 1 MG tablet Take 1 tablet (1 mg total) by mouth nightly as needed for Anxiety. 30 tablet 2    aspirin (ECOTRIN) 81 MG EC tablet Take 81 mg by mouth once daily.      gabapentin (NEURONTIN) 600 MG tablet Take 1 tablet by mouth twice daily 60 tablet 0    levocetirizine (XYZAL) 5 MG tablet TAKE 1 TABLET BY MOUTH ONCE DAILY IN THE EVENING 90 tablet 3    silodosin (RAPAFLO) 8 mg Cap capsule Take 1 capsule (8 mg total) by mouth once daily. 30 capsule 11    traMADoL (ULTRAM) 50 mg tablet Take 1 tablet (50 mg total) by mouth every 6 (six) hours as needed for Pain. 10 tablet 0     No current facility-administered medications for this visit.     Facility-Administered Medications Ordered in Other Visits   Medication Dose Route Frequency Provider Last Rate Last Admin    0.9%  NaCl infusion   Intravenous Continuous Rory Griffith MD        0.9%  NaCl infusion   Intravenous Continuous Jovanny Gurrola MD        ALPRAZolam dissolvable tablet 1 mg  1 mg Oral Once PRN Colt Lenz Jr., MD        BUPivacaine (PF) 0.25% (2.5 mg/ml) injection 25 mg  10 mL Intramuscular Once Colt Lenz Jr., MD        ceFAZolin 2 g in dextrose 5 % in water (D5W) 100 mL IVPB (MB+)  2 g Intravenous On Call Procedure Rory Griffith MD        dexAMETHasone sodium phos (PF) injection 10 mg  10 mg Epidural Once Colt Lenz Jr., MD        LIDOcaine (PF) 10 mg/ml (1%) injection 10 mg  1 mL Other Once Colt Lenz Jr., MD        LIDOcaine (PF) 10 mg/ml (1%) injection 10 mg  1 mL Intradermal Once PRN Rory Griffith MD        LIDOcaine HCL 20 mg/ml (2%) injection 10 mL  10 mL Other Once Colt Lenz Jr., MD        LIDOcaine HCL 20 mg/ml (2%) injection 10 mL  10 mL Other Once Colt Lenz Jr., MD            REVIEW OF SYSTEMS:    GENERAL:  No weight loss, malaise or fevers.  HEENT:  Negative for frequent or significant headaches.  NECK:  Negative for lumps, goiter, pain and significant neck swelling.  RESPIRATORY:  Negative for cough, wheezing or shortness of breath.  CARDIOVASCULAR:  Negative for chest pain, leg swelling or palpitations.  GI:  Negative for abdominal discomfort, blood in stools or black stools or change in bowel habits.  MUSCULOSKELETAL:  See HPI.  SKIN:  Negative for lesions, rash, and itching.  PSYCH:  Negative for sleep disturbance, mood disorder and recent psychosocial stressors.  HEMATOLOGY/LYMPHOLOGY:  Negative for prolonged bleeding, bruising easily or swollen nodes.  NEURO:   No history of headaches, syncope, paralysis, seizures or tremors.  All other reviewed and negative other than HPI.    Past Medical History:  Past Medical History:   Diagnosis Date    Allergy     Arthritis     BPH (benign prostatic hyperplasia)     Carpal tunnel syndrome     Chronic hip pain, bilateral     Chronic low back pain     COPD (chronic obstructive pulmonary disease)     DDD (degenerative disc disease), lumbar     Dorsalgia     Gastrointestinal disease     Lumbar radiculopathy     Lumbar spondylosis     Mass of right wrist 06/05/2024    Osteoarthritis of both hips     Osteoarthritis of spine     Skin cancer        Past Surgical History:  Past Surgical History:   Procedure Laterality Date    APPENDECTOMY      CARPAL TUNNEL RELEASE Right 08/12/2019    Procedure: RELEASE, CARPAL TUNNEL right;  Surgeon: Roopa Hanna MD;  Location: [facility], [place], operated by [facility] OR;  Service: Orthopedics;  Laterality: Right;    COLONOSCOPY      CYSTOSCOPY WITH INSERTION OF MINIMALLY INVASIVE IMPLANT TO ENLARGE PROSTATIC URETHRA N/A 11/29/2023    Procedure: CYSTOSCOPY, WITH INSERTION OF UROLIFT IMPLANT;  Surgeon: Rory Griffith MD;  Location: [facility] [place];  Service: Urology;  Laterality: N/A;    CYSTOSCOPY, WITH INSERTION OF UROLIFT IMPLANT   11/29/2023    EPIDURAL STEROID INJECTION INTO LUMBAR SPINE Bilateral 06/17/2021    Bilateral ALVIN per  06/17/2021    EXCISION OF GANGLION OF WRIST Right 6/5/2024    Procedure: RIGHT VOLAR WRIST GANGLION CYST EXCISION;  Surgeon: Roopa Hanna MD;  Location: [facility] OR;  Service: Orthopedics;  Laterality: Right;  MAC/REGIONAL    GASTRIC FUNDOPLICATION      HERNIA REPAIR  1990s    INJECTION OF ANESTHETIC AGENT AROUND MEDIAL BRANCH NERVES INNERVATING LUMBAR FACET JOINT Bilateral 07/15/2021    Procedure: Block-nerve-medial branch-lumbar---BILATERAL L3, L4, L5;  Surgeon: Colt Lenz Jr., MD;  Location: [facility];  Service: Pain Management;  Laterality: Bilateral;    INJECTION OF ANESTHETIC AGENT AROUND MEDIAL BRANCH NERVES INNERVATING LUMBAR FACET JOINT Bilateral 09/23/2021    Procedure: Block-nerve-medial branch-lumbar---BILATERAL L3, L4, L5;  Surgeon: Colt Lenz Jr., MD;  Location: [facility];  Service: Pain Management;  Laterality: Bilateral;    INJECTION OF ANESTHETIC AGENT AROUND NERVE Bilateral 02/16/2024    Procedure: BLOCK, NERVE BILATERAL L3, 4, 5 MEDIAL BRANCH;  Surgeon: Jeanmarie Jauregui MD;  Location: [facility] PAIN MGT;  Service: Pain Management;  Laterality: Bilateral;  [phone]    INJECTION OF ANESTHETIC AGENT AROUND NERVE Bilateral 03/08/2024    Procedure: BLOCK, NERVE BILATERAL L3, L4, AND L5 MEDIAL BRANCH;  Surgeon: Jeanmarie Jauregui MD;  Location: [facility] PAIN [facility]GT;  Service: Pain Management;  Laterality: Bilateral;  [phone]    INJECTION OF JOINT Bilateral 12/02/2021    Procedure: Injection, Joint---BILATERAL SACROILIAC INJECTION;  Surgeon: Colt Lenz Jr., MD;  Location: [facility];  Service: Pain Management;  Laterality: Bilateral;    INJECTION OF JOINT N/A 05/24/2024    Procedure: INJECTION, BILATERAL SI AND RIGHT PIRIFORMIS;  Surgeon: Jeanmarie Jauregui MD;  Location: [facility] PAIN MGT;  Service: Pain Management;  Laterality: N/A;  [phone]  2 WK  F/U [id number]    INJECTION OF STEROID Left 08/12/2019    Procedure: INJECTION, STEROID;  Surgeon: Roopa Hanna MD;  Location: [facility] OR;  Service: Orthopedics;  Laterality: Left;    INJECTION, SACROILIAC JOINT Bilateral 01/11/2024    Procedure: INJECTION,SACROILIAC JOINT BILATERAL;  Surgeon: Jeanmarie Jauregui MD;  Location: [facility] PAIN MGT;  Service: Pain Management;  Laterality: Bilateral;  [phone]    PYELOGRAM, RETROGRADE (Bilateral)    11/29/2023    RADIOFREQUENCY ABLATION Left 10/14/2021    Procedure: Radiofrequency Ablation---LEFT L3, L4, L5;  Surgeon: Colt Lenz Jr., MD;  Location: [facility] [place] [facility];  Service: Pain Management;  Laterality: Left;    RADIOFREQUENCY ABLATION Right 10/28/2021    Procedure: Radiofrequency Ablation---RIGHT L3, L4, L5;  Surgeon: Colt Lenz Jr., MD;  Location: [facility] [place] [facility];  Service: Pain Management;  Laterality: Right;    RADIOFREQUENCY ABLATION Right 04/12/2024    Procedure: RADIOFREQUENCY ABLATION RIGHT L3, L4, AND L5 1 OF 2;  Surgeon: eJanmarie Jauregui MD;  Location: [facility] PAIN MGT;  Service: Pain Management;  Laterality: Right;  [phone]    RADIOFREQUENCY ABLATION Left 04/26/2024    Procedure: RADIOFREQUENCY ABLATION LEFT L3, L4, AND L5 2 OF 2;  Surgeon: Jeanmarie Jauregui MD;  Location: [facility] PAIN MGT;  Service: Pain Management;  Laterality: Left;  [phone]    RETROGRADE PYELOGRAPHY Bilateral 11/29/2023    Procedure: PYELOGRAM, RETROGRADE;  Surgeon: Rory Griffith MD;  Location: [facility] [place] OR;  Service: Urology;  Laterality: Bilateral;    ROTATOR CUFF REPAIR Right     SACROILIAC JOINT INJECTION Bilateral 12/02/2021    SKIN CANCER EXCISION Left     arm    TRANSFORAMINAL EPIDURAL INJECTION OF STEROID Bilateral 06/17/2021    Procedure: Injection,steroid,epidural,transforaminal approach---BILATERAL S1;  Surgeon: Colt Lenz Jr., MD;  Location: [facility] [place] [facility];  Service: Pain Management;  Laterality: Bilateral;    TURBT (TRANSURETHRAL RESECTION OF  "BLADDER TUMOR) N/A 11/29/2023    Procedure: TURBT (TRANSURETHRAL RESECTION OF BLADDER TUMOR);  Surgeon: Rory Griffith MD;  Location: [facility] OR;  Service: Urology;  Laterality: N/A;  with UroLift and with bilateral retrograde pyelograms    URBT (TRANSURETHRAL RESECTION OF BLADDER TUMOR)  11/29/2023       Family History:  Family History   Problem Relation Name Age of Onset    Diabetes Mother Marly Mills     No Known Problems Father         Social History:  Social History     Socioeconomic History    Marital status:    Tobacco Use    Smoking status: Every Day     Types: Cigars    Smokeless tobacco: Never    Tobacco comments:     8 small cigars per day down from 10.   Substance and Sexual Activity    Alcohol use: No    Drug use: Yes     Types: Benzodiazepines    Sexual activity: Not Currently     Partners: Female     Birth control/protection: None       OBJECTIVE:    /66   Pulse 77   Temp 98 °F (36.7 °C)   Resp 18   Ht 5' 10" (1.778 m)   Wt 75 kg (165 lb 5.5 oz)   SpO2 100%   BMI 23.72 kg/m²     PHYSICAL EXAMINATION:     General appearance: Well appearing, in no acute distress, alert and oriented x3.  Psych:  Mood and affect appropriate.  Skin: Skin color, texture, turgor normal, no rashes or lesions, in both upper and lower body.  Head/face:  Atraumatic, normocephalic. No palpable lymph nodes  Back: Straight leg raising in the sitting and supine positions is negative to radicular pain. TTP over lumbar facet joints. Limited ROM with pain on extension > flexion. Positive facet loading bilaterally.  Positive tenderness over bilateral SIJ right > left with positive thigh and sacral thrust test, Positive FABERE,Ganselin and Yeoman's test bilaterally. Negative FADIR   Extremities: Peripheral joint ROM is full and pain free without obvious instability or laxity in all four extremities. No deformities, edema, or skin discoloration. Good capillary refill.  Musculoskeletal: Lower extremity " strength is normal and symmetric.  No atrophy or tone abnormalities are noted. TTP over right piriformis with positive piriformis stretch test.   Lumbar Paraspinal tenderness on Right  Neuro: Bilateral upper and lower extremity coordination and muscle stretch reflexes are physiologic and symmetric.  Plantar response are downgoing. No loss of sensation is noted.  Gait: Antalgic.    Patient Name: Uriah Mills  MRN: [id number]    INFORMED CONSENT: The procedure, risks, benefits and options were discussed with patient. There are no contraindications to the procedure. The patient expressed understanding and agreed to proceed. The personnel performing the procedure was discussed. I verify that I personally obtained Uriah's consent prior to the start of the procedure and the signed consent can be found on the patient's chart.    Procedure Date: 07/09/2024    Anesthesia: Topical    Pre Procedure diagnosis:   1. Chronic pain syndrome    2. Other myositis, multiple sites    3. Myofascial pain        Post-Procedure diagnosis: same      Sedation: None    PROCEDURE: TRIGGER POINT INJECTION  The patient was placed in a seated position and time out was perfomed. The site of pain and procedure were confirmed with the patient prior to starting the procedure. After performing time out. The patient's  trigger points were identified and marked at bilateral lumbar paraspinals. The skin was prepped with chlorhexidine three times.   After performing time out A 27-gauge 1.5 inch  needle was advanced through the skin and subcutaneous tissues.  Aspiration for blood, air and CSF was negative.  A total of 10 ml of Bupivacaine 0.25% and 40 mg Kenalog was injected at all trigger point.  No complications were evident. No specimens collected.    Blood Loss: Nill  Specimen: None    Bria Schwartz MD        ASSESSMENT: 73 y.o. year old male with lower back and hip pain, consistent with sacroiliitis.     1. Chronic pain syndrome  [facility] Drug  Screen      2. Other myositis, multiple sites  [facility] Drug Screen      3. Myofascial pain              PLAN:     - I personally reviewed and interpreted relevant and pertinent imaging. Results were discussed with the patient today.   - He is s/p bilateral SIJ injection and R piriformis with limited pain relief  - Encouraged HEP and PT.   - Counseled patient regarding the importance of physical therapy.  - Performed trigger point injection to lumbar paraspinals today, see procedure note above  - If pain persists, we can consider repeating RFA.  - Ordered UDS and Pain contract.  - Norco 5/325 mg BID as needed.  - Follow up in 2 months with NICO.    The above plan and management options were discussed at length with patient. Patient is in agreement with the above and verbalized understanding.    Bria Schwartz MD   07/09/2024    I spent a total of 30 minutes on the day of the visit.  This includes face to face time and non-face to face time preparing to see the patient by reviewing previous labs/imaging, obtaining and/or reviewing separately obtained history, documenting clinical information in the electronic or other health record, independently interpreting results and communicating results to the patient/family/caregiver.    Jeanmarie Jauregui

## 2024-07-10 ENCOUNTER — PATIENT MESSAGE (OUTPATIENT)
Dept: ADMINISTRATIVE | Facility: HOSPITAL | Age: 74
End: 2024-07-10
Payer: MEDICARE

## 2024-07-10 PROCEDURE — 96372 THER/PROPH/DIAG INJ SC/IM: CPT | Mod: S$GLB,,, | Performed by: ANESTHESIOLOGY

## 2024-07-10 RX ORDER — TRIAMCINOLONE ACETONIDE 40 MG/ML
40 INJECTION, SUSPENSION INTRA-ARTICULAR; INTRAMUSCULAR
Status: COMPLETED | OUTPATIENT
Start: 2024-07-10 | End: 2024-07-10

## 2024-07-10 RX ADMIN — TRIAMCINOLONE ACETONIDE 40 MG: 40 INJECTION, SUSPENSION INTRA-ARTICULAR; INTRAMUSCULAR at 03:07

## 2024-07-11 ENCOUNTER — OFFICE VISIT (OUTPATIENT)
Dept: PRIMARY CARE CLINIC | Facility: CLINIC | Age: 74
End: 2024-07-11
Payer: MEDICARE

## 2024-07-11 VITALS
DIASTOLIC BLOOD PRESSURE: 62 MMHG | HEIGHT: 70 IN | BODY MASS INDEX: 23.66 KG/M2 | RESPIRATION RATE: 18 BRPM | OXYGEN SATURATION: 90 % | WEIGHT: 165.25 LBS | SYSTOLIC BLOOD PRESSURE: 106 MMHG | HEART RATE: 74 BPM

## 2024-07-11 DIAGNOSIS — Z13.220 ENCOUNTER FOR LIPID SCREENING FOR CARDIOVASCULAR DISEASE: ICD-10-CM

## 2024-07-11 DIAGNOSIS — M16.0 PRIMARY OSTEOARTHRITIS OF BOTH HIPS: ICD-10-CM

## 2024-07-11 DIAGNOSIS — N18.31 CHRONIC KIDNEY DISEASE, STAGE 3A: ICD-10-CM

## 2024-07-11 DIAGNOSIS — R63.4 WEIGHT LOSS: Primary | ICD-10-CM

## 2024-07-11 DIAGNOSIS — Z13.6 ENCOUNTER FOR LIPID SCREENING FOR CARDIOVASCULAR DISEASE: ICD-10-CM

## 2024-07-11 DIAGNOSIS — J44.89 OBSTRUCTIVE CHRONIC BRONCHITIS WITHOUT EXACERBATION: ICD-10-CM

## 2024-07-11 PROCEDURE — 3074F SYST BP LT 130 MM HG: CPT | Mod: CPTII,S$GLB,, | Performed by: INTERNAL MEDICINE

## 2024-07-11 PROCEDURE — 1101F PT FALLS ASSESS-DOCD LE1/YR: CPT | Mod: CPTII,S$GLB,, | Performed by: INTERNAL MEDICINE

## 2024-07-11 PROCEDURE — 1160F RVW MEDS BY RX/DR IN RCRD: CPT | Mod: CPTII,S$GLB,, | Performed by: INTERNAL MEDICINE

## 2024-07-11 PROCEDURE — 99999 PR PBB SHADOW E&M-EST. PATIENT-LVL V: CPT | Mod: PBBFAC,,, | Performed by: INTERNAL MEDICINE

## 2024-07-11 PROCEDURE — 3008F BODY MASS INDEX DOCD: CPT | Mod: CPTII,S$GLB,, | Performed by: INTERNAL MEDICINE

## 2024-07-11 PROCEDURE — 1159F MED LIST DOCD IN RCRD: CPT | Mod: CPTII,S$GLB,, | Performed by: INTERNAL MEDICINE

## 2024-07-11 PROCEDURE — 3078F DIAST BP <80 MM HG: CPT | Mod: CPTII,S$GLB,, | Performed by: INTERNAL MEDICINE

## 2024-07-11 PROCEDURE — 99214 OFFICE O/P EST MOD 30 MIN: CPT | Mod: S$GLB,,, | Performed by: INTERNAL MEDICINE

## 2024-07-11 PROCEDURE — 1126F AMNT PAIN NOTED NONE PRSNT: CPT | Mod: CPTII,S$GLB,, | Performed by: INTERNAL MEDICINE

## 2024-07-11 PROCEDURE — 3288F FALL RISK ASSESSMENT DOCD: CPT | Mod: CPTII,S$GLB,, | Performed by: INTERNAL MEDICINE

## 2024-07-11 NOTE — PROGRESS NOTES
Subjective:       Patient ID: Uriah Mills is a 73 y.o. male.    Chief Complaint: Follow-up (3 mth)    HPI  Pt visit today with h/o lumbar spondylosis asthma copd abdominal aortic atherosclerosis  CKD OA both hips and both hands pt had multiple ALVIN had one yesterday and he ha sbeen losing wt and still smoking . 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   Respiratory:  Negative for shortness of breath.    Cardiovascular:  Negative for chest pain.   Gastrointestinal:  Negative for abdominal pain.   Musculoskeletal:  Positive for back pain. Negative for arthralgias.   Psychiatric/Behavioral:  Positive for dysphoric mood.        Objective:      Physical Exam  Vitals and nursing note reviewed.   Constitutional:       General: He is not in acute distress.     Appearance: He is well-developed.   HENT:      Head: Normocephalic and atraumatic.      Right Ear: External ear normal.      Left Ear: External ear normal.      Nose: Nose normal.   Eyes:      Extraocular Movements: Extraocular movements intact.      Conjunctiva/sclera: Conjunctivae normal.      Pupils: Pupils are equal, round, and reactive to light.   Neck:      Thyroid: No thyromegaly.   Cardiovascular:      Rate and Rhythm: Normal rate and regular rhythm.      Heart sounds: Normal heart sounds. No murmur heard.     No friction rub. No gallop.   Pulmonary:      Effort: Pulmonary effort is normal. No respiratory distress.      Breath sounds: Normal breath sounds. No wheezing.   Abdominal:      General: Bowel sounds are normal. There is no distension.      Palpations: Abdomen is soft.      Tenderness: There is no abdominal tenderness.   Musculoskeletal:         General: No tenderness or deformity. Normal range of motion.      Cervical back: Normal range of motion and neck supple.   Lymphadenopathy:      Cervical: No cervical adenopathy.   Skin:     General: Skin is  warm and dry.      Findings: No erythema or rash.   Neurological:      Mental Status: He is alert and oriented to person, place, and time.   Psychiatric:         Mood and Affect: Mood normal.         Thought Content: Thought content normal.         Judgment: Judgment normal.         Assessment:       1. Weight loss    2. Chronic kidney disease, stage 3a    3. Primary osteoarthritis of both hips    4. Obstructive chronic bronchitis without exacerbation    5. Encounter for lipid screening for cardiovascular disease        Plan:       Weight loss  Comments:  after labs cnsider CT chest abd pelvic  Orders:  -     TSH; Future; Expected date: 07/11/2024  -     T4, Free; Future; Expected date: 07/11/2024  -     X-Ray Chest PA And Lateral; Future; Expected date: 07/11/2024  -     Urinalysis; Future; Expected date: 07/11/2024  -     Sedimentation rate; Future; Expected date: 07/11/2024    Chronic kidney disease, stage 3a  -     MICROALBUMIN / CREATININE RATIO URINE; Future; Expected date: 07/11/2024  -     Comprehensive Metabolic Panel; Future; Expected date: 07/11/2024    Primary osteoarthritis of both hips  Comments:  OA hips f/u with orthopedist    Obstructive chronic bronchitis without exacerbation  -     Lipid Panel; Future; Expected date: 07/11/2024  -     CBC Auto Differential; Future; Expected date: 07/11/2024  -     Comprehensive Metabolic Panel; Future; Expected date: 07/11/2024  -     X-Ray Chest PA And Lateral; Future; Expected date: 07/11/2024  -     Urinalysis; Future; Expected date: 07/11/2024  -     Sedimentation rate; Future; Expected date: 07/11/2024  -     Discontinue: budesonide-glycopyr-formoterol 160-9-4.8 mcg/actuation HFAA; ! Puff  q. auilat  Dispense: 10.7 g; Refill: 2    Encounter for lipid screening for cardiovascular disease  -     Lipid Panel; Future; Expected date: 07/11/2024        Medication List with Changes/Refills   Current Medications    ALBUTEROL (PROVENTIL/VENTOLIN HFA) 90 MCG/ACTUATION  INHALER    INHALE 2 PUFFS BY MOUTH EVERY 6 HOURS AS NEEDED    ALPRAZOLAM (XANAX) 1 MG TABLET    Take 1 tablet (1 mg total) by mouth nightly as needed for Anxiety.    ASPIRIN (ECOTRIN) 81 MG EC TABLET    Take 81 mg by mouth once daily.    GABAPENTIN (NEURONTIN) 600 MG TABLET    Take 1 tablet by mouth twice daily    LEVOCETIRIZINE (XYZAL) 5 MG TABLET    TAKE 1 TABLET BY MOUTH ONCE DAILY IN THE EVENING    SILODOSIN (RAPAFLO) 8 MG CAP CAPSULE    Take 1 capsule (8 mg total) by mouth once daily.    TRAMADOL (ULTRAM) 50 MG TABLET    Take 1 tablet (50 mg total) by mouth every 6 (six) hours as needed for Pain.   Changed and/or Refilled Medications    Modified Medication Previous Medication    BREZTRI AEROSPHERE 160-9-4.8 MCG/ACTUATION HFAA budesonide-glycopyr-formoterol 160-9-4.8 mcg/actuation HFAA       INHALE 1 PUFF BY MOUTH TWICE DAILY    ! Puff  bid

## 2024-07-12 ENCOUNTER — PATIENT MESSAGE (OUTPATIENT)
Dept: PRIMARY CARE CLINIC | Facility: CLINIC | Age: 74
End: 2024-07-12
Payer: MEDICARE

## 2024-07-13 DIAGNOSIS — J44.89 OBSTRUCTIVE CHRONIC BRONCHITIS WITHOUT EXACERBATION: ICD-10-CM

## 2024-07-13 NOTE — TELEPHONE ENCOUNTER
Refill Routing Note   Medication(s) are not appropriate for processing by [facility] for the following reason(s):        Clarification of medication (Rx) details    [facility] action(s):  Defer               Appointments  past 12m or future 3m with PCP    Date Provider   Last Visit   7/11/2024 Geo Montalvo MD   Next Visit   10/23/2024 Geo Montalvo MD   ED visits in past 90 days: 0        Note composed:3:12 PM 07/13/2024

## 2024-07-13 NOTE — TELEPHONE ENCOUNTER
No care due was identified.  [facility] Embedded Care Due Messages. Reference number: [id number].   7/13/2024 9:03:30 AM CDT

## 2024-07-15 ENCOUNTER — CLINICAL SUPPORT (OUTPATIENT)
Dept: PRIMARY CARE CLINIC | Facility: CLINIC | Age: 74
End: 2024-07-15
Payer: MEDICARE

## 2024-07-15 DIAGNOSIS — Z78.9 NO KNOWN HEALTH PROBLEMS: Primary | ICD-10-CM

## 2024-07-15 RX ORDER — BUDESONIDE, GLYCOPYRROLATE, AND FORMOTEROL FUMARATE 160; 9; 4.8 UG/1; UG/1; UG/1
1 AEROSOL, METERED RESPIRATORY (INHALATION) 2 TIMES DAILY
Qty: 11 G | Refills: 2 | Status: SHIPPED | OUTPATIENT
Start: 2024-07-15

## 2024-07-18 ENCOUNTER — TELEPHONE (OUTPATIENT)
Dept: PRIMARY CARE CLINIC | Facility: CLINIC | Age: 74
End: 2024-07-18
Payer: MEDICARE

## 2024-07-18 DIAGNOSIS — R19.5 HEMATEST POSITIVE STOOLS: Primary | ICD-10-CM

## 2024-07-18 DIAGNOSIS — Z12.11 ENCOUNTER FOR SCREENING COLONOSCOPY: Primary | ICD-10-CM

## 2024-07-19 ENCOUNTER — TELEPHONE (OUTPATIENT)
Dept: PRIMARY CARE CLINIC | Facility: CLINIC | Age: 74
End: 2024-07-19
Payer: MEDICARE

## 2024-07-22 ENCOUNTER — TELEPHONE (OUTPATIENT)
Dept: PRIMARY CARE CLINIC | Facility: CLINIC | Age: 74
End: 2024-07-22
Payer: MEDICARE

## 2024-07-23 ENCOUNTER — TELEPHONE (OUTPATIENT)
Dept: GASTROENTEROLOGY | Facility: CLINIC | Age: 74
End: 2024-07-23
Payer: MEDICARE

## 2024-07-23 ENCOUNTER — PATIENT MESSAGE (OUTPATIENT)
Dept: PRIMARY CARE CLINIC | Facility: CLINIC | Age: 74
End: 2024-07-23
Payer: MEDICARE

## 2024-07-24 ENCOUNTER — TELEPHONE (OUTPATIENT)
Dept: PRIMARY CARE CLINIC | Facility: CLINIC | Age: 74
End: 2024-07-24
Payer: MEDICARE

## 2024-07-26 DIAGNOSIS — M54.16 LUMBAR RADICULOPATHY: ICD-10-CM

## 2024-07-26 DIAGNOSIS — G89.29 CHRONIC BILATERAL LOW BACK PAIN WITH BILATERAL SCIATICA: ICD-10-CM

## 2024-07-26 DIAGNOSIS — M54.42 CHRONIC BILATERAL LOW BACK PAIN WITH BILATERAL SCIATICA: ICD-10-CM

## 2024-07-26 DIAGNOSIS — M54.41 CHRONIC BILATERAL LOW BACK PAIN WITH BILATERAL SCIATICA: ICD-10-CM

## 2024-07-27 RX ORDER — GABAPENTIN 600 MG/1
600 TABLET ORAL 2 TIMES DAILY
Qty: 60 TABLET | Refills: 0 | Status: SHIPPED | OUTPATIENT
Start: 2024-07-27

## 2024-08-14 RX ORDER — POLYETHYLENE GLYCOL 3350, SODIUM SULFATE ANHYDROUS, SODIUM BICARBONATE, SODIUM CHLORIDE, POTASSIUM CHLORIDE 236; 22.74; 6.74; 5.86; 2.97 G/4L; G/4L; G/4L; G/4L; G/4L
4 POWDER, FOR SOLUTION ORAL ONCE
Qty: 4000 ML | Refills: 0 | Status: SHIPPED | OUTPATIENT
Start: 2024-08-14 | End: 2024-08-14

## 2024-08-24 DIAGNOSIS — G89.29 CHRONIC BILATERAL LOW BACK PAIN WITH BILATERAL SCIATICA: ICD-10-CM

## 2024-08-24 DIAGNOSIS — M54.16 LUMBAR RADICULOPATHY: ICD-10-CM

## 2024-08-24 DIAGNOSIS — M54.41 CHRONIC BILATERAL LOW BACK PAIN WITH BILATERAL SCIATICA: ICD-10-CM

## 2024-08-24 DIAGNOSIS — M54.42 CHRONIC BILATERAL LOW BACK PAIN WITH BILATERAL SCIATICA: ICD-10-CM

## 2024-08-24 NOTE — TELEPHONE ENCOUNTER
No care due was identified.  [facility] Embedded Care Due Messages. Reference number: [id number].   8/24/2024 10:27:48 AM CDT

## 2024-08-26 ENCOUNTER — PATIENT MESSAGE (OUTPATIENT)
Dept: GASTROENTEROLOGY | Facility: CLINIC | Age: 74
End: 2024-08-26
Payer: MEDICARE

## 2024-08-26 ENCOUNTER — TELEPHONE (OUTPATIENT)
Dept: GASTROENTEROLOGY | Facility: CLINIC | Age: 74
End: 2024-08-26
Payer: MEDICARE

## 2024-08-27 RX ORDER — GABAPENTIN 600 MG/1
600 TABLET ORAL 2 TIMES DAILY
Qty: 60 TABLET | Refills: 0 | Status: SHIPPED | OUTPATIENT
Start: 2024-08-27

## 2024-09-25 DIAGNOSIS — M54.16 LUMBAR RADICULOPATHY: ICD-10-CM

## 2024-09-25 DIAGNOSIS — M54.42 CHRONIC BILATERAL LOW BACK PAIN WITH BILATERAL SCIATICA: ICD-10-CM

## 2024-09-25 DIAGNOSIS — M54.41 CHRONIC BILATERAL LOW BACK PAIN WITH BILATERAL SCIATICA: ICD-10-CM

## 2024-09-25 DIAGNOSIS — G89.29 CHRONIC BILATERAL LOW BACK PAIN WITH BILATERAL SCIATICA: ICD-10-CM

## 2024-09-25 NOTE — TELEPHONE ENCOUNTER
No care due was identified.  [facility] Embedded Care Due Messages. Reference number: [id number].   9/25/2024 10:32:08 AM CDT

## 2024-09-26 RX ORDER — GABAPENTIN 600 MG/1
600 TABLET ORAL 2 TIMES DAILY
Qty: 60 TABLET | Refills: 0 | Status: SHIPPED | OUTPATIENT
Start: 2024-09-26

## 2024-09-27 ENCOUNTER — TELEPHONE (OUTPATIENT)
Dept: GASTROENTEROLOGY | Facility: CLINIC | Age: 74
End: 2024-09-27
Payer: MEDICARE

## 2024-09-27 NOTE — TELEPHONE ENCOUNTER
----- Message from [place] MD sent at 9/22/2024  9:23 AM CDT -----  Pathology from recent colonoscopy reviewed.  Colon polyp(s) benign.  Repeat colonoscopy in 3 years.    
Patients wife will let him know  
[FreeTextEntry1] : 64 y/o gentleman who was admitted to [facility] in March 2020 for leg swelling and pain and dyspnea. He was found to have RLE DVT and pulmonary embolism and has been on Xarelto since then. He reports that he tested negative for COVID at the time, no prior h/o DVT, denies any family h/o coagulopathy.\par \par He was seen in ED on 10/9/2020 for left leg swelling and repeat duplex showed acute left popliteal vein DVT, while on Xarelto. The left leg swelling is improved.

## 2024-10-23 ENCOUNTER — OFFICE VISIT (OUTPATIENT)
Dept: PRIMARY CARE CLINIC | Facility: CLINIC | Age: 74
End: 2024-10-23
Payer: MEDICARE

## 2024-10-23 VITALS
WEIGHT: 168.63 LBS | BODY MASS INDEX: 24.14 KG/M2 | RESPIRATION RATE: 16 BRPM | HEART RATE: 69 BPM | OXYGEN SATURATION: 94 % | HEIGHT: 70 IN | SYSTOLIC BLOOD PRESSURE: 138 MMHG | DIASTOLIC BLOOD PRESSURE: 60 MMHG

## 2024-10-23 DIAGNOSIS — R35.0 BENIGN PROSTATIC HYPERPLASIA WITH URINARY FREQUENCY: ICD-10-CM

## 2024-10-23 DIAGNOSIS — Z23 FLU VACCINE NEED: ICD-10-CM

## 2024-10-23 DIAGNOSIS — J44.1 COPD EXACERBATION: Primary | ICD-10-CM

## 2024-10-23 DIAGNOSIS — N40.1 BENIGN PROSTATIC HYPERPLASIA WITH URINARY FREQUENCY: ICD-10-CM

## 2024-10-23 DIAGNOSIS — Z12.5 ENCOUNTER FOR SCREENING FOR MALIGNANT NEOPLASM OF PROSTATE: ICD-10-CM

## 2024-10-23 DIAGNOSIS — F41.9 ANXIETY: ICD-10-CM

## 2024-10-23 DIAGNOSIS — G47.00 INSOMNIA, UNSPECIFIED TYPE: ICD-10-CM

## 2024-10-23 DIAGNOSIS — I70.0 ATHEROSCLEROSIS OF ABDOMINAL AORTA: ICD-10-CM

## 2024-10-23 PROCEDURE — 1160F RVW MEDS BY RX/DR IN RCRD: CPT | Mod: CPTII,S$GLB,, | Performed by: INTERNAL MEDICINE

## 2024-10-23 PROCEDURE — 1126F AMNT PAIN NOTED NONE PRSNT: CPT | Mod: CPTII,S$GLB,, | Performed by: INTERNAL MEDICINE

## 2024-10-23 PROCEDURE — 3078F DIAST BP <80 MM HG: CPT | Mod: CPTII,S$GLB,, | Performed by: INTERNAL MEDICINE

## 2024-10-23 PROCEDURE — 3075F SYST BP GE 130 - 139MM HG: CPT | Mod: CPTII,S$GLB,, | Performed by: INTERNAL MEDICINE

## 2024-10-23 PROCEDURE — 99999 PR PBB SHADOW E&M-EST. PATIENT-LVL IV: CPT | Mod: PBBFAC,,, | Performed by: INTERNAL MEDICINE

## 2024-10-23 PROCEDURE — 1159F MED LIST DOCD IN RCRD: CPT | Mod: CPTII,S$GLB,, | Performed by: INTERNAL MEDICINE

## 2024-10-23 PROCEDURE — 3288F FALL RISK ASSESSMENT DOCD: CPT | Mod: CPTII,S$GLB,, | Performed by: INTERNAL MEDICINE

## 2024-10-23 PROCEDURE — 1101F PT FALLS ASSESS-DOCD LE1/YR: CPT | Mod: CPTII,S$GLB,, | Performed by: INTERNAL MEDICINE

## 2024-10-23 PROCEDURE — 3008F BODY MASS INDEX DOCD: CPT | Mod: CPTII,S$GLB,, | Performed by: INTERNAL MEDICINE

## 2024-10-23 PROCEDURE — 99214 OFFICE O/P EST MOD 30 MIN: CPT | Mod: S$GLB,,, | Performed by: INTERNAL MEDICINE

## 2024-10-23 PROCEDURE — G0008 ADMIN INFLUENZA VIRUS VAC: HCPCS | Mod: S$GLB,,, | Performed by: INTERNAL MEDICINE

## 2024-10-23 PROCEDURE — 90653 IIV ADJUVANT VACCINE IM: CPT | Mod: S$GLB,,, | Performed by: INTERNAL MEDICINE

## 2024-10-23 RX ORDER — ALBUTEROL SULFATE 0.83 MG/ML
2.5 SOLUTION RESPIRATORY (INHALATION) EVERY 6 HOURS PRN
Qty: 150 ML | Refills: 5 | Status: SHIPPED | OUTPATIENT
Start: 2024-10-23 | End: 2025-10-23

## 2024-10-23 RX ORDER — PROMETHAZINE HYDROCHLORIDE AND DEXTROMETHORPHAN HYDROBROMIDE 6.25; 15 MG/5ML; MG/5ML
5 SYRUP ORAL EVERY 6 HOURS PRN
Qty: 150 ML | Refills: 0 | Status: SHIPPED | OUTPATIENT
Start: 2024-10-23 | End: 2024-11-02

## 2024-10-23 RX ORDER — DEXAMETHASONE 4 MG/1
4 TABLET ORAL EVERY 12 HOURS
Qty: 14 TABLET | Refills: 1 | Status: SHIPPED | OUTPATIENT
Start: 2024-10-23

## 2024-10-23 RX ORDER — ALPRAZOLAM 1 MG/1
1 TABLET ORAL NIGHTLY PRN
Qty: 30 TABLET | Refills: 2 | Status: SHIPPED | OUTPATIENT
Start: 2024-10-23

## 2024-10-23 NOTE — PROGRESS NOTES
Verified pt ID using name and . Mia. Administered Flu 65+ in left deltoid per physician order using aseptic technique. Aspirated and no blood return noted. Pt tolerated well with no adverse reactions noted.

## 2024-10-24 DIAGNOSIS — G89.29 CHRONIC BILATERAL LOW BACK PAIN WITH BILATERAL SCIATICA: ICD-10-CM

## 2024-10-24 DIAGNOSIS — M54.16 LUMBAR RADICULOPATHY: ICD-10-CM

## 2024-10-24 DIAGNOSIS — M54.42 CHRONIC BILATERAL LOW BACK PAIN WITH BILATERAL SCIATICA: ICD-10-CM

## 2024-10-24 DIAGNOSIS — M54.41 CHRONIC BILATERAL LOW BACK PAIN WITH BILATERAL SCIATICA: ICD-10-CM

## 2024-10-24 NOTE — TELEPHONE ENCOUNTER
No care due was identified.  [facility] Embedded Care Due Messages. Reference number: [id number].   10/24/2024 10:38:31 AM CDT

## 2024-10-25 RX ORDER — GABAPENTIN 600 MG/1
600 TABLET ORAL 2 TIMES DAILY
Qty: 60 TABLET | Refills: 0 | Status: SHIPPED | OUTPATIENT
Start: 2024-10-25

## 2024-10-25 NOTE — PROGRESS NOTES
Subjective:       Patient ID: Uriah Mills is a 73 y.o. male.    Chief Complaint: Follow-up (3 month) and Flu Vaccine    HPI  History of Present Illness    CHIEF COMPLAINT:  Uriah presents today for follow-up and cough management.    CHRONIC COUGH AND WHEEZING:  He reports a chronic cough persisting for 20 years, accompanied by significant wheezing in his lungs. Previous allergy testing yielded negative results, and an ENT evaluation including nasal endoscopy was performed. Various medication trials have failed to alleviate the cough. He experiences nocturnal coughing episodes severe enough to cause muscle spasms in his ribs. He believes the cough may be related to his previous work environment. Current management has not improved his symptoms.    RESPIRATORY TREATMENT:  He has a nebulizer but expresses reluctance to use it due to the noise it produces, stating he is unable to tolerate the device. He appears to confuse the nebulizer with a CPAP machine when discussing the noise issue.    PAIN MANAGEMENT:  He reports a history of receiving multiple injections for pain management over the past year. He expresses dissatisfaction with the current treatment approach, stating that the doctor has not provided additional interventions beyond injections. He has an unfilled pain medication prescription and expresses frustration with the limitations on pain medication use and the perceived inadequacy of his current pain management regimen.    ITCHING:  He reports infrequent episodes of itching. A previously prescribed cream was effective in managing the symptoms without associated bleeding. He expresses a desire to continue using the same cream that provided relief in the past.    CURRENT MEDICATIONS:  He reports currently taking Xanax. A previously prescribed muscle relaxant was ineffective in managing his symptoms.      ROS:  General: -fever, -chills, -fatigue, -weight gain, -weight loss  Eyes: -vision changes, -redness,  -discharge  ENT: -ear pain, -nasal congestion, -sore throat  Cardiovascular: -chest pain, -palpitations, -lower extremity edema  Respiratory: +cough, -shortness of breath  Gastrointestinal: -abdominal pain, -nausea, -vomiting, -diarrhea, -constipation, -blood in stool  Genitourinary: -dysuria, -hematuria, -frequency  Musculoskeletal: -joint pain, -muscle pain  Skin: -rash, -lesion, +itching  Neurological: -headache, -dizziness, -numbness, -tingling  Psychiatric: -anxiety, -depression, -sleep difficulty       Review of Systems    Objective:      Physical Exam  Physical Exam    General: No acute distress. Well-developed. Well-nourished.  Eyes: EOMI. Sclerae anicteric.  HENT: Normocephalic. Atraumatic. Nares patent. Moist oral mucosa.  Ears: Bilateral TMs clear. Bilateral EACs clear.  Cardiovascular: Regular rate. Regular rhythm. No murmurs. No rubs. No gallops. Normal S1, S2.  Respiratory: Normal respiratory effort. Clear to auscultation bilaterally. No rales. No rhonchi. Wheezing. Tight lungs.  Abdomen: Soft. Non-tender. Non-distended. Normoactive bowel sounds.  Musculoskeletal: No  obvious deformity.  Extremities: No lower extremity edema.  Neurological: Alert & oriented x3. No slurred speech. Normal gait.  Psychiatric: Normal mood. Normal affect. Good insight. Good judgment.  Skin: Warm. Dry. No rash.           Assessment:       1. Flu vaccine need    2. Benign prostatic hyperplasia with urinary frequency    3. Insomnia, unspecified type    4. Anxiety    5. COPD exacerbation    6. Encounter for screening for malignant neoplasm of prostate    7. Atherosclerosis of abdominal aorta        Plan:       Flu vaccine need  -     influenza (adjuvanted) (Fluad) 45 mcg/0.5 mL IM vaccine (> or = 66 yo) 0.5 mL    Benign prostatic hyperplasia with urinary frequency  -     PSA, Screening; Future; Expected date: 10/23/2024    Insomnia, unspecified type  -     ALPRAZolam (XANAX) 1 MG tablet; Take 1 tablet (1 mg total) by mouth  nightly as needed for Anxiety.  Dispense: 30 tablet; Refill: 2    Anxiety  -     ALPRAZolam (XANAX) 1 MG tablet; Take 1 tablet (1 mg total) by mouth nightly as needed for Anxiety.  Dispense: 30 tablet; Refill: 2    COPD exacerbation  -     NEBULIZER FOR HOME USE  -     dexAMETHasone (DECADRON) 4 MG Tab; Take 1 tablet (4 mg total) by mouth every 12 (twelve) hours.  Dispense: 14 tablet; Refill: 1  -     albuterol (PROVENTIL) 2.5 mg /3 mL (0.083 %) nebulizer solution; Take 3 mLs (2.5 mg total) by nebulization every 6 (six) hours as needed for Wheezing. Rescue  Dispense: 150 mL; Refill: 5  -     promethazine-dextromethorphan (PROMETHAZINE-DM) 6.25-15 mg/5 mL Syrp; Take 5 mLs by mouth every 6 (six) hours as needed (coughing).  Dispense: 150 mL; Refill: 0    Encounter for screening for malignant neoplasm of prostate  -     PSA, Screening; Future; Expected date: 10/23/2024    Atherosclerosis of abdominal aorta  Comments:  LDL at goal continue to monitor      Assessment & Plan    Assessed chronic cough, noting previous allergy tests and ENT evaluation were negative  Considered possibility of undiagnosed sinus condition; contemplated need for CT scan  Evaluated lung function, noting significant wheezing indicative of active airway inflammation  Reviewed recent lab results, including thyroid, kidney, liver function, and blood count, all within normal limits  Noted recent normal cancer screening test results  Considered treatment options for cough, wheezing, and shortness of breath, focusing on addressing underlying lung condition    RESPIRATORY CONDITION:  - Emphasized the importance of treating the underlying lung condition rather than just managing cough symptoms.  - Discussed the difference between wheezing in active airway inflammation versus lack of wheezing in advanced emphysema due to air trapping.  - Started new cough medicine.  - Started antihistamine to help with mucus production.  - Considered starting  antibiotic.    ANXIETY:  - Continued Xanax at current dose.    FLU VACCINATION:  - Ordered flu vaccine to be administered at the pharmacy.    PROSTATE CANCER SCREENING:  - Ordered prostate cancer screening test.    FOLLOW UP:  - Follow up in July for neck blood test.           Medication List with Changes/Refills   New Medications    ALBUTEROL (PROVENTIL) 2.5 MG /3 ML (0.083 %) NEBULIZER SOLUTION    Take 3 mLs (2.5 mg total) by nebulization every 6 (six) hours as needed for Wheezing. Rescue    DEXAMETHASONE (DECADRON) 4 MG TAB    Take 1 tablet (4 mg total) by mouth every 12 (twelve) hours.    PROMETHAZINE-DEXTROMETHORPHAN (PROMETHAZINE-DM) 6.25-15 MG/5 ML SYRP    Take 5 mLs by mouth every 6 (six) hours as needed (coughing).   Current Medications    ALBUTEROL (PROVENTIL/VENTOLIN HFA) 90 MCG/ACTUATION INHALER    INHALE 2 PUFFS BY MOUTH EVERY 6 HOURS AS NEEDED    ASPIRIN (ECOTRIN) 81 MG EC TABLET    Take 81 mg by mouth once daily.    BREZTRI AEROSPHERE 160-9-4.8 MCG/ACTUATION HFAA    INHALE 1 PUFF BY MOUTH TWICE DAILY    HYDROCORTISONE 2.5 % CREAM    Apply topically 2 (two) times daily.    LEVOCETIRIZINE (XYZAL) 5 MG TABLET    TAKE 1 TABLET BY MOUTH ONCE DAILY IN THE EVENING   Changed and/or Refilled Medications    Modified Medication Previous Medication    ALPRAZOLAM (XANAX) 1 MG TABLET ALPRAZolam (XANAX) 1 MG tablet       Take 1 tablet (1 mg total) by mouth nightly as needed for Anxiety.    Take 1 tablet (1 mg total) by mouth nightly as needed for Anxiety.    GABAPENTIN (NEURONTIN) 600 MG TABLET gabapentin (NEURONTIN) 600 MG tablet       Take 1 tablet by mouth twice daily    Take 1 tablet by mouth twice daily        This note was generated with the assistance of ambient listening technology. Verbal consent was obtained by the patient and accompanying visitor(s) for the recording of patient appointment to facilitate this note. I attest to having reviewed and edited the generated note for accuracy, though some syntax  or spelling errors may persist. Please contact the author of this note for any clarification.

## 2024-11-05 ENCOUNTER — TELEPHONE (OUTPATIENT)
Dept: PRIMARY CARE CLINIC | Facility: CLINIC | Age: 74
End: 2024-11-05
Payer: MEDICARE

## 2024-11-05 NOTE — TELEPHONE ENCOUNTER
Called Ty with [employer] and she informed me that DME went to the patient's house and his nebulizer is fine.

## 2024-11-05 NOTE — TELEPHONE ENCOUNTER
----- Message from Alissa sent at 11/5/2024 10:22 AM CST -----  Contact: Ty Mcdaniels   Ms Ty dobbins PH is calling in regards to needing Mia to please call her back. It is in regards to the order faxed over to her for the nebulizer machine. Please call and advise. Thank you

## 2024-11-14 ENCOUNTER — PATIENT MESSAGE (OUTPATIENT)
Dept: PAIN MEDICINE | Facility: CLINIC | Age: 74
End: 2024-11-14
Payer: MEDICARE

## 2024-11-19 ENCOUNTER — HOSPITAL ENCOUNTER (OUTPATIENT)
Dept: RADIOLOGY | Facility: OTHER | Age: 74
Discharge: HOME | End: 2024-11-19
Payer: MEDICARE

## 2024-11-19 ENCOUNTER — OFFICE VISIT (OUTPATIENT)
Dept: PAIN MEDICINE | Facility: CLINIC | Age: 74
End: 2024-11-19
Payer: MEDICARE

## 2024-11-19 VITALS
DIASTOLIC BLOOD PRESSURE: 66 MMHG | RESPIRATION RATE: 12 BRPM | HEIGHT: 70 IN | WEIGHT: 165.38 LBS | SYSTOLIC BLOOD PRESSURE: 124 MMHG | HEART RATE: 86 BPM | BODY MASS INDEX: 23.68 KG/M2 | OXYGEN SATURATION: 100 %

## 2024-11-19 DIAGNOSIS — Z91.81 STATUS POST FALL: ICD-10-CM

## 2024-11-19 DIAGNOSIS — M62.838 MUSCLE SPASM: ICD-10-CM

## 2024-11-19 DIAGNOSIS — Z91.81 STATUS POST FALL: Primary | ICD-10-CM

## 2024-11-19 DIAGNOSIS — M79.18 MYOFASCIAL PAIN: ICD-10-CM

## 2024-11-19 PROCEDURE — 99999 PR PBB SHADOW E&M-EST. PATIENT-LVL V: CPT | Mod: PBBFAC,,,

## 2024-11-19 PROCEDURE — 3288F FALL RISK ASSESSMENT DOCD: CPT | Mod: CPTII,S$GLB,,

## 2024-11-19 PROCEDURE — 72070 X-RAY EXAM THORAC SPINE 2VWS: CPT | Mod: 26,,, | Performed by: RADIOLOGY

## 2024-11-19 PROCEDURE — 3008F BODY MASS INDEX DOCD: CPT | Mod: CPTII,S$GLB,,

## 2024-11-19 PROCEDURE — 99214 OFFICE O/P EST MOD 30 MIN: CPT | Mod: S$GLB,,,

## 2024-11-19 PROCEDURE — 1125F AMNT PAIN NOTED PAIN PRSNT: CPT | Mod: CPTII,S$GLB,,

## 2024-11-19 PROCEDURE — 3078F DIAST BP <80 MM HG: CPT | Mod: CPTII,S$GLB,,

## 2024-11-19 PROCEDURE — 1160F RVW MEDS BY RX/DR IN RCRD: CPT | Mod: CPTII,S$GLB,,

## 2024-11-19 PROCEDURE — 1159F MED LIST DOCD IN RCRD: CPT | Mod: CPTII,S$GLB,,

## 2024-11-19 PROCEDURE — 3074F SYST BP LT 130 MM HG: CPT | Mod: CPTII,S$GLB,,

## 2024-11-19 PROCEDURE — 72114 X-RAY EXAM L-S SPINE BENDING: CPT | Mod: TC,FY

## 2024-11-19 PROCEDURE — 72114 X-RAY EXAM L-S SPINE BENDING: CPT | Mod: 26,,, | Performed by: RADIOLOGY

## 2024-11-19 PROCEDURE — 72070 X-RAY EXAM THORAC SPINE 2VWS: CPT | Mod: TC,FY

## 2024-11-19 PROCEDURE — 1101F PT FALLS ASSESS-DOCD LE1/YR: CPT | Mod: CPTII,S$GLB,,

## 2024-11-19 RX ORDER — METHOCARBAMOL 500 MG/1
500 TABLET, FILM COATED ORAL 4 TIMES DAILY PRN
Qty: 80 TABLET | Refills: 0 | Status: SHIPPED | OUTPATIENT
Start: 2024-11-19

## 2024-11-19 NOTE — PROGRESS NOTES
Interventional Pain Management - Established Visit  Follow-Up       Interval History 11/19/2024:  Uriah Mills returns to clinic for follow-up of chronic lower back pain. He present with his wife, Yamilex, who aids in providing history. He states that about 10 days ago, he was sitting up in bed watching TV. He had taken a Xanax earlier. He is not sure if he passed out or fell asleep, but he fell out of bed. He hit his right upper back/side, right hand and right ear on his bedside table. The fall woke him up. They did not present to the ED. He denies headaches, blurred vision, change in memory or any other signs of head trauma. He is having new pain in the upper back. He denies recent health changes. He denies new onset fever/night sweats, urinary incontinence, bowel incontinence, significant weight changes, significant motor weakness or changes, or loss of sensations. His pain today is 9/10.      Interval History 7/9/2023:  Patient is here for follow up after BL SIJ and right Piriformis injection on 5/52024 with 0% pain relief.  Today reports pain is mostly in his right lower back, worse with prolonged sitting, rotation of his back.  Denies leg pain or groin pain, denies new weakness, numbness, falls.  Patient is not participating in PT or HEP because it worsens his pain.  Today, pain is 5/10.      Interval History 5/10/2024:  Uriah Mills returns to clinic s/p right then left  L3, L4, L5 RFA on 4/12/2024 and 4/26/2024 respectively.  He reports 80% relief from these procedures. He also reports 80% relief at least from the previous bilateral SIJ injections. He reports return of his bilateral SIJ pain right > left with a new pain in his right buttock that radiates down the posterior aspect of his right thigh.  He describes the pain as shooting, dull.  Exacerbating factors: moving from sitting to standing, sitting up in bed. Mitigating factors: resting.  He has previously received Noco from his PCP last fill  10/31/2023.  He is inquiring about restarting pain medications.  He does not participate in HEP or PT currently. The patient denies fever/night sweats, urinary incontinence, bowel incontinence, significant weight changes, significant motor weakness or changes, or loss of sensations. His pain today is 8/10.     Interval History 1/26/2024:  The patient is here today for follow up of back pain. He is s/p bilateral SI joint injection with 60% relief. His primary complaint today is lower back pain, higher than previous injection site. It is stabbing and throbbing in nature. No radiation into the legs. There is associated stiffness. He has a lot of pain and stiffness first thing in the morning. He has completed PT multiple times without much improvement in symptoms. He continues with HEP. His pain today is 5/10.    Interval History 12/15/2023:  Uriah Mills presents to the clinic for a follow-up appointment for low back and hip pain. Since the last visit, Uriah Mills states the pain has been persistant. Current pain intensity is 8/10 but he reports it is usually a 10/10. He has been tolerating his pain over the past few years but had a flare up in October which led him to schedule this appointment.     Interval History (12/14/21):  He returns today for follow up.  He reports that bilateral sacroiliac joint steroid injections has been helpful for the bilateral low back pain.  He reports roughly 60% relief.  He is happy with these results and does not feel that further treatment needed at this time.  He is not interested in participating in further physical therapy.        Interval History (11/11/21):  He returns today for follow up.  He reports that bilateral lumbar radiofrequency ablations has been helpful for the bilateral lumbar pain.  He reports 95% relief of lumbar back pain.  He states that he continues to have pain in the bilateral lumbosacral regions over the bilateral sacroiliac joints.  This pain radiates to  the bilateral buttocks and lateral hips.  He denies any associated numbness, tingling, weakness, bowel bladder dysfunction.        Interval History (7/1/21):  He returns today for follow up.  He reports that bilateral S1 transforaminal epidural steroid injection has been helpful for the bilateral low back and lower extremity pain.  He reports roughly 40% relief.  He continues to have some back pain in the bilateral lower lumbar/lumbosacral region.  The pain does not radiate.  He denies any other changes in the quality or location was pain.  He denies any new or worsening symptoms.        Interval History (1/19/21):  He returns today for follow up.  He reports that his pain is unchanged in quality location since last encounter.  He denies any new or worsening symptoms.  We have received records from previous pain management provider.  Records indicate the patient has undergone multiple lumbar interventional procedures including sacroiliac joint injections, lumbar radiofrequency ablations, and bilateral L4 and L5 transforaminal epidural steroid injections.  Patient denies any significant relief from these procedures.        Initial Encounter (1/5/21):  Uriah Mills is a 71 y.o. male who presents today with chronic bilateral low back and lower extremity pain.  Patient has had this problem for many years.  No specific inciting event or injury noted.  The pain is located across the lower lumbar region radiate to the bilateral hips and posterior thighs.  Patient reports associated numbness in the right posterior thigh.  He denies any pain or numbness distal to the knees.  He denies any focal weakness or bowel bladder dysfunction.  The pain is constant worse with activity.  Patient reports undergoing multiple interventional procedures in the past with Dr. Kin Palomino.  He states that no these procedures were particularly helpful.  He was also tried on multiple opioid pain medications.  Some of these help but they also  cause significant constipation and therefore these medications were not continued.  More recently, patient has been taking tramadol prescribed by his primary care physician.  He states this medication does not cause any problems but also did not provide any significant relief..   This pain is described in detail below.    Pain Disability Index Review:      11/19/2024    11:24 AM 7/9/2024    11:04 AM 5/10/2024     1:21 PM   Last 3 PDI Scores   Pain Disability Index (PDI) 67 [phone] 56       Pain Medications:    - Opioids: Lorcet (Hydrocodone/Acetaminophen)  Hydrocodone 2 weeks since he took any, wasn't helping pain much  gabapentin    Opioid Contract: no     report:  Reviewed and inconsistent with medication use as prescribed. (Pt report he has stopped taking it).    Pain Procedures:   October 2021: RFA of bilateral L3, L4, L5  June 2021: Transforaminal epidural at bilateral S1  December 2021: Bilateral sacroiliac joint injection   1/11/24 B/L SI joint injections- 80% relief  4/12/2024 - Right L3, L4, L5 RFA - 80% relief  4/26/2024 - Left L3, L4, L5 RFA - 80% relief  5/24/2024: Bilateral Sacroiliac Joint Injection under Fluoroscopic Guidance and Right Piriformis Muscle Injection under Fluoroscopic Guidance    Physical Therapy/Home Exercise: no (has tried accupuncture without significant relief)    Imaging: MRI lumbar spine 2020    Alignment: Normal.     Vertebrae: 5 lumbar-type vertebral bodies. No aggressive marrow replacement process or fracture.     Discs: Satisfactory height.  Mild diffuse desiccation of disc material predominantly L4-L5 and L5-S1     Cord: Normal. Conus terminates at T12-L1.     Degenerative findings:     T12-L1: There is no focal disc herniation.No significant central canal narrowing . No significant neural foraminal narrowing.     L1-L2: There is no focal disc herniation.No significant central canal narrowing . No significant neural foraminal narrowing.     L2-L3: There is no focal disc  herniation.No significant central canal narrowing . No significant neural foraminal narrowing.     L3-L4: There is no focal disc herniation.No significant central canal narrowing . No significant neural foraminal narrowing.     L4-L5: Mild broad-based bulging of disc material with small superimposed central protrusion.  No significant central canal narrowing . No significant neural foraminal narrowing.     L5-S1: Mild broad-based bulging of disc material with small superimposed central protrusion.  No significant central canal narrowing . No significant neural foraminal narrowing.     Paraspinal muscles & soft tissues: Unremarkable.     Impression:     Degenerative changes disc space level 4-L5 and L5-S1. no significant central or foraminal stenosis.    Allergies: Review of patient's allergies indicates:  No Known Allergies    Current Medications:   Current Outpatient Medications   Medication Sig Dispense Refill    albuterol (PROVENTIL) 2.5 mg /3 mL (0.083 %) nebulizer solution Take 3 mLs (2.5 mg total) by nebulization every 6 (six) hours as needed for Wheezing. Rescue 150 mL 5    albuterol (PROVENTIL/VENTOLIN HFA) 90 mcg/actuation inhaler INHALE 2 PUFFS BY MOUTH EVERY 6 HOURS AS NEEDED 25.5 g 2    ALPRAZolam (XANAX) 1 MG tablet Take 1 tablet (1 mg total) by mouth nightly as needed for Anxiety. 30 tablet 2    aspirin (ECOTRIN) 81 MG EC tablet Take 81 mg by mouth once daily.      BREZTRI AEROSPHERE 160-9-4.8 mcg/actuation HFAA INHALE 1 PUFF BY MOUTH TWICE DAILY 11 g 2    dexAMETHasone (DECADRON) 4 MG Tab Take 1 tablet (4 mg total) by mouth every 12 (twelve) hours. 14 tablet 1    gabapentin (NEURONTIN) 600 MG tablet Take 1 tablet by mouth twice daily 60 tablet 0    hydrocortisone 2.5 % cream Apply topically 2 (two) times daily. 28 g 2    levocetirizine (XYZAL) 5 MG tablet TAKE 1 TABLET BY MOUTH ONCE DAILY IN THE EVENING 90 tablet 3    methocarbamoL (ROBAXIN) 500 MG Tab Take 1 tablet (500 mg total) by mouth 4 (four)  times daily as needed (muscle spasm). 80 tablet 0     No current facility-administered medications for this visit.     Facility-Administered Medications Ordered in Other Visits   Medication Dose Route Frequency Provider Last Rate Last Admin    0.9%  NaCl infusion   Intravenous Continuous Rory Griffith MD        0.9%  NaCl infusion   Intravenous Continuous Jovanny Gurrola MD        ALPRAZolam dissolvable tablet 1 mg  1 mg Oral Once PRN Colt Lenz Jr., MD        BUPivacaine (PF) 0.25% (2.5 mg/ml) injection 25 mg  10 mL Intramuscular Once Colt Lenz Jr., MD        ceFAZolin 2 g in dextrose 5 % in water (D5W) 100 mL IVPB (MB+)  2 g Intravenous On Call Procedure Rory Griffith MD        dexAMETHasone sodium phos (PF) injection 10 mg  10 mg Epidural Once Colt Lenz Jr., MD        LIDOcaine (PF) 10 mg/ml (1%) injection 10 mg  1 mL Other Once Colt Lenz Jr., MD        LIDOcaine (PF) 10 mg/ml (1%) injection 10 mg  1 mL Intradermal Once PRN Rory Griffith MD        LIDOcaine HCL 20 mg/ml (2%) injection 10 mL  10 mL Other Once Colt Lenz Jr., MD        LIDOcaine HCL 20 mg/ml (2%) injection 10 mL  10 mL Other Once Colt Lenz Jr., MD           REVIEW OF SYSTEMS:    GENERAL:  No weight loss, malaise or fevers.  HEENT:  Negative for frequent or significant headaches.  NECK:  Negative for lumps, goiter, pain and significant neck swelling.  RESPIRATORY:  Negative for cough, wheezing or shortness of breath.  CARDIOVASCULAR:  Negative for chest pain, leg swelling or palpitations.  GI:  Negative for abdominal discomfort, blood in stools or black stools or change in bowel habits.  MUSCULOSKELETAL:  See HPI.  SKIN:  Negative for lesions, rash, and itching.  PSYCH:  Negative for sleep disturbance, mood disorder and recent psychosocial stressors.  HEMATOLOGY/LYMPHOLOGY:  Negative for prolonged bleeding, bruising easily or swollen nodes.  NEURO:   No history of headaches,  syncope, paralysis, seizures or tremors.  All other reviewed and negative other than HPI.    Past Medical History:  Past Medical History:   Diagnosis Date    Allergy     Arthritis     BPH (benign prostatic hyperplasia)     Carpal tunnel syndrome     Chronic hip pain, bilateral     Chronic low back pain     COPD (chronic obstructive pulmonary disease)     DDD (degenerative disc disease), lumbar     Dorsalgia     Gastrointestinal disease     Lumbar radiculopathy     Lumbar spondylosis     Mass of right wrist 06/05/2024    Osteoarthritis of both hips     Osteoarthritis of spine     Skin cancer        Past Surgical History:  Past Surgical History:   Procedure Laterality Date    APPENDECTOMY      CARPAL TUNNEL RELEASE Right 08/12/2019    Procedure: RELEASE, CARPAL TUNNEL right;  Surgeon: Roopa Hanna MD;  Location: [facility];  Service: Orthopedics;  Laterality: Right;    COLONOSCOPY      COLONOSCOPY      COLONOSCOPY N/A 9/18/2024    Procedure: COLONOSCOPY;  Surgeon: Lake Galeana MD;  Location: [facility];  Service: Endoscopy;  Laterality: N/A;    CYSTOSCOPY WITH INSERTION OF MINIMALLY INVASIVE IMPLANT TO ENLARGE PROSTATIC URETHRA N/A 11/29/2023    Procedure: CYSTOSCOPY, WITH INSERTION OF UROLIFT IMPLANT;  Surgeon: Rory Griffith MD;  Location: [facility];  Service: Urology;  Laterality: N/A;    CYSTOSCOPY, WITH INSERTION OF UROLIFT IMPLANT  11/29/2023    EPIDURAL STEROID INJECTION INTO LUMBAR SPINE Bilateral 06/17/2021    Bilateral ALVIN per  06/17/2021    EXCISION OF GANGLION OF WRIST Right 06/05/2024    Procedure: RIGHT VOLAR WRIST GANGLION CYST EXCISION;  Surgeon: Roopa Hanna MD;  Location: [facility];  Service: Orthopedics;  Laterality: Right;  MAC/REGIONAL    GASTRIC FUNDOPLICATION      HERNIA REPAIR  1990s    INJECTION OF ANESTHETIC AGENT AROUND MEDIAL BRANCH NERVES INNERVATING LUMBAR FACET JOINT Bilateral 07/15/2021    Procedure: Block-nerve-medial branch-lumbar---BILATERAL L3,  L4, L5;  Surgeon: Colt Lenz Jr., MD;  Location: [facility];  Service: Pain Management;  Laterality: Bilateral;    INJECTION OF ANESTHETIC AGENT AROUND MEDIAL BRANCH NERVES INNERVATING LUMBAR FACET JOINT Bilateral 09/23/2021    Procedure: Block-nerve-medial branch-lumbar---BILATERAL L3, L4, L5;  Surgeon: Colt Lenz Jr., MD;  Location: [facility];  Service: Pain Management;  Laterality: Bilateral;    INJECTION OF ANESTHETIC AGENT AROUND NERVE Bilateral 02/16/2024    Procedure: BLOCK, NERVE BILATERAL L3, 4, 5 MEDIAL BRANCH;  Surgeon: Jeanmarie Jauregui MD;  Location: [facility];  Service: Pain Management;  Laterality: Bilateral;  [phone]    INJECTION OF ANESTHETIC AGENT AROUND NERVE Bilateral 03/08/2024    Procedure: BLOCK, NERVE BILATERAL L3, L4, AND L5 MEDIAL BRANCH;  Surgeon: Jeanmarie Jauregui MD;  Location: [facility];  Service: Pain Management;  Laterality: Bilateral;  [phone]    INJECTION OF JOINT Bilateral 12/02/2021    Procedure: Injection, Joint---BILATERAL SACROILIAC INJECTION;  Surgeon: Colt Lenz Jr., MD;  Location: [facility];  Service: Pain Management;  Laterality: Bilateral;    INJECTION OF JOINT N/A 05/24/2024    Procedure: INJECTION, BILATERAL SI AND RIGHT PIRIFORMIS;  Surgeon: Jeanmarie Jauregui MD;  Location: [facility];  Service: Pain Management;  Laterality: N/A;  [phone]  2 WK F/U BRITANY    INJECTION OF STEROID Left 08/12/2019    Procedure: INJECTION, STEROID;  Surgeon: Roopa Hanna MD;  Location: [facility] OR;  Service: Orthopedics;  Laterality: Left;    INJECTION, SACROILIAC JOINT Bilateral 01/11/2024    Procedure: INJECTION,SACROILIAC JOINT BILATERAL;  Surgeon: Jeanmarie Jauregui MD;  Location: [facility];  Service: Pain Management;  Laterality: Bilateral;  [phone]    PYELOGRAM, RETROGRADE (Bilateral)    11/29/2023    RADIOFREQUENCY ABLATION Left 10/14/2021    Procedure: Radiofrequency Ablation---LEFT L3, L4, L5;   Surgeon: Colt Lenz Jr., MD;  Location: [facility];  Service: Pain Management;  Laterality: Left;    RADIOFREQUENCY ABLATION Right 10/28/2021    Procedure: Radiofrequency Ablation---RIGHT L3, L4, L5;  Surgeon: Colt Lenz Jr., MD;  Location: [facility];  Service: Pain Management;  Laterality: Right;    RADIOFREQUENCY ABLATION Right 04/12/2024    Procedure: RADIOFREQUENCY ABLATION RIGHT L3, L4, AND L5 1 OF 2;  Surgeon: Jeanmarie Jauregui MD;  Location: [facility];  Service: Pain Management;  Laterality: Right;  [phone]    RADIOFREQUENCY ABLATION Left 04/26/2024    Procedure: RADIOFREQUENCY ABLATION LEFT L3, L4, AND L5 2 OF 2;  Surgeon: Jeanmarie Jauregui MD;  Location: [facility];  Service: Pain Management;  Laterality: Left;  [phone]    RETROGRADE PYELOGRAPHY Bilateral 11/29/2023    Procedure: PYELOGRAM, RETROGRADE;  Surgeon: Rory Griffith MD;  Location: [facility] OR;  Service: Urology;  Laterality: Bilateral;    ROTATOR CUFF REPAIR Right     SACROILIAC JOINT INJECTION Bilateral 12/02/2021    SKIN CANCER EXCISION Left     arm    TRANSFORAMINAL EPIDURAL INJECTION OF STEROID Bilateral 06/17/2021    Procedure: Injection,steroid,epidural,transforaminal approach---BILATERAL S1;  Surgeon: Colt Lenz Jr., MD;  Location: [facility];  Service: Pain Management;  Laterality: Bilateral;    TURBT (TRANSURETHRAL RESECTION OF BLADDER TUMOR) N/A 11/29/2023    Procedure: TURBT (TRANSURETHRAL RESECTION OF BLADDER TUMOR);  Surgeon: Rory Griffith MD;  Location: [facility] OR;  Service: Urology;  Laterality: N/A;  with UroLift and with bilateral retrograde pyelograms    URBT (TRANSURETHRAL RESECTION OF BLADDER TUMOR)  11/29/2023       Family History:  Family History   Problem Relation Name Age of Onset    Diabetes Mother Marly Mills     No Known Problems Father         Social History:  Social History     Socioeconomic History    Marital status:    Tobacco Use    Smoking  "status: Every Day     Types: Cigars     Passive exposure: Current    Smokeless tobacco: Never    Tobacco comments:     8 small cigars per day down from 10.   Substance and Sexual Activity    Alcohol use: No    Drug use: Yes     Types: Benzodiazepines    Sexual activity: Not Currently     Partners: Female     Birth control/protection: None       OBJECTIVE:    /66 (BP Location: Left arm, Patient Position: Sitting)   Pulse 86   Resp 12   Ht 5' 10" (1.778 m)   Wt 75 kg (165 lb 5.5 oz)   SpO2 100%   BMI 23.72 kg/m²     PHYSICAL EXAMINATION:     General appearance: Well appearing, in no acute distress, alert and oriented x3.  Psych:  Mood and affect appropriate.  Skin: Skin color, texture, turgor normal, no rashes in both upper and lower body. Healing hematoma to the right axillary/right upper back. Well-healing excoriation to the right dorsal hand. Well healing abrasions to right ear.  Head/face:  Atraumatic, normocephalic. No palpable lymph nodes  Back: Straight leg raising in the sitting position is negative to radicular pain. Limited ROM without pain on extension or facet loading bilaterally. No pain to palpation over lumbar spine or facet joints. Tenderness to palpation about the right lumbar paraspinals. No pain to palpation over the cervical or thoracic spinous processes. No pain to palpation over cervical or thoracic facet joints. Pain to palpation over bilateral trapezius right > left. Pain to palpation over bilateral thoracic musculature and subscapulars.  Negative FADIR   Extremities: Peripheral joint ROM is full and pain free without obvious instability or laxity in all four extremities. No deformities, edema, or skin discoloration. Good capillary refill.  Musculoskeletal: Lower extremity strength is normal and symmetric.  No atrophy or tone abnormalities are noted. TTP over right piriformis with positive piriformis stretch test.   Lumbar Paraspinal tenderness on Right  Neuro: Bilateral upper and " lower extremity coordination and muscle stretch reflexes are physiologic and symmetric.  Plantar response are downgoing. No loss of sensation is noted.  Gait: Antalgic.        ASSESSMENT: 74 y.o. year old male with lower back and hip pain, consistent with sacroiliitis.     1. Status post fall  X-Ray Thoracic Spine 4 or more views    X-Ray Lumbar Complete Including Flex And Ext      2. Muscle spasm  methocarbamoL (ROBAXIN) 500 MG Tab      3. Myofascial pain            PLAN:     - I personally reviewed and interpreted relevant and pertinent imaging. Results were discussed with the patient today.   - Encouraged HEP and PT.   - Xrays Thoracic and Lumbar spine ordered today to r/o acute processes after fall.   - Methocarbamol 500 mg QID PRN for diffuse muscle spasms/myofascial pain.  - Counseled patient regarding the importance of physical therapy.  - Previously ordered UDS not performed and Pain contract not in chart.   - Patient does take other Benzos, this is the first time he has had an accident after taking Xanax.   - Keep follow-up with Dr Jauregui on 12/12/2024    The above plan and management options were discussed at length with patient. Patient is in agreement with the above and verbalized understanding.    Nataliia Graves NP   11/19/2024

## 2024-11-21 DIAGNOSIS — G89.29 CHRONIC BILATERAL LOW BACK PAIN WITH BILATERAL SCIATICA: ICD-10-CM

## 2024-11-21 DIAGNOSIS — M54.16 LUMBAR RADICULOPATHY: ICD-10-CM

## 2024-11-21 DIAGNOSIS — M54.41 CHRONIC BILATERAL LOW BACK PAIN WITH BILATERAL SCIATICA: ICD-10-CM

## 2024-11-21 DIAGNOSIS — M54.42 CHRONIC BILATERAL LOW BACK PAIN WITH BILATERAL SCIATICA: ICD-10-CM

## 2024-11-21 NOTE — TELEPHONE ENCOUNTER
No care due was identified.  [facility] Embedded Care Due Messages. Reference number: [id number].   11/21/2024 10:24:50 AM CST

## 2024-11-22 RX ORDER — GABAPENTIN 600 MG/1
600 TABLET ORAL 2 TIMES DAILY
Qty: 60 TABLET | Refills: 0 | Status: SHIPPED | OUTPATIENT
Start: 2024-11-22

## 2024-12-12 ENCOUNTER — OFFICE VISIT (OUTPATIENT)
Dept: SPINE | Facility: CLINIC | Age: 74
End: 2024-12-12
Payer: MEDICARE

## 2024-12-12 VITALS
TEMPERATURE: 97 F | SYSTOLIC BLOOD PRESSURE: 136 MMHG | DIASTOLIC BLOOD PRESSURE: 58 MMHG | HEART RATE: 79 BPM | WEIGHT: 165.38 LBS | BODY MASS INDEX: 23.72 KG/M2

## 2024-12-12 DIAGNOSIS — G89.4 CHRONIC PAIN SYNDROME: ICD-10-CM

## 2024-12-12 DIAGNOSIS — M47.816 LUMBAR SPONDYLOSIS: Primary | ICD-10-CM

## 2024-12-12 DIAGNOSIS — M51.360 DEGENERATION OF INTERVERTEBRAL DISC OF LUMBAR REGION WITH DISCOGENIC BACK PAIN: ICD-10-CM

## 2024-12-12 PROCEDURE — 1160F RVW MEDS BY RX/DR IN RCRD: CPT | Mod: CPTII,S$GLB,, | Performed by: ANESTHESIOLOGY

## 2024-12-12 PROCEDURE — 3288F FALL RISK ASSESSMENT DOCD: CPT | Mod: CPTII,S$GLB,, | Performed by: ANESTHESIOLOGY

## 2024-12-12 PROCEDURE — 3075F SYST BP GE 130 - 139MM HG: CPT | Mod: CPTII,S$GLB,, | Performed by: ANESTHESIOLOGY

## 2024-12-12 PROCEDURE — 99999 PR PBB SHADOW E&M-EST. PATIENT-LVL III: CPT | Mod: PBBFAC,,, | Performed by: ANESTHESIOLOGY

## 2024-12-12 PROCEDURE — 1159F MED LIST DOCD IN RCRD: CPT | Mod: CPTII,S$GLB,, | Performed by: ANESTHESIOLOGY

## 2024-12-12 PROCEDURE — 80355 GABAPENTIN NON-BLOOD: CPT | Performed by: STUDENT IN AN ORGANIZED HEALTH CARE EDUCATION/TRAINING PROGRAM

## 2024-12-12 PROCEDURE — 1125F AMNT PAIN NOTED PAIN PRSNT: CPT | Mod: CPTII,S$GLB,, | Performed by: ANESTHESIOLOGY

## 2024-12-12 PROCEDURE — 3078F DIAST BP <80 MM HG: CPT | Mod: CPTII,S$GLB,, | Performed by: ANESTHESIOLOGY

## 2024-12-12 PROCEDURE — 1101F PT FALLS ASSESS-DOCD LE1/YR: CPT | Mod: CPTII,S$GLB,, | Performed by: ANESTHESIOLOGY

## 2024-12-12 PROCEDURE — 3008F BODY MASS INDEX DOCD: CPT | Mod: CPTII,S$GLB,, | Performed by: ANESTHESIOLOGY

## 2024-12-12 PROCEDURE — 99214 OFFICE O/P EST MOD 30 MIN: CPT | Mod: GC,S$GLB,, | Performed by: ANESTHESIOLOGY

## 2024-12-12 RX ORDER — HYDROCODONE BITARTRATE AND ACETAMINOPHEN 5; 325 MG/1; MG/1
1 TABLET ORAL 2 TIMES DAILY PRN
Qty: 60 TABLET | Refills: 0 | Status: CANCELLED | OUTPATIENT
Start: 2024-12-12 | End: 2025-01-11

## 2024-12-12 NOTE — H&P (VIEW-ONLY)
Chronic patient Established Note (Follow up visit)    Interval history 12/12/2024:   Uriah Mills is following up in the clinic for chronic lower back pain.  Patient continues to achy sore dull lower back pain that is distributed in a bandlike pattern with some of the pain radiating to his buttocks.  Patient said the pain is not traveling down his legs. The pain is worse with prolonged standing, walking and getting up from a sitting position. The lumbar paraspinal trigger point injections from his last visit provided limited relief. This pain was alleviated with RFA in the past. Patient is status post piriformis and SI joint injections with limited relief.  Patient is having limited pain relief with gabapentin and Robaxin. He denies new onset fever/night sweats, urinary incontinence, bowel incontinence, significant weight changes, significant motor weakness or changes, or loss of sensations. His pain today is 9/10.    Interval History 11/19/2024:  Uriah Mills returns to clinic for follow-up of chronic lower back pain. He present with his wife, Yamilex, who aids in providing history. He states that about 10 days ago, he was sitting up in bed watching TV. He had taken a Xanax earlier. He is not sure if he passed out or fell asleep, but he fell out of bed. He hit his right upper back/side, right hand and right ear on his bedside table. The fall woke him up. They did not present to the ED. He denies headaches, blurred vision, change in memory or any other signs of head trauma. He is having new pain in the upper back. He denies recent health changes. He denies new onset fever/night sweats, urinary incontinence, bowel incontinence, significant weight changes, significant motor weakness or changes, or loss of sensations. His pain today is 9/10.      Interval History 7/9/2023:  Patient is here for follow up after BL SIJ and right Piriformis injection on 5/52024 with 0% pain relief.  Today reports pain is mostly in  his right lower back, worse with prolonged sitting, rotation of his back.  Denies leg pain or groin pain, denies new weakness, numbness, falls.  Patient is not participating in PT or HEP because it worsens his pain.  Today, pain is 5/10.      Interval History 5/10/2024:  Uriah Mills returns to clinic s/p right then left  L3, L4, L5 RFA on 4/12/2024 and 4/26/2024 respectively.  He reports 80% relief from these procedures. He also reports 80% relief at least from the previous bilateral SIJ injections. He reports return of his bilateral SIJ pain right > left with a new pain in his right buttock that radiates down the posterior aspect of his right thigh.  He describes the pain as shooting, dull.  Exacerbating factors: moving from sitting to standing, sitting up in bed. Mitigating factors: resting.  He has previously received Noco from his PCP last fill 10/31/2023.  He is inquiring about restarting pain medications.  He does not participate in HEP or PT currently. The patient denies fever/night sweats, urinary incontinence, bowel incontinence, significant weight changes, significant motor weakness or changes, or loss of sensations. His pain today is 8/10.     Interval History 1/26/2024:  The patient is here today for follow up of back pain. He is s/p bilateral SI joint injection with 60% relief. His primary complaint today is lower back pain, higher than previous injection site. It is stabbing and throbbing in nature. No radiation into the legs. There is associated stiffness. He has a lot of pain and stiffness first thing in the morning. He has completed PT multiple times without much improvement in symptoms. He continues with HEP. His pain today is 5/10.    Interval History 12/15/2023:  Uriah Mills presents to the clinic for a follow-up appointment for low back and hip pain. Since the last visit, Uriah Mills states the pain has been persistant. Current pain intensity is 8/10 but he reports it is usually a  10/10. He has been tolerating his pain over the past few years but had a flare up in October which led him to schedule this appointment.     Interval History (12/14/21):  He returns today for follow up.  He reports that bilateral sacroiliac joint steroid injections has been helpful for the bilateral low back pain.  He reports roughly 60% relief.  He is happy with these results and does not feel that further treatment needed at this time.  He is not interested in participating in further physical therapy.     Interval History (11/11/21):  He returns today for follow up.  He reports that bilateral lumbar radiofrequency ablations has been helpful for the bilateral lumbar pain.  He reports 95% relief of lumbar back pain.  He states that he continues to have pain in the bilateral lumbosacral regions over the bilateral sacroiliac joints.  This pain radiates to the bilateral buttocks and lateral hips.  He denies any associated numbness, tingling, weakness, bowel bladder dysfunction.     Interval History (7/1/21):  He returns today for follow up.  He reports that bilateral S1 transforaminal epidural steroid injection has been helpful for the bilateral low back and lower extremity pain.  He reports roughly 40% relief.  He continues to have some back pain in the bilateral lower lumbar/lumbosacral region.  The pain does not radiate.  He denies any other changes in the quality or location was pain.  He denies any new or worsening symptoms.     Interval History (1/19/21):  He returns today for follow up.  He reports that his pain is unchanged in quality location since last encounter.  He denies any new or worsening symptoms.  We have received records from previous pain management provider.  Records indicate the patient has undergone multiple lumbar interventional procedures including sacroiliac joint injections, lumbar radiofrequency ablations, and bilateral L4 and L5 transforaminal epidural steroid injections.  Patient denies  any significant relief from these procedures.     Initial Encounter (1/5/21):  Uriah Mills is a 71 y.o. male who presents today with chronic bilateral low back and lower extremity pain.  Patient has had this problem for many years.  No specific inciting event or injury noted.  The pain is located across the lower lumbar region radiate to the bilateral hips and posterior thighs.  Patient reports associated numbness in the right posterior thigh.  He denies any pain or numbness distal to the knees.  He denies any focal weakness or bowel bladder dysfunction.  The pain is constant worse with activity.  Patient reports undergoing multiple interventional procedures in the past with Dr. Kin Palomino.  He states that no these procedures were particularly helpful.  He was also tried on multiple opioid pain medications.  Some of these help but they also cause significant constipation and therefore these medications were not continued.  More recently, patient has been taking tramadol prescribed by his primary care physician.  He states this medication does not cause any problems but also did not provide any significant relief..   This pain is described in detail below.    Pain Disability Index Review:      12/12/2024     8:05 AM 11/19/2024    11:24 AM 7/9/2024    11:04 AM   Last 3 PDI Scores   Pain Disability Index (PDI) 56 [phone]       Pain Medications:    - Opioids: Lorcet (Hydrocodone/Acetaminophen)  Hydrocodone 2 weeks since he took any, wasn't helping pain much  gabapentin    Opioid Contract: no     report:  Reviewed and inconsistent with medication use as prescribed. (Pt report he has stopped taking it).    Pain Procedures:   October 2021: RFA of bilateral L3, L4, L5  June 2021: Transforaminal epidural at bilateral S1  December 2021: Bilateral sacroiliac joint injection   1/11/24 B/L SI joint injections- 80% relief  4/12/2024 - Right L3, L4, L5 RFA - 80% relief  4/26/2024 - Left L3, L4, L5 RFA - 80%  relief  5/24/2024: Bilateral Sacroiliac Joint Injection under Fluoroscopic Guidance and Right Piriformis Muscle Injection under Fluoroscopic Guidance    Physical Therapy/Home Exercise: no (has tried accupuncture without significant relief)    Imaging: MRI lumbar spine 2020    Alignment: Normal.     Vertebrae: 5 lumbar-type vertebral bodies. No aggressive marrow replacement process or fracture.     Discs: Satisfactory height.  Mild diffuse desiccation of disc material predominantly L4-L5 and L5-S1     Cord: Normal. Conus terminates at T12-L1.     Degenerative findings:     T12-L1: There is no focal disc herniation.No significant central canal narrowing . No significant neural foraminal narrowing.     L1-L2: There is no focal disc herniation.No significant central canal narrowing . No significant neural foraminal narrowing.     L2-L3: There is no focal disc herniation.No significant central canal narrowing . No significant neural foraminal narrowing.     L3-L4: There is no focal disc herniation.No significant central canal narrowing . No significant neural foraminal narrowing.     L4-L5: Mild broad-based bulging of disc material with small superimposed central protrusion.  No significant central canal narrowing . No significant neural foraminal narrowing.     L5-S1: Mild broad-based bulging of disc material with small superimposed central protrusion.  No significant central canal narrowing . No significant neural foraminal narrowing.     Paraspinal muscles & soft tissues: Unremarkable.     Impression:     Degenerative changes disc space level 4-L5 and L5-S1. no significant central or foraminal stenosis.    Allergies: Review of patient's allergies indicates:  No Known Allergies    Current Medications:   Current Outpatient Medications   Medication Sig Dispense Refill    albuterol (PROVENTIL) 2.5 mg /3 mL (0.083 %) nebulizer solution Take 3 mLs (2.5 mg total) by nebulization every 6 (six) hours as needed for Wheezing.  Rescue 150 mL 5    albuterol (PROVENTIL/VENTOLIN HFA) 90 mcg/actuation inhaler INHALE 2 PUFFS BY MOUTH EVERY 6 HOURS AS NEEDED 25.5 g 2    ALPRAZolam (XANAX) 1 MG tablet Take 1 tablet (1 mg total) by mouth nightly as needed for Anxiety. 30 tablet 2    aspirin (ECOTRIN) 81 MG EC tablet Take 81 mg by mouth once daily.      BREZTRI AEROSPHERE 160-9-4.8 mcg/actuation HFAA INHALE 1 PUFF BY MOUTH TWICE DAILY 11 g 2    dexAMETHasone (DECADRON) 4 MG Tab Take 1 tablet (4 mg total) by mouth every 12 (twelve) hours. 14 tablet 1    gabapentin (NEURONTIN) 600 MG tablet Take 1 tablet by mouth twice daily 60 tablet 0    hydrocortisone 2.5 % cream Apply topically 2 (two) times daily. 28 g 2    levocetirizine (XYZAL) 5 MG tablet TAKE 1 TABLET BY MOUTH ONCE DAILY IN THE EVENING 90 tablet 3    methocarbamoL (ROBAXIN) 500 MG Tab Take 1 tablet (500 mg total) by mouth 4 (four) times daily as needed (muscle spasm). 80 tablet 0     No current facility-administered medications for this visit.     Facility-Administered Medications Ordered in Other Visits   Medication Dose Route Frequency Provider Last Rate Last Admin    0.9%  NaCl infusion   Intravenous Continuous Rory Griffith MD        0.9%  NaCl infusion   Intravenous Continuous Jovanny Gurrola MD        ALPRAZolam dissolvable tablet 1 mg  1 mg Oral Once PRN Colt Lenz Jr., MD        BUPivacaine (PF) 0.25% (2.5 mg/ml) injection 25 mg  10 mL Intramuscular Once Colt Lenz Jr., MD        ceFAZolin 2 g in dextrose 5 % in water (D5W) 100 mL IVPB (MB+)  2 g Intravenous On Call Procedure Rory Griffith MD        dexAMETHasone sodium phos (PF) injection 10 mg  10 mg Epidural Once Colt Lenz Jr., MD        LIDOcaine (PF) 10 mg/ml (1%) injection 10 mg  1 mL Other Once Colt Lenz Jr., MD        LIDOcaine (PF) 10 mg/ml (1%) injection 10 mg  1 mL Intradermal Once PRN Rory Griffith MD        LIDOcaine HCL 20 mg/ml (2%) injection 10 mL  10 mL Other Once  Colt Lenz Jr., MD        LIDOcaine HCL 20 mg/ml (2%) injection 10 mL  10 mL Other Once Colt Lenz Jr., MD           REVIEW OF SYSTEMS:    GENERAL:  No weight loss, malaise or fevers.  HEENT:  Negative for frequent or significant headaches.  NECK:  Negative for lumps, goiter, pain and significant neck swelling.  RESPIRATORY:  Negative for cough, wheezing or shortness of breath.  CARDIOVASCULAR:  Negative for chest pain, leg swelling or palpitations.  GI:  Negative for abdominal discomfort, blood in stools or black stools or change in bowel habits.  MUSCULOSKELETAL:  See HPI.  SKIN:  Negative for lesions, rash, and itching.  PSYCH:  Negative for sleep disturbance, mood disorder and recent psychosocial stressors.  HEMATOLOGY/LYMPHOLOGY:  Negative for prolonged bleeding, bruising easily or swollen nodes.  NEURO:   No history of headaches, syncope, paralysis, seizures or tremors.  All other reviewed and negative other than HPI.    Past Medical History:  Past Medical History:   Diagnosis Date    Allergy     Arthritis     BPH (benign prostatic hyperplasia)     Carpal tunnel syndrome     Chronic hip pain, bilateral     Chronic low back pain     COPD (chronic obstructive pulmonary disease)     DDD (degenerative disc disease), lumbar     Dorsalgia     Gastrointestinal disease     Lumbar radiculopathy     Lumbar spondylosis     Mass of right wrist 06/05/2024    Osteoarthritis of both hips     Osteoarthritis of spine     Skin cancer        Past Surgical History:  Past Surgical History:   Procedure Laterality Date    APPENDECTOMY      CARPAL TUNNEL RELEASE Right 08/12/2019    Procedure: RELEASE, CARPAL TUNNEL right;  Surgeon: Roopa Hanna MD;  Location: [facility] OR;  Service: Orthopedics;  Laterality: Right;    COLONOSCOPY      COLONOSCOPY      COLONOSCOPY N/A 9/18/2024    Procedure: COLONOSCOPY;  Surgeon: Lake Galeana MD;  Location: [facility];  Service: Endoscopy;  Laterality: N/A;     CYSTOSCOPY WITH INSERTION OF MINIMALLY INVASIVE IMPLANT TO ENLARGE PROSTATIC URETHRA N/A 11/29/2023    Procedure: CYSTOSCOPY, WITH INSERTION OF UROLIFT IMPLANT;  Surgeon: Rory Griffith MD;  Location: [facility] OR;  Service: Urology;  Laterality: N/A;    CYSTOSCOPY, WITH INSERTION OF UROLIFT IMPLANT  11/29/2023    EPIDURAL STEROID INJECTION INTO LUMBAR SPINE Bilateral 06/17/2021    Bilateral ALVIN per  06/17/2021    EXCISION OF GANGLION OF WRIST Right 06/05/2024    Procedure: RIGHT VOLAR WRIST GANGLION CYST EXCISION;  Surgeon: Roopa Hanna MD;  Location: [facility] OR;  Service: Orthopedics;  Laterality: Right;  MAC/REGIONAL    GASTRIC FUNDOPLICATION      HERNIA REPAIR  1990s    INJECTION OF ANESTHETIC AGENT AROUND MEDIAL BRANCH NERVES INNERVATING LUMBAR FACET JOINT Bilateral 07/15/2021    Procedure: Block-nerve-medial branch-lumbar---BILATERAL L3, L4, L5;  Surgeon: Colt Lenz Jr., MD;  Location: [facility] PAIN MGMT;  Service: Pain Management;  Laterality: Bilateral;    INJECTION OF ANESTHETIC AGENT AROUND MEDIAL BRANCH NERVES INNERVATING LUMBAR FACET JOINT Bilateral 09/23/2021    Procedure: Block-nerve-medial branch-lumbar---BILATERAL L3, L4, L5;  Surgeon: Colt Lenz Jr., MD;  Location: [facility] PAIN MGMT;  Service: Pain Management;  Laterality: Bilateral;    INJECTION OF ANESTHETIC AGENT AROUND NERVE Bilateral 02/16/2024    Procedure: BLOCK, NERVE BILATERAL L3, 4, 5 MEDIAL BRANCH;  Surgeon: Jeanmarie Jauregui MD;  Location: [facility] PAIN [facility]GT;  Service: Pain Management;  Laterality: Bilateral;  [phone]    INJECTION OF ANESTHETIC AGENT AROUND NERVE Bilateral 03/08/2024    Procedure: BLOCK, NERVE BILATERAL L3, L4, AND L5 MEDIAL BRANCH;  Surgeon: Jeanmarie Jauregui MD;  Location: [facility] PAIN MGT;  Service: Pain Management;  Laterality: Bilateral;  [phone]    INJECTION OF JOINT Bilateral 12/02/2021    Procedure: Injection, Joint---BILATERAL SACROILIAC INJECTION;  Surgeon: Colt Lenz Jr.,  MD;  Location: [facility] PAIN MGMT;  Service: Pain Management;  Laterality: Bilateral;    INJECTION OF JOINT N/A 05/24/2024    Procedure: INJECTION, BILATERAL SI AND RIGHT PIRIFORMIS;  Surgeon: Jeanmarie Jauregui MD;  Location: [facility] PAIN MGT;  Service: Pain Management;  Laterality: N/A;  [phone]  2 WK F/U ESHRAGHI    INJECTION OF STEROID Left 08/12/2019    Procedure: INJECTION, STEROID;  Surgeon: Roopa Hanna MD;  Location: [facility] OR;  Service: Orthopedics;  Laterality: Left;    INJECTION, SACROILIAC JOINT Bilateral 01/11/2024    Procedure: INJECTION,SACROILIAC JOINT BILATERAL;  Surgeon: Jeanmarie Jauregui MD;  Location: [facility] PAIN MGT;  Service: Pain Management;  Laterality: Bilateral;  [phone]    PYELOGRAM, RETROGRADE (Bilateral)    11/29/2023    RADIOFREQUENCY ABLATION Left 10/14/2021    Procedure: Radiofrequency Ablation---LEFT L3, L4, L5;  Surgeon: Colt Lenz Jr., MD;  Location: [facility] PAIN MGMT;  Service: Pain Management;  Laterality: Left;    RADIOFREQUENCY ABLATION Right 10/28/2021    Procedure: Radiofrequency Ablation---RIGHT L3, L4, L5;  Surgeon: Colt Lenz Jr., MD;  Location: [facility] PAIN MGMT;  Service: Pain Management;  Laterality: Right;    RADIOFREQUENCY ABLATION Right 04/12/2024    Procedure: RADIOFREQUENCY ABLATION RIGHT L3, L4, AND L5 1 OF 2;  Surgeon: Jeanmarie Jauregui MD;  Location: [facility] PAIN MGT;  Service: Pain Management;  Laterality: Right;  [phone]    RADIOFREQUENCY ABLATION Left 04/26/2024    Procedure: RADIOFREQUENCY ABLATION LEFT L3, L4, AND L5 2 OF 2;  Surgeon: Jeanmarie Jauregui MD;  Location: [facility] PAIN MGT;  Service: Pain Management;  Laterality: Left;  [phone]    RETROGRADE PYELOGRAPHY Bilateral 11/29/2023    Procedure: PYELOGRAM, RETROGRADE;  Surgeon: Rory Griffith MD;  Location: [facility] OR;  Service: Urology;  Laterality: Bilateral;    ROTATOR CUFF REPAIR Right     SACROILIAC JOINT INJECTION Bilateral 12/02/2021    SKIN CANCER EXCISION Left     arm     TRANSFORAMINAL EPIDURAL INJECTION OF STEROID Bilateral 06/17/2021    Procedure: Injection,steroid,epidural,transforaminal approach---BILATERAL S1;  Surgeon: Colt Lenz Jr., MD;  Location: [facility] PAIN MGMT;  Service: Pain Management;  Laterality: Bilateral;    TURBT (TRANSURETHRAL RESECTION OF BLADDER TUMOR) N/A 11/29/2023    Procedure: TURBT (TRANSURETHRAL RESECTION OF BLADDER TUMOR);  Surgeon: Rory Griffith MD;  Location: [facility] OR;  Service: Urology;  Laterality: N/A;  with UroLift and with bilateral retrograde pyelograms    URBT (TRANSURETHRAL RESECTION OF BLADDER TUMOR)  11/29/2023       Family History:  Family History   Problem Relation Name Age of Onset    Diabetes Mother Marly Mills     No Known Problems Father       Social History:  Social History     Socioeconomic History    Marital status:    Tobacco Use    Smoking status: Every Day     Types: Cigars     Passive exposure: Current    Smokeless tobacco: Never    Tobacco comments:     8 small cigars per day down from 10.   Substance and Sexual Activity    Alcohol use: No    Drug use: Yes     Types: Benzodiazepines    Sexual activity: Not Currently     Partners: Female     Birth control/protection: None     OBJECTIVE:    BP (!) 136/58 (Patient Position: Sitting)   Pulse 79   Temp 97.2 °F (36.2 °C)   Wt 75 kg (165 lb 5.5 oz)   BMI 23.72 kg/m²     PHYSICAL EXAMINATION:     General appearance: Well appearing, in no acute distress, alert and oriented x3.  Psych:  Mood and affect appropriate.  Skin: Skin color, texture, turgor normal, no rashes or lesions, in both upper and lower body.  Head/face:  Atraumatic, normocephalic. No palpable lymph nodes  Back: Straight leg raising in the sitting and supine positions is negative to radicular pain. TTP over lumbar facet joints. Limited ROM with pain on extension > flexion. Positive facet loading bilaterally.  Positive tenderness over bilateral SIJ and piriformis muscle. Positive thigh and  sacral thrust test B/L, Positive FABERE,Ganselin and Yeoman's test bilaterally. Negative FADIR   Extremities: Peripheral joint ROM is full and pain free without obvious instability or laxity in all four extremities. No deformities, edema, or skin discoloration. Good capillary refill.  Musculoskeletal: Lower extremity strength is normal and symmetric.  No atrophy or tone abnormalities are noted. TTP over right piriformis with positive piriformis stretch test.   Lumbar Paraspinal tenderness on Right  Neuro: Bilateral upper and lower extremity coordination and muscle stretch reflexes are physiologic and symmetric.  Plantar response are downgoing. No loss of sensation is noted.  Gait: Antalgic.    ASSESSMENT: 74 y.o. year old male with lower back and hip pain, consistent with sacroiliitis.     1. Lumbar spondylosis  Procedure Order to Pain Management    Pain Clinic Drug Screen      2. Chronic pain syndrome        3. Degeneration of intervertebral disc of lumbar region with discogenic back pain            PLAN:     - I personally reviewed and interpreted relevant and pertinent imaging. Results were discussed with the patient today.     - Images reviewed and discussed with patient.     - Schedule for B/L L3, L4 and L5 RFA.    - UDS from last visit was not completed. Collected at today's visit.     - Encouraged patient to continue HEP    - Pain contract completed today.     - Norco 5/325 mg BID as needed.    - Follow up 2 weeks after procedure with with NICO.        Everton Foote MD PGY-5  Interventional Pain Medicine Fellow   [facility]       I spent a total of 30 minutes on the day of the visit.  This includes face to face time and non-face to face time preparing to see the patient by reviewing previous labs/imaging, obtaining and/or reviewing separately obtained history, documenting clinical information in the electronic or other health record, independently interpreting results and communicating results  to the patient/family/caregiver.    Jeanmarie Jauregui

## 2024-12-12 NOTE — PROGRESS NOTES
Chronic patient Established Note (Follow up visit)    Interval history 12/12/2024:   Uriah Mills is following up in the clinic for chronic lower back pain.  Patient continues to achy sore dull lower back pain that is distributed in a bandlike pattern with some of the pain radiating to his buttocks.  Patient said the pain is not traveling down his legs. The pain is worse with prolonged standing, walking and getting up from a sitting position. The lumbar paraspinal trigger point injections from his last visit provided limited relief. This pain was alleviated with RFA in the past. Patient is status post piriformis and SI joint injections with limited relief.  Patient is having limited pain relief with gabapentin and Robaxin. He denies new onset fever/night sweats, urinary incontinence, bowel incontinence, significant weight changes, significant motor weakness or changes, or loss of sensations. His pain today is 9/10.    Interval History 11/19/2024:  Uriah Mills returns to clinic for follow-up of chronic lower back pain. He present with his wife, Yamilex, who aids in providing history. He states that about 10 days ago, he was sitting up in bed watching TV. He had taken a Xanax earlier. He is not sure if he passed out or fell asleep, but he fell out of bed. He hit his right upper back/side, right hand and right ear on his bedside table. The fall woke him up. They did not present to the ED. He denies headaches, blurred vision, change in memory or any other signs of head trauma. He is having new pain in the upper back. He denies recent health changes. He denies new onset fever/night sweats, urinary incontinence, bowel incontinence, significant weight changes, significant motor weakness or changes, or loss of sensations. His pain today is 9/10.      Interval History 7/9/2023:  Patient is here for follow up after BL SIJ and right Piriformis injection on 5/52024 with 0% pain relief.  Today reports pain is mostly in  his right lower back, worse with prolonged sitting, rotation of his back.  Denies leg pain or groin pain, denies new weakness, numbness, falls.  Patient is not participating in PT or HEP because it worsens his pain.  Today, pain is 5/10.      Interval History 5/10/2024:  Uriah Mills returns to clinic s/p right then left  L3, L4, L5 RFA on 4/12/2024 and 4/26/2024 respectively.  He reports 80% relief from these procedures. He also reports 80% relief at least from the previous bilateral SIJ injections. He reports return of his bilateral SIJ pain right > left with a new pain in his right buttock that radiates down the posterior aspect of his right thigh.  He describes the pain as shooting, dull.  Exacerbating factors: moving from sitting to standing, sitting up in bed. Mitigating factors: resting.  He has previously received Noco from his PCP last fill 10/31/2023.  He is inquiring about restarting pain medications.  He does not participate in HEP or PT currently. The patient denies fever/night sweats, urinary incontinence, bowel incontinence, significant weight changes, significant motor weakness or changes, or loss of sensations. His pain today is 8/10.     Interval History 1/26/2024:  The patient is here today for follow up of back pain. He is s/p bilateral SI joint injection with 60% relief. His primary complaint today is lower back pain, higher than previous injection site. It is stabbing and throbbing in nature. No radiation into the legs. There is associated stiffness. He has a lot of pain and stiffness first thing in the morning. He has completed PT multiple times without much improvement in symptoms. He continues with HEP. His pain today is 5/10.    Interval History 12/15/2023:  Uriah Mills presents to the clinic for a follow-up appointment for low back and hip pain. Since the last visit, Uriah Mills states the pain has been persistant. Current pain intensity is 8/10 but he reports it is usually a  10/10. He has been tolerating his pain over the past few years but had a flare up in October which led him to schedule this appointment.     Interval History (12/14/21):  He returns today for follow up.  He reports that bilateral sacroiliac joint steroid injections has been helpful for the bilateral low back pain.  He reports roughly 60% relief.  He is happy with these results and does not feel that further treatment needed at this time.  He is not interested in participating in further physical therapy.     Interval History (11/11/21):  He returns today for follow up.  He reports that bilateral lumbar radiofrequency ablations has been helpful for the bilateral lumbar pain.  He reports 95% relief of lumbar back pain.  He states that he continues to have pain in the bilateral lumbosacral regions over the bilateral sacroiliac joints.  This pain radiates to the bilateral buttocks and lateral hips.  He denies any associated numbness, tingling, weakness, bowel bladder dysfunction.     Interval History (7/1/21):  He returns today for follow up.  He reports that bilateral S1 transforaminal epidural steroid injection has been helpful for the bilateral low back and lower extremity pain.  He reports roughly 40% relief.  He continues to have some back pain in the bilateral lower lumbar/lumbosacral region.  The pain does not radiate.  He denies any other changes in the quality or location was pain.  He denies any new or worsening symptoms.     Interval History (1/19/21):  He returns today for follow up.  He reports that his pain is unchanged in quality location since last encounter.  He denies any new or worsening symptoms.  We have received records from previous pain management provider.  Records indicate the patient has undergone multiple lumbar interventional procedures including sacroiliac joint injections, lumbar radiofrequency ablations, and bilateral L4 and L5 transforaminal epidural steroid injections.  Patient denies  any significant relief from these procedures.     Initial Encounter (1/5/21):  Uriah Mills is a 71 y.o. male who presents today with chronic bilateral low back and lower extremity pain.  Patient has had this problem for many years.  No specific inciting event or injury noted.  The pain is located across the lower lumbar region radiate to the bilateral hips and posterior thighs.  Patient reports associated numbness in the right posterior thigh.  He denies any pain or numbness distal to the knees.  He denies any focal weakness or bowel bladder dysfunction.  The pain is constant worse with activity.  Patient reports undergoing multiple interventional procedures in the past with Dr. Kin Palomino.  He states that no these procedures were particularly helpful.  He was also tried on multiple opioid pain medications.  Some of these help but they also cause significant constipation and therefore these medications were not continued.  More recently, patient has been taking tramadol prescribed by his primary care physician.  He states this medication does not cause any problems but also did not provide any significant relief..   This pain is described in detail below.    Pain Disability Index Review:      12/12/2024     8:05 AM 11/19/2024    11:24 AM 7/9/2024    11:04 AM   Last 3 PDI Scores   Pain Disability Index (PDI) 56 [phone]       Pain Medications:    - Opioids: Lorcet (Hydrocodone/Acetaminophen)  Hydrocodone 2 weeks since he took any, wasn't helping pain much  gabapentin    Opioid Contract: no     report:  Reviewed and inconsistent with medication use as prescribed. (Pt report he has stopped taking it).    Pain Procedures:   October 2021: RFA of bilateral L3, L4, L5  June 2021: Transforaminal epidural at bilateral S1  December 2021: Bilateral sacroiliac joint injection   1/11/24 B/L SI joint injections- 80% relief  4/12/2024 - Right L3, L4, L5 RFA - 80% relief  4/26/2024 - Left L3, L4, L5 RFA - 80%  relief  5/24/2024: Bilateral Sacroiliac Joint Injection under Fluoroscopic Guidance and Right Piriformis Muscle Injection under Fluoroscopic Guidance    Physical Therapy/Home Exercise: no (has tried accupuncture without significant relief)    Imaging: MRI lumbar spine 2020    Alignment: Normal.     Vertebrae: 5 lumbar-type vertebral bodies. No aggressive marrow replacement process or fracture.     Discs: Satisfactory height.  Mild diffuse desiccation of disc material predominantly L4-L5 and L5-S1     Cord: Normal. Conus terminates at T12-L1.     Degenerative findings:     T12-L1: There is no focal disc herniation.No significant central canal narrowing . No significant neural foraminal narrowing.     L1-L2: There is no focal disc herniation.No significant central canal narrowing . No significant neural foraminal narrowing.     L2-L3: There is no focal disc herniation.No significant central canal narrowing . No significant neural foraminal narrowing.     L3-L4: There is no focal disc herniation.No significant central canal narrowing . No significant neural foraminal narrowing.     L4-L5: Mild broad-based bulging of disc material with small superimposed central protrusion.  No significant central canal narrowing . No significant neural foraminal narrowing.     L5-S1: Mild broad-based bulging of disc material with small superimposed central protrusion.  No significant central canal narrowing . No significant neural foraminal narrowing.     Paraspinal muscles & soft tissues: Unremarkable.     Impression:     Degenerative changes disc space level 4-L5 and L5-S1. no significant central or foraminal stenosis.    Allergies: Review of patient's allergies indicates:  No Known Allergies    Current Medications:   Current Outpatient Medications   Medication Sig Dispense Refill    albuterol (PROVENTIL) 2.5 mg /3 mL (0.083 %) nebulizer solution Take 3 mLs (2.5 mg total) by nebulization every 6 (six) hours as needed for Wheezing.  Rescue 150 mL 5    albuterol (PROVENTIL/VENTOLIN HFA) 90 mcg/actuation inhaler INHALE 2 PUFFS BY MOUTH EVERY 6 HOURS AS NEEDED 25.5 g 2    ALPRAZolam (XANAX) 1 MG tablet Take 1 tablet (1 mg total) by mouth nightly as needed for Anxiety. 30 tablet 2    aspirin (ECOTRIN) 81 MG EC tablet Take 81 mg by mouth once daily.      BREZTRI AEROSPHERE 160-9-4.8 mcg/actuation HFAA INHALE 1 PUFF BY MOUTH TWICE DAILY 11 g 2    dexAMETHasone (DECADRON) 4 MG Tab Take 1 tablet (4 mg total) by mouth every 12 (twelve) hours. 14 tablet 1    gabapentin (NEURONTIN) 600 MG tablet Take 1 tablet by mouth twice daily 60 tablet 0    hydrocortisone 2.5 % cream Apply topically 2 (two) times daily. 28 g 2    levocetirizine (XYZAL) 5 MG tablet TAKE 1 TABLET BY MOUTH ONCE DAILY IN THE EVENING 90 tablet 3    methocarbamoL (ROBAXIN) 500 MG Tab Take 1 tablet (500 mg total) by mouth 4 (four) times daily as needed (muscle spasm). 80 tablet 0     No current facility-administered medications for this visit.     Facility-Administered Medications Ordered in Other Visits   Medication Dose Route Frequency Provider Last Rate Last Admin    0.9%  NaCl infusion   Intravenous Continuous Rory Griffith MD        0.9%  NaCl infusion   Intravenous Continuous Jovanny Gurrola MD        ALPRAZolam dissolvable tablet 1 mg  1 mg Oral Once PRN Colt Lenz Jr., MD        BUPivacaine (PF) 0.25% (2.5 mg/ml) injection 25 mg  10 mL Intramuscular Once Colt Lenz Jr., MD        ceFAZolin 2 g in dextrose 5 % in water (D5W) 100 mL IVPB (MB+)  2 g Intravenous On Call Procedure Rory Griffith MD        dexAMETHasone sodium phos (PF) injection 10 mg  10 mg Epidural Once Colt Lenz Jr., MD        LIDOcaine (PF) 10 mg/ml (1%) injection 10 mg  1 mL Other Once Colt Lenz Jr., MD        LIDOcaine (PF) 10 mg/ml (1%) injection 10 mg  1 mL Intradermal Once PRN Rory Griffith MD        LIDOcaine HCL 20 mg/ml (2%) injection 10 mL  10 mL Other Once  Colt Lenz Jr., MD        LIDOcaine HCL 20 mg/ml (2%) injection 10 mL  10 mL Other Once Colt Lenz Jr., MD           REVIEW OF SYSTEMS:    GENERAL:  No weight loss, malaise or fevers.  HEENT:  Negative for frequent or significant headaches.  NECK:  Negative for lumps, goiter, pain and significant neck swelling.  RESPIRATORY:  Negative for cough, wheezing or shortness of breath.  CARDIOVASCULAR:  Negative for chest pain, leg swelling or palpitations.  GI:  Negative for abdominal discomfort, blood in stools or black stools or change in bowel habits.  MUSCULOSKELETAL:  See HPI.  SKIN:  Negative for lesions, rash, and itching.  PSYCH:  Negative for sleep disturbance, mood disorder and recent psychosocial stressors.  HEMATOLOGY/LYMPHOLOGY:  Negative for prolonged bleeding, bruising easily or swollen nodes.  NEURO:   No history of headaches, syncope, paralysis, seizures or tremors.  All other reviewed and negative other than HPI.    Past Medical History:  Past Medical History:   Diagnosis Date    Allergy     Arthritis     BPH (benign prostatic hyperplasia)     Carpal tunnel syndrome     Chronic hip pain, bilateral     Chronic low back pain     COPD (chronic obstructive pulmonary disease)     DDD (degenerative disc disease), lumbar     Dorsalgia     Gastrointestinal disease     Lumbar radiculopathy     Lumbar spondylosis     Mass of right wrist 06/05/2024    Osteoarthritis of both hips     Osteoarthritis of spine     Skin cancer        Past Surgical History:  Past Surgical History:   Procedure Laterality Date    APPENDECTOMY      CARPAL TUNNEL RELEASE Right 08/12/2019    Procedure: RELEASE, CARPAL TUNNEL right;  Surgeon: Roopa Hanna MD;  Location: [facility] OR;  Service: Orthopedics;  Laterality: Right;    COLONOSCOPY      COLONOSCOPY      COLONOSCOPY N/A 9/18/2024    Procedure: COLONOSCOPY;  Surgeon: Lake Galeana MD;  Location: [facility];  Service: Endoscopy;  Laterality: N/A;     CYSTOSCOPY WITH INSERTION OF MINIMALLY INVASIVE IMPLANT TO ENLARGE PROSTATIC URETHRA N/A 11/29/2023    Procedure: CYSTOSCOPY, WITH INSERTION OF UROLIFT IMPLANT;  Surgeon: Rory Griffith MD;  Location: [facility] OR;  Service: Urology;  Laterality: N/A;    CYSTOSCOPY, WITH INSERTION OF UROLIFT IMPLANT  11/29/2023    EPIDURAL STEROID INJECTION INTO LUMBAR SPINE Bilateral 06/17/2021    Bilateral ALVIN per  06/17/2021    EXCISION OF GANGLION OF WRIST Right 06/05/2024    Procedure: RIGHT VOLAR WRIST GANGLION CYST EXCISION;  Surgeon: Roopa Hanna MD;  Location: [facility] OR;  Service: Orthopedics;  Laterality: Right;  MAC/REGIONAL    GASTRIC FUNDOPLICATION      HERNIA REPAIR  1990s    INJECTION OF ANESTHETIC AGENT AROUND MEDIAL BRANCH NERVES INNERVATING LUMBAR FACET JOINT Bilateral 07/15/2021    Procedure: Block-nerve-medial branch-lumbar---BILATERAL L3, L4, L5;  Surgeon: Colt Lenz Jr., MD;  Location: [facility] PAIN MGMT;  Service: Pain Management;  Laterality: Bilateral;    INJECTION OF ANESTHETIC AGENT AROUND MEDIAL BRANCH NERVES INNERVATING LUMBAR FACET JOINT Bilateral 09/23/2021    Procedure: Block-nerve-medial branch-lumbar---BILATERAL L3, L4, L5;  Surgeon: Colt Lenz Jr., MD;  Location: [facility] PAIN [facility]MT;  Service: Pain Management;  Laterality: Bilateral;    INJECTION OF ANESTHETIC AGENT AROUND NERVE Bilateral 02/16/2024    Procedure: BLOCK, NERVE BILATERAL L3, 4, 5 MEDIAL BRANCH;  Surgeon: Jeanmarie Jauregui MD;  Location: [facility] PAIN MGT;  Service: Pain Management;  Laterality: Bilateral;  [phone]    INJECTION OF ANESTHETIC AGENT AROUND NERVE Bilateral 03/08/2024    Procedure: BLOCK, NERVE BILATERAL L3, L4, AND L5 MEDIAL BRANCH;  Surgeon: Jeanmarie Jauregui MD;  Location: [facility] PAIN [facility]GT;  Service: Pain Management;  Laterality: Bilateral;  [phone]    INJECTION OF JOINT Bilateral 12/02/2021    Procedure: Injection, Joint---BILATERAL SACROILIAC INJECTION;  Surgeon: Colt Lenz Jr.,  MD;  Location: [facility] PAIN [facility];  Service: Pain Management;  Laterality: Bilateral;    INJECTION OF JOINT N/A 05/24/2024    Procedure: INJECTION, BILATERAL SI AND RIGHT PIRIFORMIS;  Surgeon: Jeanmarie Jauregui MD;  Location: [facility] PAIN MGT;  Service: Pain Management;  Laterality: N/A;  [phone]  2 WK F/U ESHRAGHI    INJECTION OF STEROID Left 08/12/2019    Procedure: INJECTION, STEROID;  Surgeon: Roopa Hanna MD;  Location: [facility] OR;  Service: Orthopedics;  Laterality: Left;    INJECTION, SACROILIAC JOINT Bilateral 01/11/2024    Procedure: INJECTION,SACROILIAC JOINT BILATERAL;  Surgeon: Jeanmarie Jauregui MD;  Location: [facility] PAIN MGT;  Service: Pain Management;  Laterality: Bilateral;  [phone]    PYELOGRAM, RETROGRADE (Bilateral)    11/29/2023    RADIOFREQUENCY ABLATION Left 10/14/2021    Procedure: Radiofrequency Ablation---LEFT L3, L4, L5;  Surgeon: Colt Lenz Jr., MD;  Location: [facility] PAIN [facility]MT;  Service: Pain Management;  Laterality: Left;    RADIOFREQUENCY ABLATION Right 10/28/2021    Procedure: Radiofrequency Ablation---RIGHT L3, L4, L5;  Surgeon: Colt Lenz Jr., MD;  Location: [facility] PAIN [facility]MT;  Service: Pain Management;  Laterality: Right;    RADIOFREQUENCY ABLATION Right 04/12/2024    Procedure: RADIOFREQUENCY ABLATION RIGHT L3, L4, AND L5 1 OF 2;  Surgeon: Jeanmarie Jauregui MD;  Location: [facility] PAIN MGT;  Service: Pain Management;  Laterality: Right;  [phone]    RADIOFREQUENCY ABLATION Left 04/26/2024    Procedure: RADIOFREQUENCY ABLATION LEFT L3, L4, AND L5 2 OF 2;  Surgeon: Jeanmarie Jauregui MD;  Location: [facility] PAIN MGT;  Service: Pain Management;  Laterality: Left;  [phone]    RETROGRADE PYELOGRAPHY Bilateral 11/29/2023    Procedure: PYELOGRAM, RETROGRADE;  Surgeon: Rory Griffith MD;  Location: [facility] OR;  Service: Urology;  Laterality: Bilateral;    ROTATOR CUFF REPAIR Right     SACROILIAC JOINT INJECTION Bilateral 12/02/2021    SKIN CANCER EXCISION Left     arm     TRANSFORAMINAL EPIDURAL INJECTION OF STEROID Bilateral 06/17/2021    Procedure: Injection,steroid,epidural,transforaminal approach---BILATERAL S1;  Surgeon: Colt Lenz Jr., MD;  Location: [facility] PAIN [facility];  Service: Pain Management;  Laterality: Bilateral;    TURBT (TRANSURETHRAL RESECTION OF BLADDER TUMOR) N/A 11/29/2023    Procedure: TURBT (TRANSURETHRAL RESECTION OF BLADDER TUMOR);  Surgeon: Rory Griffith MD;  Location: [facility] OR;  Service: Urology;  Laterality: N/A;  with UroLift and with bilateral retrograde pyelograms    URBT (TRANSURETHRAL RESECTION OF BLADDER TUMOR)  11/29/2023       Family History:  Family History   Problem Relation Name Age of Onset    Diabetes Mother Marly Mills     No Known Problems Father       Social History:  Social History     Socioeconomic History    Marital status:    Tobacco Use    Smoking status: Every Day     Types: Cigars     Passive exposure: Current    Smokeless tobacco: Never    Tobacco comments:     8 small cigars per day down from 10.   Substance and Sexual Activity    Alcohol use: No    Drug use: Yes     Types: Benzodiazepines    Sexual activity: Not Currently     Partners: Female     Birth control/protection: None     OBJECTIVE:    BP (!) 136/58 (Patient Position: Sitting)   Pulse 79   Temp 97.2 °F (36.2 °C)   Wt 75 kg (165 lb 5.5 oz)   BMI 23.72 kg/m²     PHYSICAL EXAMINATION:     General appearance: Well appearing, in no acute distress, alert and oriented x3.  Psych:  Mood and affect appropriate.  Skin: Skin color, texture, turgor normal, no rashes or lesions, in both upper and lower body.  Head/face:  Atraumatic, normocephalic. No palpable lymph nodes  Back: Straight leg raising in the sitting and supine positions is negative to radicular pain. TTP over lumbar facet joints. Limited ROM with pain on extension > flexion. Positive facet loading bilaterally.  Positive tenderness over bilateral SIJ and piriformis muscle. Positive thigh and  sacral thrust test B/L, Positive FABERE,Ganselin and Yeoman's test bilaterally. Negative FADIR   Extremities: Peripheral joint ROM is full and pain free without obvious instability or laxity in all four extremities. No deformities, edema, or skin discoloration. Good capillary refill.  Musculoskeletal: Lower extremity strength is normal and symmetric.  No atrophy or tone abnormalities are noted. TTP over right piriformis with positive piriformis stretch test.   Lumbar Paraspinal tenderness on Right  Neuro: Bilateral upper and lower extremity coordination and muscle stretch reflexes are physiologic and symmetric.  Plantar response are downgoing. No loss of sensation is noted.  Gait: Antalgic.    ASSESSMENT: 74 y.o. year old male with lower back and hip pain, consistent with sacroiliitis.     1. Lumbar spondylosis  Procedure Order to Pain Management    Pain Clinic Drug Screen      2. Chronic pain syndrome        3. Degeneration of intervertebral disc of lumbar region with discogenic back pain            PLAN:     - I personally reviewed and interpreted relevant and pertinent imaging. Results were discussed with the patient today.     - Images reviewed and discussed with patient.     - Schedule for B/L L3, L4 and L5 RFA.    - UDS from last visit was not completed. Collected at today's visit.     - Encouraged patient to continue HEP    - Pain contract completed today.     - Norco 5/325 mg BID as needed.    - Follow up 2 weeks after procedure with with NICO.        Everton Foote MD PGY-5  Interventional Pain Medicine Fellow   [facility]       I spent a total of 30 minutes on the day of the visit.  This includes face to face time and non-face to face time preparing to see the patient by reviewing previous labs/imaging, obtaining and/or reviewing separately obtained history, documenting clinical information in the electronic or other health record, independently interpreting results and communicating results  to the patient/family/caregiver.    Jeanmarie Jauregui

## 2024-12-13 ENCOUNTER — PATIENT MESSAGE (OUTPATIENT)
Dept: PAIN MEDICINE | Facility: OTHER | Age: 74
End: 2024-12-13
Payer: MEDICARE

## 2024-12-13 RX ORDER — HYDROCODONE BITARTRATE AND ACETAMINOPHEN 5; 325 MG/1; MG/1
1 TABLET ORAL EVERY 12 HOURS PRN
Qty: 60 TABLET | Refills: 0 | Status: SHIPPED | OUTPATIENT
Start: 2024-12-13 | End: 2025-01-12

## 2025-01-02 ENCOUNTER — HOSPITAL ENCOUNTER (OUTPATIENT)
Facility: OTHER | Age: 75
Discharge: HOME | End: 2025-01-02
Attending: ANESTHESIOLOGY | Admitting: ANESTHESIOLOGY
Payer: MEDICARE

## 2025-01-02 VITALS
SYSTOLIC BLOOD PRESSURE: 198 MMHG | TEMPERATURE: 98 F | RESPIRATION RATE: 18 BRPM | OXYGEN SATURATION: 98 % | WEIGHT: 165 LBS | HEART RATE: 81 BPM | DIASTOLIC BLOOD PRESSURE: 84 MMHG | HEIGHT: 70 IN | BODY MASS INDEX: 23.62 KG/M2

## 2025-01-02 DIAGNOSIS — G89.29 CHRONIC PAIN: ICD-10-CM

## 2025-01-02 DIAGNOSIS — M47.816 LUMBAR SPONDYLOSIS: Primary | ICD-10-CM

## 2025-01-02 PROCEDURE — 63600175 PHARM REV CODE 636 W HCPCS: Performed by: ANESTHESIOLOGY

## 2025-01-02 PROCEDURE — 64636 DESTROY L/S FACET JNT ADDL: CPT | Mod: 50 | Performed by: ANESTHESIOLOGY

## 2025-01-02 PROCEDURE — 64635 DESTROY LUMB/SAC FACET JNT: CPT | Mod: 50,,, | Performed by: ANESTHESIOLOGY

## 2025-01-02 PROCEDURE — 99152 MOD SED SAME PHYS/QHP 5/>YRS: CPT | Performed by: ANESTHESIOLOGY

## 2025-01-02 PROCEDURE — 64636 DESTROY L/S FACET JNT ADDL: CPT | Mod: 50,,, | Performed by: ANESTHESIOLOGY

## 2025-01-02 PROCEDURE — 64635 DESTROY LUMB/SAC FACET JNT: CPT | Mod: 50 | Performed by: ANESTHESIOLOGY

## 2025-01-02 PROCEDURE — 99152 MOD SED SAME PHYS/QHP 5/>YRS: CPT | Mod: ,,, | Performed by: ANESTHESIOLOGY

## 2025-01-02 RX ORDER — FENTANYL CITRATE 50 UG/ML
INJECTION, SOLUTION INTRAMUSCULAR; INTRAVENOUS
Status: DISCONTINUED | OUTPATIENT
Start: 2025-01-02 | End: 2025-01-02 | Stop reason: HOSPADM

## 2025-01-02 RX ORDER — MIDAZOLAM HYDROCHLORIDE 1 MG/ML
INJECTION INTRAMUSCULAR; INTRAVENOUS
Status: DISCONTINUED | OUTPATIENT
Start: 2025-01-02 | End: 2025-01-02 | Stop reason: HOSPADM

## 2025-01-02 RX ORDER — BUPIVACAINE HYDROCHLORIDE 2.5 MG/ML
INJECTION, SOLUTION EPIDURAL; INFILTRATION; INTRACAUDAL
Status: DISCONTINUED | OUTPATIENT
Start: 2025-01-02 | End: 2025-01-02 | Stop reason: HOSPADM

## 2025-01-02 RX ORDER — DEXAMETHASONE SODIUM PHOSPHATE 10 MG/ML
INJECTION INTRAMUSCULAR; INTRAVENOUS
Status: DISCONTINUED | OUTPATIENT
Start: 2025-01-02 | End: 2025-01-02 | Stop reason: HOSPADM

## 2025-01-02 RX ORDER — SODIUM CHLORIDE 9 MG/ML
INJECTION, SOLUTION INTRAVENOUS CONTINUOUS
Status: DISCONTINUED | OUTPATIENT
Start: 2025-01-02 | End: 2025-01-02 | Stop reason: HOSPADM

## 2025-01-02 RX ORDER — LIDOCAINE HYDROCHLORIDE 20 MG/ML
INJECTION, SOLUTION INFILTRATION; PERINEURAL
Status: DISCONTINUED | OUTPATIENT
Start: 2025-01-02 | End: 2025-01-02 | Stop reason: HOSPADM

## 2025-01-02 NOTE — DISCHARGE SUMMARY
Discharge Note  Short Stay      SUMMARY     Admit Date: 1/2/2025    Attending Physician: Jeanmarie Jaurgeui MD    Discharge Physician: Jeanmarie Jauregui MD      Discharge Date: 1/2/2025 3:16 PM    Procedure(s) (LRB):  RADIOFREQUENCY ABLATION BILATERAL L3, L4, L5 (Bilateral)    Final Diagnosis: Lumbar spondylosis [M47.816]    Disposition: Home or self care    Patient Instructions:   Current Discharge Medication List        CONTINUE these medications which have NOT CHANGED    Details   albuterol (PROVENTIL) 2.5 mg /3 mL (0.083 %) nebulizer solution Take 3 mLs (2.5 mg total) by nebulization every 6 (six) hours as needed for Wheezing. Rescue  Qty: 150 mL, Refills: 5    Associated Diagnoses: COPD exacerbation      albuterol (PROVENTIL/VENTOLIN HFA) 90 mcg/actuation inhaler INHALE 2 PUFFS BY MOUTH EVERY 6 HOURS AS NEEDED  Qty: 25.5 g, Refills: 2    Associated Diagnoses: Bronchitis      ALPRAZolam (XANAX) 1 MG tablet Take 1 tablet (1 mg total) by mouth nightly as needed for Anxiety.  Qty: 30 tablet, Refills: 2    Associated Diagnoses: Insomnia, unspecified type; Anxiety      aspirin (ECOTRIN) 81 MG EC tablet Take 81 mg by mouth once daily.      BREZTRI AEROSPHERE 160-9-4.8 mcg/actuation HFAA INHALE 1 PUFF BY MOUTH TWICE DAILY  Qty: 11 g, Refills: 2    Associated Diagnoses: Obstructive chronic bronchitis without exacerbation      dexAMETHasone (DECADRON) 4 MG Tab Take 1 tablet (4 mg total) by mouth every 12 (twelve) hours.  Qty: 14 tablet, Refills: 1    Associated Diagnoses: COPD exacerbation      gabapentin (NEURONTIN) 600 MG tablet Take 1 tablet by mouth twice daily  Qty: 60 tablet, Refills: 0    Associated Diagnoses: Chronic bilateral low back pain with bilateral sciatica; Lumbar radiculopathy      HYDROcodone-acetaminophen (NORCO) 5-325 mg per tablet Take 1 tablet by mouth every 12 (twelve) hours as needed for Pain.  Qty: 60 tablet, Refills: 0    Comments: Quantity prescribed more than 7 day supply? Yes, quantity  medically necessary      hydrocortisone 2.5 % cream Apply topically 2 (two) times daily.  Qty: 28 g, Refills: 2      levocetirizine (XYZAL) 5 MG tablet TAKE 1 TABLET BY MOUTH ONCE DAILY IN THE EVENING  Qty: 90 tablet, Refills: 3    Associated Diagnoses: Chronic sinusitis, unspecified location      methocarbamoL (ROBAXIN) 500 MG Tab Take 1 tablet (500 mg total) by mouth 4 (four) times daily as needed (muscle spasm).  Qty: 80 tablet, Refills: 0    Associated Diagnoses: Muscle spasm                 Discharge Diagnosis: Lumbar spondylosis [M47.816]  Condition on Discharge: Stable with no complications to procedure   Diet on Discharge: Same as before.  Activity: as per instruction sheet.  Discharge to: Home with a responsible adult.  Follow up: 2-4 weeks       Please call my office or pager at [phone] if experienced any weakness or loss of sensation, fever > 101.5, pain uncontrolled with oral medications, persistent nausea/vomiting/or diarrhea, redness or drainage from the incisions, or any other worrisome concerns. If physician on call was not reached or could not communicate with our office for any reason please go to the nearest emergency department     Matt Martins DO    [facility]  Discharge Note  Short Stay    Procedure(s) (LRB):  RADIOFREQUENCY ABLATION BILATERAL L3, L4, L5 (Bilateral)      OUTCOME: Patient tolerated treatment/procedure well without complication and is now ready for discharge.    DISPOSITION: Home or Self Care      FOLLOWUP: In clinic    DISCHARGE INSTRUCTIONS:  No discharge procedures on file.      Clinical Reference Documents Added to Patient Instructions         Document    MODERATE SEDATION IN ADULTS DISCHARGE INSTRUCTIONS (ENGLISH)            TIME SPENT ON DISCHARGE: 15 minutes

## 2025-01-02 NOTE — OP NOTE
Therapeutic Lumbar Medial Branch Radiofrequency Ablation under Fluoroscopy     The procedure, risks, benefits, and options were discussed with the patient. There are no contraindications to the procedure. The patent expressed understanding and agreed to the procedure. Informed written consent was obtained prior to the start of the procedure and can be found in the patient's chart.        PATIENT NAME: Uriah Mills   MRN: [id number]     DATE OF PROCEDURE: 01/02/2025     PROCEDURE:  Bilateral L3, L4, and L5 Lumbar Radiofrequency Ablation under Fluoroscopy    PRE-OP DIAGNOSIS: Lumbar spondylosis [M47.816] Lumbar spondylosis [M47.816]    POST-OP DIAGNOSIS: Same    PHYSICIAN: Jeanmarie Jauregui MD    ASSISTANTS: Bria Schwartz      MEDICATIONS INJECTED:  Preservative-free Decadron 10mg with 9cc of Bupivicaine 0.25%    LOCAL ANESTHETIC INJECTED:   Xylocaine 2%    SEDATION: Versed 2mg and Fentanyl 125mcg                                                                                                                                                                                     Conscious sedation ordered by M.D. Patient re-evaluation prior to administration of conscious sedation. No changes noted in patient's status from initial evaluation. The patient's vital signs were monitored by RN and patient remained hemodynamically stable throughout the procedure.    Event Time In   Sedation Start 1452   Sedation End 1513       ESTIMATED BLOOD LOSS:  None    COMPLICATIONS:  None     INTERVAL HISTORY: Patient has clinical and imaging findings suggestive of recurrent facet mediated pain. Patient has undergone a previous RFA at specified levels with at least 50% relief for at least 6 months. Successful diagnostic medial branch blocks have been completed within the past 2 years.    TECHNIQUE: Time-out was performed to identify the patient and procedure to be performed. With the patient laying in a prone position, the surgical area was  prepped and draped in the usual sterile fashion using ChloraPrep and fenestrated drape. The levels were determined under fluoroscopic guidance. Skin anesthesia was achieved by injecting Lidocaine 2% over the injection sites. A 18 gauge 10mm curved active tip needle was introduced to the anatomic local of the medial branch at each of the above levels using AP, lateral and/or contralateral oblique fluoroscopic imaging. Then sensory and motor testing was performed to confirm that the needle tips were in the correct location. After negative aspiration for blood or CSF was confirmed, 1 mL of the lidocaine 2% listed above was injected slowly at each site. This was followed by thermal lesioning at 80 degrees celsius for 90 seconds. That was followed by slowly injecting 1 mL of the medication mixture listed above at each site. The needles were removed and bleeding was nil. A sterile dressing was applied. No specimens collected. The patient tolerated the procedure well and did not have any procedure related motor deficit at the conclusion of the procedure.        The patient was monitored after the procedure in the recovery area. They were given post-procedure and discharge instructions to follow at home. The patient was discharged in a stable condition.        Bria Schwartz MD    I reviewed and edited the fellow's note. I conducted my own interview and physical examination. I agree with the findings. I was present and supervising all critical portions of the procedure.    Jeanmarie Jauregui MD

## 2025-01-02 NOTE — DISCHARGE INSTRUCTIONS

## 2025-01-18 DIAGNOSIS — J40 BRONCHITIS: ICD-10-CM

## 2025-01-18 NOTE — TELEPHONE ENCOUNTER
No care due was identified.  Health [facility] Embedded Care Due Messages. Reference number: [id number].   1/18/2025 7:10:25 AM CST

## 2025-01-19 RX ORDER — ALBUTEROL SULFATE 90 UG/1
2 INHALANT RESPIRATORY (INHALATION) EVERY 6 HOURS
Qty: 27 G | Refills: 3 | Status: SHIPPED | OUTPATIENT
Start: 2025-01-19

## 2025-01-19 NOTE — TELEPHONE ENCOUNTER
Refill Decision Note   Uriah Mills  is requesting a refill authorization.  Brief Assessment and Rationale for Refill:  Approve     Medication Therapy Plan:         Comments:     Note composed:5:47 PM 01/19/2025             Appointments     Last Visit   10/23/2024 Geo Montalvo MD   Next Visit   1/23/2025 Geo Montalvo MD

## 2025-01-25 DIAGNOSIS — M54.16 LUMBAR RADICULOPATHY: ICD-10-CM

## 2025-01-25 DIAGNOSIS — M54.42 CHRONIC BILATERAL LOW BACK PAIN WITH BILATERAL SCIATICA: ICD-10-CM

## 2025-01-25 DIAGNOSIS — M54.41 CHRONIC BILATERAL LOW BACK PAIN WITH BILATERAL SCIATICA: ICD-10-CM

## 2025-01-25 DIAGNOSIS — G89.29 CHRONIC BILATERAL LOW BACK PAIN WITH BILATERAL SCIATICA: ICD-10-CM

## 2025-01-25 NOTE — TELEPHONE ENCOUNTER
No care due was identified.  Health [facility] Embedded Care Due Messages. Reference number: [id number].   1/25/2025 12:43:35 PM CST

## 2025-01-26 RX ORDER — GABAPENTIN 600 MG/1
600 TABLET ORAL 2 TIMES DAILY
Qty: 60 TABLET | Refills: 0 | Status: SHIPPED | OUTPATIENT
Start: 2025-01-26

## 2025-02-03 ENCOUNTER — OFFICE VISIT (OUTPATIENT)
Dept: PAIN MEDICINE | Facility: CLINIC | Age: 75
End: 2025-02-03
Payer: MEDICARE

## 2025-02-03 VITALS
SYSTOLIC BLOOD PRESSURE: 128 MMHG | BODY MASS INDEX: 23.91 KG/M2 | TEMPERATURE: 98 F | WEIGHT: 167 LBS | HEART RATE: 96 BPM | HEIGHT: 70 IN | RESPIRATION RATE: 18 BRPM | OXYGEN SATURATION: 100 % | DIASTOLIC BLOOD PRESSURE: 75 MMHG

## 2025-02-03 DIAGNOSIS — M47.816 LUMBAR SPONDYLOSIS: ICD-10-CM

## 2025-02-03 DIAGNOSIS — M46.1 SACROILIITIS: ICD-10-CM

## 2025-02-03 DIAGNOSIS — M54.9 DORSALGIA, UNSPECIFIED: Primary | ICD-10-CM

## 2025-02-03 DIAGNOSIS — M54.16 LUMBAR RADICULOPATHY: ICD-10-CM

## 2025-02-03 DIAGNOSIS — G89.4 CHRONIC PAIN SYNDROME: ICD-10-CM

## 2025-02-03 DIAGNOSIS — M51.360 DEGENERATION OF INTERVERTEBRAL DISC OF LUMBAR REGION WITH DISCOGENIC BACK PAIN: ICD-10-CM

## 2025-02-03 DIAGNOSIS — M79.18 MYOFASCIAL PAIN: ICD-10-CM

## 2025-02-03 DIAGNOSIS — G57.01 PIRIFORMIS SYNDROME OF RIGHT SIDE: ICD-10-CM

## 2025-02-03 DIAGNOSIS — M54.16 LUMBAR RADICULOPATHY, CHRONIC: ICD-10-CM

## 2025-02-03 NOTE — PROGRESS NOTES
Interventional Pain Management - Established Visit  Follow-Up      Interval History 2/3/2025:  Uriah Mills returns to clinic for follow-up after bilateral L3,4,5 RFFA on 1/2/2025. He reports limited relief of his back pain. He states he has taken Norco in the past without relief. He states the last time he took a pain pill was mid-December. He also takes  Gabapentin 600 mg BID with some relief. He took methocarbamol a couple times without relief. He denies recent health changes. He denies recent falls or trauma. He denies new onset fever/night sweats, urinary incontinence, bowel incontinence, significant weight changes, significant motor weakness or changes, or loss of sensations. His pain today is 8/10.      Interval history 12/12/2024:   Uriah Milsl is following up in the clinic for chronic lower back pain.  Patient continues to achy sore dull lower back pain that is distributed in a bandlike pattern with some of the pain radiating to his buttocks.  Patient said the pain is not traveling down his legs. The pain is worse with prolonged standing, walking and getting up from a sitting position. The lumbar paraspinal trigger point injections from his last visit provided limited relief. This pain was alleviated with RFA in the past. Patient is status post piriformis and SI joint injections with limited relief.  Patient is having limited pain relief with gabapentin and Robaxin. He denies new onset fever/night sweats, urinary incontinence, bowel incontinence, significant weight changes, significant motor weakness or changes, or loss of sensations. His pain today is 9/10.    Interval History 11/19/2024:  Uriah Mills returns to clinic for follow-up of chronic lower back pain. He present with his wife, Yamilex, who aids in providing history. He states that about 10 days ago, he was sitting up in bed watching TV. He had taken a Xanax earlier. He is not sure if he passed out or fell asleep, but he fell out of bed.  He hit his right upper back/side, right hand and right ear on his bedside table. The fall woke him up. They did not present to the ED. He denies headaches, blurred vision, change in memory or any other signs of head trauma. He is having new pain in the upper back. He denies recent health changes. He denies new onset fever/night sweats, urinary incontinence, bowel incontinence, significant weight changes, significant motor weakness or changes, or loss of sensations. His pain today is 9/10.      Interval History 7/9/2023:  Patient is here for follow up after BL SIJ and right Piriformis injection on 5/52024 with 0% pain relief.  Today reports pain is mostly in his right lower back, worse with prolonged sitting, rotation of his back.  Denies leg pain or groin pain, denies new weakness, numbness, falls.  Patient is not participating in PT or HEP because it worsens his pain.  Today, pain is 5/10.      Interval History 5/10/2024:  Uriah Mills returns to clinic s/p right then left  L3, L4, L5 RFA on 4/12/2024 and 4/26/2024 respectively.  He reports 80% relief from these procedures. He also reports 80% relief at least from the previous bilateral SIJ injections. He reports return of his bilateral SIJ pain right > left with a new pain in his right buttock that radiates down the posterior aspect of his right thigh.  He describes the pain as shooting, dull.  Exacerbating factors: moving from sitting to standing, sitting up in bed. Mitigating factors: resting.  He has previously received Noco from his PCP last fill 10/31/2023.  He is inquiring about restarting pain medications.  He does not participate in HEP or PT currently. The patient denies fever/night sweats, urinary incontinence, bowel incontinence, significant weight changes, significant motor weakness or changes, or loss of sensations. His pain today is 8/10.     Interval History 1/26/2024:  The patient is here today for follow up of back pain. He is s/p bilateral SI  joint injection with 60% relief. His primary complaint today is lower back pain, higher than previous injection site. It is stabbing and throbbing in nature. No radiation into the legs. There is associated stiffness. He has a lot of pain and stiffness first thing in the morning. He has completed PT multiple times without much improvement in symptoms. He continues with HEP. His pain today is 5/10.    Interval History 12/15/2023:  Uriah Mills presents to the clinic for a follow-up appointment for low back and hip pain. Since the last visit, Uriah Mills states the pain has been persistant. Current pain intensity is 8/10 but he reports it is usually a 10/10. He has been tolerating his pain over the past few years but had a flare up in October which led him to schedule this appointment.     Interval History (12/14/21):  He returns today for follow up.  He reports that bilateral sacroiliac joint steroid injections has been helpful for the bilateral low back pain.  He reports roughly 60% relief.  He is happy with these results and does not feel that further treatment needed at this time.  He is not interested in participating in further physical therapy.     Interval History (11/11/21):  He returns today for follow up.  He reports that bilateral lumbar radiofrequency ablations has been helpful for the bilateral lumbar pain.  He reports 95% relief of lumbar back pain.  He states that he continues to have pain in the bilateral lumbosacral regions over the bilateral sacroiliac joints.  This pain radiates to the bilateral buttocks and lateral hips.  He denies any associated numbness, tingling, weakness, bowel bladder dysfunction.     Interval History (7/1/21):  He returns today for follow up.  He reports that bilateral S1 transforaminal epidural steroid injection has been helpful for the bilateral low back and lower extremity pain.  He reports roughly 40% relief.  He continues to have some back pain in the bilateral  lower lumbar/lumbosacral region.  The pain does not radiate.  He denies any other changes in the quality or location was pain.  He denies any new or worsening symptoms.     Interval History (1/19/21):  He returns today for follow up.  He reports that his pain is unchanged in quality location since last encounter.  He denies any new or worsening symptoms.  We have received records from previous pain management provider.  Records indicate the patient has undergone multiple lumbar interventional procedures including sacroiliac joint injections, lumbar radiofrequency ablations, and bilateral L4 and L5 transforaminal epidural steroid injections.  Patient denies any significant relief from these procedures.     Initial Encounter (1/5/21):  Uriah Mills is a 71 y.o. male who presents today with chronic bilateral low back and lower extremity pain.  Patient has had this problem for many years.  No specific inciting event or injury noted.  The pain is located across the lower lumbar region radiate to the bilateral hips and posterior thighs.  Patient reports associated numbness in the right posterior thigh.  He denies any pain or numbness distal to the knees.  He denies any focal weakness or bowel bladder dysfunction.  The pain is constant worse with activity.  Patient reports undergoing multiple interventional procedures in the past with Dr. Kin Palomino.  He states that no these procedures were particularly helpful.  He was also tried on multiple opioid pain medications.  Some of these help but they also cause significant constipation and therefore these medications were not continued.  More recently, patient has been taking tramadol prescribed by his primary care physician.  He states this medication does not cause any problems but also did not provide any significant relief..   This pain is described in detail below.    Pain Disability Index Review:      12/12/2024     8:05 AM 11/19/2024    11:24 AM 7/9/2024    11:04 AM    Last 3 PDI Scores   Pain Disability Index (PDI) 56 [phone]       Pain Medications:    - Opioids: Lorcet (Hydrocodone/Acetaminophen)  Hydrocodone 2 weeks since he took any, wasn't helping pain much  gabapentin    Opioid Contract: no     report:  Reviewed and inconsistent with medication use as prescribed. (Pt report he has stopped taking it).    Pain Procedures:   October 2021: RFA of bilateral L3, L4, L5  June 2021: Transforaminal epidural at bilateral S1  December 2021: Bilateral sacroiliac joint injection   1/11/24 B/L SI joint injections- 80% relief  4/12/2024 - Right L3, L4, L5 RFA - 80% relief  4/26/2024 - Left L3, L4, L5 RFA - 80% relief  5/24/2024: Bilateral Sacroiliac Joint Injection under Fluoroscopic Guidance and Right Piriformis Muscle Injection under Fluoroscopic Guidance - 80% relief  1/2/2025 - Bilateral L3,4,5 RFA - limited relief    Physical Therapy/Home Exercise: no (has tried accupuncture without significant relief)    Imaging: MRI lumbar spine 2020    Alignment: Normal.     Vertebrae: 5 lumbar-type vertebral bodies. No aggressive marrow replacement process or fracture.     Discs: Satisfactory height.  Mild diffuse desiccation of disc material predominantly L4-L5 and L5-S1     Cord: Normal. Conus terminates at T12-L1.     Degenerative findings:     T12-L1: There is no focal disc herniation.No significant central canal narrowing . No significant neural foraminal narrowing.     L1-L2: There is no focal disc herniation.No significant central canal narrowing . No significant neural foraminal narrowing.     L2-L3: There is no focal disc herniation.No significant central canal narrowing . No significant neural foraminal narrowing.     L3-L4: There is no focal disc herniation.No significant central canal narrowing . No significant neural foraminal narrowing.     L4-L5: Mild broad-based bulging of disc material with small superimposed central protrusion.  No significant central canal narrowing . No  significant neural foraminal narrowing.     L5-S1: Mild broad-based bulging of disc material with small superimposed central protrusion.  No significant central canal narrowing . No significant neural foraminal narrowing.     Paraspinal muscles & soft tissues: Unremarkable.     Impression:     Degenerative changes disc space level 4-L5 and L5-S1. no significant central or foraminal stenosis.    Allergies: Review of patient's allergies indicates:  No Known Allergies    Current Medications:   Current Outpatient Medications   Medication Sig Dispense Refill    albuterol (PROVENTIL) 2.5 mg /3 mL (0.083 %) nebulizer solution Take 3 mLs (2.5 mg total) by nebulization every 6 (six) hours as needed for Wheezing. Rescue 150 mL 5    albuterol (PROVENTIL/VENTOLIN HFA) 90 mcg/actuation inhaler INHALE 2 PUFFS BY MOUTH EVERY 6 HOURS AS NEEDED 27 g 3    ALPRAZolam (XANAX) 1 MG tablet Take 1 tablet (1 mg total) by mouth nightly as needed for Anxiety. 30 tablet 2    aspirin (ECOTRIN) 81 MG EC tablet Take 81 mg by mouth once daily.      BREZTRI AEROSPHERE 160-9-4.8 mcg/actuation HFAA INHALE 1 PUFF BY MOUTH TWICE DAILY 11 g 2    dexAMETHasone (DECADRON) 4 MG Tab Take 1 tablet (4 mg total) by mouth every 12 (twelve) hours. 14 tablet 1    gabapentin (NEURONTIN) 600 MG tablet Take 1 tablet by mouth twice daily 60 tablet 0    hydrocortisone 2.5 % cream Apply topically 2 (two) times daily. 28 g 2    levocetirizine (XYZAL) 5 MG tablet TAKE 1 TABLET BY MOUTH ONCE DAILY IN THE EVENING 90 tablet 3    methocarbamoL (ROBAXIN) 500 MG Tab Take 1 tablet (500 mg total) by mouth 4 (four) times daily as needed (muscle spasm). 80 tablet 0     No current facility-administered medications for this visit.     Facility-Administered Medications Ordered in Other Visits   Medication Dose Route Frequency Provider Last Rate Last Admin    0.9%  NaCl infusion   Intravenous Continuous Rory Griffith MD        0.9%  NaCl infusion   Intravenous Continuous Izabela  MD Jovanny        ALPRAZolam dissolvable tablet 1 mg  1 mg Oral Once PRN Colt Lenz Jr., MD        BUPivacaine (PF) 0.25% (2.5 mg/ml) injection 25 mg  10 mL Intramuscular Once Colt Lenz Jr., MD        ceFAZolin 2 g in dextrose 5 % in water (D5W) 100 mL IVPB (MB+)  2 g Intravenous On Call Procedure Rory Griffith MD        dexAMETHasone sodium phos (PF) injection 10 mg  10 mg Epidural Once Colt Lenz Jr., MD        LIDOcaine (PF) 10 mg/ml (1%) injection 10 mg  1 mL Other Once Colt Lenz Jr., MD        LIDOcaine (PF) 10 mg/ml (1%) injection 10 mg  1 mL Intradermal Once PRN Rory Griffith MD        LIDOcaine HCL 20 mg/ml (2%) injection 10 mL  10 mL Other Once Colt Lenz Jr., MD        LIDOcaine HCL 20 mg/ml (2%) injection 10 mL  10 mL Other Once Colt Lenz Jr., MD           REVIEW OF SYSTEMS:    GENERAL:  No weight loss, malaise or fevers.  HEENT:  Negative for frequent or significant headaches.  NECK:  Negative for lumps, goiter, pain and significant neck swelling.  RESPIRATORY:  Negative for cough, wheezing or shortness of breath.  CARDIOVASCULAR:  Negative for chest pain, leg swelling or palpitations.  GI:  Negative for abdominal discomfort, blood in stools or black stools or change in bowel habits.  MUSCULOSKELETAL:  See HPI.  SKIN:  Negative for lesions, rash, and itching.  PSYCH:  Negative for sleep disturbance, mood disorder and recent psychosocial stressors.  HEMATOLOGY/LYMPHOLOGY:  Negative for prolonged bleeding, bruising easily or swollen nodes.  NEURO:   No history of headaches, syncope, paralysis, seizures or tremors.  All other reviewed and negative other than HPI.    Past Medical History:  Past Medical History:   Diagnosis Date    Allergy     Arthritis     BPH (benign prostatic hyperplasia)     Carpal tunnel syndrome     Chronic hip pain, bilateral     Chronic low back pain     COPD (chronic obstructive pulmonary disease)     DDD  (degenerative disc disease), lumbar     Dorsalgia     Gastrointestinal disease     Lumbar radiculopathy     Lumbar spondylosis     Mass of right wrist 06/05/2024    Osteoarthritis of both hips     Osteoarthritis of spine     Skin cancer        Past Surgical History:  Past Surgical History:   Procedure Laterality Date    APPENDECTOMY      CARPAL TUNNEL RELEASE Right 08/12/2019    Procedure: RELEASE, CARPAL TUNNEL right;  Surgeon: Roopa Hanna MD;  Location: [facility] OR;  Service: Orthopedics;  Laterality: Right;    COLONOSCOPY      COLONOSCOPY      COLONOSCOPY N/A 9/18/2024    Procedure: COLONOSCOPY;  Surgeon: Lake Galeana MD;  Location: [facility];  Service: Endoscopy;  Laterality: N/A;    CYSTOSCOPY WITH INSERTION OF MINIMALLY INVASIVE IMPLANT TO ENLARGE PROSTATIC URETHRA N/A 11/29/2023    Procedure: CYSTOSCOPY, WITH INSERTION OF UROLIFT IMPLANT;  Surgeon: Rory Griffith MD;  Location: [facility] OR;  Service: Urology;  Laterality: N/A;    CYSTOSCOPY, WITH INSERTION OF UROLIFT IMPLANT  11/29/2023    EPIDURAL STEROID INJECTION INTO LUMBAR SPINE Bilateral 06/17/2021    Bilateral ALVIN per  06/17/2021    EXCISION OF GANGLION OF WRIST Right 06/05/2024    Procedure: RIGHT VOLAR WRIST GANGLION CYST EXCISION;  Surgeon: Roopa Hanna MD;  Location: [facility] OR;  Service: Orthopedics;  Laterality: Right;  MAC/REGIONAL    GASTRIC FUNDOPLICATION      HERNIA REPAIR  1990s    INJECTION OF ANESTHETIC AGENT AROUND MEDIAL BRANCH NERVES INNERVATING LUMBAR FACET JOINT Bilateral 07/15/2021    Procedure: Block-nerve-medial branch-lumbar---BILATERAL L3, L4, L5;  Surgeon: Colt Lenz Jr., MD;  Location: [facility] PAIN MGMT;  Service: Pain Management;  Laterality: Bilateral;    INJECTION OF ANESTHETIC AGENT AROUND MEDIAL BRANCH NERVES INNERVATING LUMBAR FACET JOINT Bilateral 09/23/2021    Procedure: Block-nerve-medial branch-lumbar---BILATERAL L3, L4, L5;  Surgeon: Colt Lenz Jr., MD;  Location:  [facility] PAIN MGMT;  Service: Pain Management;  Laterality: Bilateral;    INJECTION OF ANESTHETIC AGENT AROUND NERVE Bilateral 02/16/2024    Procedure: BLOCK, NERVE BILATERAL L3, 4, 5 MEDIAL BRANCH;  Surgeon: Jeanmarie Jauregui MD;  Location: [facility] PAIN MGT;  Service: Pain Management;  Laterality: Bilateral;  [phone]    INJECTION OF ANESTHETIC AGENT AROUND NERVE Bilateral 03/08/2024    Procedure: BLOCK, NERVE BILATERAL L3, L4, AND L5 MEDIAL BRANCH;  Surgeon: Jeanmarie Jauregui MD;  Location: [facility] PAIN MGT;  Service: Pain Management;  Laterality: Bilateral;  [phone]    INJECTION OF JOINT Bilateral 12/02/2021    Procedure: Injection, Joint---BILATERAL SACROILIAC INJECTION;  Surgeon: Colt Lenz Jr., MD;  Location: [facility] PAIN MGMT;  Service: Pain Management;  Laterality: Bilateral;    INJECTION OF JOINT N/A 05/24/2024    Procedure: INJECTION, BILATERAL SI AND RIGHT PIRIFORMIS;  Surgeon: Jeanmarie Jauregui MD;  Location: [facility] PAIN MGT;  Service: Pain Management;  Laterality: N/A;  [phone]  2 WK F/U ESHRAGHI    INJECTION OF STEROID Left 08/12/2019    Procedure: INJECTION, STEROID;  Surgeon: Roopa Hanna MD;  Location: [facility] OR;  Service: Orthopedics;  Laterality: Left;    INJECTION, SACROILIAC JOINT Bilateral 01/11/2024    Procedure: INJECTION,SACROILIAC JOINT BILATERAL;  Surgeon: Jeanmarie Jauregui MD;  Location: [facility] PAIN MGT;  Service: Pain Management;  Laterality: Bilateral;  [phone]    PYELOGRAM, RETROGRADE (Bilateral)    11/29/2023    RADIOFREQUENCY ABLATION Left 10/14/2021    Procedure: Radiofrequency Ablation---LEFT L3, L4, L5;  Surgeon: Colt Lenz Jr., MD;  Location: [facility] PAIN MGMT;  Service: Pain Management;  Laterality: Left;    RADIOFREQUENCY ABLATION Right 10/28/2021    Procedure: Radiofrequency Ablation---RIGHT L3, L4, L5;  Surgeon: Colt Lenz Jr., MD;  Location: [facility] PAIN MGMT;  Service: Pain Management;  Laterality: Right;    RADIOFREQUENCY ABLATION Right  04/12/2024    Procedure: RADIOFREQUENCY ABLATION RIGHT L3, L4, AND L5 1 OF 2;  Surgeon: Jeanmarie Jauregui MD;  Location: [facility] PAIN MGT;  Service: Pain Management;  Laterality: Right;  [phone]    RADIOFREQUENCY ABLATION Left 04/26/2024    Procedure: RADIOFREQUENCY ABLATION LEFT L3, L4, AND L5 2 OF 2;  Surgeon: Jeanmarie Jauregui MD;  Location: [facility] PAIN MGT;  Service: Pain Management;  Laterality: Left;  [phone]    RADIOFREQUENCY ABLATION Bilateral 1/2/2025    Procedure: RADIOFREQUENCY ABLATION BILATERAL L3, L4, L5;  Surgeon: Jeanmarie Jauregui MD;  Location: [facility] PAIN MGT;  Service: Pain Management;  Laterality: Bilateral;  4 WK F/U NICO    RETROGRADE PYELOGRAPHY Bilateral 11/29/2023    Procedure: PYELOGRAM, RETROGRADE;  Surgeon: Rory Griffith MD;  Location: [facility] OR;  Service: Urology;  Laterality: Bilateral;    ROTATOR CUFF REPAIR Right     SACROILIAC JOINT INJECTION Bilateral 12/02/2021    SKIN CANCER EXCISION Left     arm    TRANSFORAMINAL EPIDURAL INJECTION OF STEROID Bilateral 06/17/2021    Procedure: Injection,steroid,epidural,transforaminal approach---BILATERAL S1;  Surgeon: Colt Lenz Jr., MD;  Location: [facility] PAIN MGMT;  Service: Pain Management;  Laterality: Bilateral;    TURBT (TRANSURETHRAL RESECTION OF BLADDER TUMOR) N/A 11/29/2023    Procedure: TURBT (TRANSURETHRAL RESECTION OF BLADDER TUMOR);  Surgeon: Rory Griffith MD;  Location: [facility] OR;  Service: Urology;  Laterality: N/A;  with UroLift and with bilateral retrograde pyelograms    URBT (TRANSURETHRAL RESECTION OF BLADDER TUMOR)  11/29/2023       Family History:  Family History   Problem Relation Name Age of Onset    Diabetes Mother Marly Mills     No Known Problems Father       Social History:  Social History     Socioeconomic History    Marital status:    Tobacco Use    Smoking status: Every Day     Types: Cigars     Passive exposure: Current    Smokeless tobacco: Never    Tobacco comments:     8 small  "cigars per day down from 10.   Substance and Sexual Activity    Alcohol use: No    Drug use: Yes     Types: Benzodiazepines    Sexual activity: Not Currently     Partners: Female     Birth control/protection: None     OBJECTIVE:    /75   Pulse 96   Temp 98 °F (36.7 °C)   Resp 18   Ht 5' 10" (1.778 m)   Wt 75.8 kg (167 lb)   SpO2 100%   BMI 23.96 kg/m²     PHYSICAL EXAMINATION:     General appearance: Well appearing, in no acute distress, alert and oriented x3.  Psych:  Mood and affect appropriate.  Skin: Skin color, texture, turgor normal, no rashes or lesions, in both upper and lower body.  Head/face:  Atraumatic, normocephalic. No palpable lymph nodes  Back: Straight leg raising in the sitting and supine positions is negative to radicular pain. TTP over lumbar facet joints. Limited ROM with pain on extension > flexion. Positive facet loading bilaterally.  Positive tenderness over bilateral SIJ and piriformis muscle. Positive thigh and sacral thrust test B/L, Positive FABERE,Ganselin and Yeoman's test bilaterally. Pain to palpation over bilateral iliac crest. Negative FADIR   Extremities: Peripheral joint ROM is full and pain free without obvious instability or laxity in all four extremities. No deformities, edema, or skin discoloration. Good capillary refill.  Musculoskeletal: Lower extremity strength is normal and symmetric.  No atrophy or tone abnormalities are noted. TTP over right piriformis with positive piriformis stretch test.   Lumbar Paraspinal tenderness on Right  Neuro: Bilateral upper and lower extremity coordination and muscle stretch reflexes are physiologic and symmetric.  Plantar response are downgoing. No loss of sensation is noted.  Gait: Antalgic.    ASSESSMENT: 74 y.o. year old male with lower back and hip pain, consistent with sacroiliitis.     1. Dorsalgia, unspecified  MRI Lumbar Spine Without Contrast      2. Lumbar radiculopathy, chronic  MRI Lumbar Spine Without Contrast    "   3. Lumbar spondylosis        4. Chronic pain syndrome        5. Myofascial pain        6. Lumbar radiculopathy        7. Degeneration of intervertebral disc of lumbar region with discogenic back pain        8. Piriformis syndrome of right side        9. Sacroiliitis            PLAN:     - I personally reviewed and interpreted relevant and pertinent imaging. Results were discussed with the patient today.     - Update Lumbar MRI given continued lumbar back pain, despite conservative measures.     - He is s/p B/L L3, L4 and L5 RFA with limited relief.     - Continue Norco 5/325 BID PRN. Last fill 12/17/2024.     - The patient is here today for a refill of current pain medications and they believe these provide effective pain control and improvements in quality of life.  The patient notes no serious side effects, and feels the benefits outweigh the risks.  The patient was reminded of the pain contract that they signed previously as well as the risks and benefits of the medication including possible death.  The updated [place] Board  Pharmacy prescription monitoring program was reviewed, and the patient has been found to be compliant with current treatment plan. Medication management provided by Dr. Jauregui.     - UDS 12/12/2024 reviewed and negative to drug and metabolites. He states Norco does not work for him and has not taken since mid-December    - Encouraged patient to continue HEP    - Pain contract signed 12/12/2024.     - Follow-up after imaging to review and to discuss medication management with Dr Jauregui.       Nataliia Graves, MALCOLM  02/03/2025

## 2025-02-23 DIAGNOSIS — M54.42 CHRONIC BILATERAL LOW BACK PAIN WITH BILATERAL SCIATICA: ICD-10-CM

## 2025-02-23 DIAGNOSIS — G89.29 CHRONIC BILATERAL LOW BACK PAIN WITH BILATERAL SCIATICA: ICD-10-CM

## 2025-02-23 DIAGNOSIS — M54.41 CHRONIC BILATERAL LOW BACK PAIN WITH BILATERAL SCIATICA: ICD-10-CM

## 2025-02-23 DIAGNOSIS — M54.16 LUMBAR RADICULOPATHY: ICD-10-CM

## 2025-02-23 NOTE — TELEPHONE ENCOUNTER
No care due was identified.  [facility] Embedded Care Due Messages. Reference number: [id number].   2/23/2025 11:18:44 AM CST

## 2025-02-25 ENCOUNTER — PATIENT MESSAGE (OUTPATIENT)
Dept: PAIN MEDICINE | Facility: OTHER | Age: 75
End: 2025-02-25
Payer: MEDICARE

## 2025-02-25 ENCOUNTER — OFFICE VISIT (OUTPATIENT)
Dept: PAIN MEDICINE | Facility: CLINIC | Age: 75
End: 2025-02-25
Payer: MEDICARE

## 2025-02-25 VITALS
DIASTOLIC BLOOD PRESSURE: 67 MMHG | BODY MASS INDEX: 23.98 KG/M2 | HEART RATE: 68 BPM | TEMPERATURE: 98 F | WEIGHT: 167.13 LBS | SYSTOLIC BLOOD PRESSURE: 125 MMHG

## 2025-02-25 DIAGNOSIS — M47.816 LUMBAR SPONDYLOSIS: ICD-10-CM

## 2025-02-25 DIAGNOSIS — M79.18 MYOFASCIAL PAIN SYNDROME: ICD-10-CM

## 2025-02-25 DIAGNOSIS — M46.1 SACROILIITIS: Primary | ICD-10-CM

## 2025-02-25 DIAGNOSIS — M51.360 DEGENERATION OF INTERVERTEBRAL DISC OF LUMBAR REGION WITH DISCOGENIC BACK PAIN: ICD-10-CM

## 2025-02-25 DIAGNOSIS — M67.911 TENDINOPATHY OF RIGHT ROTATOR CUFF: ICD-10-CM

## 2025-02-25 DIAGNOSIS — M53.3 SACROILIAC JOINT DYSFUNCTION OF BOTH SIDES: Primary | ICD-10-CM

## 2025-02-25 DIAGNOSIS — M51.360 DISCOGENIC LOW BACK PAIN: ICD-10-CM

## 2025-02-25 DIAGNOSIS — M53.3 SACROILIAC JOINT DYSFUNCTION OF BOTH SIDES: ICD-10-CM

## 2025-02-25 DIAGNOSIS — M54.9 DORSALGIA, UNSPECIFIED: ICD-10-CM

## 2025-02-25 DIAGNOSIS — G89.4 CHRONIC PAIN SYNDROME: ICD-10-CM

## 2025-02-25 PROCEDURE — 3288F FALL RISK ASSESSMENT DOCD: CPT | Mod: CPTII,S$GLB,, | Performed by: ANESTHESIOLOGY

## 2025-02-25 PROCEDURE — 3078F DIAST BP <80 MM HG: CPT | Mod: CPTII,S$GLB,, | Performed by: ANESTHESIOLOGY

## 2025-02-25 PROCEDURE — 1160F RVW MEDS BY RX/DR IN RCRD: CPT | Mod: CPTII,S$GLB,, | Performed by: ANESTHESIOLOGY

## 2025-02-25 PROCEDURE — 3074F SYST BP LT 130 MM HG: CPT | Mod: CPTII,S$GLB,, | Performed by: ANESTHESIOLOGY

## 2025-02-25 PROCEDURE — 3008F BODY MASS INDEX DOCD: CPT | Mod: CPTII,S$GLB,, | Performed by: ANESTHESIOLOGY

## 2025-02-25 PROCEDURE — 1101F PT FALLS ASSESS-DOCD LE1/YR: CPT | Mod: CPTII,S$GLB,, | Performed by: ANESTHESIOLOGY

## 2025-02-25 PROCEDURE — 1125F AMNT PAIN NOTED PAIN PRSNT: CPT | Mod: CPTII,S$GLB,, | Performed by: ANESTHESIOLOGY

## 2025-02-25 PROCEDURE — G2211 COMPLEX E/M VISIT ADD ON: HCPCS | Mod: S$GLB,,, | Performed by: ANESTHESIOLOGY

## 2025-02-25 PROCEDURE — 99214 OFFICE O/P EST MOD 30 MIN: CPT | Mod: GC,S$GLB,, | Performed by: ANESTHESIOLOGY

## 2025-02-25 PROCEDURE — 1159F MED LIST DOCD IN RCRD: CPT | Mod: CPTII,S$GLB,, | Performed by: ANESTHESIOLOGY

## 2025-02-25 PROCEDURE — 99999 PR PBB SHADOW E&M-EST. PATIENT-LVL IV: CPT | Mod: PBBFAC,,, | Performed by: ANESTHESIOLOGY

## 2025-02-25 RX ORDER — CYCLOBENZAPRINE HCL 5 MG
5 TABLET ORAL NIGHTLY PRN
Qty: 30 TABLET | Refills: 2 | Status: SHIPPED | OUTPATIENT
Start: 2025-02-25 | End: 2025-05-26

## 2025-02-25 RX ORDER — GABAPENTIN 600 MG/1
600 TABLET ORAL 2 TIMES DAILY
Qty: 60 TABLET | Refills: 0 | Status: SHIPPED | OUTPATIENT
Start: 2025-02-25

## 2025-02-25 NOTE — PROGRESS NOTES
Interventional Pain Management - Established Visit  Follow-Up      Interval History 2/25/2025:  Uriah Mills is a 74 y.o. male who presents to the clinic for follow up evaluation and management of chronic low back pain. Last seen in clinic on 2/3 at which time pt was s/p lumbar RFA without much relief. We ordered repeat MRI and continued medication management. Today, pt continues to complain of low back and buttock pain. When sitting, pain is mostly in the buttocjk region, when pt stands, pain travels up his back. Pain is described as a dull pressure with occasional electric shock like sensations. Symptoms worse with walking, prolonged standing, bending over. Pain improved with lying flat. Current pain 8/10.   He continues to use Gabapentin 600 BID. Stopped methocarbamol due to lack of efficacy. No longer using Hydrocodone.   Additionally, pt reports worsening right shoulder pain. Hx of impingement syndrome, previous benefit with right shoulder. Overall, pt is very sedentary and spends the majority of his day either lying down or sitting in a recliner.     Interval History 2/3/2025:  Uriah Mills returns to clinic for follow-up after bilateral L3,4,5 RFFA on 1/2/2025. He reports limited relief of his back pain. He states he has taken Norco in the past without relief. He states the last time he took a pain pill was mid-December. He also takes  Gabapentin 600 mg BID with some relief. He took methocarbamol a couple times without relief. He denies recent health changes. He denies recent falls or trauma. He denies new onset fever/night sweats, urinary incontinence, bowel incontinence, significant weight changes, significant motor weakness or changes, or loss of sensations. His pain today is 8/10.      Interval history 12/12/2024:   Uriah Mills is following up in the clinic for chronic lower back pain.  Patient continues to achy sore dull lower back pain that is distributed in a bandlike pattern with some of the  pain radiating to his buttocks.  Patient said the pain is not traveling down his legs. The pain is worse with prolonged standing, walking and getting up from a sitting position. The lumbar paraspinal trigger point injections from his last visit provided limited relief. This pain was alleviated with RFA in the past. Patient is status post piriformis and SI joint injections with limited relief.  Patient is having limited pain relief with gabapentin and Robaxin. He denies new onset fever/night sweats, urinary incontinence, bowel incontinence, significant weight changes, significant motor weakness or changes, or loss of sensations. His pain today is 9/10.    Interval History 11/19/2024:  Uriah Mills returns to clinic for follow-up of chronic lower back pain. He present with his wife, Yamilex, who aids in providing history. He states that about 10 days ago, he was sitting up in bed watching TV. He had taken a Xanax earlier. He is not sure if he passed out or fell asleep, but he fell out of bed. He hit his right upper back/side, right hand and right ear on his bedside table. The fall woke him up. They did not present to the ED. He denies headaches, blurred vision, change in memory or any other signs of head trauma. He is having new pain in the upper back. He denies recent health changes. He denies new onset fever/night sweats, urinary incontinence, bowel incontinence, significant weight changes, significant motor weakness or changes, or loss of sensations. His pain today is 9/10.      Interval History 7/9/2023:  Patient is here for follow up after BL SIJ and right Piriformis injection on 5/52024 with 0% pain relief.  Today reports pain is mostly in his right lower back, worse with prolonged sitting, rotation of his back.  Denies leg pain or groin pain, denies new weakness, numbness, falls.  Patient is not participating in PT or HEP because it worsens his pain.  Today, pain is 5/10.      Interval History  5/10/2024:  Uriah Mills returns to clinic s/p right then left  L3, L4, L5 RFA on 4/12/2024 and 4/26/2024 respectively.  He reports 80% relief from these procedures. He also reports 80% relief at least from the previous bilateral SIJ injections. He reports return of his bilateral SIJ pain right > left with a new pain in his right buttock that radiates down the posterior aspect of his right thigh.  He describes the pain as shooting, dull.  Exacerbating factors: moving from sitting to standing, sitting up in bed. Mitigating factors: resting.  He has previously received Noco from his PCP last fill 10/31/2023.  He is inquiring about restarting pain medications.  He does not participate in HEP or PT currently. The patient denies fever/night sweats, urinary incontinence, bowel incontinence, significant weight changes, significant motor weakness or changes, or loss of sensations. His pain today is 8/10.     Interval History 1/26/2024:  The patient is here today for follow up of back pain. He is s/p bilateral SI joint injection with 60% relief. His primary complaint today is lower back pain, higher than previous injection site. It is stabbing and throbbing in nature. No radiation into the legs. There is associated stiffness. He has a lot of pain and stiffness first thing in the morning. He has completed PT multiple times without much improvement in symptoms. He continues with HEP. His pain today is 5/10.    Interval History 12/15/2023:  Uriah Mills presents to the clinic for a follow-up appointment for low back and hip pain. Since the last visit, Uriah Mills states the pain has been persistant. Current pain intensity is 8/10 but he reports it is usually a 10/10. He has been tolerating his pain over the past few years but had a flare up in October which led him to schedule this appointment.     Interval History (12/14/21):  He returns today for follow up.  He reports that bilateral sacroiliac joint steroid injections  has been helpful for the bilateral low back pain.  He reports roughly 60% relief.  He is happy with these results and does not feel that further treatment needed at this time.  He is not interested in participating in further physical therapy.     Interval History (11/11/21):  He returns today for follow up.  He reports that bilateral lumbar radiofrequency ablations has been helpful for the bilateral lumbar pain.  He reports 95% relief of lumbar back pain.  He states that he continues to have pain in the bilateral lumbosacral regions over the bilateral sacroiliac joints.  This pain radiates to the bilateral buttocks and lateral hips.  He denies any associated numbness, tingling, weakness, bowel bladder dysfunction.     Interval History (7/1/21):  He returns today for follow up.  He reports that bilateral S1 transforaminal epidural steroid injection has been helpful for the bilateral low back and lower extremity pain.  He reports roughly 40% relief.  He continues to have some back pain in the bilateral lower lumbar/lumbosacral region.  The pain does not radiate.  He denies any other changes in the quality or location was pain.  He denies any new or worsening symptoms.     Interval History (1/19/21):  He returns today for follow up.  He reports that his pain is unchanged in quality location since last encounter.  He denies any new or worsening symptoms.  We have received records from previous pain management provider.  Records indicate the patient has undergone multiple lumbar interventional procedures including sacroiliac joint injections, lumbar radiofrequency ablations, and bilateral L4 and L5 transforaminal epidural steroid injections.  Patient denies any significant relief from these procedures.     Initial Encounter (1/5/21):  Uriah Mills is a 71 y.o. male who presents today with chronic bilateral low back and lower extremity pain.  Patient has had this problem for many years.  No specific inciting event or  injury noted.  The pain is located across the lower lumbar region radiate to the bilateral hips and posterior thighs.  Patient reports associated numbness in the right posterior thigh.  He denies any pain or numbness distal to the knees.  He denies any focal weakness or bowel bladder dysfunction.  The pain is constant worse with activity.  Patient reports undergoing multiple interventional procedures in the past with Dr. Kin Palomino.  He states that no these procedures were particularly helpful.  He was also tried on multiple opioid pain medications.  Some of these help but they also cause significant constipation and therefore these medications were not continued.  More recently, patient has been taking tramadol prescribed by his primary care physician.  He states this medication does not cause any problems but also did not provide any significant relief..   This pain is described in detail below.    Pain Disability Index Review:      2/25/2025     8:26 AM 2/3/2025    11:46 AM 12/12/2024     8:05 AM   Last 3 PDI Scores   Pain Disability Index (PDI) 48 [phone] 56       Pain Medications:    - Opioids: Lorcet (Hydrocodone/Acetaminophen)  Hydrocodone 2 weeks since he took any, wasn't helping pain much  gabapentin    Opioid Contract: no     report:  Reviewed and inconsistent with medication use as prescribed. (Pt report he has stopped taking it).    Pain Procedures:   October 2021: RFA of bilateral L3, L4, L5  June 2021: Transforaminal epidural at bilateral S1  December 2021: Bilateral sacroiliac joint injection   1/11/24 B/L SI joint injections- 80% relief  4/12/2024 - Right L3, L4, L5 RFA - 80% relief  4/26/2024 - Left L3, L4, L5 RFA - 80% relief  5/24/2024: Bilateral Sacroiliac Joint Injection under Fluoroscopic Guidance and Right Piriformis Muscle Injection under Fluoroscopic Guidance - 80% relief  1/2/2025 - Bilateral L3,4,5 RFA - limited relief    Physical Therapy/Home Exercise: no (has tried accupuncture without  significant relief)    Imaging: MRI lumbar spine 2020    Alignment: Normal.     Vertebrae: 5 lumbar-type vertebral bodies. No aggressive marrow replacement process or fracture.     Discs: Satisfactory height.  Mild diffuse desiccation of disc material predominantly L4-L5 and L5-S1     Cord: Normal. Conus terminates at T12-L1.     Degenerative findings:     T12-L1: There is no focal disc herniation.No significant central canal narrowing . No significant neural foraminal narrowing.     L1-L2: There is no focal disc herniation.No significant central canal narrowing . No significant neural foraminal narrowing.     L2-L3: There is no focal disc herniation.No significant central canal narrowing . No significant neural foraminal narrowing.     L3-L4: There is no focal disc herniation.No significant central canal narrowing . No significant neural foraminal narrowing.     L4-L5: Mild broad-based bulging of disc material with small superimposed central protrusion.  No significant central canal narrowing . No significant neural foraminal narrowing.     L5-S1: Mild broad-based bulging of disc material with small superimposed central protrusion.  No significant central canal narrowing . No significant neural foraminal narrowing.     Paraspinal muscles & soft tissues: Unremarkable.     Impression:     Degenerative changes disc space level 4-L5 and L5-S1. no significant central or foraminal stenosis.    Allergies: Review of patient's allergies indicates:  No Known Allergies    Current Medications:   Current Outpatient Medications   Medication Sig Dispense Refill    albuterol (PROVENTIL) 2.5 mg /3 mL (0.083 %) nebulizer solution Take 3 mLs (2.5 mg total) by nebulization every 6 (six) hours as needed for Wheezing. Rescue 150 mL 5    albuterol (PROVENTIL/VENTOLIN HFA) 90 mcg/actuation inhaler INHALE 2 PUFFS BY MOUTH EVERY 6 HOURS AS NEEDED 27 g 3    ALPRAZolam (XANAX) 1 MG tablet Take 1 tablet (1 mg total) by mouth nightly as needed  for Anxiety. 30 tablet 2    aspirin (ECOTRIN) 81 MG EC tablet Take 81 mg by mouth once daily.      dexAMETHasone (DECADRON) 4 MG Tab Take 1 tablet (4 mg total) by mouth every 12 (twelve) hours. 14 tablet 1    gabapentin (NEURONTIN) 600 MG tablet Take 1 tablet by mouth twice daily 60 tablet 0    hydrocortisone 2.5 % cream Apply topically 2 (two) times daily. 28 g 2    levocetirizine (XYZAL) 5 MG tablet TAKE 1 TABLET BY MOUTH ONCE DAILY IN THE EVENING 90 tablet 3    methocarbamoL (ROBAXIN) 500 MG Tab Take 1 tablet (500 mg total) by mouth 4 (four) times daily as needed (muscle spasm). 80 tablet 0    BREZTRI AEROSPHERE 160-9-4.8 mcg/actuation HFAA INHALE 1 PUFF BY MOUTH TWICE DAILY 11 g 2     No current facility-administered medications for this visit.     Facility-Administered Medications Ordered in Other Visits   Medication Dose Route Frequency Provider Last Rate Last Admin    0.9%  NaCl infusion   Intravenous Continuous Rory Griffith MD        0.9%  NaCl infusion   Intravenous Continuous Jovanny Gurrola MD        ALPRAZolam dissolvable tablet 1 mg  1 mg Oral Once PRN Colt Lenz Jr., MD        BUPivacaine (PF) 0.25% (2.5 mg/ml) injection 25 mg  10 mL Intramuscular Once Clot Lenz Jr., MD        ceFAZolin 2 g in dextrose 5 % in water (D5W) 100 mL IVPB (MB+)  2 g Intravenous On Call Procedure Rory Griffith MD        dexAMETHasone sodium phos (PF) injection 10 mg  10 mg Epidural Once Colt Lenz Jr., MD        LIDOcaine (PF) 10 mg/ml (1%) injection 10 mg  1 mL Other Once Colt Lenz Jr., MD        LIDOcaine (PF) 10 mg/ml (1%) injection 10 mg  1 mL Intradermal Once PRN Rory Griffith MD        LIDOcaine HCL 20 mg/ml (2%) injection 10 mL  10 mL Other Once Colt Lenz Jr., MD        LIDOcaine HCL 20 mg/ml (2%) injection 10 mL  10 mL Other Once Colt Lenz Jr., MD           REVIEW OF SYSTEMS:    GENERAL:  No weight loss, malaise or fevers.  HEENT:  Negative for  frequent or significant headaches.  NECK:  Negative for lumps, goiter, pain and significant neck swelling.  RESPIRATORY:  Negative for cough, wheezing or shortness of breath.  CARDIOVASCULAR:  Negative for chest pain, leg swelling or palpitations.  GI:  Negative for abdominal discomfort, blood in stools or black stools or change in bowel habits.  MUSCULOSKELETAL:  See HPI.  SKIN:  Negative for lesions, rash, and itching.  PSYCH:  Negative for sleep disturbance, mood disorder and recent psychosocial stressors.  HEMATOLOGY/LYMPHOLOGY:  Negative for prolonged bleeding, bruising easily or swollen nodes.  NEURO:   No history of headaches, syncope, paralysis, seizures or tremors.  All other reviewed and negative other than HPI.    Past Medical History:  Past Medical History:   Diagnosis Date    Allergy     Arthritis     BPH (benign prostatic hyperplasia)     Carpal tunnel syndrome     Chronic hip pain, bilateral     Chronic low back pain     COPD (chronic obstructive pulmonary disease)     DDD (degenerative disc disease), lumbar     Dorsalgia     Gastrointestinal disease     Lumbar radiculopathy     Lumbar spondylosis     Mass of right wrist 06/05/2024    Osteoarthritis of both hips     Osteoarthritis of spine     Skin cancer        Past Surgical History:  Past Surgical History:   Procedure Laterality Date    APPENDECTOMY      CARPAL TUNNEL RELEASE Right 08/12/2019    Procedure: RELEASE, CARPAL TUNNEL right;  Surgeon: Roopa Hanna MD;  Location: [facility];  Service: Orthopedics;  Laterality: Right;    COLONOSCOPY      COLONOSCOPY      COLONOSCOPY N/A 9/18/2024    Procedure: COLONOSCOPY;  Surgeon: Lake Galeana MD;  Location: [facility];  Service: Endoscopy;  Laterality: N/A;    CYSTOSCOPY WITH INSERTION OF MINIMALLY INVASIVE IMPLANT TO ENLARGE PROSTATIC URETHRA N/A 11/29/2023    Procedure: CYSTOSCOPY, WITH INSERTION OF UROLIFT IMPLANT;  Surgeon: Rory Griffith MD;  Location: [facility];  Service: Urology;   Laterality: N/A;    CYSTOSCOPY, WITH INSERTION OF UROLIFT IMPLANT  11/29/2023    EPIDURAL STEROID INJECTION INTO LUMBAR SPINE Bilateral 06/17/2021    Bilateral ALVIN per  06/17/2021    EXCISION OF GANGLION OF WRIST Right 06/05/2024    Procedure: RIGHT VOLAR WRIST GANGLION CYST EXCISION;  Surgeon: Roopa Hanna MD;  Location: [facility] OR;  Service: Orthopedics;  Laterality: Right;  MAC/REGIONAL    GASTRIC FUNDOPLICATION      HERNIA REPAIR  1990s    INJECTION OF ANESTHETIC AGENT AROUND MEDIAL BRANCH NERVES INNERVATING LUMBAR FACET JOINT Bilateral 07/15/2021    Procedure: Block-nerve-medial branch-lumbar---BILATERAL L3, L4, L5;  Surgeon: Colt Lenz Jr., MD;  Location: [facility];  Service: Pain Management;  Laterality: Bilateral;    INJECTION OF ANESTHETIC AGENT AROUND MEDIAL BRANCH NERVES INNERVATING LUMBAR FACET JOINT Bilateral 09/23/2021    Procedure: Block-nerve-medial branch-lumbar---BILATERAL L3, L4, L5;  Surgeon: Colt Lenz Jr., MD;  Location: [facility];  Service: Pain Management;  Laterality: Bilateral;    INJECTION OF ANESTHETIC AGENT AROUND NERVE Bilateral 02/16/2024    Procedure: BLOCK, NERVE BILATERAL L3, 4, 5 MEDIAL BRANCH;  Surgeon: Jeanmarie Jauregui MD;  Location: [facility]IN [facility];  Service: Pain Management;  Laterality: Bilateral;  [phone]    INJECTION OF ANESTHETIC AGENT AROUND NERVE Bilateral 03/08/2024    Procedure: BLOCK, NERVE BILATERAL L3, L4, AND L5 MEDIAL BRANCH;  Surgeon: Jeanmarie Jauregui MD;  Location: [facility]IN [facility];  Service: Pain Management;  Laterality: Bilateral;  [phone]    INJECTION OF JOINT Bilateral 12/02/2021    Procedure: Injection, Joint---BILATERAL SACROILIAC INJECTION;  Surgeon: Colt Lenz Jr., MD;  Location: [facility];  Service: Pain Management;  Laterality: Bilateral;    INJECTION OF JOINT N/A 05/24/2024    Procedure: INJECTION, BILATERAL SI AND RIGHT PIRIFORMIS;  Surgeon: Jeanmarie Jauregui MD;  Location: [facility]IN  [facility]GT;  Service: Pain Management;  Laterality: N/A;  [phone]  2 WK F/U ESHRAGHI    INJECTION OF STEROID Left 08/12/2019    Procedure: INJECTION, STEROID;  Surgeon: Roopa Hanna MD;  Location: [facility] OR;  Service: Orthopedics;  Laterality: Left;    INJECTION, SACROILIAC JOINT Bilateral 01/11/2024    Procedure: INJECTION,SACROILIAC JOINT BILATERAL;  Surgeon: Jeanmarie Jauregui MD;  Location: [facility]IN [facility]GT;  Service: Pain Management;  Laterality: Bilateral;  [phone]    PYELOGRAM, RETROGRADE (Bilateral)    11/29/2023    RADIOFREQUENCY ABLATION Left 10/14/2021    Procedure: Radiofrequency Ablation---LEFT L3, L4, L5;  Surgeon: Colt Lenz Jr., MD;  Location: [facility];  Service: Pain Management;  Laterality: Left;    RADIOFREQUENCY ABLATION Right 10/28/2021    Procedure: Radiofrequency Ablation---RIGHT L3, L4, L5;  Surgeon: Colt Lenz Jr., MD;  Location: [facility];  Service: Pain Management;  Laterality: Right;    RADIOFREQUENCY ABLATION Right 04/12/2024    Procedure: RADIOFREQUENCY ABLATION RIGHT L3, L4, AND L5 1 OF 2;  Surgeon: Jeanmarie Jauregui MD;  Location: [facility] PAIN [facility]GT;  Service: Pain Management;  Laterality: Right;  [phone]    RADIOFREQUENCY ABLATION Left 04/26/2024    Procedure: RADIOFREQUENCY ABLATION LEFT L3, L4, AND L5 2 OF 2;  Surgeon: Jeanmarie Jauregui MD;  Location: [facility] PAIN [facility]GT;  Service: Pain Management;  Laterality: Left;  [phone]    RADIOFREQUENCY ABLATION Bilateral 1/2/2025    Procedure: RADIOFREQUENCY ABLATION BILATERAL L3, L4, L5;  Surgeon: Jeanmarie Jauregui MD;  Location: [facility] PAIN [facility]GT;  Service: Pain Management;  Laterality: Bilateral;  4 WK F/U NICO    RETROGRADE PYELOGRAPHY Bilateral 11/29/2023    Procedure: PYELOGRAM, RETROGRADE;  Surgeon: Rory Griffith MD;  Location: [facility] OR;  Service: Urology;  Laterality: Bilateral;    ROTATOR CUFF REPAIR Right     SACROILIAC JOINT INJECTION Bilateral 12/02/2021    SKIN CANCER EXCISION Left     arm     TRANSFORAMINAL EPIDURAL INJECTION OF STEROID Bilateral 06/17/2021    Procedure: Injection,steroid,epidural,transforaminal approach---BILATERAL S1;  Surgeon: Colt Lenz Jr., MD;  Location: [facility] PAIN [facility];  Service: Pain Management;  Laterality: Bilateral;    TURBT (TRANSURETHRAL RESECTION OF BLADDER TUMOR) N/A 11/29/2023    Procedure: TURBT (TRANSURETHRAL RESECTION OF BLADDER TUMOR);  Surgeon: Rory Griffith MD;  Location: [facility] OR;  Service: Urology;  Laterality: N/A;  with UroLift and with bilateral retrograde pyelograms    URBT (TRANSURETHRAL RESECTION OF BLADDER TUMOR)  11/29/2023       Family History:  Family History   Problem Relation Name Age of Onset    Diabetes Mother Marly Milsl     No Known Problems Father       Social History:  Social History     Socioeconomic History    Marital status:    Tobacco Use    Smoking status: Every Day     Types: Cigars     Passive exposure: Current    Smokeless tobacco: Never    Tobacco comments:     8 small cigars per day down from 10.   Substance and Sexual Activity    Alcohol use: No    Drug use: Yes     Types: Benzodiazepines    Sexual activity: Not Currently     Partners: Female     Birth control/protection: None     OBJECTIVE:    /67 (Patient Position: Sitting)   Pulse 68   Temp 98.2 °F (36.8 °C)   Wt 75.8 kg (167 lb 1.7 oz)   BMI 23.98 kg/m²     PHYSICAL EXAMINATION:     General appearance: Well appearing, in no acute distress, alert and oriented x3.  Psych:  Mood and affect appropriate.  Skin: Skin color, texture, turgor normal, no rashes or lesions, in both upper and lower body.  Head/face:  Atraumatic, normocephalic. No palpable lymph nodes    Back:   No gross deformity about the lumbar spine   TTP over BL lumbar facet joints, SIJ BL, BL Piriformis muscles.     Limited ROM with pain on extension > flexion. Positive facet loading bilaterally.  Positive tenderness over bilateral SIJ and piriformis muscle.   Positive thigh and  sacral thrust test B/L, Positive FABERE, Thigh thrust, Maddie finger test, Ganselin test bilaterally.  Positive Milgrams test.  Straight leg raising in the sitting and supine positions is negative to radicular pain.     Extremities: Peripheral joint ROM is full and pain free without obvious instability or laxity in all four extremities. No deformities, edema, or skin discoloration. Good capillary refill.  Musculoskeletal: Lower extremity strength is normal and symmetric.  No atrophy or tone abnormalities are noted. TTP over right piriformis with positive piriformis stretch test.   Lumbar Paraspinal tenderness on Right  Neuro: Bilateral upper and lower extremity coordination and muscle stretch reflexes are physiologic and symmetric.  Plantar response are downgoing. No loss of sensation is noted.  Gait: Antalgic.    ASSESSMENT: 74 y.o. year old male with chronic low back pain. The patient was last seen on 2/3/2024 following a lumbar RFA, which provided minimal relief. A repeat MRI was ordered, and medication management was continued.     1. Sacroiliitis        2. Sacroiliac joint dysfunction of both sides        3. Myofascial pain syndrome        4. Degeneration of intervertebral disc of lumbar region with discogenic back pain        5. Chronic pain syndrome        6. Lumbar spondylosis        7. Dorsalgia, unspecified        8. Discogenic low back pain            Plan:  1. Intervention & Procedures:  Schedule bilateral SI joint injection under fluoroscopy.  2. Medication Management:  Increase Norco 5/325 mg to TID.  Prescribe Flexeril 5 mg nightly.  Patient reports Norco has not been effective and has not taken it since mid-December.  3. Referrals:  Physical therapy referral for mobility and strengthening.  Orthopedic surgery referral to Dr. Sams for right shoulder evaluation.  4. Compliance & Monitoring:  The patient is here today for a pain medication refill and reports adequate pain control and improved  quality of life with current medications.  Patient denies serious side effects and understands the risks and benefits of opioid therapy, including potential adverse effects and overdose.  Pain contract signed on 12/12/2024; patient reminded of opioid agreement.  [place] Board  Pharmacy  reviewed - patient remains compliant with the treatment plan.  Urine drug screen (UDS) from 12/12/2024 reviewed, negative for concerning substances.  5. Follow-Up:  Return to clinic (RTC) after the scheduled procedure for reassessment and further management.    I performed a history, review of systems, and physical exam with the patient. The assessment and plan were discussed and agreed upon with Dr. Jauregui, the attending of record, before sharing with the patient.     Luis Jhaveri M.D.  PGY-5  Pain Fellow        I spent a total of 30 minutes on the day of the visit.  This includes face to face time and non-face to face time preparing to see the patient by reviewing previous labs/imaging, obtaining and/or reviewing separately obtained history, documenting clinical information in the electronic or other health record, independently interpreting results and communicating results to the patient/family/caregiver.    Jeanmarie Jauregui

## 2025-03-13 ENCOUNTER — PATIENT MESSAGE (OUTPATIENT)
Dept: ORTHOPEDICS | Facility: CLINIC | Age: 75
End: 2025-03-13
Payer: MEDICARE

## 2025-03-13 DIAGNOSIS — M25.511 CHRONIC RIGHT SHOULDER PAIN: Primary | ICD-10-CM

## 2025-03-13 DIAGNOSIS — G89.29 CHRONIC RIGHT SHOULDER PAIN: Primary | ICD-10-CM

## 2025-03-15 ENCOUNTER — PATIENT MESSAGE (OUTPATIENT)
Dept: PAIN MEDICINE | Facility: OTHER | Age: 75
End: 2025-03-15
Payer: MEDICARE

## 2025-03-17 DIAGNOSIS — J32.9 CHRONIC SINUSITIS, UNSPECIFIED LOCATION: ICD-10-CM

## 2025-03-17 RX ORDER — LEVOCETIRIZINE DIHYDROCHLORIDE 5 MG/1
5 TABLET, FILM COATED ORAL NIGHTLY
Qty: 90 TABLET | Refills: 2 | Status: SHIPPED | OUTPATIENT
Start: 2025-03-17

## 2025-03-17 NOTE — TELEPHONE ENCOUNTER
No care due was identified.  [facility] Embedded Care Due Messages. Reference number: [id number].   3/17/2025 10:06:00 AM CDT

## 2025-03-18 NOTE — TELEPHONE ENCOUNTER
Refill Routing Note   Medication(s) are not appropriate for processing by [facility] for the following reason(s):     DDI not previously overridden by current provider--after initial override, the Refill Center will be able to continue overrides      Drug-disease interaction: levocetirizine and Chronic kidney disease, stage 3a     [facility] action(s):  Defer           Pharmacist review requested: Yes     Appointments  past 12m or future 3m with PCP    Date Provider   Last Visit   10/23/2024 Geo Montalvo MD   Next Visit   4/30/2025 Geo Montalvo MD   ED visits in past 90 days: 0        Note composed:11:23 PM 03/17/2025

## 2025-03-18 NOTE — TELEPHONE ENCOUNTER
Refill Decision Note   Uriah Milsl  is requesting a refill authorization.  Brief Assessment and Rationale for Refill:  Approve     Medication Therapy Plan: ddi reviewed, ok to fill      Pharmacist review requested: Yes   Comments:     Note composed:11:31 PM 03/17/2025

## 2025-03-21 ENCOUNTER — HOSPITAL ENCOUNTER (OUTPATIENT)
Facility: OTHER | Age: 75
Discharge: HOME | End: 2025-03-21
Attending: ANESTHESIOLOGY | Admitting: ANESTHESIOLOGY
Payer: MEDICARE

## 2025-03-21 VITALS
SYSTOLIC BLOOD PRESSURE: 134 MMHG | OXYGEN SATURATION: 98 % | DIASTOLIC BLOOD PRESSURE: 79 MMHG | HEART RATE: 70 BPM | RESPIRATION RATE: 16 BRPM | TEMPERATURE: 98 F

## 2025-03-21 DIAGNOSIS — M46.1 SACROILIITIS: Primary | ICD-10-CM

## 2025-03-21 DIAGNOSIS — G89.29 CHRONIC PAIN: ICD-10-CM

## 2025-03-21 PROCEDURE — 27096 INJECT SACROILIAC JOINT: CPT | Mod: 50,,, | Performed by: ANESTHESIOLOGY

## 2025-03-21 PROCEDURE — 25500020 PHARM REV CODE 255: Performed by: ANESTHESIOLOGY

## 2025-03-21 PROCEDURE — 27096 INJECT SACROILIAC JOINT: CPT | Mod: 50 | Performed by: ANESTHESIOLOGY

## 2025-03-21 PROCEDURE — 63600175 PHARM REV CODE 636 W HCPCS: Performed by: ANESTHESIOLOGY

## 2025-03-21 RX ORDER — MIDAZOLAM HYDROCHLORIDE 1 MG/ML
INJECTION INTRAMUSCULAR; INTRAVENOUS
Status: DISCONTINUED | OUTPATIENT
Start: 2025-03-21 | End: 2025-03-21 | Stop reason: HOSPADM

## 2025-03-21 RX ORDER — FENTANYL CITRATE 50 UG/ML
INJECTION, SOLUTION INTRAMUSCULAR; INTRAVENOUS
Status: DISCONTINUED | OUTPATIENT
Start: 2025-03-21 | End: 2025-03-21 | Stop reason: HOSPADM

## 2025-03-21 RX ORDER — TRIAMCINOLONE ACETONIDE 40 MG/ML
INJECTION, SUSPENSION INTRA-ARTICULAR; INTRAMUSCULAR
Status: DISCONTINUED | OUTPATIENT
Start: 2025-03-21 | End: 2025-03-21 | Stop reason: HOSPADM

## 2025-03-21 RX ORDER — BUPIVACAINE HYDROCHLORIDE 2.5 MG/ML
INJECTION, SOLUTION EPIDURAL; INFILTRATION; INTRACAUDAL; PERINEURAL
Status: DISCONTINUED | OUTPATIENT
Start: 2025-03-21 | End: 2025-03-21 | Stop reason: HOSPADM

## 2025-03-21 RX ORDER — LIDOCAINE HYDROCHLORIDE 20 MG/ML
INJECTION, SOLUTION INFILTRATION; PERINEURAL
Status: DISCONTINUED | OUTPATIENT
Start: 2025-03-21 | End: 2025-03-21 | Stop reason: HOSPADM

## 2025-03-21 RX ORDER — SODIUM CHLORIDE 9 MG/ML
INJECTION, SOLUTION INTRAVENOUS CONTINUOUS
Status: DISCONTINUED | OUTPATIENT
Start: 2025-03-21 | End: 2025-03-21 | Stop reason: HOSPADM

## 2025-03-21 NOTE — OP NOTE
Sacroiliac Joint Injection under Fluoroscopic Guidance    The procedure, risks, benefits, and options were discussed with the patient. There are no contraindications to the procedure. The patent expressed understanding and agreed to the procedure. Informed written consent was obtained prior to the start of the procedure and can be found in the patient's chart.    PATIENT NAME: Uriah Mills   MRN: [id number]     DATE OF PROCEDURE: 03/21/2025    PROCEDURE: Bilateral Sacroiliac Joint Injection under Fluoroscopic Guidance    PRE-OP DIAGNOSIS: Sacroiliac joint dysfunction of both sides [M53.3]    POST-OP DIAGNOSIS: Same    PHYSICIAN: Jeanmarie Jauregui MD    ASSISTANTS: Bria Schwartz MD     MEDICATIONS INJECTED: Preservative-free Kenalog 40mg with 3cc of Bupivacine 0.25%     LOCAL ANESTHETIC INJECTED: Xylocaine 2%     SEDATION: Versed 2mg and Fentanyl 50mcg                                                                                                                                                                                     Conscious sedation ordered by M.D. Patient re-evaluation prior to administration of conscious sedation. No changes noted in patient's status from initial evaluation. The patient's vital signs were monitored by RN and patient remained hemodynamically stable throughout the procedure.    Event Time In   Sedation Start 0945   Sedation End 0955       ESTIMATED BLOOD LOSS: None    COMPLICATIONS: None    TECHNIQUE: Time-out was performed to identify the patient and procedure to be performed. With the patient laying in a prone position, the surgical area was prepped and draped in the usual sterile fashion using ChloraPrep and a fenestrated drape. The sacroiliac joint was determined under fluoroscopy guidance. Skin anesthesia was achieved by injecting Lidocaine 2% over the injection site. The sacroiliac joint was  then approached with a 25 gauge, 3.5 inch spinal quinke needle that was introduced under  fluoroscopic guidance in the AP and Lateral views. Once the needle tip was in the area of the joint, and there was no blood, contrast dye Omnipaque (300mg/mL) was injected to confirm placement and there was no vascular runoff. Fluoroscopic imaging in the AP and lateral views revealed a clear outline of the joint space. 4 mL of the medication mixture listed above was injected slowly intraarticular and marvin-articular. Displacement of the radio opaque contrast after injection of the medication confirmed that the medication went into the area of the joint. The needles were removed and bleeding was nil. The same procedure was repeated on the contralateral side. A sterile dressing was applied. No specimens collected. The patient tolerated the procedure well.       The patient was monitored after the procedure in the recovery area. They were given post-procedure and discharge instructions to follow at home. The patient was discharged in a stable condition.    Bria Schwartz MD     I reviewed and edited the fellow's note. I conducted my own interview and physical examination. I agree with the findings. I was present and supervising all critical portions of the procedure.     Jeanmarie Jauregui MD

## 2025-03-21 NOTE — DISCHARGE SUMMARY
Discharge Note  Short Stay      SUMMARY     Admit Date: 3/21/2025    Attending Physician: Jeanmarie Jauregui MD    Discharge Physician: Jeanmarie Jauregui MD      Discharge Date: 3/21/2025 9:54 AM    Procedure(s) (LRB):  INJECTION,SACROILIAC JOINT BILATERAL (Bilateral)    Final Diagnosis:  1. Sacroiliitis    2. Chronic pain           Disposition: Home or self care    Patient Instructions:   Current Discharge Medication List        CONTINUE these medications which have NOT CHANGED    Details   albuterol (PROVENTIL) 2.5 mg /3 mL (0.083 %) nebulizer solution Take 3 mLs (2.5 mg total) by nebulization every 6 (six) hours as needed for Wheezing. Rescue  Qty: 150 mL, Refills: 5    Associated Diagnoses: COPD exacerbation      albuterol (PROVENTIL/VENTOLIN HFA) 90 mcg/actuation inhaler INHALE 2 PUFFS BY MOUTH EVERY 6 HOURS AS NEEDED  Qty: 27 g, Refills: 3    Associated Diagnoses: Bronchitis      ALPRAZolam (XANAX) 1 MG tablet Take 1 tablet (1 mg total) by mouth nightly as needed for Anxiety.  Qty: 30 tablet, Refills: 2    Associated Diagnoses: Insomnia, unspecified type; Anxiety      aspirin (ECOTRIN) 81 MG EC tablet Take 81 mg by mouth once daily.      BREZTRI AEROSPHERE 160-9-4.8 mcg/actuation HFAA INHALE 1 PUFF BY MOUTH TWICE DAILY  Qty: 11 g, Refills: 2    Associated Diagnoses: Obstructive chronic bronchitis without exacerbation      cyclobenzaprine (FLEXERIL) 5 MG tablet Take 1 tablet (5 mg total) by mouth nightly as needed for Muscle spasms.  Qty: 30 tablet, Refills: 2      dexAMETHasone (DECADRON) 4 MG Tab Take 1 tablet (4 mg total) by mouth every 12 (twelve) hours.  Qty: 14 tablet, Refills: 1    Associated Diagnoses: COPD exacerbation      gabapentin (NEURONTIN) 600 MG tablet Take 1 tablet by mouth twice daily  Qty: 60 tablet, Refills: 0    Associated Diagnoses: Chronic bilateral low back pain with bilateral sciatica; Lumbar radiculopathy      hydrocortisone 2.5 % cream Apply topically 2 (two) times daily.  Qty: 28 g,  Refills: 2      levocetirizine (XYZAL) 5 MG tablet TAKE 1 TABLET BY MOUTH ONCE DAILY IN THE EVENING  Qty: 90 tablet, Refills: 2    Associated Diagnoses: Chronic sinusitis, unspecified location      methocarbamoL (ROBAXIN) 500 MG Tab Take 1 tablet (500 mg total) by mouth 4 (four) times daily as needed (muscle spasm).  Qty: 80 tablet, Refills: 0    Associated Diagnoses: Muscle spasm                 Discharge Diagnosis: Same as above  Condition on Discharge: Stable with no complications to procedure   Diet on Discharge: Same as before.  Activity: as per instruction sheet.  Discharge to: Home with a responsible adult.  Follow up: 2-4 weeks       Please call my office or pager at [phone] if experienced any weakness or loss of sensation, fever > 101.5, pain uncontrolled with oral medications, persistent nausea/vomiting/or diarrhea, redness or drainage from the incisions, or any other worrisome concerns. If physician on call was not reached or could not communicate with our office for any reason please go to the nearest emergency department     Bria Schwartz MD

## 2025-03-21 NOTE — H&P
HPI  Patient presenting for Procedure(s) (LRB):  INJECTION,SACROILIAC JOINT BILATERAL (Bilateral)     Patient on Anti-coagulation No    No health changes since previous encounter    Past Medical History:   Diagnosis Date    Allergy     Arthritis     BPH (benign prostatic hyperplasia)     Carpal tunnel syndrome     Chronic hip pain, bilateral     Chronic low back pain     COPD (chronic obstructive pulmonary disease)     DDD (degenerative disc disease), lumbar     Dorsalgia     Gastrointestinal disease     Lumbar radiculopathy     Lumbar spondylosis     Mass of right wrist 06/05/2024    Osteoarthritis of both hips     Osteoarthritis of spine     Skin cancer      Past Surgical History:   Procedure Laterality Date    APPENDECTOMY      CARPAL TUNNEL RELEASE Right 08/12/2019    Procedure: RELEASE, CARPAL TUNNEL right;  Surgeon: Roopa Hanna MD;  Location: [facility] OR;  Service: Orthopedics;  Laterality: Right;    COLONOSCOPY      COLONOSCOPY      COLONOSCOPY N/A 9/18/2024    Procedure: COLONOSCOPY;  Surgeon: Lake Galeana MD;  Location: [facility];  Service: Endoscopy;  Laterality: N/A;    CYSTOSCOPY WITH INSERTION OF MINIMALLY INVASIVE IMPLANT TO ENLARGE PROSTATIC URETHRA N/A 11/29/2023    Procedure: CYSTOSCOPY, WITH INSERTION OF UROLIFT IMPLANT;  Surgeon: Rory Griffith MD;  Location: [facility] OR;  Service: Urology;  Laterality: N/A;    CYSTOSCOPY, WITH INSERTION OF UROLIFT IMPLANT  11/29/2023    EPIDURAL STEROID INJECTION INTO LUMBAR SPINE Bilateral 06/17/2021    Bilateral ALVIN per  06/17/2021    EXCISION OF GANGLION OF WRIST Right 06/05/2024    Procedure: RIGHT VOLAR WRIST GANGLION CYST EXCISION;  Surgeon: Roopa Hanna MD;  Location: [facility];  Service: Orthopedics;  Laterality: Right;  MAC/REGIONAL    GASTRIC FUNDOPLICATION      HERNIA REPAIR  1990s    INJECTION OF ANESTHETIC AGENT AROUND MEDIAL BRANCH NERVES INNERVATING LUMBAR FACET JOINT Bilateral 07/15/2021    Procedure:  Block-nerve-medial branch-lumbar---BILATERAL L3, L4, L5;  Surgeon: Colt Lenz Jr., MD;  Location: [facility] PAIN [facility];  Service: Pain Management;  Laterality: Bilateral;    INJECTION OF ANESTHETIC AGENT AROUND MEDIAL BRANCH NERVES INNERVATING LUMBAR FACET JOINT Bilateral 09/23/2021    Procedure: Block-nerve-medial branch-lumbar---BILATERAL L3, L4, L5;  Surgeon: Colt Lenz Jr., MD;  Location: [facility];  Service: Pain Management;  Laterality: Bilateral;    INJECTION OF ANESTHETIC AGENT AROUND NERVE Bilateral 02/16/2024    Procedure: BLOCK, NERVE BILATERAL L3, 4, 5 MEDIAL BRANCH;  Surgeon: Jeanmarie Jauregui MD;  Location: [facility]IN [facility];  Service: Pain Management;  Laterality: Bilateral;  [phone]    INJECTION OF ANESTHETIC AGENT AROUND NERVE Bilateral 03/08/2024    Procedure: BLOCK, NERVE BILATERAL L3, L4, AND L5 MEDIAL BRANCH;  Surgeon: Jeanmarie Jauregui MD;  Location: [facility];  Service: Pain Management;  Laterality: Bilateral;  [phone]    INJECTION OF JOINT Bilateral 12/02/2021    Procedure: Injection, Joint---BILATERAL SACROILIAC INJECTION;  Surgeon: Colt Lenz Jr., MD;  Location: [facility];  Service: Pain Management;  Laterality: Bilateral;    INJECTION OF JOINT N/A 05/24/2024    Procedure: INJECTION, BILATERAL SI AND RIGHT PIRIFORMIS;  Surgeon: Jeanmarie Jauregui MD;  Location: [facility];  Service: Pain Management;  Laterality: N/A;  [phone]  2 WK F/U BRITANY    INJECTION OF STEROID Left 08/12/2019    Procedure: INJECTION, STEROID;  Surgeon: Roopa Hanna MD;  Location: [facility] OR;  Service: Orthopedics;  Laterality: Left;    INJECTION, SACROILIAC JOINT Bilateral 01/11/2024    Procedure: INJECTION,SACROILIAC JOINT BILATERAL;  Surgeon: Jeanmarie Jauregui MD;  Location: [facility];  Service: Pain Management;  Laterality: Bilateral;  [phone]    PYELOGRAM, RETROGRADE (Bilateral)    11/29/2023    RADIOFREQUENCY ABLATION Left 10/14/2021    Procedure:  Radiofrequency Ablation---LEFT L3, L4, L5;  Surgeon: Colt Lenz Jr., MD;  Location: [facility];  Service: Pain Management;  Laterality: Left;    RADIOFREQUENCY ABLATION Right 10/28/2021    Procedure: Radiofrequency Ablation---RIGHT L3, L4, L5;  Surgeon: Colt Lenz Jr., MD;  Location: [facility];  Service: Pain Management;  Laterality: Right;    RADIOFREQUENCY ABLATION Right 04/12/2024    Procedure: RADIOFREQUENCY ABLATION RIGHT L3, L4, AND L5 1 OF 2;  Surgeon: Jeanmarie Jauregui MD;  Location: [facility]IN [facility];  Service: Pain Management;  Laterality: Right;  [phone]    RADIOFREQUENCY ABLATION Left 04/26/2024    Procedure: RADIOFREQUENCY ABLATION LEFT L3, L4, AND L5 2 OF 2;  Surgeon: Jeanmarie Jauregui MD;  Location: [facility];  Service: Pain Management;  Laterality: Left;  [phone]    RADIOFREQUENCY ABLATION Bilateral 1/2/2025    Procedure: RADIOFREQUENCY ABLATION BILATERAL L3, L4, L5;  Surgeon: Jeanmarie Jauregui MD;  Location: [facility];  Service: Pain Management;  Laterality: Bilateral;  4 WK F/U NICO    RETROGRADE PYELOGRAPHY Bilateral 11/29/2023    Procedure: PYELOGRAM, RETROGRADE;  Surgeon: Rory Griffith MD;  Location: [facility] OR;  Service: Urology;  Laterality: Bilateral;    ROTATOR CUFF REPAIR Right     SACROILIAC JOINT INJECTION Bilateral 12/02/2021    SKIN CANCER EXCISION Left     arm    TRANSFORAMINAL EPIDURAL INJECTION OF STEROID Bilateral 06/17/2021    Procedure: Injection,steroid,epidural,transforaminal approach---BILATERAL S1;  Surgeon: Colt Lenz Jr., MD;  Location: [facility];  Service: Pain Management;  Laterality: Bilateral;    TURBT (TRANSURETHRAL RESECTION OF BLADDER TUMOR) N/A 11/29/2023    Procedure: TURBT (TRANSURETHRAL RESECTION OF BLADDER TUMOR);  Surgeon: Rory Griffith MD;  Location: [facility] OR;  Service: Urology;  Laterality: N/A;  with UroLift and with bilateral retrograde pyelograms    URBT (TRANSURETHRAL RESECTION OF BLADDER TUMOR)   11/29/2023     Review of patient's allergies indicates:  No Known Allergies   No current facility-administered medications for this encounter.     Facility-Administered Medications Ordered in Other Encounters   Medication    0.9%  NaCl infusion    0.9%  NaCl infusion    ALPRAZolam dissolvable tablet 1 mg    BUPivacaine (PF) 0.25% (2.5 mg/ml) injection 25 mg    ceFAZolin 2 g in dextrose 5 % in water (D5W) 100 mL IVPB (MB+)    dexAMETHasone sodium phos (PF) injection 10 mg    LIDOcaine (PF) 10 mg/ml (1%) injection 10 mg    LIDOcaine (PF) 10 mg/ml (1%) injection 10 mg    LIDOcaine HCL 20 mg/ml (2%) injection 10 mL    LIDOcaine HCL 20 mg/ml (2%) injection 10 mL       PMHx, PSHx, Allergies, Medications reviewed in epic    ROS negative except pain complaints in HPI    OBJECTIVE:    There were no vitals taken for this visit.    PHYSICAL EXAMINATION:    GENERAL: Well appearing, in no acute distress, alert and oriented x3.  PSYCH:  Mood and affect appropriate.  SKIN: Skin color, texture, turgor normal, no rashes or lesions which will impact the procedure.  CV: RRR with palpation of the radial artery.  PULM: No evidence of respiratory difficulty, symmetric chest rise. Clear to auscultation.  NEURO: Cranial nerves grossly intact.    Plan:    Proceed with procedure as planned Procedure(s) (LRB):  INJECTION,SACROILIAC JOINT BILATERAL (Bilateral)    Ministerio Souza  03/21/2025

## 2025-03-21 NOTE — DISCHARGE INSTRUCTIONS

## 2025-03-24 DIAGNOSIS — Z00.00 ENCOUNTER FOR MEDICARE ANNUAL WELLNESS EXAM: ICD-10-CM

## 2025-03-30 ENCOUNTER — PATIENT MESSAGE (OUTPATIENT)
Dept: PAIN MEDICINE | Facility: OTHER | Age: 75
End: 2025-03-30
Payer: MEDICARE

## 2025-04-06 DIAGNOSIS — F41.9 ANXIETY: ICD-10-CM

## 2025-04-06 DIAGNOSIS — G47.00 INSOMNIA, UNSPECIFIED TYPE: ICD-10-CM

## 2025-04-06 NOTE — TELEPHONE ENCOUNTER
No care due was identified.  [facility] Embedded Care Due Messages. Reference number: [id number].   4/06/2025 7:54:38 AM CDT

## 2025-04-08 ENCOUNTER — PATIENT MESSAGE (OUTPATIENT)
Dept: PAIN MEDICINE | Facility: OTHER | Age: 75
End: 2025-04-08
Payer: MEDICARE

## 2025-04-08 ENCOUNTER — OFFICE VISIT (OUTPATIENT)
Dept: PAIN MEDICINE | Facility: CLINIC | Age: 75
End: 2025-04-08
Payer: MEDICARE

## 2025-04-08 VITALS
DIASTOLIC BLOOD PRESSURE: 63 MMHG | OXYGEN SATURATION: 94 % | WEIGHT: 167.13 LBS | HEART RATE: 76 BPM | BODY MASS INDEX: 23.98 KG/M2 | TEMPERATURE: 98 F | SYSTOLIC BLOOD PRESSURE: 134 MMHG

## 2025-04-08 DIAGNOSIS — G89.29 CHRONIC BILATERAL LOW BACK PAIN WITH BILATERAL SCIATICA: ICD-10-CM

## 2025-04-08 DIAGNOSIS — M53.3 SACROILIAC JOINT DYSFUNCTION OF BOTH SIDES: ICD-10-CM

## 2025-04-08 DIAGNOSIS — M54.41 CHRONIC BILATERAL LOW BACK PAIN WITH BILATERAL SCIATICA: ICD-10-CM

## 2025-04-08 DIAGNOSIS — M54.16 LUMBAR RADICULOPATHY: Primary | ICD-10-CM

## 2025-04-08 DIAGNOSIS — M47.816 LUMBAR SPONDYLOSIS: ICD-10-CM

## 2025-04-08 DIAGNOSIS — M54.42 CHRONIC BILATERAL LOW BACK PAIN WITH BILATERAL SCIATICA: ICD-10-CM

## 2025-04-08 DIAGNOSIS — M54.16 LUMBAR RADICULOPATHY: ICD-10-CM

## 2025-04-08 DIAGNOSIS — G89.4 CHRONIC PAIN DISORDER: Primary | ICD-10-CM

## 2025-04-08 DIAGNOSIS — M51.360 DEGENERATION OF INTERVERTEBRAL DISC OF LUMBAR REGION WITH DISCOGENIC BACK PAIN: ICD-10-CM

## 2025-04-08 DIAGNOSIS — M51.369 ANNULAR TEAR OF LUMBAR DISC: ICD-10-CM

## 2025-04-08 PROCEDURE — 1125F AMNT PAIN NOTED PAIN PRSNT: CPT | Mod: CPTII,S$GLB,, | Performed by: NURSE PRACTITIONER

## 2025-04-08 PROCEDURE — 1159F MED LIST DOCD IN RCRD: CPT | Mod: CPTII,S$GLB,, | Performed by: NURSE PRACTITIONER

## 2025-04-08 PROCEDURE — 3008F BODY MASS INDEX DOCD: CPT | Mod: CPTII,S$GLB,, | Performed by: NURSE PRACTITIONER

## 2025-04-08 PROCEDURE — 99214 OFFICE O/P EST MOD 30 MIN: CPT | Mod: S$GLB,,, | Performed by: NURSE PRACTITIONER

## 2025-04-08 PROCEDURE — 1160F RVW MEDS BY RX/DR IN RCRD: CPT | Mod: CPTII,S$GLB,, | Performed by: NURSE PRACTITIONER

## 2025-04-08 PROCEDURE — 3078F DIAST BP <80 MM HG: CPT | Mod: CPTII,S$GLB,, | Performed by: NURSE PRACTITIONER

## 2025-04-08 PROCEDURE — 3075F SYST BP GE 130 - 139MM HG: CPT | Mod: CPTII,S$GLB,, | Performed by: NURSE PRACTITIONER

## 2025-04-08 PROCEDURE — 1101F PT FALLS ASSESS-DOCD LE1/YR: CPT | Mod: CPTII,S$GLB,, | Performed by: NURSE PRACTITIONER

## 2025-04-08 PROCEDURE — 99999 PR PBB SHADOW E&M-EST. PATIENT-LVL IV: CPT | Mod: PBBFAC,,, | Performed by: NURSE PRACTITIONER

## 2025-04-08 PROCEDURE — 3288F FALL RISK ASSESSMENT DOCD: CPT | Mod: CPTII,S$GLB,, | Performed by: NURSE PRACTITIONER

## 2025-04-08 RX ORDER — GABAPENTIN 600 MG/1
600 TABLET ORAL 2 TIMES DAILY
Qty: 60 TABLET | Refills: 3 | Status: SHIPPED | OUTPATIENT
Start: 2025-04-08

## 2025-04-09 RX ORDER — ALPRAZOLAM 1 MG/1
1 TABLET ORAL NIGHTLY PRN
Qty: 20 TABLET | Refills: 0 | Status: SHIPPED | OUTPATIENT
Start: 2025-04-09

## 2025-04-10 ENCOUNTER — OFFICE VISIT (OUTPATIENT)
Dept: ORTHOPEDICS | Facility: CLINIC | Age: 75
End: 2025-04-10
Payer: MEDICARE

## 2025-04-10 ENCOUNTER — HOSPITAL ENCOUNTER (OUTPATIENT)
Dept: RADIOLOGY | Facility: OTHER | Age: 75
Discharge: HOME | End: 2025-04-10
Attending: ORTHOPAEDIC SURGERY
Payer: MEDICARE

## 2025-04-10 VITALS — BODY MASS INDEX: 23.93 KG/M2 | HEIGHT: 70 IN | WEIGHT: 167.13 LBS

## 2025-04-10 DIAGNOSIS — M25.511 CHRONIC RIGHT SHOULDER PAIN: ICD-10-CM

## 2025-04-10 DIAGNOSIS — G89.29 CHRONIC RIGHT SHOULDER PAIN: ICD-10-CM

## 2025-04-10 DIAGNOSIS — M19.011 ARTHRITIS OF RIGHT SHOULDER: Primary | ICD-10-CM

## 2025-04-10 DIAGNOSIS — M67.911 TENDINOPATHY OF RIGHT ROTATOR CUFF: ICD-10-CM

## 2025-04-10 PROCEDURE — 99214 OFFICE O/P EST MOD 30 MIN: CPT | Mod: 25,S$GLB,, | Performed by: ORTHOPAEDIC SURGERY

## 2025-04-10 PROCEDURE — 3008F BODY MASS INDEX DOCD: CPT | Mod: CPTII,S$GLB,, | Performed by: ORTHOPAEDIC SURGERY

## 2025-04-10 PROCEDURE — 1101F PT FALLS ASSESS-DOCD LE1/YR: CPT | Mod: CPTII,S$GLB,, | Performed by: ORTHOPAEDIC SURGERY

## 2025-04-10 PROCEDURE — 1159F MED LIST DOCD IN RCRD: CPT | Mod: CPTII,S$GLB,, | Performed by: ORTHOPAEDIC SURGERY

## 2025-04-10 PROCEDURE — 73030 X-RAY EXAM OF SHOULDER: CPT | Mod: TC,FY,RT

## 2025-04-10 PROCEDURE — 3288F FALL RISK ASSESSMENT DOCD: CPT | Mod: CPTII,S$GLB,, | Performed by: ORTHOPAEDIC SURGERY

## 2025-04-10 PROCEDURE — 99999 PR PBB SHADOW E&M-EST. PATIENT-LVL III: CPT | Mod: PBBFAC,,, | Performed by: ORTHOPAEDIC SURGERY

## 2025-04-10 PROCEDURE — 20610 DRAIN/INJ JOINT/BURSA W/O US: CPT | Mod: RT,S$GLB,, | Performed by: ORTHOPAEDIC SURGERY

## 2025-04-10 PROCEDURE — 73030 X-RAY EXAM OF SHOULDER: CPT | Mod: 26,RT,, | Performed by: RADIOLOGY

## 2025-04-10 PROCEDURE — 1125F AMNT PAIN NOTED PAIN PRSNT: CPT | Mod: CPTII,S$GLB,, | Performed by: ORTHOPAEDIC SURGERY

## 2025-04-10 RX ADMIN — TRIAMCINOLONE ACETONIDE 80 MG: 40 INJECTION, SUSPENSION INTRA-ARTICULAR; INTRAMUSCULAR at 10:04

## 2025-04-10 NOTE — PROCEDURES
Large Joint Aspiration/Injection: R glenohumeral    Date/Time: 4/10/2025 10:30 AM    Performed by: Roopa Hanna MD  Authorized by: Roopa Hanna MD    Consent Done?:  Yes (Verbal)  Indications:  Arthritis  Timeout: prior to procedure the correct patient, procedure, and site was verified      Local anesthesia used?: Yes    Anesthesia:  Local infiltration  Local anesthetic:  Lidocaine 1% without epinephrine    Details:  Needle Size:  22 G  Approach:  Posterior  Location:  Shoulder  Site:  R glenohumeral  Medications:  80 mg triamcinolone acetonide 40 mg/mL

## 2025-04-10 NOTE — PROGRESS NOTES
[facility]  History & Physical  Orthopedics    SUBJECTIVE:      Chief Complaint: Right shoulder pain    Referring Provider: Jeanmarie Jauregui MD     History of Present Illness:  Patient is a 74 y.o. right hand dominant male who presents today with complaints of right shoulder pain. Pain is located at the anterior and mid-distal aspect of the arm and sometimes radiates proximally to the shoulder    The patient is retired, previously a .    Onset of symptoms/DOI was 2 years ago.    Symptoms are aggravated by activity, movement, and overhead activity .    Symptoms are alleviated by rest.    Symptoms consist of pain and decreased ROM.    The patient rates their pain as a 8/10.    Attempted treatment(s) and/or interventions include activity modifications, rest, steroid injection. Reports last injection  two years ago with minimal relief of his pain.    Previous rotator cuff surgery approximately 20 years ago on the right shoulder.     The patient denies any fevers, chills, N/V, D/C and presents for evaluation.    Reports cigar use, 6-8 per day.       Past Medical History:   Diagnosis Date    Allergy     Arthritis     BPH (benign prostatic hyperplasia)     Carpal tunnel syndrome     Chronic hip pain, bilateral     Chronic low back pain     COPD (chronic obstructive pulmonary disease)     DDD (degenerative disc disease), lumbar     Dorsalgia     Gastrointestinal disease     Lumbar radiculopathy     Lumbar spondylosis     Mass of right wrist 06/05/2024    Osteoarthritis of both hips     Osteoarthritis of spine     Skin cancer      Past Surgical History:   Procedure Laterality Date    APPENDECTOMY      CARPAL TUNNEL RELEASE Right 08/12/2019    Procedure: RELEASE, CARPAL TUNNEL right;  Surgeon: Roopa Hanna MD;  Location: [facility];  Service: Orthopedics;  Laterality: Right;    COLONOSCOPY      COLONOSCOPY      COLONOSCOPY N/A 9/18/2024    Procedure: COLONOSCOPY;  Surgeon:  Lake Galeana MD;  Location: [facility];  Service: Endoscopy;  Laterality: N/A;    CYSTOSCOPY WITH INSERTION OF MINIMALLY INVASIVE IMPLANT TO ENLARGE PROSTATIC URETHRA N/A 11/29/2023    Procedure: CYSTOSCOPY, WITH INSERTION OF UROLIFT IMPLANT;  Surgeon: Rory Griffith MD;  Location: [facility] OR;  Service: Urology;  Laterality: N/A;    CYSTOSCOPY, WITH INSERTION OF UROLIFT IMPLANT  11/29/2023    EPIDURAL STEROID INJECTION INTO LUMBAR SPINE Bilateral 06/17/2021    Bilateral ALVIN per  06/17/2021    EXCISION OF GANGLION OF WRIST Right 06/05/2024    Procedure: RIGHT VOLAR WRIST GANGLION CYST EXCISION;  Surgeon: Roopa Hanna MD;  Location: [facility];  Service: Orthopedics;  Laterality: Right;  MAC/REGIONAL    GASTRIC FUNDOPLICATION      HERNIA REPAIR  1990s    INJECTION OF ANESTHETIC AGENT AROUND MEDIAL BRANCH NERVES INNERVATING LUMBAR FACET JOINT Bilateral 07/15/2021    Procedure: Block-nerve-medial branch-lumbar---BILATERAL L3, L4, L5;  Surgeon: Colt Lenz Jr., MD;  Location: [facility];  Service: Pain Management;  Laterality: Bilateral;    INJECTION OF ANESTHETIC AGENT AROUND MEDIAL BRANCH NERVES INNERVATING LUMBAR FACET JOINT Bilateral 09/23/2021    Procedure: Block-nerve-medial branch-lumbar---BILATERAL L3, L4, L5;  Surgeon: Colt Lenz Jr., MD;  Location: [facility];  Service: Pain Management;  Laterality: Bilateral;    INJECTION OF ANESTHETIC AGENT AROUND NERVE Bilateral 02/16/2024    Procedure: BLOCK, NERVE BILATERAL L3, 4, 5 MEDIAL BRANCH;  Surgeon: Jeanmarie Jauregui MD;  Location: [facility];  Service: Pain Management;  Laterality: Bilateral;  [phone]    INJECTION OF ANESTHETIC AGENT AROUND NERVE Bilateral 03/08/2024    Procedure: BLOCK, NERVE BILATERAL L3, L4, AND L5 MEDIAL BRANCH;  Surgeon: Jeanmarie Jauregui MD;  Location: [facility];  Service: Pain Management;  Laterality: Bilateral;  [phone]    INJECTION OF JOINT Bilateral 12/02/2021     Procedure: Injection, Joint---BILATERAL SACROILIAC INJECTION;  Surgeon: Colt Lenz Jr., MD;  Location: [facility];  Service: Pain Management;  Laterality: Bilateral;    INJECTION OF JOINT N/A 05/24/2024    Procedure: INJECTION, BILATERAL SI AND RIGHT PIRIFORMIS;  Surgeon: Jeanmarie Jauregui MD;  Location: [facility]IN [facility];  Service: Pain Management;  Laterality: N/A;  [phone]  2 WK F/U ESHRAGHI    INJECTION OF STEROID Left 08/12/2019    Procedure: INJECTION, STEROID;  Surgeon: Roopa Hanna MD;  Location: [facility] OR;  Service: Orthopedics;  Laterality: Left;    INJECTION, SACROILIAC JOINT Bilateral 01/11/2024    Procedure: INJECTION,SACROILIAC JOINT BILATERAL;  Surgeon: Jeanmarie Jauregui MD;  Location: [facility];  Service: Pain Management;  Laterality: Bilateral;  [phone]    INJECTION, SACROILIAC JOINT Bilateral 3/21/2025    Procedure: INJECTION,SACROILIAC JOINT BILATERAL;  Surgeon: Jeanmarie Jauregui MD;  Location: [facility];  Service: Pain Management;  Laterality: Bilateral;  2 WK F/U NICO    PYELOGRAM, RETROGRADE (Bilateral)    11/29/2023    RADIOFREQUENCY ABLATION Left 10/14/2021    Procedure: Radiofrequency Ablation---LEFT L3, L4, L5;  Surgeon: Colt Lenz Jr., MD;  Location: [facility];  Service: Pain Management;  Laterality: Left;    RADIOFREQUENCY ABLATION Right 10/28/2021    Procedure: Radiofrequency Ablation---RIGHT L3, L4, L5;  Surgeon: Colt Lenz Jr., MD;  Location: [facility];  Service: Pain Management;  Laterality: Right;    RADIOFREQUENCY ABLATION Right 04/12/2024    Procedure: RADIOFREQUENCY ABLATION RIGHT L3, L4, AND L5 1 OF 2;  Surgeon: Jeanmarie Jauregui MD;  Location: [facility];  Service: Pain Management;  Laterality: Right;  [phone]    RADIOFREQUENCY ABLATION Left 04/26/2024    Procedure: RADIOFREQUENCY ABLATION LEFT L3, L4, AND L5 2 OF 2;  Surgeon: Jeanmarie Jauregui MD;  Location: [facility];  Service: Pain Management;   "Laterality: Left;  [phone]    RADIOFREQUENCY ABLATION Bilateral 1/2/2025    Procedure: RADIOFREQUENCY ABLATION BILATERAL L3, L4, L5;  Surgeon: Jeanmarie Jauregui MD;  Location: [facility] [place];  Service: Pain Management;  Laterality: Bilateral;  4 WK F/U NICO    RETROGRADE PYELOGRAPHY Bilateral 11/29/2023    Procedure: PYELOGRAM, RETROGRADE;  Surgeon: Rory Griffith MD;  Location: [facility] OR;  Service: Urology;  Laterality: Bilateral;    ROTATOR CUFF REPAIR Right     SACROILIAC JOINT INJECTION Bilateral 12/02/2021    SKIN CANCER EXCISION Left     arm    TRANSFORAMINAL EPIDURAL INJECTION OF STEROID Bilateral 06/17/2021    Procedure: Injection,steroid,epidural,transforaminal approach---BILATERAL S1;  Surgeon: Colt Lenz Jr., MD;  Location: [facility];  Service: Pain Management;  Laterality: Bilateral;    TURBT (TRANSURETHRAL RESECTION OF BLADDER TUMOR) N/A 11/29/2023    Procedure: TURBT (TRANSURETHRAL RESECTION OF BLADDER TUMOR);  Surgeon: Rory Griffith MD;  Location: [facility] OR;  Service: Urology;  Laterality: N/A;  with UroLift and with bilateral retrograde pyelograms    URBT (TRANSURETHRAL RESECTION OF BLADDER TUMOR)  11/29/2023     Review of patient's allergies indicates:  No Known Allergies  Social History     Social History Narrative    Not on file     Family History   Problem Relation Name Age of Onset    Diabetes Mother Marly Mills     No Known Problems Father         Current Medications[1]      Review of Systems:  As per HPI otherwise noncontributory    OBJECTIVE:      Vital Signs (Most Recent):  Vitals:    04/10/25 1039   Weight: 75.8 kg (167 lb 1.7 oz)   Height: 5' 10" (1.778 m)     Body mass index is 23.98 kg/m².      Physical Exam:  Constitutional: The patient appears well-developed and well-nourished. No distress.   Skin: No lesions appreciated  Head: Normocephalic and atraumatic.   Nose: Nose normal.   Ears: No deformities seen  Eyes: Conjunctivae and EOM are normal.   Neck: " No tracheal deviation present.   Cardiovascular: Normal rate and intact distal pulses.    Pulmonary/Chest: Effort normal. No respiratory distress.   Abdominal: There is no guarding.   Neurological: The patient is alert.   Psychiatric: The patient has a normal mood and affect.     Right Hand/Wrist Examination:    Observation/Inspection:  Swelling  none    Deformity  none  Discoloration  none     Scars   none    Atrophy  none    HAND/WRIST EXAMINATION:  Nontender to palpation throughout    Neurovascular Exam:  Digits WWP, brisk CR < 3s throughout    ROM hand full, painless    ROM wrist full, painless    ROM elbow full, painless    SHOULDER EXAMINATION:  External rotation at 0°: 30° (contralateral side 30°)  Internal rotation at 0°:  L4  Abduction:  70°  Forward flexion:  90°  Positive empty can  Positive speed's, negative Yergason's  4/5 strength with shoulder abduction    Abdomen not guarded  Respirations nonlabored  Perfusion intact    Diagnostic Results:     Imaging - I independently viewed the patient's imaging as well as the radiology report.  Xrays of the patient's right shoulder demonstrate no evidence of any acute fractures or dislocations or significant degenerative changes, previous hardware from rotator cuff repair intact      ASSESSMENT/PLAN:      74 y.o. yo male with right shoulder rotator cuff tendinopathy and biceps tendinitis.    Plan: The patient and I had a thorough discussion today.  We discussed the working diagnosis as well as several other potential alternative diagnoses.  Treatment options were discussed, both conservative and surgical.  Conservative treatment options would include things such as activity modifications, workplace modifications, a period of rest, oral vs topical OTC and prescription anti-inflammatory medications, occupational therapy, splinting/bracing, immobilization, corticosteroid injections, and others.  Surgical options were discussed as well.     At this time, the patient  would like to proceed with a steroid injection and home exercise program.    Should the patient's symptoms worsen, persist, or fail to improve they should return for reevaluation and I would be happy to see them back anytime.             [1]   Current Outpatient Medications:     albuterol (PROVENTIL) 2.5 mg /3 mL (0.083 %) nebulizer solution, Take 3 mLs (2.5 mg total) by nebulization every 6 (six) hours as needed for Wheezing. Rescue, Disp: 150 mL, Rfl: 5    albuterol (PROVENTIL/VENTOLIN HFA) 90 mcg/actuation inhaler, INHALE 2 PUFFS BY MOUTH EVERY 6 HOURS AS NEEDED, Disp: 27 g, Rfl: 3    ALPRAZolam (XANAX) 1 MG tablet, TAKE 1 TABLET BY MOUTH NIGHTLY AS NEEDED FOR ANXIETY, Disp: 20 tablet, Rfl: 0    aspirin (ECOTRIN) 81 MG EC tablet, Take 81 mg by mouth once daily., Disp: , Rfl:     cyclobenzaprine (FLEXERIL) 5 MG tablet, Take 1 tablet (5 mg total) by mouth nightly as needed for Muscle spasms., Disp: 30 tablet, Rfl: 2    dexAMETHasone (DECADRON) 4 MG Tab, Take 1 tablet (4 mg total) by mouth every 12 (twelve) hours., Disp: 14 tablet, Rfl: 1    gabapentin (NEURONTIN) 600 MG tablet, Take 1 tablet (600 mg total) by mouth 2 (two) times daily., Disp: 60 tablet, Rfl: 3    hydrocortisone 2.5 % cream, Apply topically 2 (two) times daily., Disp: 28 g, Rfl: 2    levocetirizine (XYZAL) 5 MG tablet, TAKE 1 TABLET BY MOUTH ONCE DAILY IN THE EVENING, Disp: 90 tablet, Rfl: 2    methocarbamoL (ROBAXIN) 500 MG Tab, Take 1 tablet (500 mg total) by mouth 4 (four) times daily as needed (muscle spasm)., Disp: 80 tablet, Rfl: 0  No current facility-administered medications for this visit.    Facility-Administered Medications Ordered in Other Visits:     0.9%  NaCl infusion, , Intravenous, Continuous, Rory Griffith MD    0.9%  NaCl infusion, , Intravenous, Continuous, Jovanny Gurrola MD    ALPRAZolam dissolvable tablet 1 mg, 1 mg, Oral, Once PRN, Colt Lenz Jr., MD    BUPivacaine (PF) 0.25% (2.5 mg/ml) injection 25 mg, 10 mL,  Intramuscular, Once, Colt Lenz Jr., MD    ceFAZolin 2 g in dextrose 5 % in water (D5W) 100 mL IVPB (MB+), 2 g, Intravenous, On Call Procedure, Rory Griffith MD    dexAMETHasone sodium phos (PF) injection 10 mg, 10 mg, Epidural, Once, Colt Lenz Jr., MD    LIDOcaine (PF) 10 mg/ml (1%) injection 10 mg, 1 mL, Other, Once, Colt Lenz Jr., MD    LIDOcaine (PF) 10 mg/ml (1%) injection 10 mg, 1 mL, Intradermal, Once PRN, Rory Griffith MD    LIDOcaine HCL 20 mg/ml (2%) injection 10 mL, 10 mL, Other, Once, Colt Lenz Jr., MD    LIDOcaine HCL 20 mg/ml (2%) injection 10 mL, 10 mL, Other, Once, Colt Lenz Jr., MD

## 2025-04-11 ENCOUNTER — PATIENT MESSAGE (OUTPATIENT)
Dept: ORTHOPEDICS | Facility: CLINIC | Age: 75
End: 2025-04-11
Payer: MEDICARE

## 2025-04-11 ENCOUNTER — PATIENT MESSAGE (OUTPATIENT)
Dept: PAIN MEDICINE | Facility: CLINIC | Age: 75
End: 2025-04-11
Payer: MEDICARE

## 2025-04-16 RX ORDER — TRIAMCINOLONE ACETONIDE 40 MG/ML
80 INJECTION, SUSPENSION INTRA-ARTICULAR; INTRAMUSCULAR
Status: DISCONTINUED | OUTPATIENT
Start: 2025-04-10 | End: 2025-04-16 | Stop reason: HOSPADM

## 2025-04-16 NOTE — PROGRESS NOTES
Patient ID: Uriah Mills is a 74 y.o. male.    Chief Complaint: Pain of the Right Shoulder    History of Present Illness    CHIEF COMPLAINT:  Right shoulder pain    HPI:  Mr. Mills presents with chronic right shoulder pain ongoing for approximately two years. He reports pain primarily located at the mid to distal aspect of the humerus, radiating into the shoulder. It worsens with overhead activity and improves with rest, reaching levels of 8/10 pain at its worst, though it was at 0 during the exam. Pain limits his ability to perform overhead activities.    A steroid injection two years ago provided minimal to no relief. He has not tried any medications or PT for the current condition. There is a history of rotator cuff repair from approximately 20 years ago.    He is currently smoking 6 to 8 cigars a day and reports being right-handed.    PREVIOUS TREATMENTS:  Mr. Mills received a steroid injection two years ago, which provided minimal to no relief for his condition.    SURGICAL HISTORY:  Mr. Mills underwent a rotator cuff repair approximately 20 years ago.    SOCIAL HISTORY:  He currently smokes 6-8 cigars per day.    IMAGING:  An X-ray of the right shoulder revealed severe arthritis with visible loss of joint space. The imaging showed that in one particular area, the humeral head appears to be almost fused to the glenoid socket. Additionally, anchors from the patient's previous rotator cuff repair are still visible in the X-ray.      ROS:  General: denies fever, denies chills, denies fatigue, denies weight gain, denies weight loss, reports increased substance use  Eyes: denies vision changes, denies redness, denies discharge  ENT: denies ear pain, denies nasal congestion, denies sore throat  Cardiovascular: denies chest pain, denies palpitations, denies lower extremity edema  Respiratory: denies cough, denies shortness of breath  Gastrointestinal: denies abdominal pain, denies nausea, denies vomiting, denies  diarrhea, denies constipation, denies blood in stool  Genitourinary: denies dysuria, denies hematuria, denies frequency  Musculoskeletal: denies joint pain, denies muscle pain, reports pain with movement, reports limited movement  Skin: denies rash, denies lesion  Neurological: denies headache, denies dizziness, denies numbness, denies tingling  Psychiatric: denies anxiety, denies depression, denies sleep difficulty         Physical Exam    Musculoskeletal: Stiff dorsiflexion of wrists bilaterally. Stiff palmar flexion of wrists bilaterally.  MSK: Shoulder - Right: Decreased abduction of right shoulder. Decreased forward flexion of right shoulder. Right forward flexion is 95 degrees. Decreased external rotation of right shoulder. External rotation of right shoulder is 30 degrees. Decreased internal rotation of right shoulder. 4/5 strength in right shoulder. Crepitus in right shoulder. No improvement in passive range of motion in right shoulder. Internal rotation of right shoulder to T10.  MSK: Elbow - Right: Flexion of right elbow is 90 degrees.  MSK: Elbow - Left: Flexion of left elbow is 110 degrees.  MSK: Shoulder - Left: Left forward flexion is 175 degrees. External rotation of left shoulder is 65 degrees. Internal rotation of left shoulder to T10.         Assessment & Plan     Right shoulder steroid injection administered today.   Discussed shoulder replacement as a potential future option when pain becomes unbearable.   Mr. Mills declines surgical intervention at this time.              No follow-ups on file.    This note was generated with the assistance of ambient listening technology. Verbal consent was obtained by the patient and accompanying visitor(s) for the recording of patient appointment to facilitate this note. I attest to having reviewed and edited the generated note for accuracy, though some syntax or spelling errors may persist. Please contact the author of this note for any clarification.

## 2025-04-30 ENCOUNTER — OFFICE VISIT (OUTPATIENT)
Dept: PRIMARY CARE CLINIC | Facility: CLINIC | Age: 75
End: 2025-04-30
Payer: MEDICARE

## 2025-04-30 VITALS
OXYGEN SATURATION: 90 % | DIASTOLIC BLOOD PRESSURE: 64 MMHG | HEIGHT: 70 IN | RESPIRATION RATE: 16 BRPM | BODY MASS INDEX: 23.31 KG/M2 | SYSTOLIC BLOOD PRESSURE: 126 MMHG | HEART RATE: 75 BPM | WEIGHT: 162.81 LBS

## 2025-04-30 DIAGNOSIS — M54.41 CHRONIC BILATERAL LOW BACK PAIN WITH BILATERAL SCIATICA: ICD-10-CM

## 2025-04-30 DIAGNOSIS — G89.29 CHRONIC BILATERAL LOW BACK PAIN WITH BILATERAL SCIATICA: ICD-10-CM

## 2025-04-30 DIAGNOSIS — N18.31 CHRONIC KIDNEY DISEASE, STAGE 3A: ICD-10-CM

## 2025-04-30 DIAGNOSIS — J30.2 SEASONAL ALLERGIC RHINITIS, UNSPECIFIED TRIGGER: Primary | ICD-10-CM

## 2025-04-30 DIAGNOSIS — M54.16 LUMBAR RADICULOPATHY: ICD-10-CM

## 2025-04-30 DIAGNOSIS — I71.40 ABDOMINAL AORTIC ANEURYSM (AAA) WITHOUT RUPTURE, UNSPECIFIED PART: ICD-10-CM

## 2025-04-30 DIAGNOSIS — M54.42 CHRONIC BILATERAL LOW BACK PAIN WITH BILATERAL SCIATICA: ICD-10-CM

## 2025-04-30 DIAGNOSIS — F41.9 ANXIETY: ICD-10-CM

## 2025-04-30 DIAGNOSIS — J44.1 COPD EXACERBATION: ICD-10-CM

## 2025-04-30 PROCEDURE — 1126F AMNT PAIN NOTED NONE PRSNT: CPT | Mod: CPTII,S$GLB,, | Performed by: INTERNAL MEDICINE

## 2025-04-30 PROCEDURE — 1160F RVW MEDS BY RX/DR IN RCRD: CPT | Mod: CPTII,S$GLB,, | Performed by: INTERNAL MEDICINE

## 2025-04-30 PROCEDURE — 1101F PT FALLS ASSESS-DOCD LE1/YR: CPT | Mod: CPTII,S$GLB,, | Performed by: INTERNAL MEDICINE

## 2025-04-30 PROCEDURE — 3288F FALL RISK ASSESSMENT DOCD: CPT | Mod: CPTII,S$GLB,, | Performed by: INTERNAL MEDICINE

## 2025-04-30 PROCEDURE — 1159F MED LIST DOCD IN RCRD: CPT | Mod: CPTII,S$GLB,, | Performed by: INTERNAL MEDICINE

## 2025-04-30 PROCEDURE — 3008F BODY MASS INDEX DOCD: CPT | Mod: CPTII,S$GLB,, | Performed by: INTERNAL MEDICINE

## 2025-04-30 PROCEDURE — 99214 OFFICE O/P EST MOD 30 MIN: CPT | Mod: S$GLB,,, | Performed by: INTERNAL MEDICINE

## 2025-04-30 PROCEDURE — 3078F DIAST BP <80 MM HG: CPT | Mod: CPTII,S$GLB,, | Performed by: INTERNAL MEDICINE

## 2025-04-30 PROCEDURE — 3074F SYST BP LT 130 MM HG: CPT | Mod: CPTII,S$GLB,, | Performed by: INTERNAL MEDICINE

## 2025-04-30 PROCEDURE — 99999 PR PBB SHADOW E&M-EST. PATIENT-LVL IV: CPT | Mod: PBBFAC,,, | Performed by: INTERNAL MEDICINE

## 2025-04-30 RX ORDER — GABAPENTIN 600 MG/1
600 TABLET ORAL 2 TIMES DAILY
Qty: 60 TABLET | Refills: 5 | Status: SHIPPED | OUTPATIENT
Start: 2025-04-30

## 2025-04-30 RX ORDER — FLUTICASONE PROPIONATE 50 MCG
2 SPRAY, SUSPENSION (ML) NASAL DAILY
Qty: 15.8 ML | Refills: 5 | Status: SHIPPED | OUTPATIENT
Start: 2025-04-30

## 2025-04-30 RX ORDER — DEXAMETHASONE 4 MG/1
4 TABLET ORAL EVERY 12 HOURS
Qty: 14 TABLET | Refills: 1 | Status: SHIPPED | OUTPATIENT
Start: 2025-04-30

## 2025-05-02 NOTE — PROGRESS NOTES
Subjective:       Patient ID: Uriah Mills is a 74 y.o. male.    Chief Complaint: Follow-up (3 month) and Medication Refill    HPI  History of Present Illness    CHIEF COMPLAINT:  Uriah presents today for follow up.    GENITOURINARY:  He experiences nocturia once nightly. He has history of urolift procedure.    CARDIOVASCULAR:  He has known abdominal aortic aneurysm, currently measuring 4.8 cm.He denies sob cp XAVIER no dizziness     GASTROINTESTINAL:  Colonoscopy last year revealed polyps. He denies any current issues with bowel movements or constipation.    LABS:  PSA level has been climbing up slightly. Kidney function has improved. Thyroid function, blood sugar, liver enzymes, and cholesterol levels are all normal.    SOCIAL HISTORY:  He continues to smoke. Help with naxiety      ROS:  General: -fever, -chills, -fatigue, -weight gain, -weight loss  Eyes: -vision changes, -redness, -discharge  ENT: -ear pain, -nasal congestion, -sore throat  Cardiovascular: -chest pain, -palpitations, -lower extremity edema  Respiratory: -cough, +shortness of breath  Gastrointestinal: -abdominal pain, -nausea, -vomiting, -diarrhea, -constipation, -blood in stool  Genitourinary: -dysuria, -hematuria, -frequency, +nocturia  Musculoskeletal: -joint pain, -muscle pain  Skin: -rash, -lesion  Neurological: -headache, +dizziness, -numbness, -tingling  Psychiatric: -anxiety, -depression, -sleep difficulty       Review of Systems    Objective:      Physical Exam  Physical Exam    General: No acute distress. Well-developed. Well-nourished.  Eyes: EOMI. Sclerae anicteric.  HENT: Normocephalic. Atraumatic. Nares patent. Moist oral mucosa.  Ears: Bilateral TMs clear. Bilateral EACs clear.  Cardiovascular: Regular rate. Regular rhythm. No murmurs. No rubs. No gallops. Normal S1, S2.  Respiratory: Normal respiratory effort. Clear to auscultation bilaterally. No rales. No rhonchi. No wheezing.  Abdomen: Soft. Non-tender. Non-distended.  Normoactive bowel sounds.  Musculoskeletal: No  obvious deformity.  Extremities: No lower extremity edema.  Neurological: Alert & oriented x3. No slurred speech. Normal gait.  Psychiatric: Normal mood. Normal affect. Good insight. Good judgment.  Skin: Warm. Dry. No rash.           Assessment:       1. Seasonal allergic rhinitis, unspecified trigger    2. Chronic bilateral low back pain with bilateral sciatica    3. Lumbar radiculopathy    4. COPD exacerbation    5. Chronic kidney disease, stage 3a    6. Anxiety    7. Abdominal aortic aneurysm (AAA) without rupture, unspecified part        Plan:       Seasonal allergic rhinitis, unspecified trigger  -     fluticasone propionate (FLONASE) 50 mcg/actuation nasal spray; 2 sprays (100 mcg total) by Each Nostril route once daily.  Dispense: 15.8 mL; Refill: 5    Chronic bilateral low back pain with bilateral sciatica  Comments:  pt is being tretaed by pain clinic and having LS injections PT  Orders:  -     gabapentin (NEURONTIN) 600 MG tablet; Take 1 tablet (600 mg total) by mouth 2 (two) times daily.  Dispense: 60 tablet; Refill: 5    Lumbar radiculopathy  -     gabapentin (NEURONTIN) 600 MG tablet; Take 1 tablet (600 mg total) by mouth 2 (two) times daily.  Dispense: 60 tablet; Refill: 5    COPD exacerbation  -     dexAMETHasone (DECADRON) 4 MG Tab; Take 1 tablet (4 mg total) by mouth every 12 (twelve) hours.  Dispense: 14 tablet; Refill: 1    Chronic kidney disease, stage 3a  Comments:  last labs better need new labs almost a yr avoid NSAIDS    Anxiety  Comments:  currently stable only take med occasionally    Abdominal aortic aneurysm (AAA) without rupture, unspecified part  Comments:  repeat abd aortic u/s in may 2025 accidental finding on MRI LS 4.8 cm vascular consult after u/s  Orders:  -     Radiology US Abdominal Aorta; Future; Expected date: 05/02/2025      Assessment & Plan    J44.1 COPD exacerbation  N18.31 Chronic kidney disease, stage 3a  I71.40 Abdominal  aortic aneurysm, without rupture, unspecified  E78.5 Hyperlipidemia, unspecified  R35.1 Nocturia  R97.20 Elevated prostate specific antigen [PSA]  F17.200 Nicotine dependence, unspecified, uncomplicated  Z86.0109 Personal history of other colon polyps    IMPRESSION:  - Reviewed blood test results: PSA slightly elevated, will monitor and consider MRI if trend continues.  - Renal function, liver enzymes, blood count, and cholesterol levels within acceptable ranges.  - Evaluated previous colonoscopy results; polyps found, recommending repeat in 3 years.  - Monitored abdominal aortic aneurysm (AAA), currently measuring 4.8 cm; approaching threshold for intervention (5 cm).  - Considered chest XR results in context of smoking history.    COPD EXACERBATION:  - Uriah reports shortness of breath but denies chest pain.  - Discussed continued smoking habit.  - Will examine lungs due to smoking history and ordered a chest XR.    CHRONIC KIDNEY DISEASE, STAGE 3A:  - Reviewed kidney function through labs, noting improvement in results.    ABDOMINAL AORTIC ANEURYSM:  - Monitored patient's abdominal aortic aneurysm, currently 4.8 cm and approaching the critical intervention size of 5 cm.  - Explained risks associated with AAA rupture and the importance of continued monitoring.  - Ordered abdominal ultrasound in 6 months to reassess size.  - Will refer to specialist for potential reinforcement procedure if aneurysm reaches 5 cm.    HYPERLIPIDEMIA:  - Cholesterol levels currently good per labs.  - Will continue cholesterol medication to help prevent myocardial infarction and cerebrovascular accident.    NOCTURIA:  - Uriah reports waking up once at night to urinate.  - Will address this issue.    ELEVATED PSA:  - PSA level shows slight increase.  - Ordered PSA test to continue monitoring levels.  - May order an MRI if levels continue to rise.    NICOTINE DEPENDENCE:  - Uriah continues to smoke.  - Explained relationship between  smoking and pulmonary health.    HISTORY OF COLON POLYPS:  - Uriah has history of colon polyps discovered during previous colonoscopy.  - Recommend follow-up colonoscopy in 3 years to monitor and remove polyps to prevent cancer development.           Medication List with Changes/Refills   New Medications    FLUTICASONE PROPIONATE (FLONASE) 50 MCG/ACTUATION NASAL SPRAY    2 sprays (100 mcg total) by Each Nostril route once daily.   Current Medications    ALBUTEROL (PROVENTIL) 2.5 MG /3 ML (0.083 %) NEBULIZER SOLUTION    Take 3 mLs (2.5 mg total) by nebulization every 6 (six) hours as needed for Wheezing. Rescue    ALBUTEROL (PROVENTIL/VENTOLIN HFA) 90 MCG/ACTUATION INHALER    INHALE 2 PUFFS BY MOUTH EVERY 6 HOURS AS NEEDED    ALPRAZOLAM (XANAX) 1 MG TABLET    TAKE 1 TABLET BY MOUTH NIGHTLY AS NEEDED FOR ANXIETY    ASPIRIN (ECOTRIN) 81 MG EC TABLET    Take 81 mg by mouth once daily.    CYCLOBENZAPRINE (FLEXERIL) 5 MG TABLET    Take 1 tablet (5 mg total) by mouth nightly as needed for Muscle spasms.    HYDROCORTISONE 2.5 % CREAM    Apply topically 2 (two) times daily.    LEVOCETIRIZINE (XYZAL) 5 MG TABLET    TAKE 1 TABLET BY MOUTH ONCE DAILY IN THE EVENING    METHOCARBAMOL (ROBAXIN) 500 MG TAB    Take 1 tablet (500 mg total) by mouth 4 (four) times daily as needed (muscle spasm).   Changed and/or Refilled Medications    Modified Medication Previous Medication    DEXAMETHASONE (DECADRON) 4 MG TAB dexAMETHasone (DECADRON) 4 MG Tab       Take 1 tablet (4 mg total) by mouth every 12 (twelve) hours.    Take 1 tablet (4 mg total) by mouth every 12 (twelve) hours.    GABAPENTIN (NEURONTIN) 600 MG TABLET gabapentin (NEURONTIN) 600 MG tablet       Take 1 tablet (600 mg total) by mouth 2 (two) times daily.    Take 1 tablet (600 mg total) by mouth 2 (two) times daily.        This note was generated with the assistance of ambient listening technology. Verbal consent was obtained by the patient and accompanying visitor(s) for the  recording of patient appointment to facilitate this note. I attest to having reviewed and edited the generated note for accuracy, though some syntax or spelling errors may persist. Please contact the author of this note for any clarification.

## 2025-05-03 ENCOUNTER — PATIENT MESSAGE (OUTPATIENT)
Dept: PAIN MEDICINE | Facility: OTHER | Age: 75
End: 2025-05-03
Payer: MEDICARE

## 2025-05-05 ENCOUNTER — PATIENT MESSAGE (OUTPATIENT)
Dept: PAIN MEDICINE | Facility: OTHER | Age: 75
End: 2025-05-05
Payer: MEDICARE

## 2025-05-09 ENCOUNTER — HOSPITAL ENCOUNTER (OUTPATIENT)
Facility: OTHER | Age: 75
Discharge: HOME | End: 2025-05-09
Attending: ANESTHESIOLOGY | Admitting: ANESTHESIOLOGY
Payer: MEDICARE

## 2025-05-09 VITALS
HEIGHT: 70 IN | WEIGHT: 162 LBS | RESPIRATION RATE: 18 BRPM | BODY MASS INDEX: 23.19 KG/M2 | DIASTOLIC BLOOD PRESSURE: 69 MMHG | TEMPERATURE: 98 F | OXYGEN SATURATION: 98 % | SYSTOLIC BLOOD PRESSURE: 148 MMHG | HEART RATE: 86 BPM

## 2025-05-09 DIAGNOSIS — G89.29 CHRONIC PAIN: ICD-10-CM

## 2025-05-09 DIAGNOSIS — M54.16 LUMBAR RADICULOPATHY: Primary | ICD-10-CM

## 2025-05-09 PROCEDURE — 62323 NJX INTERLAMINAR LMBR/SAC: CPT | Performed by: ANESTHESIOLOGY

## 2025-05-09 PROCEDURE — A4216 STERILE WATER/SALINE, 10 ML: HCPCS | Performed by: ANESTHESIOLOGY

## 2025-05-09 PROCEDURE — 25500020 PHARM REV CODE 255: Performed by: ANESTHESIOLOGY

## 2025-05-09 PROCEDURE — 63600175 PHARM REV CODE 636 W HCPCS: Performed by: ANESTHESIOLOGY

## 2025-05-09 PROCEDURE — 62323 NJX INTERLAMINAR LMBR/SAC: CPT | Mod: ,,, | Performed by: ANESTHESIOLOGY

## 2025-05-09 PROCEDURE — 25000003 PHARM REV CODE 250: Performed by: ANESTHESIOLOGY

## 2025-05-09 RX ORDER — FENTANYL CITRATE 50 UG/ML
INJECTION, SOLUTION INTRAMUSCULAR; INTRAVENOUS
Status: DISCONTINUED | OUTPATIENT
Start: 2025-05-09 | End: 2025-05-09 | Stop reason: HOSPADM

## 2025-05-09 RX ORDER — SODIUM CHLORIDE 9 MG/ML
INJECTION, SOLUTION INTRAVENOUS CONTINUOUS
Status: DISCONTINUED | OUTPATIENT
Start: 2025-05-09 | End: 2025-05-09 | Stop reason: HOSPADM

## 2025-05-09 RX ORDER — DEXAMETHASONE SODIUM PHOSPHATE 10 MG/ML
INJECTION, SOLUTION INTRA-ARTICULAR; INTRALESIONAL; INTRAMUSCULAR; INTRAVENOUS; SOFT TISSUE
Status: DISCONTINUED | OUTPATIENT
Start: 2025-05-09 | End: 2025-05-09 | Stop reason: HOSPADM

## 2025-05-09 RX ORDER — LIDOCAINE HYDROCHLORIDE 10 MG/ML
INJECTION, SOLUTION EPIDURAL; INFILTRATION; INTRACAUDAL; PERINEURAL
Status: DISCONTINUED | OUTPATIENT
Start: 2025-05-09 | End: 2025-05-09 | Stop reason: HOSPADM

## 2025-05-09 RX ORDER — SODIUM CHLORIDE 9 MG/ML
INJECTION, SOLUTION INTRAMUSCULAR; INTRAVENOUS; SUBCUTANEOUS
Status: DISCONTINUED | OUTPATIENT
Start: 2025-05-09 | End: 2025-05-09 | Stop reason: HOSPADM

## 2025-05-09 RX ORDER — MIDAZOLAM HYDROCHLORIDE 1 MG/ML
INJECTION INTRAMUSCULAR; INTRAVENOUS
Status: DISCONTINUED | OUTPATIENT
Start: 2025-05-09 | End: 2025-05-09 | Stop reason: HOSPADM

## 2025-05-09 RX ORDER — LIDOCAINE HYDROCHLORIDE 20 MG/ML
INJECTION, SOLUTION EPIDURAL; INFILTRATION; INTRACAUDAL; PERINEURAL
Status: DISCONTINUED | OUTPATIENT
Start: 2025-05-09 | End: 2025-05-09 | Stop reason: HOSPADM

## 2025-05-09 NOTE — H&P
HPI  Patient presenting for Procedure(s) (LRB):  LUMBAR L4/5 IL ALVIN *ASPIRIN OTC* HOLD FOR 5 DAYS (N/A)     Patient on Anti-coagulation No    No health changes since previous encounter    Past Medical History:   Diagnosis Date    Allergy     Arthritis     BPH (benign prostatic hyperplasia)     Carpal tunnel syndrome     Chronic hip pain, bilateral     Chronic low back pain     COPD (chronic obstructive pulmonary disease)     DDD (degenerative disc disease), lumbar     Dorsalgia     Gastrointestinal disease     Lumbar radiculopathy     Lumbar spondylosis     Mass of right wrist 06/05/2024    Osteoarthritis of both hips     Osteoarthritis of spine     Skin cancer      Past Surgical History:   Procedure Laterality Date    APPENDECTOMY      CARPAL TUNNEL RELEASE Right 08/12/2019    Procedure: RELEASE, CARPAL TUNNEL right;  Surgeon: Roopa Hanna MD;  Location: [facility];  Service: Orthopedics;  Laterality: Right;    COLONOSCOPY      COLONOSCOPY      COLONOSCOPY N/A 9/18/2024    Procedure: COLONOSCOPY;  Surgeon: Lake Galeana MD;  Location: [facility];  Service: Endoscopy;  Laterality: N/A;    CYSTOSCOPY WITH INSERTION OF MINIMALLY INVASIVE IMPLANT TO ENLARGE PROSTATIC URETHRA N/A 11/29/2023    Procedure: CYSTOSCOPY, WITH INSERTION OF UROLIFT IMPLANT;  Surgeon: Rory Griffith MD;  Location: [facility];  Service: Urology;  Laterality: N/A;    CYSTOSCOPY, WITH INSERTION OF UROLIFT IMPLANT  11/29/2023    EPIDURAL STEROID INJECTION INTO LUMBAR SPINE Bilateral 06/17/2021    Bilateral ALVIN per  06/17/2021    EXCISION OF GANGLION OF WRIST Right 06/05/2024    Procedure: RIGHT VOLAR WRIST GANGLION CYST EXCISION;  Surgeon: Roopa Hanna MD;  Location: [facility];  Service: Orthopedics;  Laterality: Right;  MAC/REGIONAL    GASTRIC FUNDOPLICATION      HERNIA REPAIR  1990s    INJECTION OF ANESTHETIC AGENT AROUND MEDIAL BRANCH NERVES INNERVATING LUMBAR FACET JOINT Bilateral 07/15/2021    Procedure:  Block-nerve-medial branch-lumbar---BILATERAL L3, L4, L5;  Surgeon: Colt Lenz Jr., MD;  Location: [facility];  Service: Pain Management;  Laterality: Bilateral;    INJECTION OF ANESTHETIC AGENT AROUND MEDIAL BRANCH NERVES INNERVATING LUMBAR FACET JOINT Bilateral 09/23/2021    Procedure: Block-nerve-medial branch-lumbar---BILATERAL L3, L4, L5;  Surgeon: Colt Lenz Jr., MD;  Location: [facility];  Service: Pain Management;  Laterality: Bilateral;    INJECTION OF ANESTHETIC AGENT AROUND NERVE Bilateral 02/16/2024    Procedure: BLOCK, NERVE BILATERAL L3, 4, 5 MEDIAL BRANCH;  Surgeon: Jeanmarie Jauregui MD;  Location: [facility];  Service: Pain Management;  Laterality: Bilateral;  [phone]    INJECTION OF ANESTHETIC AGENT AROUND NERVE Bilateral 03/08/2024    Procedure: BLOCK, NERVE BILATERAL L3, L4, AND L5 MEDIAL BRANCH;  Surgeon: Jeanmarie Jauregui MD;  Location: [facility];  Service: Pain Management;  Laterality: Bilateral;  [phone]    INJECTION OF JOINT Bilateral 12/02/2021    Procedure: Injection, Joint---BILATERAL SACROILIAC INJECTION;  Surgeon: Colt Lenz Jr., MD;  Location: [facility];  Service: Pain Management;  Laterality: Bilateral;    INJECTION OF JOINT N/A 05/24/2024    Procedure: INJECTION, BILATERAL SI AND RIGHT PIRIFORMIS;  Surgeon: Jeanmarie Jauregui MD;  Location: [facility];  Service: Pain Management;  Laterality: N/A;  [phone]  2 WK F/U CARROLLHRHI    INJECTION OF STEROID Left 08/12/2019    Procedure: INJECTION, STEROID;  Surgeon: Roopa Hanna MD;  Location: [facility] OR;  Service: Orthopedics;  Laterality: Left;    INJECTION, SACROILIAC JOINT Bilateral 01/11/2024    Procedure: INJECTION,SACROILIAC JOINT BILATERAL;  Surgeon: Jeanmarie Jauregui MD;  Location: [facility];  Service: Pain Management;  Laterality: Bilateral;  [phone]    INJECTION, SACROILIAC JOINT Bilateral 3/21/2025    Procedure: INJECTION,SACROILIAC JOINT BILATERAL;   Surgeon: Jeanmarie Jauregui MD;  Location: [facility];  Service: Pain Management;  Laterality: Bilateral;  2 WK F/U NICO    PYELOGRAM, RETROGRADE (Bilateral)    11/29/2023    RADIOFREQUENCY ABLATION Left 10/14/2021    Procedure: Radiofrequency Ablation---LEFT L3, L4, L5;  Surgeon: Colt Lenz Jr., MD;  Location: [facility];  Service: Pain Management;  Laterality: Left;    RADIOFREQUENCY ABLATION Right 10/28/2021    Procedure: Radiofrequency Ablation---RIGHT L3, L4, L5;  Surgeon: Colt Lenz Jr., MD;  Location: [facility];  Service: Pain Management;  Laterality: Right;    RADIOFREQUENCY ABLATION Right 04/12/2024    Procedure: RADIOFREQUENCY ABLATION RIGHT L3, L4, AND L5 1 OF 2;  Surgeon: Jeanmarie Jauregui MD;  Location: [facility];  Service: Pain Management;  Laterality: Right;  [phone]    RADIOFREQUENCY ABLATION Left 04/26/2024    Procedure: RADIOFREQUENCY ABLATION LEFT L3, L4, AND L5 2 OF 2;  Surgeon: Jeanmarie Jauregui MD;  Location: [facility];  Service: Pain Management;  Laterality: Left;  [phone]    RADIOFREQUENCY ABLATION Bilateral 1/2/2025    Procedure: RADIOFREQUENCY ABLATION BILATERAL L3, L4, L5;  Surgeon: Jeanmarie Jauregui MD;  Location: [facility];  Service: Pain Management;  Laterality: Bilateral;  4 WK F/U NICO    RETROGRADE PYELOGRAPHY Bilateral 11/29/2023    Procedure: PYELOGRAM, RETROGRADE;  Surgeon: Rory Griffith MD;  Location: [facility] OR;  Service: Urology;  Laterality: Bilateral;    ROTATOR CUFF REPAIR Right     SACROILIAC JOINT INJECTION Bilateral 12/02/2021    SKIN CANCER EXCISION Left     arm    TRANSFORAMINAL EPIDURAL INJECTION OF STEROID Bilateral 06/17/2021    Procedure: Injection,steroid,epidural,transforaminal approach---BILATERAL S1;  Surgeon: Colt Lenz Jr., MD;  Location: [facility];  Service: Pain Management;  Laterality: Bilateral;    TURBT (TRANSURETHRAL RESECTION OF BLADDER TUMOR) N/A 11/29/2023    Procedure: TURBT  (TRANSURETHRAL RESECTION OF BLADDER TUMOR);  Surgeon: Rory Griffith MD;  Location: [facility] OR;  Service: Urology;  Laterality: N/A;  with UroLift and with bilateral retrograde pyelograms    URBT (TRANSURETHRAL RESECTION OF BLADDER TUMOR)  11/29/2023     Review of patient's allergies indicates:  No Known Allergies   No current facility-administered medications for this encounter.     Facility-Administered Medications Ordered in Other Encounters   Medication    0.9%  NaCl infusion    0.9%  NaCl infusion    ALPRAZolam dissolvable tablet 1 mg    BUPivacaine (PF) 0.25% (2.5 mg/ml) injection 25 mg    ceFAZolin 2 g in dextrose 5 % in water (D5W) 100 mL IVPB (MB+)    dexAMETHasone sodium phos (PF) injection 10 mg    LIDOcaine (PF) 10 mg/ml (1%) injection 10 mg    LIDOcaine (PF) 10 mg/ml (1%) injection 10 mg    LIDOcaine HCL 20 mg/ml (2%) injection 10 mL    LIDOcaine HCL 20 mg/ml (2%) injection 10 mL       PMHx, PSHx, Allergies, Medications reviewed in epic    ROS negative except pain complaints in HPI    OBJECTIVE:    There were no vitals taken for this visit.    PHYSICAL EXAMINATION:    GENERAL: Well appearing, in no acute distress, alert and oriented x3.  PSYCH:  Mood and affect appropriate.  SKIN: Skin color, texture, turgor normal, no rashes or lesions which will impact the procedure.  CV: RRR with palpation of the radial artery.  PULM: No evidence of respiratory difficulty, symmetric chest rise. Clear to auscultation.  NEURO: Cranial nerves grossly intact.    Plan:    Proceed with procedure as planned Procedure(s) (LRB):  LUMBAR L4/5 IL ALVIN *ASPIRIN OTC* HOLD FOR 5 DAYS (N/A)    Mirza Max  05/09/2025

## 2025-05-09 NOTE — DISCHARGE SUMMARY
Discharge Note  Short Stay      SUMMARY     Admit Date: 5/9/2025    Attending Physician: Jeanmarie Jauregui MD    Discharge Physician: Jeanmarie Jauregui MD      Discharge Date: 5/9/2025 9:13 AM    Procedure(s) (LRB):  LUMBAR L4/5 IL ALVIN *ASPIRIN OTC* HOLD FOR 5 DAYS (N/A)    Final Diagnosis: Lumbar radiculopathy [M54.16]    Disposition: Home or self care    Patient Instructions:   Current Discharge Medication List        CONTINUE these medications which have NOT CHANGED    Details   albuterol (PROVENTIL) 2.5 mg /3 mL (0.083 %) nebulizer solution Take 3 mLs (2.5 mg total) by nebulization every 6 (six) hours as needed for Wheezing. Rescue  Qty: 150 mL, Refills: 5    Associated Diagnoses: COPD exacerbation      albuterol (PROVENTIL/VENTOLIN HFA) 90 mcg/actuation inhaler INHALE 2 PUFFS BY MOUTH EVERY 6 HOURS AS NEEDED  Qty: 27 g, Refills: 3    Associated Diagnoses: Bronchitis      ALPRAZolam (XANAX) 1 MG tablet TAKE 1 TABLET BY MOUTH NIGHTLY AS NEEDED FOR ANXIETY  Qty: 20 tablet, Refills: 0    Associated Diagnoses: Insomnia, unspecified type; Anxiety      aspirin (ECOTRIN) 81 MG EC tablet Take 81 mg by mouth once daily.      cyclobenzaprine (FLEXERIL) 5 MG tablet Take 1 tablet (5 mg total) by mouth nightly as needed for Muscle spasms.  Qty: 30 tablet, Refills: 2      dexAMETHasone (DECADRON) 4 MG Tab Take 1 tablet (4 mg total) by mouth every 12 (twelve) hours.  Qty: 14 tablet, Refills: 1    Associated Diagnoses: COPD exacerbation      fluticasone propionate (FLONASE) 50 mcg/actuation nasal spray 2 sprays (100 mcg total) by Each Nostril route once daily.  Qty: 15.8 mL, Refills: 5    Associated Diagnoses: Seasonal allergic rhinitis, unspecified trigger      gabapentin (NEURONTIN) 600 MG tablet Take 1 tablet (600 mg total) by mouth 2 (two) times daily.  Qty: 60 tablet, Refills: 5    Associated Diagnoses: Chronic bilateral low back pain with bilateral sciatica; Lumbar radiculopathy      hydrocortisone 2.5 % cream Apply  topically 2 (two) times daily.  Qty: 28 g, Refills: 2      levocetirizine (XYZAL) 5 MG tablet TAKE 1 TABLET BY MOUTH ONCE DAILY IN THE EVENING  Qty: 90 tablet, Refills: 2    Associated Diagnoses: Chronic sinusitis, unspecified location      methocarbamoL (ROBAXIN) 500 MG Tab Take 1 tablet (500 mg total) by mouth 4 (four) times daily as needed (muscle spasm).  Qty: 80 tablet, Refills: 0    Associated Diagnoses: Muscle spasm                 Discharge Diagnosis: Lumbar radiculopathy [M54.16]  Condition on Discharge: Stable with no complications to procedure   Diet on Discharge: Same as before.  Activity: as per instruction sheet.  Discharge to: Home with a responsible adult.  Follow up: 2-4 weeks       Please call my office or pager at [phone] if experienced any weakness or loss of sensation, fever > 101.5, pain uncontrolled with oral medications, persistent nausea/vomiting/or diarrhea, redness or drainage from the incisions, or any other worrisome concerns. If physician on call was not reached or could not communicate with our office for any reason please go to the nearest emergency department     Destin Tucker MD

## 2025-05-09 NOTE — OP NOTE
Lumbar Interlaminar Epidural Steroid Injection under Fluoroscopic Guidance    The procedure, risks, benefits, and options were discussed with the patient. There are no contraindications to the procedure. The patent expressed understanding and agreed to the procedure. Informed written consent was obtained prior to the start of the procedure and can be found in the patient's chart.    PATIENT NAME: Uriah Mills   MRN: [id number]     DATE OF PROCEDURE: 05/09/2025    PROCEDURE: Lumbar Interlaminar Epidural Steroid Injection L4/L5 under Fluoroscopic Guidance    PRE-OP DIAGNOSIS: Lumbar radiculopathy [M54.16] Lumbar radiculopathy [M54.16]      POST-OP DIAGNOSIS: Same    PHYSICIAN: Jeanmarie Jauregui MD    ASSISTANTS:   Mirza Max MD [facility] Pain Fellow  Jean Carrizales MD [employer] PM&R Resident     MEDICATIONS INJECTED: Preservative-free Decadron 10mg with 4cc of Lidocaine 1% MPF and preservative free normal saline    LOCAL ANESTHETIC INJECTED: Xylocaine 2%     SEDATION: Versed 2mg and Fentanyl 50mcg                                                                                                                                                                                     Conscious sedation ordered by M.D. Patient re-evaluation prior to administration of conscious sedation. No changes noted in patient's status from initial evaluation. The patient's vital signs were monitored by RN and patient remained hemodynamically stable throughout the procedure.    Event Time In   Sedation Start 0912   Sedation End 0918       ESTIMATED BLOOD LOSS: None    COMPLICATIONS: None    TECHNIQUE: Time-out was performed to identify the patient and procedure to be performed. With the patient laying in a prone position, the surgical area was prepped and draped in the usual sterile fashion using ChloraPrep and a fenestrated drape. The level was determined under fluoroscopy guidance. Skin anesthesia was achieved by injecting Lidocaine 2% over the  injection site. The interlaminar space was then approached with a 20 gauge,  3.5 inch Tuohy needle that was introduced under fluoroscopic guidance in the AP, lateral and/or contralateral oblique imaging. Once the Ligamentum flavum was encountered loss of resistance to saline was used to enter the epidural space. With positive loss of resistance and negative aspiration for CSF or Blood, contrast dye Omnipaque (300mg/mL) was injected to confirm placement and there was no vascular runoff. 4 mL of the medication mixture listed above was injected slowly. Displacement of the radio opaque contrast after injection of the medication confirmed that the medication went into the area of the epidural space. The needles were removed and bleeding was nil. A sterile dressing was applied. No specimens collected. The patient tolerated the procedure well.     The patient was monitored after the procedure in the recovery area. They were given post-procedure and discharge instructions to follow at home. The patient was discharged in a stable condition.    Destin Tucker MD    I reviewed and edited the fellow's note. I conducted my own interview and physical examination. I agree with the findings. I was present and supervising all critical portions of the procedure.    Jeanmarie Jauregui MD

## 2025-05-09 NOTE — DISCHARGE INSTRUCTIONS

## 2025-05-11 ENCOUNTER — PATIENT MESSAGE (OUTPATIENT)
Dept: PAIN MEDICINE | Facility: OTHER | Age: 75
End: 2025-05-11
Payer: MEDICARE

## 2025-05-11 DIAGNOSIS — G47.00 INSOMNIA, UNSPECIFIED TYPE: ICD-10-CM

## 2025-05-11 DIAGNOSIS — F41.9 ANXIETY: ICD-10-CM

## 2025-05-11 NOTE — TELEPHONE ENCOUNTER
No care due was identified.  [facility] Embedded Care Due Messages. Reference number: [id number].   5/11/2025 10:27:48 AM CDT

## 2025-05-12 NOTE — TELEPHONE ENCOUNTER
Refill Routing Note   Medication(s) are not appropriate for processing by [facility] for the following reason(s):        Outside of protocol    [facility] action(s):  Route               Appointments  past 12m or future 3m with PCP    Date Provider   Last Visit   4/30/2025 Geo Montalvo MD   Next Visit   7/30/2025 Geo Montalvo MD   ED visits in past 90 days: 0        Note composed:11:20 AM 05/12/2025

## 2025-05-13 RX ORDER — ALPRAZOLAM 1 MG/1
1 TABLET ORAL NIGHTLY PRN
Qty: 20 TABLET | Refills: 0 | Status: SHIPPED | OUTPATIENT
Start: 2025-05-13

## 2025-05-16 ENCOUNTER — RESULTS FOLLOW-UP (OUTPATIENT)
Dept: PRIMARY CARE CLINIC | Facility: CLINIC | Age: 75
End: 2025-05-16

## 2025-05-16 DIAGNOSIS — I71.40 ABDOMINAL AORTIC ANEURYSM (AAA) WITHOUT RUPTURE, UNSPECIFIED PART: Primary | ICD-10-CM

## 2025-05-21 ENCOUNTER — INITIAL CONSULT (OUTPATIENT)
Dept: VASCULAR SURGERY | Facility: CLINIC | Age: 75
End: 2025-05-21
Payer: MEDICARE

## 2025-05-21 VITALS
BODY MASS INDEX: 23.35 KG/M2 | WEIGHT: 163.13 LBS | DIASTOLIC BLOOD PRESSURE: 65 MMHG | HEART RATE: 81 BPM | SYSTOLIC BLOOD PRESSURE: 164 MMHG | HEIGHT: 70 IN | TEMPERATURE: 98 F

## 2025-05-21 DIAGNOSIS — I71.40 ABDOMINAL AORTIC ANEURYSM (AAA) WITHOUT RUPTURE, UNSPECIFIED PART: ICD-10-CM

## 2025-05-21 PROCEDURE — 99999 PR PBB SHADOW E&M-EST. PATIENT-LVL IV: CPT | Mod: PBBFAC,,, | Performed by: SURGERY

## 2025-05-21 PROCEDURE — 99203 OFFICE O/P NEW LOW 30 MIN: CPT | Mod: S$GLB,,, | Performed by: SURGERY

## 2025-05-21 NOTE — PROGRESS NOTES
VASCULAR SURGERY NOTE    Patient ID: Uriah Mills is a 74 y.o. male.    I. HISTORY     Chief Complaint: AAA evaluation    HPI: Uriah Mills is a 74 y.o. male with CKD3a and COPD who is here today for new patient initial appointment evaluation for AAA. Patient has been smoking cigarettes since he as 9 years old then started smoking 8 cigars a day. He reports he does not want to quit.     SOCIAL HISTORY:  been smoking cigarettes since he as 9 years old then started smoking 8 cigars a day. He reports he does not want to quit.     MEDICATIONS: only ASA 81 mg daily     Past Medical History:   Diagnosis Date    Allergy     Arthritis     BPH (benign prostatic hyperplasia)     Carpal tunnel syndrome     Chronic hip pain, bilateral     Chronic low back pain     COPD (chronic obstructive pulmonary disease)     DDD (degenerative disc disease), lumbar     Dorsalgia     Gastrointestinal disease     Lumbar radiculopathy     Lumbar spondylosis     Mass of right wrist 06/05/2024    Osteoarthritis of both hips     Osteoarthritis of spine     Skin cancer         Past Surgical History:   Procedure Laterality Date    APPENDECTOMY      CARPAL TUNNEL RELEASE Right 08/12/2019    Procedure: RELEASE, CARPAL TUNNEL right;  Surgeon: Roopa Hanna MD;  Location: [facility];  Service: Orthopedics;  Laterality: Right;    COLONOSCOPY      COLONOSCOPY      COLONOSCOPY N/A 9/18/2024    Procedure: COLONOSCOPY;  Surgeon: Lake Galeana MD;  Location: [facility];  Service: Endoscopy;  Laterality: N/A;    CYSTOSCOPY WITH INSERTION OF MINIMALLY INVASIVE IMPLANT TO ENLARGE PROSTATIC URETHRA N/A 11/29/2023    Procedure: CYSTOSCOPY, WITH INSERTION OF UROLIFT IMPLANT;  Surgeon: Rory Griffith MD;  Location: [facility];  Service: Urology;  Laterality: N/A;    CYSTOSCOPY, WITH INSERTION OF UROLIFT IMPLANT  11/29/2023    EPIDURAL STEROID INJECTION INTO LUMBAR SPINE Bilateral 06/17/2021    Bilateral ALVIN per  06/17/2021    EXCISION  OF GANGLION OF WRIST Right 06/05/2024    Procedure: RIGHT VOLAR WRIST GANGLION CYST EXCISION;  Surgeon: Roopa Hanna MD;  Location: [facility] OR;  Service: Orthopedics;  Laterality: Right;  MAC/REGIONAL    GASTRIC FUNDOPLICATION      HERNIA REPAIR  1990s    INJECTION OF ANESTHETIC AGENT AROUND MEDIAL BRANCH NERVES INNERVATING LUMBAR FACET JOINT Bilateral 07/15/2021    Procedure: Block-nerve-medial branch-lumbar---BILATERAL L3, L4, L5;  Surgeon: Colt Lenz Jr., MD;  Location: [facility];  Service: Pain Management;  Laterality: Bilateral;    INJECTION OF ANESTHETIC AGENT AROUND MEDIAL BRANCH NERVES INNERVATING LUMBAR FACET JOINT Bilateral 09/23/2021    Procedure: Block-nerve-medial branch-lumbar---BILATERAL L3, L4, L5;  Surgeon: Colt Lenz Jr., MD;  Location: [facility];  Service: Pain Management;  Laterality: Bilateral;    INJECTION OF ANESTHETIC AGENT AROUND NERVE Bilateral 02/16/2024    Procedure: BLOCK, NERVE BILATERAL L3, 4, 5 MEDIAL BRANCH;  Surgeon: Jeanmarie Jauregui MD;  Location: [facility];  Service: Pain Management;  Laterality: Bilateral;  [phone]    INJECTION OF ANESTHETIC AGENT AROUND NERVE Bilateral 03/08/2024    Procedure: BLOCK, NERVE BILATERAL L3, L4, AND L5 MEDIAL BRANCH;  Surgeon: Jeanmarie Jauregui MD;  Location: [facility];  Service: Pain Management;  Laterality: Bilateral;  [phone]    INJECTION OF JOINT Bilateral 12/02/2021    Procedure: Injection, Joint---BILATERAL SACROILIAC INJECTION;  Surgeon: Colt Lenz Jr., MD;  Location: [facility];  Service: Pain Management;  Laterality: Bilateral;    INJECTION OF JOINT N/A 05/24/2024    Procedure: INJECTION, BILATERAL SI AND RIGHT PIRIFORMIS;  Surgeon: Jeanmarie Jauregui MD;  Location: [facility];  Service: Pain Management;  Laterality: N/A;  [phone]  2 WK F/U ESHRAGHI    INJECTION OF STEROID Left 08/12/2019    Procedure: INJECTION, STEROID;  Surgeon: Roopa Hanna MD;  Location:  [facility] OR;  Service: Orthopedics;  Laterality: Left;    INJECTION, SACROILIAC JOINT Bilateral 01/11/2024    Procedure: INJECTION,SACROILIAC JOINT BILATERAL;  Surgeon: Jeanmarie Jauregui MD;  Location: [facility];  Service: Pain Management;  Laterality: Bilateral;  [phone]    INJECTION, SACROILIAC JOINT Bilateral 3/21/2025    Procedure: INJECTION,SACROILIAC JOINT BILATERAL;  Surgeon: Jeanmarie Jauregui MD;  Location: [facility];  Service: Pain Management;  Laterality: Bilateral;  2 WK F/U NICO    INJECTION, SPINE, LUMBOSACRAL, TRANSFORAMINAL APPROACH N/A 5/9/2025    Procedure: LUMBAR L4/5 IL ALVIN *ASPIRIN OTC* HOLD FOR 5 DAYS;  Surgeon: Jeanmarie Jauregui MD;  Location: [facility];  Service: Pain Management;  Laterality: N/A;  2 WK F/U BRITANY    PYELOGRAM, RETROGRADE (Bilateral)    11/29/2023    RADIOFREQUENCY ABLATION Left 10/14/2021    Procedure: Radiofrequency Ablation---LEFT L3, L4, L5;  Surgeon: Colt Lenz Jr., MD;  Location: [facility];  Service: Pain Management;  Laterality: Left;    RADIOFREQUENCY ABLATION Right 10/28/2021    Procedure: Radiofrequency Ablation---RIGHT L3, L4, L5;  Surgeon: Colt Lenz Jr., MD;  Location: [facility];  Service: Pain Management;  Laterality: Right;    RADIOFREQUENCY ABLATION Right 04/12/2024    Procedure: RADIOFREQUENCY ABLATION RIGHT L3, L4, AND L5 1 OF 2;  Surgeon: Jeanmarie Jauregui MD;  Location: [facility];  Service: Pain Management;  Laterality: Right;  [phone]    RADIOFREQUENCY ABLATION Left 04/26/2024    Procedure: RADIOFREQUENCY ABLATION LEFT L3, L4, AND L5 2 OF 2;  Surgeon: Jeanmarie Jauregui MD;  Location: [facility];  Service: Pain Management;  Laterality: Left;  [phone]    RADIOFREQUENCY ABLATION Bilateral 1/2/2025    Procedure: RADIOFREQUENCY ABLATION BILATERAL L3, L4, L5;  Surgeon: Jeanmarie Jauregui MD;  Location: [facility];  Service: Pain Management;  Laterality: Bilateral;  4 WK F/U NICO    RETROGRADE  PYELOGRAPHY Bilateral 11/29/2023    Procedure: PYELOGRAM, RETROGRADE;  Surgeon: Rory Griffith MD;  Location: [facility] OR;  Service: Urology;  Laterality: Bilateral;    ROTATOR CUFF REPAIR Right     SACROILIAC JOINT INJECTION Bilateral 12/02/2021    SKIN CANCER EXCISION Left     arm    TRANSFORAMINAL EPIDURAL INJECTION OF STEROID Bilateral 06/17/2021    Procedure: Injection,steroid,epidural,transforaminal approach---BILATERAL S1;  Surgeon: Colt Lenz Jr., MD;  Location: [facility];  Service: Pain Management;  Laterality: Bilateral;    TURBT (TRANSURETHRAL RESECTION OF BLADDER TUMOR) N/A 11/29/2023    Procedure: TURBT (TRANSURETHRAL RESECTION OF BLADDER TUMOR);  Surgeon: Rory Griffith MD;  Location: [facility] OR;  Service: Urology;  Laterality: N/A;  with UroLift and with bilateral retrograde pyelograms    URBT (TRANSURETHRAL RESECTION OF BLADDER TUMOR)  11/29/2023       Social History     Occupational History    Not on file   Tobacco Use    Smoking status: Every Day     Types: Cigars     Passive exposure: Current    Smokeless tobacco: Never    Tobacco comments:     8 small cigars per day down from 10.   Substance and Sexual Activity    Alcohol use: No    Drug use: Yes     Types: Benzodiazepines    Sexual activity: Not Currently     Partners: Female     Birth control/protection: None         Review of Systems   Constitutional: Negative for chills and decreased appetite.   Cardiovascular:  Negative for chest pain and claudication.   Respiratory:  Negative for cough.    Endocrine: Negative for cold intolerance and heat intolerance.   Musculoskeletal:  Negative for back pain.   Gastrointestinal:  Negative for abdominal pain.         II. PHYSICAL EXAM     Physical Exam  Constitutional:       Appearance: Normal appearance.   Cardiovascular:      Rate and Rhythm: Normal rate and regular rhythm.   Pulmonary:      Effort: Pulmonary effort is normal.   Abdominal:      General: Abdomen is flat.      Palpations:  Abdomen is soft.   Skin:     General: Skin is warm.   Neurological:      General: No focal deficit present.      Mental Status: He is alert. Mental status is at baseline.           III. ASSESSMENT & PLAN (MEDICAL DECISION MAKING)       Imaging Results:     AA Ultrasound: The proximal, mid, and distal abdominal aorta have a maximum transaxial diameter of 4, 3.3, and 4.7 cm respectively. The proximal right and left common iliac arteries measure 2.2 and 2.5 cm respectively.     Assessment/Diagnosis and Plan:    1. Abdominal aortic aneurysm (AAA) without rupture, unspecified part        74 y.o. male with CKD3a and COPD comes for evaluation of asymptomatic AAA. US reads max of 4.7 cm in the distal aorta. Prox right and left common iliac and 2.5 respectively.     Follow up 1 year with AAA  Blood pressure management as per PCP/Cardiologist

## 2025-05-28 ENCOUNTER — OFFICE VISIT (OUTPATIENT)
Dept: PRIMARY CARE CLINIC | Facility: CLINIC | Age: 75
End: 2025-05-28
Payer: MEDICARE

## 2025-05-28 VITALS
TEMPERATURE: 98 F | WEIGHT: 164.69 LBS | RESPIRATION RATE: 16 BRPM | DIASTOLIC BLOOD PRESSURE: 58 MMHG | BODY MASS INDEX: 23.58 KG/M2 | OXYGEN SATURATION: 95 % | HEIGHT: 70 IN | HEART RATE: 83 BPM | SYSTOLIC BLOOD PRESSURE: 132 MMHG

## 2025-05-28 DIAGNOSIS — J44.1 COPD EXACERBATION: Primary | ICD-10-CM

## 2025-05-28 DIAGNOSIS — Z71.6 ENCOUNTER FOR SMOKING CESSATION COUNSELING: ICD-10-CM

## 2025-05-28 PROCEDURE — 1159F MED LIST DOCD IN RCRD: CPT | Mod: CPTII,S$GLB,, | Performed by: INTERNAL MEDICINE

## 2025-05-28 PROCEDURE — 3008F BODY MASS INDEX DOCD: CPT | Mod: CPTII,S$GLB,, | Performed by: INTERNAL MEDICINE

## 2025-05-28 PROCEDURE — 3288F FALL RISK ASSESSMENT DOCD: CPT | Mod: CPTII,S$GLB,, | Performed by: INTERNAL MEDICINE

## 2025-05-28 PROCEDURE — 3075F SYST BP GE 130 - 139MM HG: CPT | Mod: CPTII,S$GLB,, | Performed by: INTERNAL MEDICINE

## 2025-05-28 PROCEDURE — 99213 OFFICE O/P EST LOW 20 MIN: CPT | Mod: 25,S$GLB,, | Performed by: INTERNAL MEDICINE

## 2025-05-28 PROCEDURE — 1101F PT FALLS ASSESS-DOCD LE1/YR: CPT | Mod: CPTII,S$GLB,, | Performed by: INTERNAL MEDICINE

## 2025-05-28 PROCEDURE — 96372 THER/PROPH/DIAG INJ SC/IM: CPT | Mod: S$GLB,,, | Performed by: INTERNAL MEDICINE

## 2025-05-28 PROCEDURE — 1160F RVW MEDS BY RX/DR IN RCRD: CPT | Mod: CPTII,S$GLB,, | Performed by: INTERNAL MEDICINE

## 2025-05-28 PROCEDURE — 1126F AMNT PAIN NOTED NONE PRSNT: CPT | Mod: CPTII,S$GLB,, | Performed by: INTERNAL MEDICINE

## 2025-05-28 PROCEDURE — 99999 PR PBB SHADOW E&M-EST. PATIENT-LVL IV: CPT | Mod: PBBFAC,,, | Performed by: INTERNAL MEDICINE

## 2025-05-28 PROCEDURE — 3078F DIAST BP <80 MM HG: CPT | Mod: CPTII,S$GLB,, | Performed by: INTERNAL MEDICINE

## 2025-05-28 RX ORDER — AZITHROMYCIN 250 MG/1
TABLET, FILM COATED ORAL
Qty: 6 TABLET | Refills: 0 | Status: SHIPPED | OUTPATIENT
Start: 2025-05-28 | End: 2025-06-01

## 2025-05-28 RX ORDER — IBUPROFEN 200 MG
1 TABLET ORAL DAILY
Qty: 30 PATCH | Refills: 1 | Status: SHIPPED | OUTPATIENT
Start: 2025-05-28

## 2025-05-28 RX ORDER — PREDNISONE 20 MG/1
20 TABLET ORAL 2 TIMES DAILY
Qty: 10 TABLET | Refills: 0 | Status: SHIPPED | OUTPATIENT
Start: 2025-05-28 | End: 2025-06-02

## 2025-05-28 RX ORDER — TRIAMCINOLONE ACETONIDE 40 MG/ML
60 INJECTION, SUSPENSION INTRA-ARTICULAR; INTRAMUSCULAR ONCE
Status: COMPLETED | OUTPATIENT
Start: 2025-05-28 | End: 2025-05-28

## 2025-05-28 RX ORDER — PROMETHAZINE HYDROCHLORIDE AND DEXTROMETHORPHAN HYDROBROMIDE 6.25; 15 MG/5ML; MG/5ML
5 SYRUP ORAL EVERY 4 HOURS PRN
Qty: 150 ML | Refills: 0 | Status: SHIPPED | OUTPATIENT
Start: 2025-05-28 | End: 2025-06-07

## 2025-05-28 RX ORDER — BUPROPION HYDROCHLORIDE 150 MG/1
150 TABLET, FILM COATED, EXTENDED RELEASE ORAL 2 TIMES DAILY
Qty: 60 TABLET | Refills: 1 | Status: SHIPPED | OUTPATIENT
Start: 2025-05-28

## 2025-05-28 RX ADMIN — TRIAMCINOLONE ACETONIDE 60 MG: 40 INJECTION, SUSPENSION INTRA-ARTICULAR; INTRAMUSCULAR at 02:05

## 2025-05-28 NOTE — PROGRESS NOTES
"Subjective:       Patient ID: Uriah Mills is a 74 y.o. male.    Chief Complaint: Shortness of Breath    HPI  History of Present Illness    CHIEF COMPLAINT:  Uriah presents today for shortness of breath and increased phlegm production getting worse for several not better has nebulizer at home but malfunction > 4 yrs old and still smoking about 7 small cigar a day no fever chill no loss of tastes or smell no chest pain     RESPIRATORY:  He experiences shortness of breath at rest without exertion, reporting it is worse than usual. He has phlegm production which he describes as "normal." He denies fever and chest pain.pulse ox 95 % at     MEDICATIONS:  His press pack inhaler is not functioning properly, causing significant discomfort with burning sensation and nausea during use. The device is 3-4 years old.    SOCIAL HISTORY:  He currently smokes one pack of cigarettes per day.      ROS:  General: -fever, -chills, -fatigue, -weight gain, -weight loss  Eyes: -vision changes, -redness, -discharge  ENT: -ear pain, -nasal congestion, -sore throat  Cardiovascular: -chest pain, -palpitations, -lower extremity edema  Respiratory: -cough, +shortness of breath, +productive cough, +wheezing  Gastrointestinal: -abdominal pain, +nausea, -vomiting, -diarrhea, -constipation, -blood in stool  Genitourinary: -dysuria, -hematuria, -frequency  Musculoskeletal: -joint pain, -muscle pain  Skin: -rash, -lesion  Neurological: -headache, -dizziness, -numbness, -tingling  Psychiatric: -anxiety, -depression, -sleep difficulty       Review of Systems    Objective:      Physical Exam  Physical Exam    Vitals: SpO2 at 95%.  General: No acute distress. Well-developed. Well-nourished.  Eyes: EOMI. Sclerae anicteric.  HENT: Normocephalic. Atraumatic. Nares patent. Moist oral mucosa.  Ears: Bilateral TMs clear. Bilateral EACs clear.  Cardiovascular: Regular rate. Regular rhythm. No murmurs. No rubs. No gallops. Normal S1, S2.  Respiratory: Normal " respiratory effort. Clear to auscultation bilaterally. No rales. Tight exp wheezing. Wheezing. Tightness in lungs.  Abdomen: Soft. Non-tender. Non-distended. Normoactive bowel sounds.  Musculoskeletal: No  obvious deformity.  Extremities: No lower extremity edema.  Neurological: Alert & oriented x3. No slurred speech. Normal gait.  Psychiatric: Normal mood. Normal affect. Good insight. Good judgment.  Skin: Warm. Dry. No rash.           Assessment:       1. COPD exacerbation    2. Encounter for smoking cessation counseling        Plan:       COPD exacerbation  -     NEBULIZER FOR HOME USE  -     triamcinolone acetonide injection 60 mg  -     X-Ray Chest PA And Lateral; Future; Expected date: 05/28/2025  -     Cancel: Six Minute Walk Test to qualify for Home Oxygen; Future  -     azithromycin (Z-KADE) 250 MG tablet; 2 tabs by mouth day 1, then 1 tab by mouth daily x 4 days  Dispense: 6 tablet; Refill: 0  -     predniSONE (DELTASONE) 20 MG tablet; Take 1 tablet (20 mg total) by mouth 2 (two) times daily. for 5 days  Dispense: 10 tablet; Refill: 0  -     Six Minute Walk Test to qualify for Home Oxygen; Future; Expected date: 05/28/2025  -     promethazine-dextromethorphan (PROMETHAZINE-DM) 6.25-15 mg/5 mL Syrp; Take 5 mLs by mouth every 4 (four) hours as needed.  Dispense: 150 mL; Refill: 0    Encounter for smoking cessation counseling  -     nicotine (NICODERM CQ) 21 mg/24 hr; Place 1 patch onto the skin once daily.  Dispense: 30 patch; Refill: 1  -     buPROPion HCL, smoking deter, (ZYBAN) 150 mg TBSR 12 hr tablet; Take 1 tablet (150 mg total) by mouth 2 (two) times daily.  Dispense: 60 tablet; Refill: 1      Assessment & Plan    J22 Unspecified acute lower respiratory infection  R06.01 Orthopnea  R06.2 Wheezing  F17.210 Nicotine dependence, cigarettes, uncomplicated  Z87.891 Personal history of nicotine dependence    ACUTE LOWER RESPIRATORY INFECTION:  - Uriah presents with increased shortness of breath, phlegm  production, tight wheezing and congestion, but no fever or chest pain.  - Oxygen level measured at 95%.  - After auscultation confirmed respiratory issues, prescribed bronchodilators and antibiotics for potential infection.  - Initiated medication for mucus and cough control.  - Ordered new nebulizer device with new medication for wheezing.  - Administered a shot for respiratory symptoms.  - Ordered 6-minute walk test to assess oxygen needs and discussed potential oxygen therapy if levels drop below 88%, explaining its benefits for managing shortness of breath.    NICOTINE DEPENDENCE:  - Uriah currently smokes approximately 1 pack per day.  - Thoroughly discussed smoking cessation options and initiated combination therapy with nicotine replacement and Zyban to increase chances of successful cessation.  - Prescribed nicotine patch with instructions to apply to skin daily and wait 1 day for absorption before stopping smoking, along with Zyban 1 tablet twice daily.  - Educated patient on health risks associated with continued smoking, emphasizing its impact on respiratory function, and explained the mechanism of action for nicotine replacement therapy in managing withdrawal symptoms.           Medication List with Changes/Refills   New Medications    AZITHROMYCIN (Z-KADE) 250 MG TABLET    2 tabs by mouth day 1, then 1 tab by mouth daily x 4 days    BUPROPION HCL, SMOKING DETER, (ZYBAN) 150 MG TBSR 12 HR TABLET    Take 1 tablet (150 mg total) by mouth 2 (two) times daily.    NICOTINE (NICODERM CQ) 21 MG/24 HR    Place 1 patch onto the skin once daily.    PREDNISONE (DELTASONE) 20 MG TABLET    Take 1 tablet (20 mg total) by mouth 2 (two) times daily. for 5 days    PROMETHAZINE-DEXTROMETHORPHAN (PROMETHAZINE-DM) 6.25-15 MG/5 ML SYRP    Take 5 mLs by mouth every 4 (four) hours as needed.   Current Medications    ALBUTEROL (PROVENTIL) 2.5 MG /3 ML (0.083 %) NEBULIZER SOLUTION    Take 3 mLs (2.5 mg total) by nebulization  every 6 (six) hours as needed for Wheezing. Rescue    ALBUTEROL (PROVENTIL/VENTOLIN HFA) 90 MCG/ACTUATION INHALER    INHALE 2 PUFFS BY MOUTH EVERY 6 HOURS AS NEEDED    ALPRAZOLAM (XANAX) 1 MG TABLET    Take 1 tablet (1 mg total) by mouth nightly as needed.    ASPIRIN (ECOTRIN) 81 MG EC TABLET    Take 81 mg by mouth once daily.    FLUTICASONE PROPIONATE (FLONASE) 50 MCG/ACTUATION NASAL SPRAY    2 sprays (100 mcg total) by Each Nostril route once daily.    GABAPENTIN (NEURONTIN) 600 MG TABLET    Take 1 tablet (600 mg total) by mouth 2 (two) times daily.    HYDROCORTISONE 2.5 % CREAM    Apply topically 2 (two) times daily.    LEVOCETIRIZINE (XYZAL) 5 MG TABLET    TAKE 1 TABLET BY MOUTH ONCE DAILY IN THE EVENING    METHOCARBAMOL (ROBAXIN) 500 MG TAB    Take 1 tablet (500 mg total) by mouth 4 (four) times daily as needed (muscle spasm).   Discontinued Medications    DEXAMETHASONE (DECADRON) 4 MG TAB    Take 1 tablet (4 mg total) by mouth every 12 (twelve) hours.        This note was generated with the assistance of ambient listening technology. Verbal consent was obtained by the patient and accompanying visitor(s) for the recording of patient appointment to facilitate this note. I attest to having reviewed and edited the generated note for accuracy, though some syntax or spelling errors may persist. Please contact the author of this note for any clarification.

## 2025-05-28 NOTE — PROGRESS NOTES
Verified pt ID using name and . NKDA. Administered Kenalog 60 in left dorsalgluteal per physician order using aseptic technique. Aspirated and no blood return noted. Pt tolerated well with no adverse reactions noted.

## 2025-06-03 ENCOUNTER — OFFICE VISIT (OUTPATIENT)
Dept: PAIN MEDICINE | Facility: CLINIC | Age: 75
End: 2025-06-03
Payer: MEDICARE

## 2025-06-03 ENCOUNTER — RESULTS FOLLOW-UP (OUTPATIENT)
Dept: PRIMARY CARE CLINIC | Facility: CLINIC | Age: 75
End: 2025-06-03

## 2025-06-03 VITALS
BODY MASS INDEX: 23.29 KG/M2 | TEMPERATURE: 98 F | DIASTOLIC BLOOD PRESSURE: 68 MMHG | WEIGHT: 162.69 LBS | HEIGHT: 70 IN | OXYGEN SATURATION: 94 % | HEART RATE: 78 BPM | SYSTOLIC BLOOD PRESSURE: 130 MMHG | RESPIRATION RATE: 19 BRPM

## 2025-06-03 DIAGNOSIS — M51.369 ANNULAR TEAR OF LUMBAR DISC: ICD-10-CM

## 2025-06-03 DIAGNOSIS — G89.4 CHRONIC PAIN DISORDER: Primary | ICD-10-CM

## 2025-06-03 DIAGNOSIS — M54.16 LUMBAR RADICULOPATHY: ICD-10-CM

## 2025-06-03 DIAGNOSIS — M47.816 LUMBAR SPONDYLOSIS: ICD-10-CM

## 2025-06-03 DIAGNOSIS — M51.360 DEGENERATION OF INTERVERTEBRAL DISC OF LUMBAR REGION WITH DISCOGENIC BACK PAIN: ICD-10-CM

## 2025-06-03 PROCEDURE — 99999 PR PBB SHADOW E&M-EST. PATIENT-LVL V: CPT | Mod: PBBFAC,,, | Performed by: NURSE PRACTITIONER

## 2025-06-03 PROCEDURE — 1101F PT FALLS ASSESS-DOCD LE1/YR: CPT | Mod: CPTII,S$GLB,, | Performed by: NURSE PRACTITIONER

## 2025-06-03 PROCEDURE — 99213 OFFICE O/P EST LOW 20 MIN: CPT | Mod: S$GLB,,, | Performed by: NURSE PRACTITIONER

## 2025-06-03 PROCEDURE — 3008F BODY MASS INDEX DOCD: CPT | Mod: CPTII,S$GLB,, | Performed by: NURSE PRACTITIONER

## 2025-06-03 PROCEDURE — 3075F SYST BP GE 130 - 139MM HG: CPT | Mod: CPTII,S$GLB,, | Performed by: NURSE PRACTITIONER

## 2025-06-03 PROCEDURE — 1160F RVW MEDS BY RX/DR IN RCRD: CPT | Mod: CPTII,S$GLB,, | Performed by: NURSE PRACTITIONER

## 2025-06-03 PROCEDURE — 1125F AMNT PAIN NOTED PAIN PRSNT: CPT | Mod: CPTII,S$GLB,, | Performed by: NURSE PRACTITIONER

## 2025-06-03 PROCEDURE — 3078F DIAST BP <80 MM HG: CPT | Mod: CPTII,S$GLB,, | Performed by: NURSE PRACTITIONER

## 2025-06-03 PROCEDURE — 3288F FALL RISK ASSESSMENT DOCD: CPT | Mod: CPTII,S$GLB,, | Performed by: NURSE PRACTITIONER

## 2025-06-03 PROCEDURE — 1159F MED LIST DOCD IN RCRD: CPT | Mod: CPTII,S$GLB,, | Performed by: NURSE PRACTITIONER

## 2025-07-07 ENCOUNTER — OFFICE VISIT (OUTPATIENT)
Dept: UROLOGY | Facility: CLINIC | Age: 75
End: 2025-07-07
Payer: MEDICARE

## 2025-07-07 VITALS
HEIGHT: 70 IN | BODY MASS INDEX: 22.6 KG/M2 | WEIGHT: 157.88 LBS | DIASTOLIC BLOOD PRESSURE: 65 MMHG | HEART RATE: 69 BPM | SYSTOLIC BLOOD PRESSURE: 162 MMHG

## 2025-07-07 DIAGNOSIS — R31.0 GROSS HEMATURIA: Primary | ICD-10-CM

## 2025-07-07 DIAGNOSIS — F17.200 SMOKER: ICD-10-CM

## 2025-07-07 LAB
BILIRUB SERPL-MCNC: NORMAL MG/DL
BLOOD URINE, POC: NORMAL
CLARITY, POC UA: NORMAL
COLOR, POC UA: YELLOW
GLUCOSE UR QL STRIP: NORMAL
KETONES UR QL STRIP: NORMAL
LEUKOCYTE ESTERASE URINE, POC: NORMAL
NITRITE, POC UA: NORMAL
PH, POC UA: 5
PROTEIN, POC: NORMAL
SPECIFIC GRAVITY, POC UA: 1.02
UROBILINOGEN, POC UA: NORMAL

## 2025-07-07 PROCEDURE — 1159F MED LIST DOCD IN RCRD: CPT | Mod: CPTII,S$GLB,, | Performed by: STUDENT IN AN ORGANIZED HEALTH CARE EDUCATION/TRAINING PROGRAM

## 2025-07-07 PROCEDURE — 99999 PR PBB SHADOW E&M-EST. PATIENT-LVL III: CPT | Mod: PBBFAC,,, | Performed by: STUDENT IN AN ORGANIZED HEALTH CARE EDUCATION/TRAINING PROGRAM

## 2025-07-07 PROCEDURE — 3008F BODY MASS INDEX DOCD: CPT | Mod: CPTII,S$GLB,, | Performed by: STUDENT IN AN ORGANIZED HEALTH CARE EDUCATION/TRAINING PROGRAM

## 2025-07-07 PROCEDURE — 3077F SYST BP >= 140 MM HG: CPT | Mod: CPTII,S$GLB,, | Performed by: STUDENT IN AN ORGANIZED HEALTH CARE EDUCATION/TRAINING PROGRAM

## 2025-07-07 PROCEDURE — 3288F FALL RISK ASSESSMENT DOCD: CPT | Mod: CPTII,S$GLB,, | Performed by: STUDENT IN AN ORGANIZED HEALTH CARE EDUCATION/TRAINING PROGRAM

## 2025-07-07 PROCEDURE — 81002 URINALYSIS NONAUTO W/O SCOPE: CPT | Mod: S$GLB,,, | Performed by: STUDENT IN AN ORGANIZED HEALTH CARE EDUCATION/TRAINING PROGRAM

## 2025-07-07 PROCEDURE — 99214 OFFICE O/P EST MOD 30 MIN: CPT | Mod: S$GLB,,, | Performed by: STUDENT IN AN ORGANIZED HEALTH CARE EDUCATION/TRAINING PROGRAM

## 2025-07-07 PROCEDURE — 1126F AMNT PAIN NOTED NONE PRSNT: CPT | Mod: CPTII,S$GLB,, | Performed by: STUDENT IN AN ORGANIZED HEALTH CARE EDUCATION/TRAINING PROGRAM

## 2025-07-07 PROCEDURE — 3078F DIAST BP <80 MM HG: CPT | Mod: CPTII,S$GLB,, | Performed by: STUDENT IN AN ORGANIZED HEALTH CARE EDUCATION/TRAINING PROGRAM

## 2025-07-07 PROCEDURE — 1101F PT FALLS ASSESS-DOCD LE1/YR: CPT | Mod: CPTII,S$GLB,, | Performed by: STUDENT IN AN ORGANIZED HEALTH CARE EDUCATION/TRAINING PROGRAM

## 2025-07-07 NOTE — PROGRESS NOTES
"[facility] [place] [id number]   Clinic Note    SUBJECTIVE:     Chief Complaint: BPH with LUTS    History of Present Illness:  Uriah Mills is a 74 y.o. male who presents to clinic for BPH with LUTS. He is established to our clinic.    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 He passed subsequent voiding trial. Path from resection was benign.       Having blood in his urine. He stopped Rapaflo and LUTS remained well controlled, but one month ago he developed gross hematuria, now resolved.       PMH notable for asthma/COPD - uses rescue inhaler 2-3 times daily currently, more in the summer    Anticoagulation:  Yes ASA 81 mg    OBJECTIVE:     Estimated body mass index is 22.65 kg/m² as calculated from the following:    Height as of this encounter: 5' 10" (1.778 m).    Weight as of this encounter: 71.6 kg (157 lb 13.6 oz).    Vital Signs (Most Recent)  Pulse: 69 (07/07/25 0846)  BP: (!) 162/65 (07/07/25 0846)    Physical Exam  Constitutional:       Appearance: Normal appearance.   HENT:      Head: Normocephalic and atraumatic.   Eyes:      Conjunctiva/sclera: Conjunctivae normal.   Cardiovascular:      Rate and Rhythm: Normal rate.   Pulmonary:      Effort: Pulmonary effort is normal.   Abdominal:      General: Abdomen is flat. There is no distension.      Tenderness: There is no abdominal tenderness.   Musculoskeletal:         General: Normal range of motion.   Skin:     General: Skin is warm and dry.   Neurological:      General: No focal deficit present.      Mental Status: He is alert and oriented to person, place, and time.   Psychiatric:         Mood and Affect: Mood normal.         Behavior: Behavior normal.         Thought Content: Thought content normal.         Judgment: Judgment normal.       Lab Results   Component Value Date    BUN 14 07/15/2024    CREATININE 1.1 07/15/2024    WBC 12.43 07/15/2024    HGB 14.1 07/15/2024    HCT 43.2 " 07/15/2024     07/15/2024    AST 11 07/15/2024    ALT 7 (L) 07/15/2024    ALKPHOS 94 07/15/2024    ALBUMIN 3.4 (L) 07/15/2024        Lab Results   Component Value Date    PSA 3.7 01/14/2025    PSA 2.3 08/28/2023    PSA 1.6 03/08/2022    PSA 1.5 12/16/2020    PSA 1.4 03/13/2019    PSA 1.2 11/15/2017         ASSESSMENT     1. Gross hematuria    2. Smoker          PLAN:   1. Gross hematuria  -     POCT urine dipstick without microscope  -     Cytology, Urine  -     Urine Culture High Risk ($$)  -     CT Urogram Abd Pelvis W WO; Future; Expected date: 07/07/2025  -     Creatinine, Serum; Future; Expected date: 07/07/2025  -     Cystoscopy; Future    2. Smoker           Encouraged smoking cessation.  Will obtain gross hematuria workup - urine culture and cytology, CT urogram, cystoscopy.    Rory Griffith MD

## 2025-07-08 LAB — BACTERIA UR CULT: NO GROWTH

## 2025-07-09 LAB
ESTROGEN SERPL-MCNC: NORMAL PG/ML
INSULIN SERPL-ACNC: NORMAL U[IU]/ML
LAB AP GROSS DESCRIPTION: NORMAL
LAB AP PERFORMING LOCATION(S): NORMAL
LAB AP URINE CYTOLOGY INTERPRETATION SPECIMEN 1: NORMAL

## 2025-07-10 ENCOUNTER — OFFICE VISIT (OUTPATIENT)
Dept: ORTHOPEDICS | Facility: CLINIC | Age: 75
End: 2025-07-10
Payer: MEDICARE

## 2025-07-10 VITALS — HEIGHT: 70 IN | WEIGHT: 157.88 LBS | BODY MASS INDEX: 22.6 KG/M2

## 2025-07-10 DIAGNOSIS — M19.011 ARTHRITIS OF RIGHT SHOULDER: Primary | ICD-10-CM

## 2025-07-10 PROCEDURE — 99999 PR PBB SHADOW E&M-EST. PATIENT-LVL III: CPT | Mod: PBBFAC,,, | Performed by: ORTHOPAEDIC SURGERY

## 2025-07-10 PROCEDURE — 20610 DRAIN/INJ JOINT/BURSA W/O US: CPT | Mod: RT,S$GLB,, | Performed by: ORTHOPAEDIC SURGERY

## 2025-07-10 PROCEDURE — 3008F BODY MASS INDEX DOCD: CPT | Mod: CPTII,S$GLB,, | Performed by: ORTHOPAEDIC SURGERY

## 2025-07-10 PROCEDURE — 1159F MED LIST DOCD IN RCRD: CPT | Mod: CPTII,S$GLB,, | Performed by: ORTHOPAEDIC SURGERY

## 2025-07-10 PROCEDURE — 1126F AMNT PAIN NOTED NONE PRSNT: CPT | Mod: CPTII,S$GLB,, | Performed by: ORTHOPAEDIC SURGERY

## 2025-07-10 PROCEDURE — 99214 OFFICE O/P EST MOD 30 MIN: CPT | Mod: 25,S$GLB,, | Performed by: ORTHOPAEDIC SURGERY

## 2025-07-10 RX ADMIN — TRIAMCINOLONE ACETONIDE 80 MG: 40 INJECTION, SUSPENSION INTRA-ARTICULAR; INTRAMUSCULAR at 08:07

## 2025-07-10 NOTE — PROGRESS NOTES
[facility]  History & Physical  Orthopedics    SUBJECTIVE:      Chief Complaint: Right shoulder pain    Referring Provider: No ref. provider found     HPI 7/10/25 Patient received an injection in his right shoulder 4/10/25 and it felt a lot better. He has arthritis in his right shoulder. His ROM has improved since last visit     History of Present Illness:  Patient is a 74 y.o. right hand dominant male who presents today with complaints of right shoulder pain. Pain is located at the anterior and mid-distal aspect of the arm and sometimes radiates proximally to the shoulder    The patient is retired, previously a .    Onset of symptoms/DOI was 2 years ago.    Symptoms are aggravated by activity, movement, and overhead activity.    Symptoms are alleviated by rest.    Symptoms consist of pain and decreased ROM.    The patient rates their pain as a 8/10.    Attempted treatment(s) and/or interventions include activity modifications, rest, steroid injection. Reports last injection  two years ago with minimal relief of his pain.    Previous rotator cuff surgery approximately 20 years ago on the right shoulder.     The patient denies any fevers, chills, N/V, D/C and presents for evaluation.    Reports cigar use, 6-8 per day.       Past Medical History:   Diagnosis Date    Allergy     Arthritis     BPH (benign prostatic hyperplasia)     Carpal tunnel syndrome     Chronic hip pain, bilateral     Chronic low back pain     COPD (chronic obstructive pulmonary disease)     DDD (degenerative disc disease), lumbar     Dorsalgia     Gastrointestinal disease     Lumbar radiculopathy     Lumbar spondylosis     Mass of right wrist 06/05/2024    Osteoarthritis of both hips     Osteoarthritis of spine     Skin cancer      Past Surgical History:   Procedure Laterality Date    APPENDECTOMY      CARPAL TUNNEL RELEASE Right 08/12/2019    Procedure: RELEASE, CARPAL TUNNEL right;  Surgeon: Roopa NIELSEN  MD Jacques;  Location: [facility] OR;  Service: Orthopedics;  Laterality: Right;    COLONOSCOPY      COLONOSCOPY      COLONOSCOPY N/A 9/18/2024    Procedure: COLONOSCOPY;  Surgeon: Lake Galeana MD;  Location: [facility] ENDO;  Service: Endoscopy;  Laterality: N/A;    CYSTOSCOPY WITH INSERTION OF MINIMALLY INVASIVE IMPLANT TO ENLARGE PROSTATIC URETHRA N/A 11/29/2023    Procedure: CYSTOSCOPY, WITH INSERTION OF UROLIFT IMPLANT;  Surgeon: Rory Griffith MD;  Location: [facility] OR;  Service: Urology;  Laterality: N/A;    CYSTOSCOPY, WITH INSERTION OF UROLIFT IMPLANT  11/29/2023    EPIDURAL STEROID INJECTION INTO LUMBAR SPINE Bilateral 06/17/2021    Bilateral ALVIN per  06/17/2021    EXCISION OF GANGLION OF WRIST Right 06/05/2024    Procedure: RIGHT VOLAR WRIST GANGLION CYST EXCISION;  Surgeon: Roopa Hanna MD;  Location: [facility] OR;  Service: Orthopedics;  Laterality: Right;  MAC/REGIONAL    GASTRIC FUNDOPLICATION      HERNIA REPAIR  1990s    INJECTION OF ANESTHETIC AGENT AROUND MEDIAL BRANCH NERVES INNERVATING LUMBAR FACET JOINT Bilateral 07/15/2021    Procedure: Block-nerve-medial branch-lumbar---BILATERAL L3, L4, L5;  Surgeon: Colt Lenz Jr., MD;  Location: [facility] PAIN MGMT;  Service: Pain Management;  Laterality: Bilateral;    INJECTION OF ANESTHETIC AGENT AROUND MEDIAL BRANCH NERVES INNERVATING LUMBAR FACET JOINT Bilateral 09/23/2021    Procedure: Block-nerve-medial branch-lumbar---BILATERAL L3, L4, L5;  Surgeon: Colt Lenz Jr., MD;  Location: [facility] PAIN MGMT;  Service: Pain Management;  Laterality: Bilateral;    INJECTION OF ANESTHETIC AGENT AROUND NERVE Bilateral 02/16/2024    Procedure: BLOCK, NERVE BILATERAL L3, 4, 5 MEDIAL BRANCH;  Surgeon: Jeanmarie Jauregui MD;  Location: [facility] PAIN MGT;  Service: Pain Management;  Laterality: Bilateral;  [phone]    INJECTION OF ANESTHETIC AGENT AROUND NERVE Bilateral 03/08/2024    Procedure: BLOCK, NERVE BILATERAL L3, L4, AND L5 MEDIAL  BRANCH;  Surgeon: Jeanmarie Jauregui MD;  Location: [facility] PAIN MGT;  Service: Pain Management;  Laterality: Bilateral;  [phone]    INJECTION OF JOINT Bilateral 12/02/2021    Procedure: Injection, Joint---BILATERAL SACROILIAC INJECTION;  Surgeon: Colt Lenz Jr., MD;  Location: [facility] PAIN MGMT;  Service: Pain Management;  Laterality: Bilateral;    INJECTION OF JOINT N/A 05/24/2024    Procedure: INJECTION, BILATERAL SI AND RIGHT PIRIFORMIS;  Surgeon: Jeanmarie Jauregui MD;  Location: [facility] PAIN MGT;  Service: Pain Management;  Laterality: N/A;  [phone]  2 WK F/U BRITANY    INJECTION OF STEROID Left 08/12/2019    Procedure: INJECTION, STEROID;  Surgeon: Roopa Hanna MD;  Location: [facility] OR;  Service: Orthopedics;  Laterality: Left;    INJECTION, SACROILIAC JOINT Bilateral 01/11/2024    Procedure: INJECTION,SACROILIAC JOINT BILATERAL;  Surgeon: Jeanmarie Jauregui MD;  Location: [facility] PAIN MGT;  Service: Pain Management;  Laterality: Bilateral;  [phone]    INJECTION, SACROILIAC JOINT Bilateral 3/21/2025    Procedure: INJECTION,SACROILIAC JOINT BILATERAL;  Surgeon: Jeanmarie Jauregui MD;  Location: [facility] PAIN MGT;  Service: Pain Management;  Laterality: Bilateral;  2 WK F/U NICO    INJECTION, SPINE, LUMBOSACRAL, TRANSFORAMINAL APPROACH N/A 5/9/2025    Procedure: LUMBAR L4/5 IL ALVIN *ASPIRIN OTC* HOLD FOR 5 DAYS;  Surgeon: Jeanmarie Jauregui MD;  Location: [facility] PAIN MGT;  Service: Pain Management;  Laterality: N/A;  2 WK F/U BRITANY    PYELOGRAM, RETROGRADE (Bilateral)    11/29/2023    RADIOFREQUENCY ABLATION Left 10/14/2021    Procedure: Radiofrequency Ablation---LEFT L3, L4, L5;  Surgeon: Colt Lenz Jr., MD;  Location: [facility] PAIN MGMT;  Service: Pain Management;  Laterality: Left;    RADIOFREQUENCY ABLATION Right 10/28/2021    Procedure: Radiofrequency Ablation---RIGHT L3, L4, L5;  Surgeon: Colt Lenz Jr., MD;  Location: [facility] PAIN [facility];  Service: Pain Management;  Laterality: Right;     RADIOFREQUENCY ABLATION Right 04/12/2024    Procedure: RADIOFREQUENCY ABLATION RIGHT L3, L4, AND L5 1 OF 2;  Surgeon: Jeanmarie Jauregui MD;  Location: [facility] [place] PAIN MGT;  Service: Pain Management;  Laterality: Right;  [phone]    RADIOFREQUENCY ABLATION Left 04/26/2024    Procedure: RADIOFREQUENCY ABLATION LEFT L3, L4, AND L5 2 OF 2;  Surgeon: Jeanmarie Jauregui MD;  Location: [facility] [place] PAIN MGT;  Service: Pain Management;  Laterality: Left;  [phone]    RADIOFREQUENCY ABLATION Bilateral 1/2/2025    Procedure: RADIOFREQUENCY ABLATION BILATERAL L3, L4, L5;  Surgeon: Jeanmarie Jauregui MD;  Location: [facility] [place] PAIN MGT;  Service: Pain Management;  Laterality: Bilateral;  4 WK F/U NICO    RETROGRADE PYELOGRAPHY Bilateral 11/29/2023    Procedure: PYELOGRAM, RETROGRADE;  Surgeon: Rory Griffith MD;  Location: [facility] OR;  Service: Urology;  Laterality: Bilateral;    ROTATOR CUFF REPAIR Right     SACROILIAC JOINT INJECTION Bilateral 12/02/2021    SKIN CANCER EXCISION Left     arm    TRANSFORAMINAL EPIDURAL INJECTION OF STEROID Bilateral 06/17/2021    Procedure: Injection,steroid,epidural,transforaminal approach---BILATERAL S1;  Surgeon: Colt Lenz Jr., MD;  Location: [facility] PAIN MGMT;  Service: Pain Management;  Laterality: Bilateral;    TURBT (TRANSURETHRAL RESECTION OF BLADDER TUMOR) N/A 11/29/2023    Procedure: TURBT (TRANSURETHRAL RESECTION OF BLADDER TUMOR);  Surgeon: Rory Griffith MD;  Location: [facility] OR;  Service: Urology;  Laterality: N/A;  with UroLift and with bilateral retrograde pyelograms    URBT (TRANSURETHRAL RESECTION OF BLADDER TUMOR)  11/29/2023     Review of patient's allergies indicates:  No Known Allergies  Social History     Social History Narrative    Not on file     Family History   Problem Relation Name Age of Onset    Diabetes Mother Marly Mills     No Known Problems Father         Current Medications[1]      Review of Systems:  As per HPI otherwise noncontributory    OBJECTIVE:   "    Vital Signs (Most Recent):  Vitals:    07/10/25 0841   Weight: 71.6 kg (157 lb 13.6 oz)   Height: 5' 10" (1.778 m)     Body mass index is 22.65 kg/m².      Physical Exam:  Constitutional: The patient appears well-developed and well-nourished. No distress.   Skin: No lesions appreciated  Head: Normocephalic and atraumatic.   Nose: Nose normal.   Ears: No deformities seen  Eyes: Conjunctivae and EOM are normal.   Neck: No tracheal deviation present.   Cardiovascular: Normal rate and intact distal pulses.    Pulmonary/Chest: Effort normal. No respiratory distress.   Abdominal: There is no guarding.   Neurological: The patient is alert.   Psychiatric: The patient has a normal mood and affect.     Right Hand/Wrist Examination:    Observation/Inspection:  Swelling  none    Deformity  none  Discoloration  none     Scars   none    Atrophy  none    HAND/WRIST EXAMINATION:  Nontender to palpation throughout    Neurovascular Exam:  Digits WWP, brisk CR < 3s throughout    ROM hand full, painless    ROM wrist full, painless    ROM elbow full, painless    SHOULDER EXAMINATION:  External rotation at 0°: 30° (contralateral side 30°)  Internal rotation at 0°:  L4  Abduction:  70°  Forward flexion:  90°  Positive empty can  Positive speed's, negative Yergason's  4/5 strength with shoulder abduction    Abdomen not guarded  Respirations nonlabored  Perfusion intact    Diagnostic Results:     Imaging - I independently viewed the patient's imaging as well as the radiology report.  Xrays of the patient's right shoulder demonstrate no evidence of any acute fractures or dislocations or significant degenerative changes, previous hardware from rotator cuff repair intact      ASSESSMENT/PLAN:      74 y.o. yo male with right shoulder rotator cuff tendinopathy and biceps tendinitis.    Plan: The patient and I had a thorough discussion today.  We discussed the working diagnosis as well as several other potential alternative diagnoses.  " Treatment options were discussed, both conservative and surgical.  Conservative treatment options would include things such as activity modifications, workplace modifications, a period of rest, oral vs topical OTC and prescription anti-inflammatory medications, occupational therapy, splinting/bracing, immobilization, corticosteroid injections, and others.  Surgical options were discussed as well.     At this time, the patient would like to proceed with a steroid injection and home exercise program.  RTC in 3 months        Vickie Matos ATC,OTC  Clinical/OR Assistant to Roopa Hanna MD                 [1]   Current Outpatient Medications:     albuterol (PROVENTIL) 2.5 mg /3 mL (0.083 %) nebulizer solution, Take 3 mLs (2.5 mg total) by nebulization every 6 (six) hours as needed for Wheezing. Rescue, Disp: 150 mL, Rfl: 5    albuterol (PROVENTIL/VENTOLIN HFA) 90 mcg/actuation inhaler, INHALE 2 PUFFS BY MOUTH EVERY 6 HOURS AS NEEDED, Disp: 27 g, Rfl: 3    ALPRAZolam (XANAX) 1 MG tablet, Take 1 tablet (1 mg total) by mouth nightly as needed., Disp: 20 tablet, Rfl: 0    aspirin (ECOTRIN) 81 MG EC tablet, Take 81 mg by mouth once daily., Disp: , Rfl:     fluticasone propionate (FLONASE) 50 mcg/actuation nasal spray, 2 sprays (100 mcg total) by Each Nostril route once daily., Disp: 15.8 mL, Rfl: 5    gabapentin (NEURONTIN) 600 MG tablet, Take 1 tablet (600 mg total) by mouth 2 (two) times daily., Disp: 60 tablet, Rfl: 5    hydrocortisone 2.5 % cream, Apply topically 2 (two) times daily., Disp: 28 g, Rfl: 2    levocetirizine (XYZAL) 5 MG tablet, TAKE 1 TABLET BY MOUTH ONCE DAILY IN THE EVENING, Disp: 90 tablet, Rfl: 2    methocarbamoL (ROBAXIN) 500 MG Tab, Take 1 tablet (500 mg total) by mouth 4 (four) times daily as needed (muscle spasm)., Disp: 80 tablet, Rfl: 0    nicotine (NICODERM CQ) 21 mg/24 hr, Place 1 patch onto the skin once daily., Disp: 30 patch, Rfl: 1  No current facility-administered medications for this  visit.    Facility-Administered Medications Ordered in Other Visits:     0.9%  NaCl infusion, , Intravenous, Continuous, Rory Griffith MD    0.9%  NaCl infusion, , Intravenous, Continuous, Jovanny Gurrola MD    ALPRAZolam dissolvable tablet 1 mg, 1 mg, Oral, Once PRN, Colt Lenz Jr., MD    BUPivacaine (PF) 0.25% (2.5 mg/ml) injection 25 mg, 10 mL, Intramuscular, Once, Colt Lenz Jr., MD    ceFAZolin 2 g in dextrose 5 % in water (D5W) 100 mL IVPB (MB+), 2 g, Intravenous, On Call Procedure, Rory Griffith MD    dexAMETHasone sodium phos (PF) injection 10 mg, 10 mg, Epidural, Once, Colt Lenz Jr., MD    LIDOcaine (PF) 10 mg/ml (1%) injection 10 mg, 1 mL, Other, Once, Colt Lenz Jr., MD    LIDOcaine (PF) 10 mg/ml (1%) injection 10 mg, 1 mL, Intradermal, Once PRN, Rory Griffith MD    LIDOcaine HCL 20 mg/ml (2%) injection 10 mL, 10 mL, Other, Once, Colt Lenz Jr., MD    LIDOcaine HCL 20 mg/ml (2%) injection 10 mL, 10 mL, Other, Once, Colt Lenz Jr., MD

## 2025-07-10 NOTE — PROCEDURES
Large Joint Aspiration/Injection: R glenohumeral    Date/Time: 7/10/2025 8:30 AM    Performed by: Roopa Hanna MD  Authorized by: Roopa Hanna MD    Consent Done?:  Yes (Verbal)  Indications:  Arthritis  Site marked: the procedure site was marked      Local anesthesia used?: Yes    Anesthesia:  Local infiltration  Local anesthetic:  Lidocaine 1% without epinephrine    Details:  Needle Size:  22 G  Approach:  Posterior  Location:  Shoulder  Site:  R glenohumeral  Medications:  80 mg triamcinolone acetonide 40 mg/mL

## 2025-07-14 RX ORDER — TRIAMCINOLONE ACETONIDE 40 MG/ML
80 INJECTION, SUSPENSION INTRA-ARTICULAR; INTRAMUSCULAR
Status: DISCONTINUED | OUTPATIENT
Start: 2025-07-10 | End: 2025-07-14 | Stop reason: HOSPADM

## 2025-07-14 NOTE — PROGRESS NOTES
Patient ID: Uriah Mills is a 74 y.o. male.    Chief Complaint: Follow-up of the Right Shoulder    History of Present Illness    CHIEF COMPLAINT:  Right shoulder injection for arthritis and concern about recurring wrist cyst.    HPI:  Mr. Mills presents with right shoulder arthritis. Pain is currently minimal, but the effects of the previous injection are diminishing. He received a right shoulder injection three months ago, which provided significant relief. He reports the injection was highly effective and he experienced substantial improvement afterward.    He also mentions a recurring cyst on his wrist that was removed approximately one year ago.    He denies any formal medical diagnoses.    PREVIOUS TREATMENTS:  Mr. Mills received a right shoulder injection three months ago, which provided significant benefit.    SURGICAL HISTORY:  Mr. Mills underwent wrist cyst removal surgery approximately one year ago.    IMAGING:  Mr. Mills underwent an X-ray of the wrist, but the specific findings were not detailed.      ROS:  General: denies fever, denies chills, denies fatigue, denies weight gain, denies weight loss  Eyes: denies vision changes, denies redness, denies discharge  ENT: denies ear pain, denies nasal congestion, denies sore throat  Cardiovascular: denies chest pain, denies palpitations, denies lower extremity edema  Respiratory: denies cough, denies shortness of breath  Gastrointestinal: denies abdominal pain, denies nausea, denies vomiting, denies diarrhea, denies constipation, denies blood in stool  Genitourinary: denies dysuria, denies hematuria, denies frequency  Musculoskeletal: denies joint pain, denies muscle pain, reports lumps/masses  Skin: denies rash, denies lesion  Neurological: denies headache, denies dizziness, denies numbness, denies tingling  Psychiatric: denies anxiety, denies depression, denies sleep difficulty         Physical Exam    Skin: Vascular tumor present.         Assessment &  Plan     Right shoulder injection administered today.   Discussed potential future shoulder replacement, but patient is not ready at this time.   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 Leave alone if not painful or bothersome.   Follow up in 6 months.              No follow-ups on file.    This note was generated with the assistance of ambient listening technology. Verbal consent was obtained by the patient and accompanying visitor(s) for the recording of patient appointment to facilitate this note. I attest to having reviewed and edited the generated note for accuracy, though some syntax or spelling errors may persist. Please contact the author of this note for any clarification.

## 2025-07-16 ENCOUNTER — TELEPHONE (OUTPATIENT)
Dept: UROLOGY | Facility: CLINIC | Age: 75
End: 2025-07-16
Payer: MEDICARE

## 2025-07-16 ENCOUNTER — PROCEDURE VISIT (OUTPATIENT)
Dept: UROLOGY | Facility: CLINIC | Age: 75
End: 2025-07-16
Payer: MEDICARE

## 2025-07-16 VITALS
BODY MASS INDEX: 23.78 KG/M2 | HEIGHT: 70 IN | DIASTOLIC BLOOD PRESSURE: 83 MMHG | HEART RATE: 63 BPM | SYSTOLIC BLOOD PRESSURE: 153 MMHG | WEIGHT: 166.13 LBS

## 2025-07-16 DIAGNOSIS — N28.89 RIGHT RENAL MASS: ICD-10-CM

## 2025-07-16 DIAGNOSIS — R31.0 GROSS HEMATURIA: Primary | ICD-10-CM

## 2025-07-16 DIAGNOSIS — N28.89 OTHER SPECIFIED DISORDERS OF KIDNEY AND URETER: ICD-10-CM

## 2025-07-16 PROCEDURE — 52000 CYSTOURETHROSCOPY: CPT | Mod: S$GLB,,, | Performed by: STUDENT IN AN ORGANIZED HEALTH CARE EDUCATION/TRAINING PROGRAM

## 2025-07-16 RX ORDER — LIDOCAINE HYDROCHLORIDE 20 MG/ML
JELLY TOPICAL
Status: COMPLETED | OUTPATIENT
Start: 2025-07-16 | End: 2025-07-16

## 2025-07-16 RX ORDER — CIPROFLOXACIN 500 MG/1
500 TABLET, FILM COATED ORAL
Status: COMPLETED | OUTPATIENT
Start: 2025-07-16 | End: 2025-07-16

## 2025-07-16 RX ADMIN — LIDOCAINE HYDROCHLORIDE: 20 JELLY TOPICAL at 02:07

## 2025-07-16 RX ADMIN — CIPROFLOXACIN 500 MG: 500 TABLET, FILM COATED ORAL at 02:07

## 2025-07-16 NOTE — PROCEDURES
"Cystoscopy    Date/Time: 7/16/2025 3:15 PM    Performed by: Rory Griffith MD  Authorized by: Rory Griffith MD    Consent Done?:  Yes (Written)  Timeout: prior to procedure the correct patient, procedure, and site was verified    Prep: patient was prepped and draped in usual sterile fashion    Local anesthesia used?: Yes    Anesthesia:  Intraurethral instillation  Indications: hematuria and BPH    Position:  Supine  Anesthesia:  Intraurethral instillation  Patient sedated?: Yes    Preparation: Patient was prepped and draped in usual sterile fashion    Scope type:  Flexible cystoscope  Guidewire: guidewire removed intact    Guidewire comment: no guidewire used  - note template is requiring that I select "Guidewire removed intact"  Urethra normal: Yes    Prostate normal: No (mild bilobar regrowth of prostatic tissue)     Hyperplasia   Bilobar  Bladder neck normal: Yes    Bladder normal: Yes     patient tolerated the procedure well with no immediate complications  Comments:      Negative hematuria workup apart from BPH and indeterminate right renal lesion.   Patient bothered by urinary frequency; start Vesicare (reports Flomax previously did not help.)  F/u with Dr. Nunn in 6 months with MRI abdomen.    "

## 2025-07-17 ENCOUNTER — PATIENT MESSAGE (OUTPATIENT)
Dept: UROLOGY | Facility: CLINIC | Age: 75
End: 2025-07-17
Payer: MEDICARE

## 2025-07-18 RX ORDER — SOLIFENACIN SUCCINATE 10 MG/1
10 TABLET, FILM COATED ORAL DAILY
Qty: 30 TABLET | Refills: 11 | Status: SHIPPED | OUTPATIENT
Start: 2025-07-18 | End: 2026-07-18

## 2025-08-05 ENCOUNTER — OFFICE VISIT (OUTPATIENT)
Dept: PAIN MEDICINE | Facility: CLINIC | Age: 75
End: 2025-08-05
Payer: MEDICARE

## 2025-08-05 ENCOUNTER — PATIENT MESSAGE (OUTPATIENT)
Dept: PAIN MEDICINE | Facility: CLINIC | Age: 75
End: 2025-08-05

## 2025-08-05 VITALS
RESPIRATION RATE: 18 BRPM | DIASTOLIC BLOOD PRESSURE: 70 MMHG | WEIGHT: 162.25 LBS | BODY MASS INDEX: 23.23 KG/M2 | OXYGEN SATURATION: 92 % | SYSTOLIC BLOOD PRESSURE: 148 MMHG | HEIGHT: 70 IN | HEART RATE: 80 BPM | TEMPERATURE: 98 F

## 2025-08-05 DIAGNOSIS — M51.360 DEGENERATION OF INTERVERTEBRAL DISC OF LUMBAR REGION WITH DISCOGENIC BACK PAIN: ICD-10-CM

## 2025-08-05 DIAGNOSIS — M51.369 ANNULAR TEAR OF LUMBAR DISC: ICD-10-CM

## 2025-08-05 DIAGNOSIS — G89.4 CHRONIC PAIN DISORDER: ICD-10-CM

## 2025-08-05 DIAGNOSIS — G89.4 CHRONIC PAIN DISORDER: Primary | ICD-10-CM

## 2025-08-05 DIAGNOSIS — M47.816 LUMBAR SPONDYLOSIS: ICD-10-CM

## 2025-08-05 DIAGNOSIS — M54.16 LUMBAR RADICULOPATHY: ICD-10-CM

## 2025-08-05 DIAGNOSIS — M54.16 LUMBAR RADICULOPATHY: Primary | ICD-10-CM

## 2025-08-05 PROCEDURE — 1101F PT FALLS ASSESS-DOCD LE1/YR: CPT | Mod: CPTII,S$GLB,, | Performed by: NURSE PRACTITIONER

## 2025-08-05 PROCEDURE — 1160F RVW MEDS BY RX/DR IN RCRD: CPT | Mod: CPTII,S$GLB,, | Performed by: NURSE PRACTITIONER

## 2025-08-05 PROCEDURE — 99999 PR PBB SHADOW E&M-EST. PATIENT-LVL V: CPT | Mod: PBBFAC,,, | Performed by: NURSE PRACTITIONER

## 2025-08-05 PROCEDURE — 1159F MED LIST DOCD IN RCRD: CPT | Mod: CPTII,S$GLB,, | Performed by: NURSE PRACTITIONER

## 2025-08-05 PROCEDURE — 3288F FALL RISK ASSESSMENT DOCD: CPT | Mod: CPTII,S$GLB,, | Performed by: NURSE PRACTITIONER

## 2025-08-05 PROCEDURE — 1125F AMNT PAIN NOTED PAIN PRSNT: CPT | Mod: CPTII,S$GLB,, | Performed by: NURSE PRACTITIONER

## 2025-08-05 PROCEDURE — 3078F DIAST BP <80 MM HG: CPT | Mod: CPTII,S$GLB,, | Performed by: NURSE PRACTITIONER

## 2025-08-05 PROCEDURE — 3008F BODY MASS INDEX DOCD: CPT | Mod: CPTII,S$GLB,, | Performed by: NURSE PRACTITIONER

## 2025-08-05 PROCEDURE — 3077F SYST BP >= 140 MM HG: CPT | Mod: CPTII,S$GLB,, | Performed by: NURSE PRACTITIONER

## 2025-08-05 PROCEDURE — 99214 OFFICE O/P EST MOD 30 MIN: CPT | Mod: S$GLB,,, | Performed by: NURSE PRACTITIONER

## 2025-08-05 RX ORDER — OXYCODONE AND ACETAMINOPHEN 5; 325 MG/1; MG/1
1 TABLET ORAL EVERY 12 HOURS PRN
Qty: 60 TABLET | Refills: 0 | Status: SHIPPED | OUTPATIENT
Start: 2025-08-05 | End: 2025-08-05 | Stop reason: SDUPTHER

## 2025-08-05 RX ORDER — OXYCODONE AND ACETAMINOPHEN 5; 325 MG/1; MG/1
1 TABLET ORAL EVERY 12 HOURS PRN
Qty: 60 TABLET | Refills: 0 | Status: SHIPPED | OUTPATIENT
Start: 2025-08-05 | End: 2025-09-04

## 2025-08-05 NOTE — PROGRESS NOTES
Interventional Pain Management - Established Visit  Follow-Up      Interval History 8/5/2025:  The patient returns to clinic today for follow up of back pain. He continues to report low back pain. He is having worsened radiating pain into the buttocks. His pain is worse with prolonged standing and bending. He is having trouble cooking due to pain. He is currently taking Gabapentin. He is having limited relief with Norco. This does not bring his pain down to a tolerable level. He denies any other health changes. His pain today is 8/10..     Interval History 6/3/2025:  The patient returns to clinic today for follow up of back pain. He is s/p L4/5 IL ALVIN on 5/9/2025. He reports 50% relief of his pain. He reports that this injection has provided the most relief of any thus far. He continues to report low back and buttock pain, worse with prolonged walking. He denies any radicular leg pain. He is taking Gabapentin. He denies any other health changes. His pain today is 6/10.    Interval History 4/8/2025:  The patient returns to clinic today for follow up of back pain. He is s/p bilateral SI joint injections on 3/21/2025. He reports limited relief. He continues to report low back and buttock pain. He denies any radicular leg pain. He does endorse worsened pain with bending over. He is taking Gabapentin. He is not taking Norco, as this is not helpful. He denies any other health changes. His pain today is 8/10.     Interval History 2/25/2025:  Uriah Mills is a 74 y.o. male who presents to the clinic for follow up evaluation and management of chronic low back pain. Last seen in clinic on 2/3 at which time pt was s/p lumbar RFA without much relief. We ordered repeat MRI and continued medication management. Today, pt continues to complain of low back and buttock pain. When sitting, pain is mostly in the buttocjk region, when pt stands, pain travels up his back. Pain is described as a dull pressure with occasional electric  shock like sensations. Symptoms worse with walking, prolonged standing, bending over. Pain improved with lying flat. Current pain 8/10.   He continues to use Gabapentin 600 BID. Stopped methocarbamol due to lack of efficacy. No longer using Hydrocodone.   Additionally, pt reports worsening right shoulder pain. Hx of impingement syndrome, previous benefit with right shoulder. Overall, pt is very sedentary and spends the majority of his day either lying down or sitting in a recliner.     Interval History 2/3/2025:  Uriah Mills returns to clinic for follow-up after bilateral L3,4,5 RFFA on 1/2/2025. He reports limited relief of his back pain. He states he has taken Norco in the past without relief. He states the last time he took a pain pill was mid-December. He also takes  Gabapentin 600 mg BID with some relief. He took methocarbamol a couple times without relief. He denies recent health changes. He denies recent falls or trauma. He denies new onset fever/night sweats, urinary incontinence, bowel incontinence, significant weight changes, significant motor weakness or changes, or loss of sensations. His pain today is 8/10.      Interval history 12/12/2024:   Uriah Mills is following up in the clinic for chronic lower back pain.  Patient continues to achy sore dull lower back pain that is distributed in a bandlike pattern with some of the pain radiating to his buttocks.  Patient said the pain is not traveling down his legs. The pain is worse with prolonged standing, walking and getting up from a sitting position. The lumbar paraspinal trigger point injections from his last visit provided limited relief. This pain was alleviated with RFA in the past. Patient is status post piriformis and SI joint injections with limited relief.  Patient is having limited pain relief with gabapentin and Robaxin. He denies new onset fever/night sweats, urinary incontinence, bowel incontinence, significant weight changes, significant  motor weakness or changes, or loss of sensations. His pain today is 9/10.    Interval History 11/19/2024:  Uriah Mills returns to clinic for follow-up of chronic lower back pain. He present with his wife, Yamilex, who aids in providing history. He states that about 10 days ago, he was sitting up in bed watching TV. He had taken a Xanax earlier. He is not sure if he passed out or fell asleep, but he fell out of bed. He hit his right upper back/side, right hand and right ear on his bedside table. The fall woke him up. They did not present to the ED. He denies headaches, blurred vision, change in memory or any other signs of head trauma. He is having new pain in the upper back. He denies recent health changes. He denies new onset fever/night sweats, urinary incontinence, bowel incontinence, significant weight changes, significant motor weakness or changes, or loss of sensations. His pain today is 9/10.      Interval History 7/9/2023:  Patient is here for follow up after BL SIJ and right Piriformis injection on 5/52024 with 0% pain relief.  Today reports pain is mostly in his right lower back, worse with prolonged sitting, rotation of his back.  Denies leg pain or groin pain, denies new weakness, numbness, falls.  Patient is not participating in PT or HEP because it worsens his pain.  Today, pain is 5/10.      Interval History 5/10/2024:  Uriah Mills returns to clinic s/p right then left  L3, L4, L5 RFA on 4/12/2024 and 4/26/2024 respectively.  He reports 80% relief from these procedures. He also reports 80% relief at least from the previous bilateral SIJ injections. He reports return of his bilateral SIJ pain right > left with a new pain in his right buttock that radiates down the posterior aspect of his right thigh.  He describes the pain as shooting, dull.  Exacerbating factors: moving from sitting to standing, sitting up in bed. Mitigating factors: resting.  He has previously received Noco from his PCP last  fill 10/31/2023.  He is inquiring about restarting pain medications.  He does not participate in HEP or PT currently. The patient denies fever/night sweats, urinary incontinence, bowel incontinence, significant weight changes, significant motor weakness or changes, or loss of sensations. His pain today is 8/10.     Interval History 1/26/2024:  The patient is here today for follow up of back pain. He is s/p bilateral SI joint injection with 60% relief. His primary complaint today is lower back pain, higher than previous injection site. It is stabbing and throbbing in nature. No radiation into the legs. There is associated stiffness. He has a lot of pain and stiffness first thing in the morning. He has completed PT multiple times without much improvement in symptoms. He continues with HEP. His pain today is 5/10.    Interval History 12/15/2023:  Uriah Mills presents to the clinic for a follow-up appointment for low back and hip pain. Since the last visit, Uriah Mills states the pain has been persistant. Current pain intensity is 8/10 but he reports it is usually a 10/10. He has been tolerating his pain over the past few years but had a flare up in October which led him to schedule this appointment.     Interval History (12/14/21):  He returns today for follow up.  He reports that bilateral sacroiliac joint steroid injections has been helpful for the bilateral low back pain.  He reports roughly 60% relief.  He is happy with these results and does not feel that further treatment needed at this time.  He is not interested in participating in further physical therapy.     Interval History (11/11/21):  He returns today for follow up.  He reports that bilateral lumbar radiofrequency ablations has been helpful for the bilateral lumbar pain.  He reports 95% relief of lumbar back pain.  He states that he continues to have pain in the bilateral lumbosacral regions over the bilateral sacroiliac joints.  This pain radiates  to the bilateral buttocks and lateral hips.  He denies any associated numbness, tingling, weakness, bowel bladder dysfunction.     Interval History (7/1/21):  He returns today for follow up.  He reports that bilateral S1 transforaminal epidural steroid injection has been helpful for the bilateral low back and lower extremity pain.  He reports roughly 40% relief.  He continues to have some back pain in the bilateral lower lumbar/lumbosacral region.  The pain does not radiate.  He denies any other changes in the quality or location was pain.  He denies any new or worsening symptoms.     Interval History (1/19/21):  He returns today for follow up.  He reports that his pain is unchanged in quality location since last encounter.  He denies any new or worsening symptoms.  We have received records from previous pain management provider.  Records indicate the patient has undergone multiple lumbar interventional procedures including sacroiliac joint injections, lumbar radiofrequency ablations, and bilateral L4 and L5 transforaminal epidural steroid injections.  Patient denies any significant relief from these procedures.     Initial Encounter (1/5/21):  Uriah Mills is a 71 y.o. male who presents today with chronic bilateral low back and lower extremity pain.  Patient has had this problem for many years.  No specific inciting event or injury noted.  The pain is located across the lower lumbar region radiate to the bilateral hips and posterior thighs.  Patient reports associated numbness in the right posterior thigh.  He denies any pain or numbness distal to the knees.  He denies any focal weakness or bowel bladder dysfunction.  The pain is constant worse with activity.  Patient reports undergoing multiple interventional procedures in the past with Dr. Kin Palomino.  He states that no these procedures were particularly helpful.  He was also tried on multiple opioid pain medications.  Some of these help but they also cause  significant constipation and therefore these medications were not continued.  More recently, patient has been taking tramadol prescribed by his primary care physician.  He states this medication does not cause any problems but also did not provide any significant relief..   This pain is described in detail below.    Pain Disability Index Review:      8/5/2025     9:23 AM 6/3/2025     8:05 AM 4/8/2025     7:42 AM   Last 3 PDI Scores   Pain Disability Index (PDI) 56 [phone]       Pain Medications:    - Opioids: Lorcet (Hydrocodone/Acetaminophen)  Hydrocodone 2 weeks since he took any, wasn't helping pain much  gabapentin    Opioid Contract: no     report:  Reviewed and inconsistent with medication use as prescribed. (Pt report he has stopped taking it).    Pain Procedures:   October 2021: RFA of bilateral L3, L4, L5  June 2021: Transforaminal epidural at bilateral S1  December 2021: Bilateral sacroiliac joint injection   1/11/24 B/L SI joint injections- 80% relief  4/12/2024 - Right L3, L4, L5 RFA - 80% relief  4/26/2024 - Left L3, L4, L5 RFA - 80% relief  5/24/2024: Bilateral Sacroiliac Joint Injection under Fluoroscopic Guidance and Right Piriformis Muscle Injection under Fluoroscopic Guidance - 80% relief  1/2/2025 - Bilateral L3,4,5 RFA - limited relief  3/21/2025- Bilateral SI joint injection- limited relief.   5/9/2025- L4/5 IL ALVIN- 50% relief      Physical Therapy/Home Exercise: no (has tried accupuncture without significant relief)    Imaging:  MRI Lumbar Spine 2/17/2025:  COMPARISON:  11/19/2024 and 12/22/2023     FINDINGS:  Alignment: Normal.     Vertebrae: 5 lumbar-type vertebral bodies. No aggressive marrow replacement process or fracture.     Discs: Annular fissure L4-5     Cord: Normal. Conus terminates at L1.     Degenerative findings:     T12-L1: There is no focal disc herniation. No significant central canal narrowing . No significant neural foraminal narrowing.     L1-L2: There is no focal disc  herniation. No significant central canal narrowing . No significant neural foraminal narrowing.     L2-L3: There is no focal disc herniation. No significant central canal narrowing . No significant neural foraminal narrowing.     L3-L4: There is no focal disc herniation. No significant central canal narrowing . No significant neural foraminal narrowing.     L4-L5: Broad based mild diffuse bulging of disc material .  Facet hypertrophy.  No significant central canal narrowing . No significant neural foraminal narrowing.     L5-S1: Broad based mild diffuse bulging of disc material with small left posterocentral protrusion.  Facet hypertrophy.  Mild central canal narrowing .  Mild bilateral neural foraminal narrowing.     Paraspinal muscles & soft tissues: Abdominal aortic aneurysm measuring 4.8*3.7 cm.     Impression:     No significant interval detrimental change in lumbar spine from 12/22/2023.  As above.     4.8 x 3.7 cm abdominal aortic aneurysm.     MRI lumbar spine 2020    Alignment: Normal.     Vertebrae: 5 lumbar-type vertebral bodies. No aggressive marrow replacement process or fracture.     Discs: Satisfactory height.  Mild diffuse desiccation of disc material predominantly L4-L5 and L5-S1     Cord: Normal. Conus terminates at T12-L1.     Degenerative findings:     T12-L1: There is no focal disc herniation.No significant central canal narrowing . No significant neural foraminal narrowing.     L1-L2: There is no focal disc herniation.No significant central canal narrowing . No significant neural foraminal narrowing.     L2-L3: There is no focal disc herniation.No significant central canal narrowing . No significant neural foraminal narrowing.     L3-L4: There is no focal disc herniation.No significant central canal narrowing . No significant neural foraminal narrowing.     L4-L5: Mild broad-based bulging of disc material with small superimposed central protrusion.  No significant central canal narrowing . No  significant neural foraminal narrowing.     L5-S1: Mild broad-based bulging of disc material with small superimposed central protrusion.  No significant central canal narrowing . No significant neural foraminal narrowing.     Paraspinal muscles & soft tissues: Unremarkable.     Impression:     Degenerative changes disc space level 4-L5 and L5-S1. no significant central or foraminal stenosis.    Allergies: Review of patient's allergies indicates:  No Known Allergies    Current Medications:   Current Outpatient Medications   Medication Sig Dispense Refill    albuterol (PROVENTIL) 2.5 mg /3 mL (0.083 %) nebulizer solution Take 3 mLs (2.5 mg total) by nebulization every 6 (six) hours as needed for Wheezing. Rescue 150 mL 5    albuterol (PROVENTIL/VENTOLIN HFA) 90 mcg/actuation inhaler INHALE 2 PUFFS BY MOUTH EVERY 6 HOURS AS NEEDED 27 g 3    ALPRAZolam (XANAX) 1 MG tablet Take 1 tablet (1 mg total) by mouth nightly as needed. 20 tablet 0    aspirin (ECOTRIN) 81 MG EC tablet Take 81 mg by mouth once daily.      fluticasone propionate (FLONASE) 50 mcg/actuation nasal spray 2 sprays (100 mcg total) by Each Nostril route once daily. 15.8 mL 5    gabapentin (NEURONTIN) 600 MG tablet Take 1 tablet (600 mg total) by mouth 2 (two) times daily. 60 tablet 5    hydrocortisone 2.5 % cream Apply topically 2 (two) times daily. 28 g 2    levocetirizine (XYZAL) 5 MG tablet TAKE 1 TABLET BY MOUTH ONCE DAILY IN THE EVENING 90 tablet 2    methocarbamoL (ROBAXIN) 500 MG Tab Take 1 tablet (500 mg total) by mouth 4 (four) times daily as needed (muscle spasm). 80 tablet 0    solifenacin (VESICARE) 10 MG tablet Take 1 tablet (10 mg total) by mouth once daily. 30 tablet 11    nicotine (NICODERM CQ) 21 mg/24 hr Place 1 patch onto the skin once daily. 30 patch 1     No current facility-administered medications for this visit.     Facility-Administered Medications Ordered in Other Visits   Medication Dose Route Frequency Provider Last Rate Last  Admin    0.9%  NaCl infusion   Intravenous Continuous Rory Griffith MD        0.9%  NaCl infusion   Intravenous Continuous Jovanny Gurrola MD        ALPRAZolam dissolvable tablet 1 mg  1 mg Oral Once PRN Colt Lenz Jr., MD        BUPivacaine (PF) 0.25% (2.5 mg/ml) injection 25 mg  10 mL Intramuscular Once Colt Lenz Jr., MD        ceFAZolin 2 g in dextrose 5 % in water (D5W) 100 mL IVPB (MB+)  2 g Intravenous On Call Procedure Rory Griffith MD        dexAMETHasone sodium phos (PF) injection 10 mg  10 mg Epidural Once Colt Lenz Jr., MD        LIDOcaine (PF) 10 mg/ml (1%) injection 10 mg  1 mL Other Once Colt Lenz Jr., MD        LIDOcaine (PF) 10 mg/ml (1%) injection 10 mg  1 mL Intradermal Once PRN Rory Griffith MD        LIDOcaine HCL 20 mg/ml (2%) injection 10 mL  10 mL Other Once Colt Lenz Jr., MD        LIDOcaine HCL 20 mg/ml (2%) injection 10 mL  10 mL Other Once Colt Lenz Jr., MD           REVIEW OF SYSTEMS:    GENERAL:  No weight loss, malaise or fevers.  HEENT:  Negative for frequent or significant headaches.  NECK:  Negative for lumps, goiter, pain and significant neck swelling.  RESPIRATORY:  Negative for cough, wheezing or shortness of breath. COPD  CARDIOVASCULAR:  Negative for chest pain, leg swelling or palpitations.  GI:  Negative for abdominal discomfort, blood in stools or black stools or change in bowel habits.  MUSCULOSKELETAL:  See HPI.  SKIN:  Negative for lesions, rash, and itching.  PSYCH:  Negative for sleep disturbance, mood disorder and recent psychosocial stressors.  HEMATOLOGY/LYMPHOLOGY:  Negative for prolonged bleeding, bruising easily or swollen nodes.  NEURO:   No history of headaches, syncope, paralysis, seizures or tremors.  All other reviewed and negative other than HPI.    Past Medical History:  Past Medical History:   Diagnosis Date    Allergy     Arthritis     BPH (benign prostatic hyperplasia)     Carpal  tunnel syndrome     Chronic hip pain, bilateral     Chronic low back pain     COPD (chronic obstructive pulmonary disease)     DDD (degenerative disc disease), lumbar     Dorsalgia     Gastrointestinal disease     Lumbar radiculopathy     Lumbar spondylosis     Mass of right wrist 06/05/2024    Osteoarthritis of both hips     Osteoarthritis of spine     Skin cancer        Past Surgical History:  Past Surgical History:   Procedure Laterality Date    APPENDECTOMY      CARPAL TUNNEL RELEASE Right 08/12/2019    Procedure: RELEASE, CARPAL TUNNEL right;  Surgeon: Roopa Hanna MD;  Location: [facility] OR;  Service: Orthopedics;  Laterality: Right;    COLONOSCOPY      COLONOSCOPY      COLONOSCOPY N/A 9/18/2024    Procedure: COLONOSCOPY;  Surgeon: Lake Galeana MD;  Location: [facility] ENDO;  Service: Endoscopy;  Laterality: N/A;    CYSTOSCOPY WITH INSERTION OF MINIMALLY INVASIVE IMPLANT TO ENLARGE PROSTATIC URETHRA N/A 11/29/2023    Procedure: CYSTOSCOPY, WITH INSERTION OF UROLIFT IMPLANT;  Surgeon: Rory Griffith MD;  Location: [facility] OR;  Service: Urology;  Laterality: N/A;    CYSTOSCOPY, WITH INSERTION OF UROLIFT IMPLANT  11/29/2023    EPIDURAL STEROID INJECTION INTO LUMBAR SPINE Bilateral 06/17/2021    Bilateral ALVIN per  06/17/2021    EXCISION OF GANGLION OF WRIST Right 06/05/2024    Procedure: RIGHT VOLAR WRIST GANGLION CYST EXCISION;  Surgeon: Roopa Hanna MD;  Location: [facility] OR;  Service: Orthopedics;  Laterality: Right;  MAC/REGIONAL    GASTRIC FUNDOPLICATION      HERNIA REPAIR  1990s    INJECTION OF ANESTHETIC AGENT AROUND MEDIAL BRANCH NERVES INNERVATING LUMBAR FACET JOINT Bilateral 07/15/2021    Procedure: Block-nerve-medial branch-lumbar---BILATERAL L3, L4, L5;  Surgeon: Colt Lenz Jr., MD;  Location: [facility] PAIN MGMT;  Service: Pain Management;  Laterality: Bilateral;    INJECTION OF ANESTHETIC AGENT AROUND MEDIAL BRANCH NERVES INNERVATING LUMBAR FACET JOINT Bilateral  09/23/2021    Procedure: Block-nerve-medial branch-lumbar---BILATERAL L3, L4, L5;  Surgeon: Colt Lenz Jr., MD;  Location: [facility] PAIN MGMT;  Service: Pain Management;  Laterality: Bilateral;    INJECTION OF ANESTHETIC AGENT AROUND NERVE Bilateral 02/16/2024    Procedure: BLOCK, NERVE BILATERAL L3, 4, 5 MEDIAL BRANCH;  Surgeon: Jeanmarie Jauregui MD;  Location: [facility] PAIN MGT;  Service: Pain Management;  Laterality: Bilateral;  [phone]    INJECTION OF ANESTHETIC AGENT AROUND NERVE Bilateral 03/08/2024    Procedure: BLOCK, NERVE BILATERAL L3, L4, AND L5 MEDIAL BRANCH;  Surgeon: Jeanmarie Jauregui MD;  Location: [facility] PAIN MGT;  Service: Pain Management;  Laterality: Bilateral;  [phone]    INJECTION OF JOINT Bilateral 12/02/2021    Procedure: Injection, Joint---BILATERAL SACROILIAC INJECTION;  Surgeon: Colt Lenz Jr., MD;  Location: [facility] PAIN MGMT;  Service: Pain Management;  Laterality: Bilateral;    INJECTION OF JOINT N/A 05/24/2024    Procedure: INJECTION, BILATERAL SI AND RIGHT PIRIFORMIS;  Surgeon: Jeanmarie Jauregui MD;  Location: [facility] PAIN MGT;  Service: Pain Management;  Laterality: N/A;  [phone]  2 WK F/U ESHRAGHI    INJECTION OF STEROID Left 08/12/2019    Procedure: INJECTION, STEROID;  Surgeon: Roopa Hanna MD;  Location: [facility] OR;  Service: Orthopedics;  Laterality: Left;    INJECTION, SACROILIAC JOINT Bilateral 01/11/2024    Procedure: INJECTION,SACROILIAC JOINT BILATERAL;  Surgeon: Jeanmarie Jauregui MD;  Location: [facility] PAIN MGT;  Service: Pain Management;  Laterality: Bilateral;  [phone]    INJECTION, SACROILIAC JOINT Bilateral 3/21/2025    Procedure: INJECTION,SACROILIAC JOINT BILATERAL;  Surgeon: Jeanmarie Jauregui MD;  Location: [facility] PAIN MGT;  Service: Pain Management;  Laterality: Bilateral;  2 WK F/U NICO    INJECTION, SPINE, LUMBOSACRAL, TRANSFORAMINAL APPROACH N/A 5/9/2025    Procedure: LUMBAR L4/5 IL ALVIN *ASPIRIN OTC* HOLD FOR 5 DAYS;  Surgeon: Shania  MD Jeanmarie;  Location: [facility] PAIN MGT;  Service: Pain Management;  Laterality: N/A;  2 WK F/U ESHRAGHI    PYELOGRAM, RETROGRADE (Bilateral)    11/29/2023    RADIOFREQUENCY ABLATION Left 10/14/2021    Procedure: Radiofrequency Ablation---LEFT L3, L4, L5;  Surgeon: Colt Lenz Jr., MD;  Location: [facility] PAIN MGMT;  Service: Pain Management;  Laterality: Left;    RADIOFREQUENCY ABLATION Right 10/28/2021    Procedure: Radiofrequency Ablation---RIGHT L3, L4, L5;  Surgeon: Colt Lenz Jr., MD;  Location: [facility] PAIN MGMT;  Service: Pain Management;  Laterality: Right;    RADIOFREQUENCY ABLATION Right 04/12/2024    Procedure: RADIOFREQUENCY ABLATION RIGHT L3, L4, AND L5 1 OF 2;  Surgeon: Jeanmarie Jauregui MD;  Location: [facility] PAIN MGT;  Service: Pain Management;  Laterality: Right;  [phone]    RADIOFREQUENCY ABLATION Left 04/26/2024    Procedure: RADIOFREQUENCY ABLATION LEFT L3, L4, AND L5 2 OF 2;  Surgeon: Jeanmarie Jauregui MD;  Location: [facility] PAIN MGT;  Service: Pain Management;  Laterality: Left;  [phone]    RADIOFREQUENCY ABLATION Bilateral 1/2/2025    Procedure: RADIOFREQUENCY ABLATION BILATERAL L3, L4, L5;  Surgeon: Jeanmarie Jauregui MD;  Location: [facility] PAIN MGT;  Service: Pain Management;  Laterality: Bilateral;  4 WK F/U NICO    RETROGRADE PYELOGRAPHY Bilateral 11/29/2023    Procedure: PYELOGRAM, RETROGRADE;  Surgeon: Rory Griffith MD;  Location: [facility] OR;  Service: Urology;  Laterality: Bilateral;    ROTATOR CUFF REPAIR Right     SACROILIAC JOINT INJECTION Bilateral 12/02/2021    SKIN CANCER EXCISION Left     arm    TRANSFORAMINAL EPIDURAL INJECTION OF STEROID Bilateral 06/17/2021    Procedure: Injection,steroid,epidural,transforaminal approach---BILATERAL S1;  Surgeon: Colt Lenz Jr., MD;  Location: [facility] PAIN MGMT;  Service: Pain Management;  Laterality: Bilateral;    TURBT (TRANSURETHRAL RESECTION OF BLADDER TUMOR) N/A 11/29/2023    Procedure: TURBT (TRANSURETHRAL RESECTION OF  BLADDER TUMOR);  Surgeon: Rory Griffith MD;  Location: [facility] OR;  Service: Urology;  Laterality: N/A;  with UroLift and with bilateral retrograde pyelograms    URBT (TRANSURETHRAL RESECTION OF BLADDER TUMOR)  11/29/2023       Family History:  Family History   Problem Relation Name Age of Onset    Diabetes Mother M[place]     No Known Problems Father       Social History:  Social History     Socioeconomic History    Marital status:    Tobacco Use    Smoking status: Every Day     Types: Cigars     Passive exposure: Current    Smokeless tobacco: Never    Tobacco comments:     8 small cigars per day down from 10.   Substance and Sexual Activity    Alcohol use: No    Drug use: Yes     Types: Benzodiazepines    Sexual activity: Not Currently     Partners: Female     Birth control/protection: None     Social Drivers of Health     Financial Resource Strain: Medium Risk (5/17/2025)    Overall Financial Resource Strain (CARDIA)     Difficulty of Paying Living Expenses: Somewhat hard   Food Insecurity: Food Insecurity Present (5/17/2025)    Hunger Vital Sign     Worried About Running Out of Food in the Last Year: Sometimes true     Ran Out of Food in the Last Year: Sometimes true   Transportation Needs: No Transportation Needs (5/17/2025)    PRAPARE - Transportation     Lack of Transportation (Medical): No     Lack of Transportation (Non-Medical): No   Physical Activity: Inactive (5/17/2025)    Exercise Vital Sign     Days of Exercise per Week: 0 days     Minutes of Exercise per Session: 0 min   Stress: Stress Concern Present (5/17/2025)    [employer] - Occupational Stress Questionnaire     Feeling of Stress : Rather much   Housing Stability: Low Risk  (5/17/2025)    Housing Stability Vital Sign     Unable to Pay for Housing in the Last Year: No     Number of Times Moved in the Last Year: 0     Homeless in the Last Year: No     OBJECTIVE:    BP (!) 148/70 (BP Location:  "Left arm, Patient Position: Sitting)   Pulse 80   Temp 97.8 °F (36.6 °C) (Oral)   Resp 18   Ht 5' 10" (1.778 m)   Wt 73.6 kg (162 lb 4.1 oz)   SpO2 (!) 92%   BMI 23.28 kg/m²     PHYSICAL EXAMINATION:     General appearance: Well appearing, in no acute distress, alert and oriented x3.  Psych:  Mood and affect appropriate.  Skin: Skin color, texture, turgor normal, no rashes or lesions, in both upper and lower body.  Head/face:  Atraumatic, normocephalic. No palpable lymph nodes  Back: Straight leg raise in the sitting position is negative for radicular leg pain. Limited ROM with pain on flexion and extension.   Extremities:  No deformities, edema, or skin discoloration. Good capillary refill.  Musculoskeletal: Lower extremity strength is normal and symmetric.  No atrophy or tone abnormalities are noted.   Neuro: No loss of sensation is noted.  Gait: Antalgic- ambulates without assistance.    ASSESSMENT: 74 y.o. year old male with low back pain, consistent with the followin. Lumbar radiculopathy  Procedure Order to Pain Management      2. Annular tear of lumbar disc        3. Lumbar spondylosis        4. Degeneration of intervertebral disc of lumbar region with discogenic back pain        5. Chronic pain disorder                  Plan:    - Previous imaging reviewed, labs reviewed.     - Schedule for L4/5 [place] ALVIN.     - Continue Gabapentin 600 mg BID.     - Pain contract signed 2024.     - Discontinue Norco. Trial Percocet 5/325 mg BID PRN.     - The patient is here today for a refill of current pain medications and they believe these provide effective pain control and improvements in quality of life.  The patient notes no serious side effects, and feels the benefits outweigh the risks.  The patient was reminded of the pain contract that they signed previously as well as the risks and benefits of the medication including possible death.  The updated [place] [employer] prescription " monitoring program was reviewed, and the patient has been found to be compliant with current treatment plan.    - Continue home exercise routine.     - RTC in 1 month.     - Dr. Jauregui was consulted on the patient and agrees with this plan.     The above plan and management options were discussed at length with patient. Patient is in agreement with the above and verbalized understanding.     Alissa Waite NP  08/05/2025

## 2025-08-23 ENCOUNTER — PATIENT MESSAGE (OUTPATIENT)
Dept: PAIN MEDICINE | Facility: OTHER | Age: 75
End: 2025-08-23
Payer: MEDICARE

## 2025-08-24 ENCOUNTER — PATIENT MESSAGE (OUTPATIENT)
Dept: PAIN MEDICINE | Facility: OTHER | Age: 75
End: 2025-08-24
Payer: MEDICARE

## 2025-08-29 ENCOUNTER — HOSPITAL ENCOUNTER (OUTPATIENT)
Facility: OTHER | Age: 75
Discharge: HOME | End: 2025-08-29
Attending: ANESTHESIOLOGY | Admitting: ANESTHESIOLOGY
Payer: MEDICARE

## 2025-08-29 VITALS
SYSTOLIC BLOOD PRESSURE: 158 MMHG | HEART RATE: 77 BPM | HEIGHT: 70 IN | WEIGHT: 160 LBS | OXYGEN SATURATION: 97 % | TEMPERATURE: 98 F | RESPIRATION RATE: 18 BRPM | DIASTOLIC BLOOD PRESSURE: 67 MMHG | BODY MASS INDEX: 22.9 KG/M2

## 2025-08-29 DIAGNOSIS — G89.29 CHRONIC PAIN: ICD-10-CM

## 2025-08-29 DIAGNOSIS — M54.16 LUMBAR RADICULOPATHY: Primary | ICD-10-CM

## 2025-08-29 PROCEDURE — 62323 NJX INTERLAMINAR LMBR/SAC: CPT | Mod: ,,, | Performed by: ANESTHESIOLOGY

## 2025-08-29 PROCEDURE — 62323 NJX INTERLAMINAR LMBR/SAC: CPT | Performed by: ANESTHESIOLOGY

## 2025-08-29 PROCEDURE — 63600175 PHARM REV CODE 636 W HCPCS: Performed by: ANESTHESIOLOGY

## 2025-08-29 PROCEDURE — 25500020 PHARM REV CODE 255: Performed by: ANESTHESIOLOGY

## 2025-08-29 RX ORDER — MIDAZOLAM HYDROCHLORIDE 1 MG/ML
INJECTION INTRAMUSCULAR; INTRAVENOUS
Status: DISCONTINUED | OUTPATIENT
Start: 2025-08-29 | End: 2025-08-29 | Stop reason: HOSPADM

## 2025-08-29 RX ORDER — FENTANYL CITRATE 50 UG/ML
INJECTION, SOLUTION INTRAMUSCULAR; INTRAVENOUS
Status: DISCONTINUED | OUTPATIENT
Start: 2025-08-29 | End: 2025-08-29 | Stop reason: HOSPADM

## 2025-08-29 RX ORDER — DEXAMETHASONE SODIUM PHOSPHATE 10 MG/ML
INJECTION, SOLUTION INTRA-ARTICULAR; INTRALESIONAL; INTRAMUSCULAR; INTRAVENOUS; SOFT TISSUE
Status: DISCONTINUED | OUTPATIENT
Start: 2025-08-29 | End: 2025-08-29 | Stop reason: HOSPADM

## 2025-08-29 RX ORDER — LIDOCAINE HYDROCHLORIDE 10 MG/ML
INJECTION, SOLUTION EPIDURAL; INFILTRATION; INTRACAUDAL; PERINEURAL
Status: DISCONTINUED | OUTPATIENT
Start: 2025-08-29 | End: 2025-08-29 | Stop reason: HOSPADM

## 2025-08-29 RX ORDER — SODIUM CHLORIDE 9 MG/ML
INJECTION, SOLUTION INTRAVENOUS CONTINUOUS
Status: DISCONTINUED | OUTPATIENT
Start: 2025-08-29 | End: 2025-08-29 | Stop reason: HOSPADM

## 2025-08-29 RX ORDER — LIDOCAINE HYDROCHLORIDE 20 MG/ML
INJECTION, SOLUTION INFILTRATION; PERINEURAL
Status: DISCONTINUED | OUTPATIENT
Start: 2025-08-29 | End: 2025-08-29 | Stop reason: HOSPADM

## (undated) DEVICE — SYR B-D DISP CONTROL 10CC100/C

## (undated) DEVICE — GLOVE BIOGEL PI MICRO INDIC 7

## (undated) DEVICE — BANDAGE ELASTIC 2X5 VELCRO ST

## (undated) DEVICE — GLOVE BIOGEL ECLIPSE SZ 7

## (undated) DEVICE — BLADE SURG STAINLESS STEEL #15

## (undated) DEVICE — TOURNIQUET SB QC DP 18X4IN

## (undated) DEVICE — NDL ECLIPSE SAFETY 23G 1.5IN

## (undated) DEVICE — PACK UPPER EXTREMITY BAPTIST

## (undated) DEVICE — DRESSING LEUKOPLAST FLEX 1X3IN

## (undated) DEVICE — CORD BIPOLAR 12 FOOT

## (undated) DEVICE — SEE MEDLINE ITEM 146322

## (undated) DEVICE — NDL SAFETY 22G X 1.5 ECLIPSE

## (undated) DEVICE — NDL HYPO STD REG BVL 18GX1.5IN

## (undated) DEVICE — DRAPE SURG W/TWL 17 5/8X23

## (undated) DEVICE — BANDAGE MATRIX HK LOOP 4IN 5YD

## (undated) DEVICE — SUT MONOCRYL PLUS 4-0 P3

## (undated) DEVICE — SPONGE COTTON TRAY 4X4IN

## (undated) DEVICE — GOWN NONREINF SET-IN SLV XL

## (undated) DEVICE — DRESSING GAUZE 6PLY 4X4

## (undated) DEVICE — APPLICATOR CHLORAPREP ORN 26ML

## (undated) DEVICE — GAUZE SPONGE 4X4 12PLY

## (undated) DEVICE — UNDERPAD ULTRASORB 300LB 30X36

## (undated) DEVICE — DRAPE STERI-DRAPE 1000 17X11IN

## (undated) DEVICE — PAD UNDERPAD 30X30

## (undated) DEVICE — FORCEP STRAIGHT DISP

## (undated) DEVICE — SEE MEDLINE ITEM 146268

## (undated) DEVICE — DRESSING N ADH OIL EMUL 3X3

## (undated) DEVICE — SOL PVP-I SCRUB 7.5% 4OZ

## (undated) DEVICE — NDL 18GA X1 1/2 REG BEVEL

## (undated) DEVICE — SUT 4/0 18IN ETHILON BL P3

## (undated) DEVICE — PAD CAST SPECIALIST STRL 4

## (undated) DEVICE — PADDING CAST SOF-ROL 2X4YD

## (undated) DEVICE — BANDAGE GAUZE 6PLY FLUFF 2X3

## (undated) DEVICE — SOL POVIDONE SCRUB IODINE 4 OZ